# Patient Record
Sex: FEMALE | Race: WHITE | Employment: OTHER | URBAN - METROPOLITAN AREA
[De-identification: names, ages, dates, MRNs, and addresses within clinical notes are randomized per-mention and may not be internally consistent; named-entity substitution may affect disease eponyms.]

---

## 2017-01-05 ENCOUNTER — OFFICE VISIT (OUTPATIENT)
Dept: INTERNAL MEDICINE | Facility: CLINIC | Age: 82
End: 2017-01-05
Payer: COMMERCIAL

## 2017-01-05 VITALS
TEMPERATURE: 97.7 F | OXYGEN SATURATION: 97 % | BODY MASS INDEX: 17.67 KG/M2 | WEIGHT: 90 LBS | HEART RATE: 110 BPM | DIASTOLIC BLOOD PRESSURE: 70 MMHG | SYSTOLIC BLOOD PRESSURE: 140 MMHG | HEIGHT: 60 IN

## 2017-01-05 DIAGNOSIS — M25.50 MULTIPLE JOINT PAIN: Primary | ICD-10-CM

## 2017-01-05 LAB — ERYTHROCYTE [SEDIMENTATION RATE] IN BLOOD BY WESTERGREN METHOD: 60 MM/H (ref 0–30)

## 2017-01-05 PROCEDURE — 99000 SPECIMEN HANDLING OFFICE-LAB: CPT | Performed by: INTERNAL MEDICINE

## 2017-01-05 PROCEDURE — 85652 RBC SED RATE AUTOMATED: CPT | Performed by: INTERNAL MEDICINE

## 2017-01-05 PROCEDURE — 86431 RHEUMATOID FACTOR QUANT: CPT | Mod: 90 | Performed by: INTERNAL MEDICINE

## 2017-01-05 PROCEDURE — 36415 COLL VENOUS BLD VENIPUNCTURE: CPT | Performed by: INTERNAL MEDICINE

## 2017-01-05 PROCEDURE — 86038 ANTINUCLEAR ANTIBODIES: CPT | Mod: 90 | Performed by: INTERNAL MEDICINE

## 2017-01-05 PROCEDURE — 99214 OFFICE O/P EST MOD 30 MIN: CPT | Performed by: INTERNAL MEDICINE

## 2017-01-05 PROCEDURE — 86200 CCP ANTIBODY: CPT | Mod: 90 | Performed by: INTERNAL MEDICINE

## 2017-01-05 RX ORDER — PREDNISONE 1 MG/1
TABLET ORAL
Qty: 63 TABLET | Refills: 0 | Status: SHIPPED
Start: 2017-01-05 | End: 2017-02-06

## 2017-01-05 NOTE — NURSING NOTE
Chief Complaint   Patient presents with     Recheck Medication       Initial /70 mmHg  Pulse 110  Temp(Src) 97.7  F (36.5  C) (Oral)  Ht 5' (1.524 m)  Wt 90 lb (40.824 kg)  BMI 17.58 kg/m2  SpO2 97% Estimated body mass index is 17.58 kg/(m^2) as calculated from the following:    Height as of this encounter: 5' (1.524 m).    Weight as of this encounter: 90 lb (40.824 kg).  BP completed using cuff size: regular

## 2017-01-05 NOTE — MR AVS SNAPSHOT
"              After Visit Summary   2017    Ana Cristina Dominguez    MRN: [id number]           Patient Information     Date Of Birth          1931        Visit Information        Provider Department      2017 9:40 AM Alexsandra Davison MD [facility]        Today's Diagnoses     Multiple joint pain    -  1        Follow-ups after your visit        Who to contact     If you have questions or need follow up information about today's clinic visit or your schedule please contact [facility] directly at [phone].  Normal or non-critical lab and imaging results will be communicated to you by Spinomixhart, letter or phone within 4 business days after the clinic has received the results. If you do not hear from us within 7 days, please contact the clinic through Spinomixhart or phone. If you have a critical or abnormal lab result, we will notify you by phone as soon as possible.  Submit refill requests through [email or web address] or call your pharmacy and they will forward the refill request to us. Please allow 3 business days for your refill to be completed.          Additional Information About Your Visit        MyChart Information     [email or web address] lets you send messages to your doctor, view your test results, renew your prescriptions, schedule appointments and more. To sign up, go to [email or web address] . Click on \"Log in\" on the left side of the screen, which will take you to the Welcome page. Then click on \"Sign up Now\" on the right side of the page.     You will be asked to enter the access code listed below, as well as some personal information. Please follow the directions to create your username and password.     Your access code is: [email or web address]  Expires: 2017 10:04 AM     Your access code will  in 90 days. If you need help or a new code, please call your [facility] or [phone].        Care EveryWhere ID     This is your Care EveryWhere ID. This could be used by other " organizations to access your [place] medical records  [id number]        Your Vitals Were     Pulse Temperature Height BMI (Body Mass Index) Pulse Oximetry       110 97.7  F (36.5  C) (Oral) 5' (1.524 m) 17.58 kg/m2 97%        Blood Pressure from Last 3 Encounters:   01/05/17 140/70   12/06/16 156/72   11/08/16 130/50    Weight from Last 3 Encounters:   01/05/17 90 lb (40.824 kg)   12/06/16 90 lb 9.6 oz (41.096 kg)   11/08/16 89 lb 1.6 oz (40.415 kg)              We Performed the Following     SHIRLEY Screen IFA ([facility])     Cyclic Citrullinated Peptide Antibody IgG     ESR: Erythrocyte sedimentation rate     Rheumatoid factor          Today's Medication Changes          These changes are accurate as of: 1/5/17 10:04 AM.  If you have any questions, ask your nurse or doctor.               Start taking these medicines.        Dose/Directions    predniSONE 1 MG tablet   Commonly known as:  DELTASONE   Used for:  Multiple joint pain   Started by:  Alexsandra Davison MD        4 tabs po bid for 3 days, then 3 tabs po bid for 3 days, then 2 tabs po bid for 3 days then 1 tab po bid for 3 days, then 1 po daily for 3 days then stop.   Quantity:  63 tablet   Refills:  0            Where to get your medicines      These medications were sent to [facility]/pharmacy #[id number] - [place], [place] - [address], [place] [address]     Phone:  [phone]    - predniSONE 1 MG tablet             Primary Care Provider Office Phone # Fax #    Alexsandra Davison [phone]       [place] [address]  [facility] [address]        Thank you!     Thank you for choosing [facility]  for your care. Our goal is always to provide you with excellent care. Hearing back from our patients is one way we can continue to improve our services. Please take a few minutes to complete the written survey that you may receive in the mail after your visit with us. Thank you!             Your  Updated Medication List - Protect others around you: Learn how to safely use, store and throw away your medicines at www.disposemymeds.org.          This list is accurate as of: 1/5/17 10:05 AM.  Always use your most recent med list.                   Brand Name Dispense Instructions for use    ascorbic acid 500 MG tablet    VITAMIN C    100    1 TABLET DAILY       CALCIUM 600 + D 600-200 MG-UNIT Tabs      2 qd       diltiazem 120 MG Cp12 12 hr SR capsule    CARDIZEM SR    180 capsule    Take 120 mg by mouth 2 times daily       metoprolol 50 MG 24 hr tablet    TOPROL-XL    135 tablet    Take 1.5 tablets (75 mg) by mouth daily       MULTIVITAMIN TABS   OR      1 qd       olopatadine 0.1 % ophthalmic solution    PATANOL     Place 1 drop into both eyes 2 times daily as needed for allergies During Spring       predniSONE 1 MG tablet    DELTASONE    63 tablet    4 tabs po bid for 3 days, then 3 tabs po bid for 3 days, then 2 tabs po bid for 3 days then 1 tab po bid for 3 days, then 1 po daily for 3 days then stop.       rivaroxaban ANTICOAGULANT 20 MG Tabs tablet    XARELTO    90 tablet    Take 1 tablet (20 mg) by mouth daily (with dinner)       rOPINIRole 0.5 MG tablet    REQUIP    30 tablet    1/2 po qhs x 5-7 days, then 1 tablet       vitamin E 400 UNIT capsule     100    ONE CAP PO QD

## 2017-01-05 NOTE — PROGRESS NOTES
SUBJECTIVE:                                                    Ana Cristina Dominguez is a 85 year old female who presents to clinic today for the following health issues:    Joint pains: after her last visit with me with hand swelling, she saw Dr. Antonio from ortho and was put on a medrol dose pack. Within a day or so of finishing she started to have significant pain in feet, ankles, knees with swelling in the ankles. She thought it was due to the diltiazem started in October so stopped it. She then started on prednisone 5 mg bid she had from her allergist. The joint pains seemed to resolve. She ran out with last dose yesterday am and today she is starting to have significant pain in her toes again.   She is concerned this is rheumatoid arthritis after speaking with a friend who has it.   She was not having any issues other than her hands until after the medrol.   She went back on the diltiazem without worsening.     Patient Active Problem List   Diagnosis     Fracture, pelvis closed (H)     HYPERLIPIDEMIA LDL GOAL <160     Achilles bursitis or tendinitis     Senile osteoporosis     RLS (restless legs syndrome)     Abnormal blood sugar     Atrial fibrillation (H)     HTN (hypertension)     Diarrhea     Current Outpatient Prescriptions   Medication Sig Dispense Refill     diltiazem (CARDIZEM SR) 120 MG CP12 12 hr SR capsule Take 120 mg by mouth 2 times daily 180 capsule 3     rivaroxaban ANTICOAGULANT (XARELTO) 20 MG TABS tablet Take 1 tablet (20 mg) by mouth daily (with dinner) 90 tablet 1     metoprolol (TOPROL-XL) 50 MG 24 hr tablet Take 1.5 tablets (75 mg) by mouth daily 135 tablet 3     VITAMIN E 400 UNIT OR CAPS ONE CAP PO  0     VITAMIN C 500 MG OR TABS 1 TABLET DAILY 100 0     MULTIVITAMIN TABS   OR 1 qd       CALCIUM 600 + D 600-200 MG-IU OR TABS 2 qd       olopatadine (PATANOL) 0.1 % ophthalmic solution Place 1 drop into both eyes 2 times daily as needed for allergies During Spring       rOPINIRole  (REQUIP) 0.5 MG tablet 1/2 po qhs x 5-7 days, then 1 tablet 30 tablet 5      Social History   Substance Use Topics     Smoking status: Former Smoker     Quit date: 05/06/1973     Smokeless tobacco: Never Used     Alcohol Use: Yes      Comment: Socially          ROS:  No fever, chills    OBJECTIVE:                                                    /70 mmHg  Pulse 110  Temp(Src) 97.7  F (36.5  C) (Oral)  Ht 5' (1.524 m)  Wt 90 lb (40.824 kg)  BMI 17.58 kg/m2  SpO2 97%  Body mass index is 17.58 kg/(m^2).    Hand joints: no effusions or synovitis     ASSESSMENT/PLAN:                                                            1. Multiple joint pain  Advised that likely her acute joint symptoms were due to stopping the steroid and that exam is not suggestive of RA but will check labs. Will restart prednisone low dose and wean gradually to reduce joint flare.  - Rheumatoid factor  - Cyclic Citrullinated Peptide Antibody IgG  - ESR: Erythrocyte sedimentation rate  - predniSONE (DELTASONE) 1 MG tablet; 4 tabs po bid for 3 days, then 3 tabs po bid for 3 days, then 2 tabs po bid for 3 days then 1 tab po bid for 3 days, then 1 po daily for 3 days then stop.  Dispense: 63 tablet; Refill: 0  - Antinuclear antibody screen by EIA        Alexsandra Davison MD  [facility]    25 minutes spent with the patient, >50% of time spent counseling about RA versus osteoarthritis, steroid side effects.

## 2017-01-06 LAB
ANA SER QL IA: NORMAL
CCP AB SER IA-ACNC: 1 U/ML
RHEUMATOID FACT SER NEPH-ACNC: <20 IU/ML (ref 0–20)

## 2017-01-23 ENCOUNTER — TELEPHONE (OUTPATIENT)
Dept: INTERNAL MEDICINE | Facility: CLINIC | Age: 82
End: 2017-01-23

## 2017-01-23 DIAGNOSIS — M25.50 PAIN IN JOINT, MULTIPLE SITES: Primary | ICD-10-CM

## 2017-01-23 NOTE — TELEPHONE ENCOUNTER
Pt walked into clinic today wanting to know what the next step is as she is having inflammation in Rt hand and bilat ankles, feels worse after completing the medrol lilly, difficulty putting on shoes and socks, hurts to carry anything  Taking tylenol rates pain 8/10 in am once gets going 3-4 during day.   Spoke with PCP recommend appt w/ortho. For F/U  appt scheduled for [facility] hand specialist Jan 30 8:30 BV Dr. Parisi.  Pt declined appt at [facility] d/t driving over the rvr.  Alia Sandhu RN

## 2017-02-03 ENCOUNTER — OFFICE VISIT (OUTPATIENT)
Dept: INTERNAL MEDICINE | Facility: CLINIC | Age: 82
End: 2017-02-03
Payer: COMMERCIAL

## 2017-02-03 VITALS
DIASTOLIC BLOOD PRESSURE: 72 MMHG | OXYGEN SATURATION: 97 % | TEMPERATURE: 97.5 F | RESPIRATION RATE: 16 BRPM | WEIGHT: 91.5 LBS | BODY MASS INDEX: 17.96 KG/M2 | HEART RATE: 74 BPM | HEIGHT: 60 IN | SYSTOLIC BLOOD PRESSURE: 176 MMHG

## 2017-02-03 DIAGNOSIS — M19.041 PRIMARY OSTEOARTHRITIS OF BOTH HANDS: ICD-10-CM

## 2017-02-03 DIAGNOSIS — I10 ESSENTIAL HYPERTENSION: ICD-10-CM

## 2017-02-03 DIAGNOSIS — R60.9 EDEMA, UNSPECIFIED TYPE: Primary | ICD-10-CM

## 2017-02-03 DIAGNOSIS — M19.042 PRIMARY OSTEOARTHRITIS OF BOTH HANDS: ICD-10-CM

## 2017-02-03 PROCEDURE — 82040 ASSAY OF SERUM ALBUMIN: CPT | Performed by: INTERNAL MEDICINE

## 2017-02-03 PROCEDURE — 80048 BASIC METABOLIC PNL TOTAL CA: CPT | Performed by: INTERNAL MEDICINE

## 2017-02-03 PROCEDURE — 99214 OFFICE O/P EST MOD 30 MIN: CPT | Performed by: INTERNAL MEDICINE

## 2017-02-03 PROCEDURE — 36415 COLL VENOUS BLD VENIPUNCTURE: CPT | Performed by: INTERNAL MEDICINE

## 2017-02-03 NOTE — NURSING NOTE
Chief Complaint   Patient presents with     RECHECK     f/u from swelling       Initial /72 mmHg  Pulse 74  Temp(Src) 97.5  F (36.4  C) (Oral)  Resp 16  Ht 5' (1.524 m)  Wt 91 lb 8 oz (41.504 kg)  BMI 17.87 kg/m2  SpO2 97% Estimated body mass index is 17.87 kg/(m^2) as calculated from the following:    Height as of this encounter: 5' (1.524 m).    Weight as of this encounter: 91 lb 8 oz (41.504 kg).  BP completed using cuff size: giuseppe Chambers, CMA

## 2017-02-03 NOTE — PROGRESS NOTES
SUBJECTIVE:                                                    Ana Cristina Dominguez is a 85 year old female who presents to clinic today for the following health issues:      Edema: bilateral lower leg edema started about 2-3 weeks ago, some time after her last visit with me on 1/5. There is no pain but they are uncomfortable and it is difficult to wear shoes. They do not go down much with elevation. It has been gradually increasing.  She had some edema before last visit that had resolved. This is worse.   She was on prednisone for her hand joints a while. It helped a lot. She was told by Dr. Parisi she could stay on low dose. He agreed her symptoms were consistent with osteoarthritis but suggested possible rheumatology referral per her history. Her knees and ankles are improved also.         Patient Active Problem List   Diagnosis     Fracture, pelvis closed (H)     HYPERLIPIDEMIA LDL GOAL <160     Achilles bursitis or tendinitis     Senile osteoporosis     RLS (restless legs syndrome)     Abnormal blood sugar     Atrial fibrillation (H)     HTN (hypertension)     Diarrhea     Current Outpatient Prescriptions   Medication Sig Dispense Refill     diltiazem (CARDIZEM SR) 120 MG CP12 12 hr SR capsule Take 120 mg by mouth 2 times daily 180 capsule 3     rivaroxaban ANTICOAGULANT (XARELTO) 20 MG TABS tablet Take 1 tablet (20 mg) by mouth daily (with dinner) 90 tablet 1     metoprolol (TOPROL-XL) 50 MG 24 hr tablet Take 1.5 tablets (75 mg) by mouth daily 135 tablet 3     VITAMIN E 400 UNIT OR CAPS ONE CAP PO  0     VITAMIN C 500 MG OR TABS 1 TABLET DAILY 100 0     MULTIVITAMIN TABS   OR 1 qd       CALCIUM 600 + D 600-200 MG-IU OR TABS 2 qd       predniSONE (DELTASONE) 1 MG tablet 4 tabs po bid for 3 days, then 3 tabs po bid for 3 days, then 2 tabs po bid for 3 days then 1 tab po bid for 3 days, then 1 po daily for 3 days then stop. 63 tablet 0     olopatadine (PATANOL) 0.1 % ophthalmic solution Place 1 drop  into both eyes 2 times daily as needed for allergies During Spring       rOPINIRole (REQUIP) 0.5 MG tablet 1/2 po qhs x 5-7 days, then 1 tablet 30 tablet 5        ROS:  No fever, chills, leg pain, chest pain, dyspnea, palpitations or tachycardia, PND, orthopnea, abdominal bloating.     OBJECTIVE:                                                    /72 mmHg  Pulse 74  Temp(Src) 97.5  F (36.4  C) (Oral)  Resp 16  Ht 5' (1.524 m)  Wt 91 lb 8 oz (41.504 kg)  BMI 17.87 kg/m2  SpO2 97%  Body mass index is 17.87 kg/(m^2).    CV: normal S1, S2 with 3/6 ZORAIDA, no JVD  Lungs: clear  Abdomen: Bowel sounds normal, soft, nontender. No hepatosplenomegaly. No masses.   Legs: 2-3+ edema, extends almost to knees, no calf tenderness        ASSESSMENT/PLAN:                                                            1. Edema, unspecified type  Discussed at length that this may be related to fluid retention due to steroids, venous insufficiency. It could be change in renal status with elevated BP. Advised the edema is not due to her joint symptoms. Check labs, then restart lasix and consider abdominal imaging to rule out obstruction  - Basic metabolic panel  - Albumin level    2. Joint pains: improved, strongly recommend against chronic prednisone due to osteoporosis with high risk of fracture, edema issues and HTN.     3. HTN; check labs, may be stress related, will need to recheck soon.    Alexsandra Davison MD  [facility]

## 2017-02-03 NOTE — MR AVS SNAPSHOT
"              After Visit Summary   2/3/2017    Ana Cristina Dominguez    MRN: [id number]           Patient Information     Date Of Birth          1931        Visit Information        Provider Department      2/3/2017 2:20 PM Alexsandra Davison MD [facility]        Today's Diagnoses     Edema, unspecified type    -  1        Follow-ups after your visit        Who to contact     If you have questions or need follow up information about today's clinic visit or your schedule please contact [facility] directly at [phone].  Normal or non-critical lab and imaging results will be communicated to you by Biz360hart, letter or phone within 4 business days after the clinic has received the results. If you do not hear from us within 7 days, please contact the clinic through Biz360hart or phone. If you have a critical or abnormal lab result, we will notify you by phone as soon as possible.  Submit refill requests through Rip van Wafels or call your pharmacy and they will forward the refill request to us. Please allow 3 business days for your refill to be completed.          Additional Information About Your Visit        MyChart Information     Rip van Wafels lets you send messages to your doctor, view your test results, renew your prescriptions, schedule appointments and more. To sign up, go to [email or web address] . Click on \"Log in\" on the left side of the screen, which will take you to the Welcome page. Then click on \"Sign up Now\" on the right side of the page.     You will be asked to enter the access code listed below, as well as some personal information. Please follow the directions to create your username and password.     Your access code is: [email or web address]  Expires: 2017 10:04 AM     Your access code will  in 90 days. If you need help or a new code, please call your [facility] or [phone].        Care EveryWhere ID     This is your Care EveryWhere ID. This could be used by other " organizations to access your [place] medical records  [id number]        Your Vitals Were     Pulse Temperature Respirations Height BMI (Body Mass Index) Pulse Oximetry    74 97.5  F (36.4  C) (Oral) 16 5' (1.524 m) 17.87 kg/m2 97%       Blood Pressure from Last 3 Encounters:   02/03/17 176/72   01/05/17 140/70   12/06/16 156/72    Weight from Last 3 Encounters:   02/03/17 91 lb 8 oz (41.504 kg)   01/05/17 90 lb (40.824 kg)   12/06/16 90 lb 9.6 oz (41.096 kg)              We Performed the Following     Albumin level     Basic metabolic panel        Primary Care Provider Office Phone # Fax #    Alexsandra Davison [phone]       [facility] [address] [facility] [address]  [facility] [address]        Thank you!     Thank you for choosing [facility]  for your care. Our goal is always to provide you with excellent care. Hearing back from our patients is one way we can continue to improve our services. Please take a few minutes to complete the written survey that you may receive in the mail after your visit with us. Thank you!             Your Updated Medication List - Protect others around you: Learn how to safely use, store and throw away your medicines at www.disposemymeds.org.          This list is accurate as of: 2/3/17  3:06 PM.  Always use your most recent med list.                   Brand Name Dispense Instructions for use    ascorbic acid 500 MG tablet    VITAMIN C    100    1 TABLET DAILY       CALCIUM 600 + D 600-200 MG-UNIT Tabs      2 qd       diltiazem 120 MG Cp12 12 hr SR capsule    CARDIZEM SR    180 capsule    Take 120 mg by mouth 2 times daily       metoprolol 50 MG 24 hr tablet    TOPROL-XL    135 tablet    Take 1.5 tablets (75 mg) by mouth daily       MULTIVITAMIN TABS   OR      1 qd       olopatadine 0.1 % ophthalmic solution    PATANOL     Place 1 drop into both eyes 2 times daily as needed for allergies During Spring       predniSONE 1 MG tablet    DELTASONE    63  tablet    4 tabs po bid for 3 days, then 3 tabs po bid for 3 days, then 2 tabs po bid for 3 days then 1 tab po bid for 3 days, then 1 po daily for 3 days then stop.       rivaroxaban ANTICOAGULANT 20 MG Tabs tablet    XARELTO    90 tablet    Take 1 tablet (20 mg) by mouth daily (with dinner)       rOPINIRole 0.5 MG tablet    REQUIP    30 tablet    1/2 po qhs x 5-7 days, then 1 tablet       vitamin E 400 UNIT capsule     100    ONE CAP PO QD

## 2017-02-04 LAB
ALBUMIN SERPL-MCNC: 3.3 G/DL (ref 3.4–5)
ANION GAP SERPL CALCULATED.3IONS-SCNC: 7 MMOL/L (ref 3–14)
BUN SERPL-MCNC: 21 MG/DL (ref 7–30)
CALCIUM SERPL-MCNC: 9.5 MG/DL (ref 8.5–10.1)
CHLORIDE SERPL-SCNC: 96 MMOL/L (ref 94–109)
CO2 SERPL-SCNC: 31 MMOL/L (ref 20–32)
CREAT SERPL-MCNC: 0.84 MG/DL (ref 0.52–1.04)
GFR SERPL CREATININE-BSD FRML MDRD: 64 ML/MIN/1.7M2
GLUCOSE SERPL-MCNC: 93 MG/DL (ref 70–99)
POTASSIUM SERPL-SCNC: 4.1 MMOL/L (ref 3.4–5.3)
SODIUM SERPL-SCNC: 134 MMOL/L (ref 133–144)

## 2017-02-06 ENCOUNTER — TELEPHONE (OUTPATIENT)
Dept: INTERNAL MEDICINE | Facility: CLINIC | Age: 82
End: 2017-02-06

## 2017-02-06 NOTE — TELEPHONE ENCOUNTER
Please call and advise her kidney tests are normal, protein stable. Ask if any changes in legs. She can go back on the lasix 20 mg daily. I will probably order US abdomen next.

## 2017-02-06 NOTE — TELEPHONE ENCOUNTER
Spoke with pt and let her know message below.  She will call us back if no or little change in edema, she says it's about the same currently. She is going to start the Lasix.

## 2017-02-20 ENCOUNTER — OFFICE VISIT (OUTPATIENT)
Dept: INTERNAL MEDICINE | Facility: CLINIC | Age: 82
End: 2017-02-20
Payer: COMMERCIAL

## 2017-02-20 VITALS
HEIGHT: 60 IN | SYSTOLIC BLOOD PRESSURE: 150 MMHG | WEIGHT: 90.1 LBS | TEMPERATURE: 97.8 F | OXYGEN SATURATION: 97 % | RESPIRATION RATE: 16 BRPM | HEART RATE: 86 BPM | DIASTOLIC BLOOD PRESSURE: 71 MMHG | BODY MASS INDEX: 17.69 KG/M2

## 2017-02-20 DIAGNOSIS — R60.9 EDEMA, UNSPECIFIED TYPE: Primary | ICD-10-CM

## 2017-02-20 DIAGNOSIS — I48.20 CHRONIC ATRIAL FIBRILLATION (H): ICD-10-CM

## 2017-02-20 PROCEDURE — 99214 OFFICE O/P EST MOD 30 MIN: CPT | Performed by: INTERNAL MEDICINE

## 2017-02-20 RX ORDER — FUROSEMIDE 20 MG
40 TABLET ORAL DAILY
Qty: 60 TABLET | Refills: 5 | Status: SHIPPED | OUTPATIENT
Start: 2017-02-20 | End: 2017-04-07

## 2017-02-20 NOTE — MR AVS SNAPSHOT
"              After Visit Summary   2/20/2017    Ana Cristina Dominguez    MRN: [id number]           Patient Information     Date Of Birth          6/6/1931        Visit Information        Provider Department      2/20/2017 2:20 PM Alexsandra Davison MD [facility]        Today's Diagnoses     Edema, unspecified type    -  1    Chronic atrial fibrillation (H)          Care Instructions    Take 2 of the furosemide but try later in the morning.   Do not take the diltiazem for up to a week to see if the swelling improves.   Take metoprolol 2 tablets daily while off the diltiazem.                Follow-ups after your visit        Who to contact     If you have questions or need follow up information about today's clinic visit or your schedule please contact [facility] directly at [phone].  Normal or non-critical lab and imaging results will be communicated to you by MyChart, letter or phone within 4 business days after the clinic has received the results. If you do not hear from us within 7 days, please contact the clinic through MyChart or phone. If you have a critical or abnormal lab result, we will notify you by phone as soon as possible.  Submit refill requests through Funderbeam or call your pharmacy and they will forward the refill request to us. Please allow 3 business days for your refill to be completed.          Additional Information About Your Visit        MyChart Information     Funderbeam lets you send messages to your doctor, view your test results, renew your prescriptions, schedule appointments and more. To sign up, go to [email or web address] . Click on \"Log in\" on the left side of the screen, which will take you to the Welcome page. Then click on \"Sign up Now\" on the right side of the page.     You will be asked to enter the access code listed below, as well as some personal information. Please follow the directions to create your username and password.     Your access code " is: [email or web address]  Expires: 2017 10:04 AM     Your access code will  in 90 days. If you need help or a new code, please call your [facility] or [phone].        Care EveryWhere ID     This is your Care EveryWhere ID. This could be used by other organizations to access your [place] medical records  [id number]        Your Vitals Were     Pulse Temperature Respirations Height Pulse Oximetry BMI (Body Mass Index)    86 97.8  F (36.6  C) (Oral) 16 5' (1.524 m) 97% 17.6 kg/m2       Blood Pressure from Last 3 Encounters:   17 150/71   17 176/72   17 140/70    Weight from Last 3 Encounters:   17 90 lb 1.6 oz (40.9 kg)   17 91 lb 8 oz (41.5 kg)   17 90 lb (40.8 kg)              Today, you had the following     No orders found for display         Today's Medication Changes          These changes are accurate as of: 17  2:52 PM.  If you have any questions, ask your nurse or doctor.               Start taking these medicines.        Dose/Directions    furosemide 20 MG tablet   Commonly known as:  LASIX   Used for:  Edema, unspecified type   Started by:  Alexsandra Davison MD        Dose:  40 mg   Take 2 tablets (40 mg) by mouth daily   Quantity:  60 tablet   Refills:  5            Where to get your medicines      These medications were sent to [facility]/pharmacy #[id number] - [place], [place] - [address], [place] [address]     Phone:  [phone]     furosemide 20 MG tablet    rivaroxaban ANTICOAGULANT 20 MG Tabs tablet                Primary Care Provider Office Phone # Fax #    Alexsandra Davison [phone]       [facility] [address]  [facility] [address]        Thank you!     Thank you for choosing [facility]  for your care. Our goal is always to provide you with excellent care. Hearing back from our patients is one way we can continue to improve our services. Please take a few minutes to  complete the written survey that you may receive in the mail after your visit with us. Thank you!             Your Updated Medication List - Protect others around you: Learn how to safely use, store and throw away your medicines at www.disposemymeds.org.          This list is accurate as of: 2/20/17  2:52 PM.  Always use your most recent med list.                   Brand Name Dispense Instructions for use    ascorbic acid 500 MG tablet    VITAMIN C    100    1 TABLET DAILY       CALCIUM 600 + D 600-200 MG-UNIT Tabs      2 qd       diltiazem 120 MG Cp12 12 hr SR capsule    CARDIZEM SR    180 capsule    Take 120 mg by mouth 2 times daily       furosemide 20 MG tablet    LASIX    60 tablet    Take 2 tablets (40 mg) by mouth daily       metoprolol 50 MG 24 hr tablet    TOPROL-XL    135 tablet    Take 1.5 tablets (75 mg) by mouth daily       MULTIVITAMIN TABS   OR      1 qd       olopatadine 0.1 % ophthalmic solution    PATANOL     Place 1 drop into both eyes 2 times daily as needed for allergies During Spring       rivaroxaban ANTICOAGULANT 20 MG Tabs tablet    XARELTO    30 tablet    Take 1 tablet (20 mg) by mouth daily (with dinner)       rOPINIRole 0.5 MG tablet    REQUIP    30 tablet    1/2 po qhs x 5-7 days, then 1 tablet       vitamin E 400 UNIT capsule     100    ONE CAP PO QD

## 2017-02-20 NOTE — PATIENT INSTRUCTIONS
Take 2 of the furosemide but try later in the morning.   Do not take the diltiazem for up to a week to see if the swelling improves.   Take metoprolol 2 tablets daily while off the diltiazem.

## 2017-02-20 NOTE — NURSING NOTE
Chief Complaint   Patient presents with     Edema     Swelling and in pain, arthritis pain       Initial /71  Pulse 86  Temp 97.8  F (36.6  C) (Oral)  Resp 16  Ht 5' (1.524 m)  Wt 90 lb 1.6 oz (40.9 kg)  SpO2 97%  BMI 17.6 kg/m2 Estimated body mass index is 17.6 kg/(m^2) as calculated from the following:    Height as of this encounter: 5' (1.524 m).    Weight as of this encounter: 90 lb 1.6 oz (40.9 kg).  Medication Reconciliation: complete      Oriana Chambers, CMA

## 2017-02-20 NOTE — PROGRESS NOTES
SUBJECTIVE:                                                    Ana Cristina Dominguez is a 85 year old female who presents to clinic today for the following health issues:      Follow-up edema: She restarted on Lasix 2 weeks ago at one tablet in the morning for 3 days. She had minimal results with that. She increased it to 2 tablets in the morning And it did seem to help a little bit more for several days, now has been more stable. She continues to have discomfort related to the edema and having difficulty wearing shoes.  She apparently did not get called to schedule the abdominal ultrasound.   She continues to have a difficult time understanding that her joint aches/osteoarthritis are not causing her edema.  She had been started on diltiazem in October. Her edema did not start for a while after that. She tried to hold it for about 3 days without much change. There are no other new medication changes, no over-the-counter medications.      Patient Active Problem List   Diagnosis     Fracture, pelvis closed (H)     HYPERLIPIDEMIA LDL GOAL <160     Achilles bursitis or tendinitis     Senile osteoporosis     RLS (restless legs syndrome)     Abnormal blood sugar     Atrial fibrillation (H)     HTN (hypertension)     Diarrhea     Current Outpatient Prescriptions   Medication Sig Dispense Refill     furosemide (LASIX) 20 MG tablet Take 2 tablets (40 mg) by mouth daily 60 tablet 5     rivaroxaban ANTICOAGULANT (XARELTO) 20 MG TABS tablet Take 1 tablet (20 mg) by mouth daily (with dinner) 30 tablet 11     diltiazem (CARDIZEM SR) 120 MG CP12 12 hr SR capsule Take 120 mg by mouth 2 times daily 180 capsule 3     olopatadine (PATANOL) 0.1 % ophthalmic solution Place 1 drop into both eyes 2 times daily as needed for allergies During Spring       metoprolol (TOPROL-XL) 50 MG 24 hr tablet Take 1.5 tablets (75 mg) by mouth daily 135 tablet 3     rOPINIRole (REQUIP) 0.5 MG tablet 1/2 po qhs x 5-7 days, then 1 tablet 30 tablet 5      VITAMIN E 400 UNIT OR CAPS ONE CAP PO  0     VITAMIN C 500 MG OR TABS 1 TABLET DAILY 100 0     MULTIVITAMIN TABS   OR 1 qd       CALCIUM 600 + D 600-200 MG-IU OR TABS 2 qd       [DISCONTINUED] rivaroxaban ANTICOAGULANT (XARELTO) 20 MG TABS tablet Take 1 tablet (20 mg) by mouth daily (with dinner) 90 tablet 1      Social History   Substance Use Topics     Smoking status: Former Smoker     Quit date: 5/6/1973     Smokeless tobacco: Never Used     Alcohol use Yes      Comment: Socially        ROS:  No abdominal pain, cough, shortness of breath, PND, orthopnea. She has minimal ankle joint soreness. She has moderate knee joint soreness.    OBJECTIVE:                                                    /71  Pulse 86  Temp 97.8  F (36.6  C) (Oral)  Resp 16  Ht 5' (1.524 m)  Wt 90 lb 1.6 oz (40.9 kg)  SpO2 97%  BMI 17.6 kg/m2  Body mass index is 17.6 kg/(m^2).    Legs: 2+ edema without change  Ankle joints without effusions or tenderness        ASSESSMENT/PLAN:                                                            1. Edema, unspecified type  I explained to her again that her joint aches/osteoarthritis are not causing the edema. It is most likely related to vein insufficiency. It is possible the diltiazem as a factor, though I would've expected symptoms to started sooner after starting the medication. I did recommend though that she stopped taking the diltiazem for the time being, at least a week. Increase the metoprolol to 100 mg a day to help avoid any rapid A. Fib. Take her Lasix 40 mg mid to late morning. I will have the nurse call in 4 days for an update on her symptoms. Unfortunately is very difficult for her to wear support stockings and she cannot pull them up with herosteoarthritis.  - furosemide (LASIX) 20 MG tablet; Take 2 tablets (40 mg) by mouth daily  Dispense: 60 tablet; Refill: 5      Alexsandra Davison MD  [facility]    25 minutes spent with the patient, >50% of time spent  counseling About edema, venous insufficiency, osteoarthritis, the evaluation we have done so far

## 2017-02-24 ENCOUNTER — TELEPHONE (OUTPATIENT)
Dept: INTERNAL MEDICINE | Facility: CLINIC | Age: 82
End: 2017-02-24

## 2017-02-24 DIAGNOSIS — R60.9 EDEMA, UNSPECIFIED TYPE: Primary | ICD-10-CM

## 2017-02-24 DIAGNOSIS — M25.562 KNEE PAIN, BILATERAL: ICD-10-CM

## 2017-02-24 DIAGNOSIS — M25.561 KNEE PAIN, BILATERAL: ICD-10-CM

## 2017-02-24 NOTE — TELEPHONE ENCOUNTER
Call to pt. She would like to give it a few more days. It is slightly better.     Informed her that we will call her early next week.

## 2017-02-28 PROBLEM — R60.9 EDEMA, UNSPECIFIED TYPE: Status: ACTIVE | Noted: 2017-02-28

## 2017-02-28 NOTE — TELEPHONE ENCOUNTER
Does she think stopping the diltiazem has made any difference in the swelling?  I planned that she should continue the lasix 2 daily.   She should do lab to check potassium, kidneys in 1-2 weeks with this dose of lasix.   She should see ortho for the knee, Dr. Parisi is really hand so she would see someone else there, she can call or we can do through [employer].

## 2017-02-28 NOTE — TELEPHONE ENCOUNTER
Call to pt. She state there is a slight improvement.     She is really hobbling around. Her right knee is painful. Using Tylenol for the pain. She states it is swollen, but not worse than the left.     She states she doesn't need to use her walker.     She would like to use the Lasix for a few more days.     She is asking about xrays or office visit or specialist?

## 2017-03-01 ENCOUNTER — HOSPITAL ENCOUNTER (OUTPATIENT)
Dept: ULTRASOUND IMAGING | Facility: CLINIC | Age: 82
Discharge: HOME | End: 2017-03-01
Attending: INTERNAL MEDICINE | Admitting: INTERNAL MEDICINE
Payer: MEDICARE

## 2017-03-01 DIAGNOSIS — R60.9 EDEMA, UNSPECIFIED TYPE: ICD-10-CM

## 2017-03-01 PROCEDURE — 76705 ECHO EXAM OF ABDOMEN: CPT

## 2017-03-06 RX ORDER — BUMETANIDE 0.5 MG/1
0.5 TABLET ORAL
Qty: 60 TABLET | Refills: 1 | Status: SHIPPED | OUTPATIENT
Start: 2017-03-06 | End: 2017-05-13

## 2017-03-06 NOTE — TELEPHONE ENCOUNTER
Stop lasix and start bumex 1 tablet twice a day; first in am and the second around 1-2 pm. She should go back to Dr. Teixeira at [facility] for her knee.

## 2017-03-06 NOTE — TELEPHONE ENCOUNTER
Call to pt. States she does not think stopping the Diltiazen has helped at all. States her feet and knees are still quite swollen. States her legs are slightly better, but not her feet or knees. She has been taking 2 lasix daily and elevating as much as she can. When I advised pt that she needs labs done, she stated that she does not feel the lasix is doing any good so she is wondering why she is on it. She is also asking for a recommendation for who to see for her knee. Pt expressed a lot of frustration because the swelling is not getting any better, she doesn't know what to do and feels she is not getting much direction. Assured pt we would enter an order for an ortho referral for her knees and message would be sent to PCP about the swelling. Pt states she is getting depressed because she can not leave the house because her feet are so swollen that she can't get shoes on. Please advise.

## 2017-03-08 NOTE — TELEPHONE ENCOUNTER
Call to pt. Advised. She states Dr Teixeira works on hands. She wants to see Dr Aniceto Diaz.   She will  the Diuretic.     She wants us to schedule her with [facility] Dr Diaz.   Will call tomorrow.

## 2017-03-09 NOTE — TELEPHONE ENCOUNTER
Call to [facility] and scheduled OV with Dr Teixeira.   The  states Dr Teixeira mainly works on knees.   The  states they like to keep pts with the same provider.     Thursday, March 23rd. 10:30. Neli.     Call to pt and advised. She agrees.

## 2017-03-17 ENCOUNTER — TELEPHONE (OUTPATIENT)
Dept: INTERNAL MEDICINE | Facility: CLINIC | Age: 82
End: 2017-03-17

## 2017-03-17 NOTE — TELEPHONE ENCOUNTER
"Pt calling.  Has been off Diltiazem x 2 wks and taking Metoprolol 50mg 2 tabs daily.      Had to restart the Diltiazem today d/t felt her heart \"thumping and racing\" and was exhausted.    She will restart Diltiazem 120mg BID and cut her Metoprolol back to 1 1/2 tabs daily.    Anything further?    Please advise, thanks.  "

## 2017-03-20 NOTE — TELEPHONE ENCOUNTER
Attempted to contact pt to get update but no answer and voicemail not set up yet.    Will try again later.

## 2017-03-21 NOTE — TELEPHONE ENCOUNTER
Spoke to patient-    Pt has appt with Dr Antonio, Orthopedist on Thursday. Pt will wait to make appt with [facility] after she see's Dr Antonio. Pt reports that the swelling has gone down a bit more on her legs but her ankles and her feet are double the size from before. Pt continues to take her water pill and have been taking her Diltilazem with no side effects. Pt thinks she will continue to water pill for another week. Pt is using compression stockings that she bought from her local pharmacy and feels that it is not the best but insurance will not pay for nicer ones.     This CMA asked pt if there was anything wrong with her phone because we have a hard time reaching her. Pt stated that pt walks really slow due to the swelling and pain of her legs and feet, most of the time the phone will stop ringing by the time she gets to the phone. PT will try to call back but wait on hold for too long. No voicemail.

## 2017-03-27 ENCOUNTER — TELEPHONE (OUTPATIENT)
Dept: INTERNAL MEDICINE | Facility: CLINIC | Age: 82
End: 2017-03-27

## 2017-04-07 ENCOUNTER — OFFICE VISIT (OUTPATIENT)
Dept: INTERNAL MEDICINE | Facility: CLINIC | Age: 82
End: 2017-04-07
Payer: COMMERCIAL

## 2017-04-07 VITALS
HEART RATE: 92 BPM | BODY MASS INDEX: 17.85 KG/M2 | HEIGHT: 60 IN | WEIGHT: 90.9 LBS | DIASTOLIC BLOOD PRESSURE: 60 MMHG | SYSTOLIC BLOOD PRESSURE: 118 MMHG | OXYGEN SATURATION: 98 % | TEMPERATURE: 97.8 F

## 2017-04-07 DIAGNOSIS — R60.9 EDEMA, UNSPECIFIED TYPE: Primary | ICD-10-CM

## 2017-04-07 DIAGNOSIS — M25.561 PAIN IN BOTH KNEES, UNSPECIFIED CHRONICITY: ICD-10-CM

## 2017-04-07 DIAGNOSIS — M25.562 PAIN IN BOTH KNEES, UNSPECIFIED CHRONICITY: ICD-10-CM

## 2017-04-07 DIAGNOSIS — I48.20 CHRONIC ATRIAL FIBRILLATION (H): ICD-10-CM

## 2017-04-07 PROCEDURE — 99213 OFFICE O/P EST LOW 20 MIN: CPT | Performed by: INTERNAL MEDICINE

## 2017-04-07 NOTE — PROGRESS NOTES
SUBJECTIVE:                                                    Ana Cristina Dominguez is a 85 year old female who presents to clinic today for the following health issues:      1. Edema: she had improvement but decreased the diuretic back to one a day so worse again. No new symptoms.     2. Knee pain; she had injections and had decrease in swelling of her legs for about 6 days. Her knees continue to hurt.     3. Afib: started to have some tachycardia when holding diltiazem so restarted it. The edema was not changed at all when she held it.     Patient Active Problem List   Diagnosis     Fracture, pelvis closed (H)     HYPERLIPIDEMIA LDL GOAL <160     Achilles bursitis or tendinitis     Senile osteoporosis     RLS (restless legs syndrome)     Abnormal blood sugar     Atrial fibrillation (H)     HTN (hypertension)     Diarrhea     Edema, unspecified type     Current Outpatient Prescriptions   Medication Sig Dispense Refill     bumetanide (BUMEX) 0.5 MG tablet Take 1 tablet (0.5 mg) by mouth 2 times daily 60 tablet 1     rivaroxaban ANTICOAGULANT (XARELTO) 20 MG TABS tablet Take 1 tablet (20 mg) by mouth daily (with dinner) 30 tablet 11     diltiazem (CARDIZEM SR) 120 MG CP12 12 hr SR capsule Take 120 mg by mouth 2 times daily 180 capsule 3     olopatadine (PATANOL) 0.1 % ophthalmic solution Place 1 drop into both eyes 2 times daily as needed for allergies During Spring       metoprolol (TOPROL-XL) 50 MG 24 hr tablet Take 1.5 tablets (75 mg) by mouth daily 135 tablet 3     rOPINIRole (REQUIP) 0.5 MG tablet 1/2 po qhs x 5-7 days, then 1 tablet 30 tablet 5     VITAMIN E 400 UNIT OR CAPS ONE CAP PO  0     VITAMIN C 500 MG OR TABS 1 TABLET DAILY 100 0     MULTIVITAMIN TABS   OR 1 qd       CALCIUM 600 + D 600-200 MG-IU OR TABS 2 qd          Reviewed and updated as needed this visit by clinical staff  Tobacco  Allergies  Meds  Med Hx  Surg Hx  Fam Hx  Soc Hx      Reviewed and updated as needed this visit by  Provider         ROS:  No fever, chills, hand joints have not flared up, some soreness in ankles still without change. No PND, orthopnea, dyspnea on exertion, chest pains.     OBJECTIVE:                                                    /60  Pulse 92  Temp 97.8  F (36.6  C) (Oral)  Ht 5' (1.524 m)  Wt 90 lb 14.4 oz (41.2 kg)  SpO2 98%  BMI 17.75 kg/m2  Body mass index is 17.75 kg/(m^2).    2+ edema.        ASSESSMENT/PLAN:                                                            1. Edema, unspecified type  Advised that she should stay on the higher dose diuretic. She should do her lab in 2 weeks. It is not clear why it decreased with prednisone other than joints were better.     2. Chronic atrial fibrillation (H)  Stable back on med    3. Pain in both knees, unspecified chronicity  Recommend she return to ortho, Dr. Antonio, she may want to ask him if he thinks there could be any benefit from rheumatology referral but advised that the hand specialist did not think the cause was autoimmune and Dr. Antonio did not indicate that previously.           Alexsandra Davison MD  [facility]

## 2017-04-07 NOTE — MR AVS SNAPSHOT
After Visit Summary   4/7/2017    Ana Cristina Dominguez    MRN: [id number]           Patient Information     Date Of Birth          6/6/1931        Visit Information        Provider Department      4/7/2017 2:40 PM Alexsandra Davison MD [facility]        Today's Diagnoses     Edema, unspecified type    -  1    Chronic atrial fibrillation (H)        Pain in both knees, unspecified chronicity           Follow-ups after your visit        Follow-up notes from your care team     Return in about 10 days (around 4/17/2017) for Lab Work.      Your next 10 appointments already scheduled     Apr 17, 2017  9:45 AM CDT   LAB with [facility]   [facility] ([facility]    [address]  [facility] [address] [phone]           Patient must bring picture ID.  Patient should be prepared to give a urine specimen  Please do not eat 10-12 hours before your appointment if you are coming in fasting for labs on lipids, cholesterol, or glucose (sugar).  Pregnant women should follow their Care Team instructions. Water with medications is okay. Do not drink coffee or other fluids.   If you have concerns about taking  your medications, please ask at office or if scheduling via [employer], send a message by clicking on Secure Messaging, Message Your Care Team.              Who to contact     If you have questions or need follow up information about today's clinic visit or your schedule please contact [facility] directly at [phone].  Normal or non-critical lab and imaging results will be communicated to you by MyChart, letter or phone within 4 business days after the clinic has received the results. If you do not hear from us within 7 days, please contact the clinic through [employer] or phone. If you have a critical or abnormal lab result, we will notify you by phone as soon as possible.  Submit refill requests through [employer] or call your pharmacy and  "they will forward the refill request to us. Please allow 3 business days for your refill to be completed.          Additional Information About Your Visit        MyChart Information     [employer] lets you send messages to your doctor, view your test results, renew your prescriptions, schedule appointments and more. To sign up, go to [email or web address] . Click on \"Log in\" on the left side of the screen, which will take you to the Welcome page. Then click on \"Sign up Now\" on the right side of the page.     You will be asked to enter the access code listed below, as well as some personal information. Please follow the directions to create your username and password.     Your access code is: [email or web address]  Expires: 7/10/2017  3:57 PM     Your access code will  in 90 days. If you need help or a new code, please call your [facility] clinic or [phone].        Care EveryWhere ID     This is your Care EveryWhere ID. This could be used by other organizations to access your [facility] medical records  [id number]        Your Vitals Were     Pulse Temperature Height Pulse Oximetry BMI (Body Mass Index)       92 97.8  F (36.6  C) (Oral) 5' (1.524 m) 98% 17.75 kg/m2        Blood Pressure from Last 3 Encounters:   17 118/60   17 150/71   17 176/72    Weight from Last 3 Encounters:   17 90 lb 14.4 oz (41.2 kg)   17 90 lb 1.6 oz (40.9 kg)   17 91 lb 8 oz (41.5 kg)              Today, you had the following     No orders found for display         Today's Medication Changes          These changes are accurate as of: 17 11:59 PM.  If you have any questions, ask your nurse or doctor.               Stop taking these medicines if you haven't already. Please contact your care team if you have questions.     furosemide 20 MG tablet   Commonly known as:  LASIX   Stopped by:  Alexsandra Davison MD                    Primary Care Provider Office Phone # Fax #    Alexsandra Davison [phone] " [phone]       [facility] [address]  [facility] [address]        Thank you!     Thank you for choosing [facility]  for your care. Our goal is always to provide you with excellent care. Hearing back from our patients is one way we can continue to improve our services. Please take a few minutes to complete the written survey that you may receive in the mail after your visit with us. Thank you!             Your Updated Medication List - Protect others around you: Learn how to safely use, store and throw away your medicines at www.disposemymeds.org.          This list is accurate as of: 4/7/17 11:59 PM.  Always use your most recent med list.                   Brand Name Dispense Instructions for use    ascorbic acid 500 MG tablet    VITAMIN C    100    1 TABLET DAILY       bumetanide 0.5 MG tablet    BUMEX    60 tablet    Take 1 tablet (0.5 mg) by mouth 2 times daily       CALCIUM 600 + D 600-200 MG-UNIT Tabs      2 qd       diltiazem 120 MG Cp12 12 hr SR capsule    CARDIZEM SR    180 capsule    Take 120 mg by mouth 2 times daily       metoprolol 50 MG 24 hr tablet    TOPROL-XL    135 tablet    Take 1.5 tablets (75 mg) by mouth daily       MULTIVITAMIN TABS   OR      1 qd       olopatadine 0.1 % ophthalmic solution    PATANOL     Place 1 drop into both eyes 2 times daily as needed for allergies During Spring       rivaroxaban ANTICOAGULANT 20 MG Tabs tablet    XARELTO    30 tablet    Take 1 tablet (20 mg) by mouth daily (with dinner)       rOPINIRole 0.5 MG tablet    REQUIP    30 tablet    1/2 po qhs x 5-7 days, then 1 tablet       vitamin E 400 UNIT capsule     100    ONE CAP PO QD

## 2017-04-07 NOTE — NURSING NOTE
Chief Complaint   Patient presents with     RECHECK       Initial /60  Pulse 92  Temp 97.8  F (36.6  C) (Oral)  Ht 5' (1.524 m)  Wt 90 lb 14.4 oz (41.2 kg)  SpO2 98%  BMI 17.75 kg/m2 Estimated body mass index is 17.75 kg/(m^2) as calculated from the following:    Height as of this encounter: 5' (1.524 m).    Weight as of this encounter: 90 lb 14.4 oz (41.2 kg).  Medication Reconciliation: complete

## 2017-04-11 PROBLEM — M25.561 PAIN IN BOTH KNEES, UNSPECIFIED CHRONICITY: Status: ACTIVE | Noted: 2017-04-11

## 2017-04-11 PROBLEM — M25.562 PAIN IN BOTH KNEES, UNSPECIFIED CHRONICITY: Status: ACTIVE | Noted: 2017-04-11

## 2017-04-17 DIAGNOSIS — R60.9 EDEMA, UNSPECIFIED TYPE: ICD-10-CM

## 2017-04-17 PROCEDURE — 36415 COLL VENOUS BLD VENIPUNCTURE: CPT | Performed by: INTERNAL MEDICINE

## 2017-04-17 PROCEDURE — 80048 BASIC METABOLIC PNL TOTAL CA: CPT | Performed by: INTERNAL MEDICINE

## 2017-04-18 LAB
ANION GAP SERPL CALCULATED.3IONS-SCNC: 11 MMOL/L (ref 3–14)
BUN SERPL-MCNC: 21 MG/DL (ref 7–30)
CALCIUM SERPL-MCNC: 9.2 MG/DL (ref 8.5–10.1)
CHLORIDE SERPL-SCNC: 98 MMOL/L (ref 94–109)
CO2 SERPL-SCNC: 29 MMOL/L (ref 20–32)
CREAT SERPL-MCNC: 0.78 MG/DL (ref 0.52–1.04)
GFR SERPL CREATININE-BSD FRML MDRD: 71 ML/MIN/1.7M2
GLUCOSE SERPL-MCNC: 81 MG/DL (ref 70–99)
POTASSIUM SERPL-SCNC: 4.1 MMOL/L (ref 3.4–5.3)
SODIUM SERPL-SCNC: 138 MMOL/L (ref 133–144)

## 2017-05-01 ENCOUNTER — OFFICE VISIT (OUTPATIENT)
Dept: INTERNAL MEDICINE | Facility: CLINIC | Age: 82
End: 2017-05-01
Payer: COMMERCIAL

## 2017-05-01 VITALS
HEART RATE: 76 BPM | WEIGHT: 91.2 LBS | RESPIRATION RATE: 16 BRPM | BODY MASS INDEX: 17.91 KG/M2 | SYSTOLIC BLOOD PRESSURE: 178 MMHG | HEIGHT: 60 IN | TEMPERATURE: 98 F | OXYGEN SATURATION: 98 % | DIASTOLIC BLOOD PRESSURE: 82 MMHG

## 2017-05-01 DIAGNOSIS — R60.9 EDEMA, UNSPECIFIED TYPE: Primary | ICD-10-CM

## 2017-05-01 PROCEDURE — 99213 OFFICE O/P EST LOW 20 MIN: CPT | Performed by: INTERNAL MEDICINE

## 2017-05-01 NOTE — NURSING NOTE
Chief Complaint   Patient presents with     Edema     Feet and left hand edema- not getting any better. Sx for 3 months. Water pill does not work        Initial /82  Pulse 76  Temp 98  F (36.7  C) (Oral)  Resp 16  Ht 5' (1.524 m)  Wt 91 lb 3.2 oz (41.4 kg)  SpO2 98%  BMI 17.81 kg/m2 Estimated body mass index is 17.81 kg/(m^2) as calculated from the following:    Height as of this encounter: 5' (1.524 m).    Weight as of this encounter: 91 lb 3.2 oz (41.4 kg).  Medication Reconciliation: complete      Oriana Chambers CMA'

## 2017-05-01 NOTE — PROGRESS NOTES
SUBJECTIVE:                                                    Ana Cristina Dominguez is a 85 year old female who presents to clinic today for the following health issues:    Edema: bothering still. She is taking the bumex bid consistently without much improvement. She saw ortho last week and they did not think she had any other rheumatologic condition causing the edema apparently, records have not been received. No new symptoms of dyspnea, chest pain, abdominal bloating.   She is still very upset about the edema and thinks there must be another cause rather than vein changes and low albumen and wonders about seeing a cardiologist.     She was diagnosed with macular degeneration and is worried about being able to get around on her own.     Patient Active Problem List   Diagnosis     Fracture, pelvis closed (H)     HYPERLIPIDEMIA LDL GOAL <160     Achilles bursitis or tendinitis     Senile osteoporosis     RLS (restless legs syndrome)     Abnormal blood sugar     Atrial fibrillation (H)     HTN (hypertension)     Diarrhea     Edema, unspecified type     Pain in both knees, unspecified chronicity     Current Outpatient Prescriptions   Medication Sig Dispense Refill     bumetanide (BUMEX) 0.5 MG tablet Take 1 tablet (0.5 mg) by mouth 2 times daily 60 tablet 1     rivaroxaban ANTICOAGULANT (XARELTO) 20 MG TABS tablet Take 1 tablet (20 mg) by mouth daily (with dinner) 30 tablet 11     diltiazem (CARDIZEM SR) 120 MG CP12 12 hr SR capsule Take 120 mg by mouth 2 times daily 180 capsule 3     olopatadine (PATANOL) 0.1 % ophthalmic solution Place 1 drop into both eyes 2 times daily as needed for allergies During Spring       metoprolol (TOPROL-XL) 50 MG 24 hr tablet Take 1.5 tablets (75 mg) by mouth daily 135 tablet 3     rOPINIRole (REQUIP) 0.5 MG tablet 1/2 po qhs x 5-7 days, then 1 tablet 30 tablet 5     VITAMIN E 400 UNIT OR CAPS ONE CAP PO  0     VITAMIN C 500 MG OR TABS 1 TABLET DAILY 100 0     MULTIVITAMIN TABS    OR 1 qd       CALCIUM 600 + D 600-200 MG-IU OR TABS 2 qd        Social History   Substance Use Topics     Smoking status: Former Smoker     Quit date: 5/6/1973     Smokeless tobacco: Never Used     Alcohol use Yes      Comment: Socially      Reviewed and updated as needed this visit by clinical staff  Tobacco  Allergies  Meds  Med Hx  Surg Hx  Fam Hx  Soc Hx      Reviewed and updated as needed this visit by Provider         ROS:  negative    OBJECTIVE:                                                    /82  Pulse 76  Temp 98  F (36.7  C) (Oral)  Resp 16  Ht 5' (1.524 m)  Wt 91 lb 3.2 oz (41.4 kg)  SpO2 98%  BMI 17.81 kg/m2  Body mass index is 17.81 kg/(m^2).      2+ edema stable     ASSESSMENT/PLAN:                                                            1. Edema, unspecified type  She had echo with normal right ventricle function and size, normal kidney function, no proteinuria. She claims edema was better on prednisone but tests were negative for any rheumatologic condition. Will refer to cardiology per her request for another opinion, increase bumex to 2 am and 1 pm.   - CARDIOLOGY EVAL ADULT REFERRAL        Alexsandra Davison MD  [facility]

## 2017-05-01 NOTE — MR AVS SNAPSHOT
After Visit Summary   5/1/2017    Ana Cristina Dominguez    MRN: [id number]           Patient Information     Date Of Birth          6/6/1931        Visit Information        Provider Department      5/1/2017 8:20 AM Alexsandra Davison MD [facility]        Today's Diagnoses     Edema, unspecified type    -  1      Care Instructions    Increase the bumex to 2 pills in the morning and 1 pill in the afternoon.         Follow-ups after your visit        Additional Services     CARDIOLOGY EVAL ADULT REFERRAL       Your provider has referred you to:  [facility]: [facility] [phone]   [email or web address]    Please be aware that coverage of these services is subject to the terms and limitations of your health insurance plan.  Call member services at your health plan with any benefit or coverage questions.      Type of Referral:  New Cardiology Consult    Timeframe requested:  Within 2 weeks    Please bring the following to your appointment:  >>   Any x-rays, CTs or MRIs which have been performed.  Contact the facility where they were done to arrange for  prior to your scheduled appointment.    >>   List of current medications  >>   This referral request   >>   Any documents/labs given to you for this referral                  Who to contact     If you have questions or need follow up information about today's clinic visit or your schedule please contact [facility] directly at [phone].  Normal or non-critical lab and imaging results will be communicated to you by MyChart, letter or phone within 4 business days after the clinic has received the results. If you do not hear from us within 7 days, please contact the clinic through MyChart or phone. If you have a critical or abnormal lab result, we will notify you by phone as soon as possible.  Submit refill requests through [employer] or  "call your pharmacy and they will forward the refill request to us. Please allow 3 business days for your refill to be completed.          Additional Information About Your Visit        MyChart Information     [employer] lets you send messages to your doctor, view your test results, renew your prescriptions, schedule appointments and more. To sign up, go to [email or web address] . Click on \"Log in\" on the left side of the screen, which will take you to the Welcome page. Then click on \"Sign up Now\" on the right side of the page.     You will be asked to enter the access code listed below, as well as some personal information. Please follow the directions to create your username and password.     Your access code is: [email or web address]  Expires: 7/10/2017  3:57 PM     Your access code will  in 90 days. If you need help or a new code, please call your [place] clinic or [phone].        Care EveryWhere ID     This is your Care EveryWhere ID. This could be used by other organizations to access your [place] medical records  [id number]        Your Vitals Were     Pulse Temperature Respirations Height Pulse Oximetry BMI (Body Mass Index)    76 98  F (36.7  C) (Oral) 16 5' (1.524 m) 98% 17.81 kg/m2       Blood Pressure from Last 3 Encounters:   17 178/82   17 118/60   17 150/71    Weight from Last 3 Encounters:   17 91 lb 3.2 oz (41.4 kg)   17 90 lb 14.4 oz (41.2 kg)   17 90 lb 1.6 oz (40.9 kg)              We Performed the Following     CARDIOLOGY EVAL ADULT REFERRAL        Primary Care Provider Office Phone # Fax #    Alexsandra Davison [phone]       [facility] [address]   [facility] [address]        Thank you!     Thank you for choosing [facility]  for your care. Our goal is always to provide you with excellent care. Hearing back from our patients is one way we can continue to improve our services. Please take a few minutes " to complete the written survey that you may receive in the mail after your visit with us. Thank you!             Your Updated Medication List - Protect others around you: Learn how to safely use, store and throw away your medicines at www.disposemymeds.org.          This list is accurate as of: 5/1/17  9:10 AM.  Always use your most recent med list.                   Brand Name Dispense Instructions for use    ascorbic acid 500 MG tablet    VITAMIN C    100    1 TABLET DAILY       bumetanide 0.5 MG tablet    BUMEX    60 tablet    Take 1 tablet (0.5 mg) by mouth 2 times daily       CALCIUM 600 + D 600-200 MG-UNIT Tabs      2 qd       diltiazem 120 MG Cp12 12 hr SR capsule    CARDIZEM SR    180 capsule    Take 120 mg by mouth 2 times daily       metoprolol 50 MG 24 hr tablet    TOPROL-XL    135 tablet    Take 1.5 tablets (75 mg) by mouth daily       MULTIVITAMIN TABS   OR      1 qd       olopatadine 0.1 % ophthalmic solution    PATANOL     Place 1 drop into both eyes 2 times daily as needed for allergies During Spring       rivaroxaban ANTICOAGULANT 20 MG Tabs tablet    XARELTO    30 tablet    Take 1 tablet (20 mg) by mouth daily (with dinner)       rOPINIRole 0.5 MG tablet    REQUIP    30 tablet    1/2 po qhs x 5-7 days, then 1 tablet       vitamin E 400 UNIT capsule     100    ONE CAP PO QD

## 2017-05-02 ENCOUNTER — OFFICE VISIT (OUTPATIENT)
Dept: CARDIOLOGY | Facility: CLINIC | Age: 82
End: 2017-05-02
Attending: INTERNAL MEDICINE
Payer: COMMERCIAL

## 2017-05-02 VITALS
HEART RATE: 76 BPM | BODY MASS INDEX: 18.08 KG/M2 | SYSTOLIC BLOOD PRESSURE: 152 MMHG | DIASTOLIC BLOOD PRESSURE: 84 MMHG | WEIGHT: 92.1 LBS | HEIGHT: 60 IN

## 2017-05-02 DIAGNOSIS — I50.32 CHRONIC DIASTOLIC CONGESTIVE HEART FAILURE (H): Primary | ICD-10-CM

## 2017-05-02 PROCEDURE — 99204 OFFICE O/P NEW MOD 45 MIN: CPT | Performed by: INTERNAL MEDICINE

## 2017-05-02 NOTE — PROGRESS NOTES
HPI and Plan:   See dictation    Orders Placed This Encounter   Procedures     Follow-Up with Cardiologist     Echocardiogram     No orders of the defined types were placed in this encounter.    There are no discontinued medications.      Encounter Diagnosis   Name Primary?     Chronic diastolic congestive heart failure (H) Yes       CURRENT MEDICATIONS:  Current Outpatient Prescriptions   Medication Sig Dispense Refill     bumetanide (BUMEX) 0.5 MG tablet Take 1 tablet (0.5 mg) by mouth 2 times daily 60 tablet 1     rivaroxaban ANTICOAGULANT (XARELTO) 20 MG TABS tablet Take 1 tablet (20 mg) by mouth daily (with dinner) 30 tablet 11     diltiazem (CARDIZEM SR) 120 MG CP12 12 hr SR capsule Take 120 mg by mouth 2 times daily 180 capsule 3     olopatadine (PATANOL) 0.1 % ophthalmic solution Place 1 drop into both eyes 2 times daily as needed for allergies During Spring       metoprolol (TOPROL-XL) 50 MG 24 hr tablet Take 1.5 tablets (75 mg) by mouth daily 135 tablet 3     rOPINIRole (REQUIP) 0.5 MG tablet 1/2 po qhs x 5-7 days, then 1 tablet 30 tablet 5     VITAMIN E 400 UNIT OR CAPS ONE CAP PO  0     VITAMIN C 500 MG OR TABS 1 TABLET DAILY 100 0     MULTIVITAMIN TABS   OR 1 qd       CALCIUM 600 + D 600-200 MG-IU OR TABS 2 qd         ALLERGIES     Allergies   Allergen Reactions     Amoxicillin Rash     Codeine GI Disturbance     Stomach pain       PAST MEDICAL HISTORY:  Past Medical History:   Diagnosis Date     Other and unspecified hyperlipidemia      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 Father       Family History Negative Mother      Genitourinary Problems Sister      Renal failure     HEART DISEASE Sister      Rhythm issues       SOCIAL HISTORY:  Social History     Social History     Marital status:      Spouse name: N/A     Number of children: 3     Years of education: N/A     Occupational History      Retired     Social History Main Topics     Smoking status: Former Smoker     Quit date: 5/6/1973     Smokeless tobacco: Never Used     Alcohol use Yes      Comment: Socially     Drug use: No     Sexual activity: No     Other Topics Concern     Exercise Yes     Seat Belt Yes     Social History Narrative       Review of Systems:  Skin:  Negative     Eyes:  Positive for glasses  ENT:  Positive for hearing loss  Respiratory:  Positive for cough  Cardiovascular:    Positive for;edema  Gastroenterology: Negative    Genitourinary:  Positive for nocturia  Musculoskeletal:  Positive for joint pain  Neurologic:  Positive for numbness or tingling of feet  Psychiatric:  Positive for sleep disturbances  Heme/Lymph/Imm:  Positive for chills  Endocrine:  Negative      Physical Exam:  Vitals: /84 (BP Location: Right arm, Patient Position: Chair, Cuff Size: Adult Regular)  Pulse 76  Ht 1.524 m (5')  Wt 41.8 kg (92 lb 1.6 oz)  BMI 17.99 kg/m2    Constitutional:  cooperative        Skin:  warm and dry to the touch        Head:  normocephalic        Eyes:  no xanthalasma        ENT:  no pallor or cyanosis        Neck:  carotid pulses are full and equal bilaterally;no carotid bruit JVP 8-10      Chest:  normal breath sounds, clear to auscultation, normal A-P diameter, normal symmetry, normal respiratory excursion, no use of accessory muscles        Cardiac: regular rhythm;normal S1 and S2;no S3 or S4       grade 2;holosystolic murmur   diastolic murmur;grade 1      Abdomen:  abdomen soft        Vascular: pulses full and equal                                      Extremities and Back:      extending to mid  shin    Neurological:  affect appropriate, oriented to time, person and place          Recent Lab Results:  LIPID RESULTS:  Lab Results   Component Value Date    CHOL 107 10/07/2016    HDL 22 (L) 10/07/2016    LDL 65 10/07/2016    TRIG 100 10/07/2016    CHOLHDLRATIO 3.3 08/29/2013       LIVER ENZYME RESULTS:  Lab Results   Component Value Date    AST 38 10/05/2016    ALT 40 10/05/2016       CBC RESULTS:  Lab Results   Component Value Date    WBC 9.4 10/08/2016    RBC 3.28 (L) 10/08/2016    HGB 9.2 (L) 10/09/2016    HCT 29.3 (L) 10/08/2016    MCV 89 10/08/2016    MCH 30.2 10/08/2016    MCHC 33.8 10/08/2016    RDW 13.6 10/08/2016     10/08/2016       BMP RESULTS:  Lab Results   Component Value Date     04/17/2017    POTASSIUM 4.1 04/17/2017    CHLORIDE 98 04/17/2017    CO2 29 04/17/2017    ANIONGAP 11 04/17/2017    GLC 81 04/17/2017    BUN 21 04/17/2017    CR 0.78 04/17/2017    GFRESTIMATED 71 04/17/2017    GFRESTBLACK 85 04/17/2017    KARTHIK 9.2 04/17/2017        A1C RESULTS:  Lab Results   Component Value Date    A1C 5.7 12/16/2015       INR RESULTS:  Lab Results   Component Value Date    INR 1.24 (H) 10/08/2016    INR 1.35 (H) 10/05/2016           CC  Alexsandra Davison MD  [facility]  [address]  [place] [address]

## 2017-05-02 NOTE — PROGRESS NOTES
HISTORY OF PRESENT ILLNESS:  Thank you for asking me to see Ana Cristina Dominguez in Cardiology Clinic for consultation today.  Mrs. Dominguez is a delightful 85-year-old female who is here because of progressive lower extremity edema.  Mrs. Dominguez has a history of atrial fibrillation for which she is chronically anticoagulated with Xarelto.  She also has a history of hypertension and hyperlipidemia.      Mrs. Dominguez reports that she has noticed significant edema of her legs since about Jelani.  Her legs feel heavy and her shoes do not fit and she feels like her skin is stretched.  They are somewhat uncomfortable.  She does have a history of arthritis and wonders whether the injection she has gotten in her knees in the past have played any role in this.  She also has had varicose vein in the past and wonders if this might play a role in it.  She has not noticed any other cardiac symptoms such as increasing fatigue, PND/orthopnea, exertional chest, arm, neck, shoulder or jaw discomfort, claudication, palpitations or syncope/near syncope.      The patient has worn compression stockings in the past, but has not been doing this just recently.  She states that they were successful at helping to reduce her edema and she is not certain why she quit.  She was told not to wear them at night.  She is having a hard time getting them on now because of her swollen feet.  She has in the past tried to elevate her feet at least to some degree part of the day but she does not believe that ever helped much.  She also states that she tries to minimize her dietary salt, and she is aware of salt content of  multiple products and has tried to avoid processed food with high sodium content.     I reviewed the echocardiogram done last October with the patient.  It shows evidence for elevated left ventricular filling pressures as well as a significant degree of tricuspid regurgitation and some aortic insufficiency.  I explained to  her how valves work and how the elevated filling pressures and valve dysfunction may be contributing to her peripheral edema.  She asked many good questions, which I hope I have answered to her satisfaction.      ASSESSMENT AND PLAN:    1.  Peripheral edema.  This is largely secondary to the tricuspid regurgitation and the elevated left heart filling pressures as well as age.  I discussed in detail that there really are 4 approaches we can take to this.  Three of them are under her control, including the use of compression stockings, elevation of the legs, and reduced salt intake.  We discussed each of these in great detail, and she is willing to redouble her effort at each of these goals.  As for diuresis, her dose of Bumex was just increased yesterday, so we have not had a chance to see whether that is effective yet.  I will have her back in 2 or 3 weeks to review how her edema has fared with the increased diuretics.   2.  Hypertension.  Blood pressure is not under excellent control, but today's blood pressure of 152/84 is acceptable for a patient of 85 years.  I think she may benefit from use of a diuretic for blood pressure control and I would think about using spironolactone to accomplish this.  At her next visit, we will readdress this issue. She will obtain a home blood pressure monitor and record daily weights, pulse and blood pressure in a notebook and then bring those with her for her next visit.  3.  Atrial fibrillation on chronic Xarelto.  No plan to change medications.  Her rate is controlled with the use of diltiazem and metoprolol, both at appropriate doses and apparently doing a good job.     Thank you for asking me to participate in Mrs. Hampton's care.  Please do not hesitate to call if I can be of further assistance.         EZIO ROTHMAN MD, [facility]             D: 05/02/2017 12:56   T: 05/02/2017 15:19   MT: AGATHA      Name:     FELIZ HAMPTON   MRN:      [id number]        Account:       [id number]   :      1931           Service Date: 2017      Document: [id number]

## 2017-05-02 NOTE — MR AVS SNAPSHOT
After Visit Summary   5/2/2017    Ana Cristina Dominguez    MRN: [id number]           Patient Information     Date Of Birth          6/6/1931        Visit Information        Provider Department      5/2/2017 10:15 AM Fabiano Bower MD [facility] [place]        Today's Diagnoses     Chronic diastolic congestive heart failure (H)    -  1      Care Instructions    Keep salt intake under 2000 mg/day        Follow-ups after your visit        Additional Services     Follow-Up with Cardiologist                 Your next 10 appointments already scheduled     May 22, 2017  9:30 AM CDT   Ech Complete with [address] ([facility])    [address]  [facility] [id number] [phone]           1.  Please bring or wear a comfortable two-piece outfit. 2.  You may eat, drink and take your normal medicines. 3.  For any questions that cannot be answered, please contact the ordering physician ***Please check-in at the [facility] located in [address] in the [facility] building. When you are finished registering, please go to [address] and have a seat. The technician will call your name for the test.            May 30, 2017  9:15 AM CDT   Return Visit with Fabiano Bower MD   [facility] ([facility])    [address]  [facility] [id number] [phone]              Future tests that were ordered for you today     Open Future Orders        Priority Expected Expires Ordered    Follow-Up with Cardiologist Routine 5/16/2017 5/30/2017 5/2/2017    Echocardiogram Routine 5/9/2017 5/2/2018 5/2/2017            Who to contact     If you have questions or need follow up information about today's clinic visit or your schedule please contact [facility] directly at  "[phone].  Normal or non-critical lab and imaging results will be communicated to you by MyChart, letter or phone within 4 business days after the clinic has received the results. If you do not hear from us within 7 days, please contact the clinic through MyChart or phone. If you have a critical or abnormal lab result, we will notify you by phone as soon as possible.  Submit refill requests through [employer] or call your pharmacy and they will forward the refill request to us. Please allow 3 business days for your refill to be completed.          Additional Information About Your Visit        Adanhart Information     [employer] lets you send messages to your doctor, view your test results, renew your prescriptions, schedule appointments and more. To sign up, go to [email or web address] . Click on \"Log in\" on the left side of the screen, which will take you to the Welcome page. Then click on \"Sign up Now\" on the right side of the page.     You will be asked to enter the access code listed below, as well as some personal information. Please follow the directions to create your username and password.     Your access code is: [email or web address]  Expires: 7/10/2017  3:57 PM     Your access code will  in 90 days. If you need help or a new code, please call your [facility] or [phone].        Care EveryWhere ID     This is your Care EveryWhere ID. This could be used by other organizations to access your [place] medical records  [id number]        Your Vitals Were     Pulse Height BMI (Body Mass Index)             76 1.524 m (5') 17.99 kg/m2          Blood Pressure from Last 3 Encounters:   17 152/84   17 178/82   17 118/60    Weight from Last 3 Encounters:   17 41.8 kg (92 lb 1.6 oz)   17 41.4 kg (91 lb 3.2 oz)   17 41.2 kg (90 lb 14.4 oz)               Primary Care Provider Office Phone # Fax #    Alexsandra Davison [phone]       [facility] [address] [facility] [address]  [facility] [address]        Thank you!     Thank you for choosing [facility] [place]  for your care. Our goal is always to provide you with excellent care. Hearing back from our patients is one way we can continue to improve our services. Please take a few minutes to complete the written survey that you may receive in the mail after your visit with us. Thank you!             Your Updated Medication List - Protect others around you: Learn how to safely use, store and throw away your medicines at www.disposemymeds.org.          This list is accurate as of: 5/2/17 11:42 AM.  Always use your most recent med list.                   Brand Name Dispense Instructions for use    ascorbic acid 500 MG tablet    VITAMIN C    100    1 TABLET DAILY       bumetanide 0.5 MG tablet    BUMEX    60 tablet    Take 1 tablet (0.5 mg) by mouth 2 times daily       CALCIUM 600 + D 600-200 MG-UNIT Tabs      2 qd       diltiazem 120 MG Cp12 12 hr SR capsule    CARDIZEM SR    180 capsule    Take 120 mg by mouth 2 times daily       metoprolol 50 MG 24 hr tablet    TOPROL-XL    135 tablet    Take 1.5 tablets (75 mg) by mouth daily       MULTIVITAMIN TABS   OR      1 qd       olopatadine 0.1 % ophthalmic solution    PATANOL     Place 1 drop into both eyes 2 times daily as needed for allergies During Spring       rivaroxaban ANTICOAGULANT 20 MG Tabs tablet    XARELTO    30 tablet    Take 1 tablet (20 mg) by mouth daily (with dinner)       rOPINIRole 0.5 MG tablet    REQUIP    30 tablet    1/2 po qhs x 5-7 days, then 1 tablet       vitamin E 400 UNIT capsule     100    ONE CAP PO QD

## 2017-05-02 NOTE — LETTER
5/2/2017    Alexsandra Davison MD  [facility]  [address] [facility] [address]  [place] [address]    RE: Ana Cristina A Angelica       Dear Colleague,    I had the pleasure of seeing Ana Cristina Dominguez in the [facility].    Thank you for asking me to see Ana Cristina Dominguez in Cardiology Clinic for consultation today.  Mrs. Dominguez is a delightful 85-year-old female who is here because of progressive lower extremity edema.  Mrs. Dominguez has a history of atrial fibrillation for which she is chronically anticoagulated with Xarelto.  She also has a history of hypertension and hyperlipidemia.      Mrs. Dominguez reports that she has noticed significant edema of her legs since about Jelani.  Her legs feel heavy and her shoes do not fit and she feels like her skin is stretched.  They are somewhat uncomfortable.  She does have a history of arthritis and wonders whether the injection she has gotten in her knees in the past have played any role in this.  She also has had varicose vein in the past and wonders if this might play a role in it.  She has not noticed any other cardiac symptoms such as increasing fatigue, PND/orthopnea, exertional chest, arm, neck, shoulder or jaw discomfort, claudication, palpitations or syncope/near syncope.      The patient has worn compression stockings in the past, but has not been doing this just recently.  She states that they were successful at helping to reduce her edema and she is not certain why she quit.  She was told not to wear them at night.  She is having a hard time getting them on now because of her swollen feet.  She has in the past tried to elevate her feet at least to some degree part of the day but she does not believe that ever helped much.  She also states that she tries to minimize her dietary salt, and she is aware of salt content of  multiple products and has tried to avoid processed food with high sodium content.     I reviewed the  echocardiogram done last October with the patient.  It shows evidence for elevated left ventricular filling pressures as well as a significant degree of tricuspid regurgitation and some aortic insufficiency.  I explained to her how valves work and how the elevated filling pressures and valve dysfunction may be contributing to her peripheral edema.  She asked many good questions, which I hope I have answered to her satisfaction.   Outpatient Encounter Prescriptions as of 5/2/2017   Medication Sig Dispense Refill     bumetanide (BUMEX) 0.5 MG tablet Take 1 tablet (0.5 mg) by mouth 2 times daily 60 tablet 1     rivaroxaban ANTICOAGULANT (XARELTO) 20 MG TABS tablet Take 1 tablet (20 mg) by mouth daily (with dinner) 30 tablet 11     diltiazem (CARDIZEM SR) 120 MG CP12 12 hr SR capsule Take 120 mg by mouth 2 times daily 180 capsule 3     olopatadine (PATANOL) 0.1 % ophthalmic solution Place 1 drop into both eyes 2 times daily as needed for allergies During Spring       metoprolol (TOPROL-XL) 50 MG 24 hr tablet Take 1.5 tablets (75 mg) by mouth daily 135 tablet 3     rOPINIRole (REQUIP) 0.5 MG tablet 1/2 po qhs x 5-7 days, then 1 tablet 30 tablet 5     VITAMIN E 400 UNIT OR CAPS ONE CAP PO  0     VITAMIN C 500 MG OR TABS 1 TABLET DAILY 100 0     MULTIVITAMIN TABS   OR 1 qd       CALCIUM 600 + D 600-200 MG-IU OR TABS 2 qd       No facility-administered encounter medications on file as of 5/2/2017.       ASSESSMENT AND PLAN:    1.  Peripheral edema.  This is largely secondary to the tricuspid regurgitation and the elevated left heart filling pressures as well as age.  I discussed in detail that there really are 4 approaches we can take to this.  Three of them are under her control, including the use of compression stockings, elevation of the legs, and reduced salt intake.  We discussed each of these in great detail, and she is willing to redouble her effort at each of these goals.  As for diuresis, her dose of Bumex  was just increased yesterday, so we have not had a chance to see whether that is effective yet.  I will have her back in 2 or 3 weeks to review how her edema has fared with the increased diuretics.   2.  Hypertension.  Blood pressure is not under excellent control, but today's blood pressure of 152/84 is acceptable for a patient of 85 years.  I think she may benefit from use of a diuretic for blood pressure control and I would think about using spironolactone to accomplish this.  At her next visit, we will readdress this issue. She will obtain a home blood pressure monitor and record daily weights, pulse and blood pressure in a notebook and then bring those with her for her next visit.  3.  Atrial fibrillation on chronic Xarelto.  No plan to change medications.  Her rate is controlled with the use of diltiazem and metoprolol, both at appropriate doses and apparently doing a good job.     Thank you for asking me to participate in Mrs. Dominguez's care.  Please do not hesitate to call if I can be of further assistance.     Sincerely,    Fabiano Bower MD     [facility]

## 2017-05-09 DIAGNOSIS — G25.81 RLS (RESTLESS LEGS SYNDROME): ICD-10-CM

## 2017-05-09 RX ORDER — ROPINIROLE 0.5 MG/1
0.5 TABLET, FILM COATED ORAL AT BEDTIME
Qty: 30 TABLET | Refills: 5 | Status: SHIPPED | OUTPATIENT
Start: 2017-05-09 | End: 2017-11-06

## 2017-05-09 NOTE — TELEPHONE ENCOUNTER
Requip     Last Written Prescription Date: 05/20/16  Last Fill Quantity: 30, # refills: 5  Last Office Visit with [facility], [facility] or [facility] prescribing provider: 05/01/17   Next 5 appointments (look out 90 days)     May 30, 2017  9:15 AM CDT   Return Visit with Fabiano Bower MD   [facility] [place] ([facility])    [id number] [place] [address] [phone]                   BP Readings from Last 3 Encounters:   05/02/17 152/84   05/01/17 178/82   04/07/17 118/60

## 2017-05-13 DIAGNOSIS — R60.9 EDEMA, UNSPECIFIED TYPE: ICD-10-CM

## 2017-05-13 RX ORDER — BUMETANIDE 0.5 MG/1
TABLET ORAL
Qty: 90 TABLET | Refills: 1 | Status: SHIPPED | OUTPATIENT
Start: 2017-05-13 | End: 2017-07-11

## 2017-05-16 ENCOUNTER — TRANSFERRED RECORDS (OUTPATIENT)
Dept: HEALTH INFORMATION MANAGEMENT | Facility: CLINIC | Age: 82
End: 2017-05-16

## 2017-05-22 ENCOUNTER — HOSPITAL ENCOUNTER (OUTPATIENT)
Dept: CARDIOLOGY | Facility: CLINIC | Age: 82
Discharge: HOME | End: 2017-05-22
Attending: INTERNAL MEDICINE | Admitting: INTERNAL MEDICINE
Payer: MEDICARE

## 2017-05-22 DIAGNOSIS — I50.32 CHRONIC DIASTOLIC CONGESTIVE HEART FAILURE (H): ICD-10-CM

## 2017-05-22 PROCEDURE — 93306 TTE W/DOPPLER COMPLETE: CPT

## 2017-05-22 PROCEDURE — 93306 TTE W/DOPPLER COMPLETE: CPT | Mod: 26 | Performed by: INTERNAL MEDICINE

## 2017-05-30 ENCOUNTER — OFFICE VISIT (OUTPATIENT)
Dept: CARDIOLOGY | Facility: CLINIC | Age: 82
End: 2017-05-30
Attending: INTERNAL MEDICINE
Payer: COMMERCIAL

## 2017-05-30 VITALS
SYSTOLIC BLOOD PRESSURE: 140 MMHG | HEART RATE: 88 BPM | WEIGHT: 91.5 LBS | BODY MASS INDEX: 17.96 KG/M2 | DIASTOLIC BLOOD PRESSURE: 70 MMHG | OXYGEN SATURATION: 97 % | HEIGHT: 60 IN

## 2017-05-30 DIAGNOSIS — I50.32 CHRONIC DIASTOLIC CONGESTIVE HEART FAILURE (H): ICD-10-CM

## 2017-05-30 LAB
ANION GAP SERPL CALCULATED.3IONS-SCNC: 11 MMOL/L (ref 3–14)
BUN SERPL-MCNC: 18 MG/DL (ref 7–30)
CALCIUM SERPL-MCNC: 9.3 MG/DL (ref 8.5–10.1)
CHLORIDE SERPL-SCNC: 95 MMOL/L (ref 94–109)
CO2 SERPL-SCNC: 22 MMOL/L (ref 20–32)
CREAT SERPL-MCNC: 0.52 MG/DL (ref 0.52–1.04)
GFR SERPL CREATININE-BSD FRML MDRD: ABNORMAL ML/MIN/1.7M2
GLUCOSE SERPL-MCNC: 111 MG/DL (ref 70–99)
POTASSIUM SERPL-SCNC: 4.1 MMOL/L (ref 3.4–5.3)
SODIUM SERPL-SCNC: 128 MMOL/L (ref 133–144)

## 2017-05-30 PROCEDURE — 36415 COLL VENOUS BLD VENIPUNCTURE: CPT | Performed by: INTERNAL MEDICINE

## 2017-05-30 PROCEDURE — 80048 BASIC METABOLIC PNL TOTAL CA: CPT | Performed by: INTERNAL MEDICINE

## 2017-05-30 PROCEDURE — 99214 OFFICE O/P EST MOD 30 MIN: CPT | Performed by: INTERNAL MEDICINE

## 2017-05-30 RX ORDER — METOLAZONE 2.5 MG/1
2.5 TABLET ORAL
Qty: 30 TABLET | Refills: 3 | Status: SHIPPED | OUTPATIENT
Start: 2017-06-01 | End: 2017-06-29

## 2017-05-30 NOTE — PROGRESS NOTES
HISTORY OF PRESENT ILLNESS:  I am seeing Ana Cristina Dominguez in Cardiology Clinic today for a followup visit.  Ana Cristina is a delightful 85-year-old female whom I first met a month ago when she was seen for progressive lower extremity edema.  She also has a history of chronic atrial fibrillation for which she uses Xarelto.  In addition, she has a history of hypertension and hyperlipidemia.      At her last visit Mrs. Dominguez reported worsening peripheral edema, especially over the last 6 months or so.  She had just had her diuretics increased the day before, and  I felt this was appropriate.  We spoke about various mechanical mechanisms for reducing peripheral edema including use of compression stockings, elevation of legs and reduction of salt intake.  We went into great amount of detail in each of these, and she reports today that she has redoubled her efforts.  She watches salt content in foods and has kept her total salt intake less than 2 grams per day.  She is elevating her legs when she can, and she wears compression stockings throughout the day.  She continues to complain of peripheral edema that causes aching of her ankles and a heaviness of her feet.  She also periodically gets throbbing of her varicose veins in her legs which is similar to what she had with pregnancy many years ago, and this seems to correlate with degree of edema..  On these days, she stays off her legs.  Today her varicose veins are not bothering her.      I also asked the patient to check her home blood pressure, and she got a blood pressure machine and brought in those values today.  I reviewed them for her, and they look excellent with systolic values ranging between 106 to 145 and diastolic values in the 50s to 70s.  Her blood pressure is well controlled.      Since I last saw her, the bumex has been slightly increased further, and she is now taking 3 tablets of half mg per day.  She has not noticed any improvement in her edema.   "She has not had her metabolic profile checked for more than a month. She is annoyed by her thick ankles, and acknowledges that it bothers her pride.  It is hard to tell how much discomfort they really cause her, since she stated several times that \"if I have to live with them, I can.  I just want to know\".  Her main complaint, however is her severe diffuse arthritic pain, which is new for her and limits her activity considerably.     ASSESSMENT AND PLAN:   1.  Lower extremity edema.  This is multifactorial including advanced age, tricuspid regurgitation and heart failure with preserved ejection fraction.  The patient's echo was reviewed with her in detail, and it shows no new changes with a moderate degree of tricuspid regurgitation and borderline elevated right heart pressures.  We talked extensively about the discomfort she is experiencing from her edema and the possibilities for further medical treatment.  I have suggested that she add metolazone but warned her that this could be harmful to her kidneys.  We will monitor this closely, but she thinks that this would be a worthwhile endeavor.  I will start at a very low dose because of her small size and advanced age, 2.5 mg twice a week.   2.  Arthritis.  The patient is actually more bothered by arthritis than anything else, and she has difficulty walking and is in a great deal of pain throughout her body.  I fully agree that a rheumatologist consult would be in order to try to find an acceptable treatment for her.  She has tried several injections in various joints without results.   3.  Hypertension.  Blood pressure is under excellent control.  The metolazone may cause some further reduction in blood pressure, and I warned her of possible orthostatic dizziness.  We do not need to push antihypertensives further.      I will plan on having Mrs. Dominguez back in a month for a repeat visit and metabolic profile.  I am hoping that she will get some improvement in " her uncomfortable lower extremity edema with the addition of metolazone.  None of her current medications are likely to be a contributing factor.  Please do not hesitate to call if I can be of further assistance.         EZIO ROTHMAN MD, [facility]             D: 2017 10:13   T: 2017 12:48   MT: CALDERON      Name:     FELIZ HAMPTON   MRN:      [id number]        Account:      [id number]   :      1931           Service Date: 2017      Document: [id number]

## 2017-05-30 NOTE — LETTER
5/30/2017    Dez Urban MD  [address] [facility] [address]  [facility] [address]    RE: Ana Cristina Dominguez       Dear Colleague,    I had the pleasure of seeing Ana Cristina Dominguez in the [facility].    I am seeing Ana Cristina Dominguez in Cardiology Clinic today for a followup visit.  Ana Cristina is a delightful 85-year-old female whom I first met a month ago when she was seen for progressive lower extremity edema.  She also has a history of chronic atrial fibrillation for which she uses Xarelto.  In addition, she has a history of hypertension and hyperlipidemia.      At her last visit Mrs. Dominguez reported worsening peripheral edema, especially over the last 6 months or so.  She had just had her diuretics increased the day before, and  I felt this was appropriate.  We spoke about various mechanical mechanisms for reducing peripheral edema including use of compression stockings, elevation of legs and reduction of salt intake.  We went into great amount of detail in each of these, and she reports today that she has redoubled her efforts.  She watches salt content in foods and has kept her total salt intake less than 2 grams per day.  She is elevating her legs when she can, and she wears compression stockings throughout the day.  She continues to complain of peripheral edema that causes aching of her ankles and a heaviness of her feet.  She also periodically gets throbbing of her varicose veins in her legs which is similar to what she had with pregnancy many years ago, and this seems to correlate with degree of edema..  On these days, she stays off her legs.  Today her varicose veins are not bothering her.      I also asked the patient to check her home blood pressure, and she got a blood pressure machine and brought in those values today.  I reviewed them for her, and they look excellent with systolic values ranging between 106 to 145 and diastolic values in the 50s to 70s.  Her blood  "pressure is well controlled.      Since I last saw her, the bumex has been slightly increased further, and she is now taking 3 tablets of half mg per day.  She has not noticed any improvement in her edema.  She has not had her metabolic profile checked for more than a month. She is annoyed by her thick ankles, and acknowledges that it bothers her pride.  It is hard to tell how much discomfort they really cause her, since she stated several times that \"if I have to live with them, I can.  I just want to know\".  Her main complaint, however is her severe diffuse arthritic pain, which is new for her and limits her activity considerably.    Outpatient Encounter Prescriptions as of 5/30/2017   Medication Sig Dispense Refill     [DISCONTINUED] metolazone (ZAROXOLYN) 2.5 MG tablet Take 1 tablet (2.5 mg) by mouth twice a week 30 tablet 3     [DISCONTINUED] bumetanide (BUMEX) 0.5 MG tablet 2 q am, 1 q pm 90 tablet 1     rOPINIRole (REQUIP) 0.5 MG tablet Take 1 tablet (0.5 mg) by mouth At Bedtime 30 tablet 5     rivaroxaban ANTICOAGULANT (XARELTO) 20 MG TABS tablet Take 1 tablet (20 mg) by mouth daily (with dinner) 30 tablet 11     diltiazem (CARDIZEM SR) 120 MG CP12 12 hr SR capsule Take 120 mg by mouth 2 times daily 180 capsule 3     olopatadine (PATANOL) 0.1 % ophthalmic solution Place 1 drop into both eyes 2 times daily as needed for allergies During Spring       [DISCONTINUED] metoprolol (TOPROL-XL) 50 MG 24 hr tablet Take 1.5 tablets (75 mg) by mouth daily 135 tablet 3     VITAMIN E 400 UNIT OR CAPS ONE CAP PO  0     VITAMIN C 500 MG OR TABS 1 TABLET DAILY 100 0     MULTIVITAMIN TABS   OR 1 qd       CALCIUM 600 + D 600-200 MG-IU OR TABS 2 qd       No facility-administered encounter medications on file as of 5/30/2017.       ASSESSMENT AND PLAN:   1.  Lower extremity edema.  This is multifactorial including advanced age, tricuspid regurgitation and heart failure with preserved ejection fraction.  The patient's echo " was reviewed with her in detail, and it shows no new changes with a moderate degree of tricuspid regurgitation and borderline elevated right heart pressures.  We talked extensively about the discomfort she is experiencing from her edema and the possibilities for further medical treatment.  I have suggested that she add metolazone but warned her that this could be harmful to her kidneys.  We will monitor this closely, but she thinks that this would be a worthwhile endeavor.  I will start at a very low dose because of her small size and advanced age, 2.5 mg twice a week.   2.  Arthritis.  The patient is actually more bothered by arthritis than anything else, and she has difficulty walking and is in a great deal of pain throughout her body.  I fully agree that a rheumatologist consult would be in order to try to find an acceptable treatment for her.  She has tried several injections in various joints without results.   3.  Hypertension.  Blood pressure is under excellent control.  The metolazone may cause some further reduction in blood pressure, and I warned her of possible orthostatic dizziness.  We do not need to push antihypertensives further.      I will plan on having Mrs. Dominguez back in a month for a repeat visit and metabolic profile.  I am hoping that she will get some improvement in her uncomfortable lower extremity edema with the addition of metolazone.  None of her current medications are likely to be a contributing factor.  Please do not hesitate to call if I can be of further assistance.             Again, thank you for allowing me to participate in the care of your patient.      Sincerely,    Fabiano Bower MD     [facility]

## 2017-05-30 NOTE — MR AVS SNAPSHOT
After Visit Summary   5/30/2017    Ana Cristina Dominguez    MRN: [id number]           Patient Information     Date Of Birth          6/6/1931        Visit Information        Provider Department      5/30/2017 9:15 AM Fabiano Bower MD [facility]        Today's Diagnoses     Chronic diastolic congestive heart failure (H)           Follow-ups after your visit        Additional Services     Follow-Up with Cardiologist                 Your next 10 appointments already scheduled     May 30, 2017 10:30 AM CDT   LAB with RU LAB   [facility] ([facility])    [address] [facility] [address] [place] [address]   [phone]           Patient must bring picture ID.  Patient should be prepared to give a urine specimen  Please do not eat 10-12 hours before your appointment if you are coming in fasting for labs on lipids, cholesterol, or glucose (sugar).  Pregnant women should follow their Care Team instructions. Water with medications is okay. Do not drink coffee or other fluids.   If you have concerns about taking  your medications, please ask at office or if scheduling via [employer], send a message by clicking on Secure Messaging, Message Your Care Team.            Jun 29, 2017  8:45 AM CDT   LAB with RU LAB   [facility] ([facility])    [address] [facility] [address]  [facility] [address] [phone]           Patient must bring picture ID.  Patient should be prepared to give a urine specimen  Please do not eat 10-12 hours before your appointment if you are coming in fasting for labs on lipids, cholesterol, or glucose (sugar).  Pregnant women should follow their Care Team instructions. Water with medications is okay. Do not drink coffee or other fluids.   If you have concerns about taking  your medications, please ask at office or if scheduling via [employer],  "send a message by clicking on Secure Messaging, Message Your Care Team.            Jun 29, 2017  9:45 AM CDT   Return Visit with Fabiano Bower MD   [facility] AT [place] ([facility] Clin[facility])    [address] [facility] [address]  [facility] [id number] [phone]              Future tests that were ordered for you today     Open Future Orders        Priority Expected Expires Ordered    Basic metabolic panel Routine 6/29/2017 5/30/2018 5/30/2017    Follow-Up with Cardiologist Routine 6/29/2017 5/30/2018 5/30/2017    Basic metabolic panel Routine 5/30/2017 5/25/2018 5/30/2017            Who to contact     If you have questions or need follow up information about today's clinic visit or your schedule please contact [facility] [place] directly at [phone].  Normal or non-critical lab and imaging results will be communicated to you by MyChart, letter or phone within 4 business days after the clinic has received the results. If you do not hear from us within 7 days, please contact the clinic through [employer] or phone. If you have a critical or abnormal lab result, we will notify you by phone as soon as possible.  Submit refill requests through [employer] or call your pharmacy and they will forward the refill request to us. Please allow 3 business days for your refill to be completed.          Additional Information About Your Visit        [employer] Information     [employer] lets you send messages to your doctor, view your test results, renew your prescriptions, schedule appointments and more. To sign up, go to [email or web address] . Click on \"Log in\" on the left side of the screen, which will take you to the Welcome page. Then click on \"Sign up Now\" on the right side of the page.     You will be asked to enter the access code listed below, as well as some personal information. Please follow the directions to create your username and password.   "   Your access code is: [email or web address]  Expires: 7/10/2017  3:57 PM     Your access code will  in 90 days. If you need help or a new code, please call your [facility] or [phone].        Care EveryWhere ID     This is your Care EveryWhere ID. This could be used by other organizations to access your [place] medical records  [id number]        Your Vitals Were     Pulse Height Pulse Oximetry BMI (Body Mass Index)          88 1.524 m (5') 97% 17.87 kg/m2         Blood Pressure from Last 3 Encounters:   17 140/70   17 152/84   17 178/82    Weight from Last 3 Encounters:   17 41.5 kg (91 lb 8 oz)   17 41.8 kg (92 lb 1.6 oz)   17 41.4 kg (91 lb 3.2 oz)              We Performed the Following     Follow-Up with Cardiologist          Today's Medication Changes          These changes are accurate as of: 17 10:08 AM.  If you have any questions, ask your nurse or doctor.               Start taking these medicines.        Dose/Directions    metolazone 2.5 MG tablet   Commonly known as:  ZAROXOLYN   Used for:  Chronic diastolic congestive heart failure (H)   Started by:  Fabiano Bower MD        Dose:  2.5 mg   Start taking on:  2017   Take 1 tablet (2.5 mg) by mouth twice a week   Quantity:  30 tablet   Refills:  3            Where to get your medicines      These medications were sent to [facility]/pharmacy #[id number] [facility] [address], [place] [address]     Phone:  [phone]     metolazone 2.5 MG tablet                Primary Care Provider Office Phone # Fax #    Alexsandra Davison [phone]       [facility] [address] [place] [address]        Thank you!     Thank you for choosing [facility]YS[facility] HE[facility] AT [place]  for your care. Our goal is always to provide you with excellent care. Hearing back from our patients is one way we can continue to improve our  services. Please take a few minutes to complete the written survey that you may receive in the mail after your visit with us. Thank you!             Your Updated Medication List - Protect others around you: Learn how to safely use, store and throw away your medicines at www.disposemymeds.org.          This list is accurate as of: 5/30/17 10:08 AM.  Always use your most recent med list.                   Brand Name Dispense Instructions for use    ascorbic acid 500 MG tablet    VITAMIN C    100    1 TABLET DAILY       bumetanide 0.5 MG tablet    BUMEX    90 tablet    2 q am, 1 q pm       CALCIUM 600 + D 600-200 MG-UNIT Tabs      2 qd       diltiazem 120 MG Cp12 12 hr SR capsule    CARDIZEM SR    180 capsule    Take 120 mg by mouth 2 times daily       metolazone 2.5 MG tablet   Start taking on:  6/1/2017    ZAROXOLYN    30 tablet    Take 1 tablet (2.5 mg) by mouth twice a week       metoprolol 50 MG 24 hr tablet    TOPROL-XL    135 tablet    Take 1.5 tablets (75 mg) by mouth daily       MULTIVITAMIN TABS   OR      1 qd       olopatadine 0.1 % ophthalmic solution    PATANOL     Place 1 drop into both eyes 2 times daily as needed for allergies During Spring       rivaroxaban ANTICOAGULANT 20 MG Tabs tablet    XARELTO    30 tablet    Take 1 tablet (20 mg) by mouth daily (with dinner)       rOPINIRole 0.5 MG tablet    REQUIP    30 tablet    Take 1 tablet (0.5 mg) by mouth At Bedtime       vitamin E 400 UNIT capsule     100    ONE CAP PO QD

## 2017-05-30 NOTE — PROGRESS NOTES
HPI and Plan:   See dictation    Orders Placed This Encounter   Procedures     Basic metabolic panel     Basic metabolic panel     Follow-Up with Cardiologist     Orders Placed This Encounter   Medications     metolazone (ZAROXOLYN) 2.5 MG tablet     Sig: Take 1 tablet (2.5 mg) by mouth twice a week     Dispense:  30 tablet     Refill:  3     There are no discontinued medications.      Encounter Diagnosis   Name Primary?     Chronic diastolic congestive heart failure (H)        CURRENT MEDICATIONS:  Current Outpatient Prescriptions   Medication Sig Dispense Refill     [START ON 6/1/2017] metolazone (ZAROXOLYN) 2.5 MG tablet Take 1 tablet (2.5 mg) by mouth twice a week 30 tablet 3     bumetanide (BUMEX) 0.5 MG tablet 2 q am, 1 q pm 90 tablet 1     rOPINIRole (REQUIP) 0.5 MG tablet Take 1 tablet (0.5 mg) by mouth At Bedtime 30 tablet 5     rivaroxaban ANTICOAGULANT (XARELTO) 20 MG TABS tablet Take 1 tablet (20 mg) by mouth daily (with dinner) 30 tablet 11     diltiazem (CARDIZEM SR) 120 MG CP12 12 hr SR capsule Take 120 mg by mouth 2 times daily 180 capsule 3     olopatadine (PATANOL) 0.1 % ophthalmic solution Place 1 drop into both eyes 2 times daily as needed for allergies During Spring       metoprolol (TOPROL-XL) 50 MG 24 hr tablet Take 1.5 tablets (75 mg) by mouth daily 135 tablet 3     VITAMIN E 400 UNIT OR CAPS ONE CAP PO  0     VITAMIN C 500 MG OR TABS 1 TABLET DAILY 100 0     MULTIVITAMIN TABS   OR 1 qd       CALCIUM 600 + D 600-200 MG-IU OR TABS 2 qd         ALLERGIES     Allergies   Allergen Reactions     Amoxicillin Rash     Codeine GI Disturbance     Stomach pain       PAST MEDICAL HISTORY:  Past Medical History:   Diagnosis Date     Other and unspecified hyperlipidemia      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 Father      Family History Negative Mother      Genitourinary Problems Sister      Renal failure     HEART DISEASE Sister      Rhythm issues       SOCIAL HISTORY:  Social History     Social History     Marital status:      Spouse name: N/A     Number of children: 3     Years of education: N/A     Occupational History      Retired     Social History Main Topics     Smoking status: Former Smoker     Quit date: 5/6/1973     Smokeless tobacco: Never Used     Alcohol use Yes      Comment: Socially     Drug use: No     Sexual activity: No     Other Topics Concern     Exercise Yes     Seat Belt Yes     Social History Narrative       Review of Systems:  Skin:  Negative     Eyes:  Positive for glasses  ENT:  Positive for hearing loss  Respiratory:  Positive for cough  Cardiovascular:    edema;Positive for  Gastroenterology: Negative    Genitourinary:  Positive for urinary frequency  Musculoskeletal:  Positive for arthritis  Neurologic:  Negative    Psychiatric:  Negative    Heme/Lymph/Imm:  Positive for easy bruising  Endocrine:  Negative      Physical Exam:  Vitals: /70 (BP Location: Right arm, Patient Position: Chair, Cuff Size: Adult Small)  Pulse 88  Ht 1.524 m (5')  Wt 41.5 kg (91 lb 8 oz)  SpO2 97%  BMI 17.87 kg/m2    Constitutional:  cooperative        Skin:  warm and dry to the touch        Head:  normocephalic        Eyes:  no xanthalasma        ENT:  no pallor or cyanosis        Neck:  carotid pulses are full and equal bilaterally, JVP normal, no carotid bruit, no thyromegaly        Chest:  normal breath sounds, clear to auscultation, normal A-P diameter, normal symmetry, normal respiratory excursion, no use of accessory muscles        Cardiac: normal S1 and  S2;no S3 or S4 irregular rhythm     systolic murmur;grade 1;LUSB systolic murmur;apical;grade 1        Abdomen:  abdomen soft, non-tender, BS normoactive, no mass, no HSM, no bruits        Vascular: pulses full and equal                                      Extremities and Back:           Neurological:  affect appropriate, oriented to time, person and place          Recent Lab Results:  LIPID RESULTS:  Lab Results   Component Value Date    CHOL 107 10/07/2016    HDL 22 (L) 10/07/2016    LDL 65 10/07/2016    TRIG 100 10/07/2016    CHOLHDLRATIO 3.3 08/29/2013       LIVER ENZYME RESULTS:  Lab Results   Component Value Date    AST 38 10/05/2016    ALT 40 10/05/2016       CBC RESULTS:  Lab Results   Component Value Date    WBC 9.4 10/08/2016    RBC 3.28 (L) 10/08/2016    HGB 9.2 (L) 10/09/2016    HCT 29.3 (L) 10/08/2016    MCV 89 10/08/2016    MCH 30.2 10/08/2016    MCHC 33.8 10/08/2016    RDW 13.6 10/08/2016     10/08/2016       BMP RESULTS:  Lab Results   Component Value Date     (L) 05/30/2017    POTASSIUM 4.1 05/30/2017    CHLORIDE 95 05/30/2017    CO2 22 05/30/2017    ANIONGAP 11 05/30/2017     (H) 05/30/2017    BUN 18 05/30/2017    CR 0.52 05/30/2017    GFRESTIMATED >90  Non  GFR Calc   05/30/2017    GFRESTBLACK >90   GFR Calc   05/30/2017    KARTHIK 9.3 05/30/2017        A1C RESULTS:  Lab Results   Component Value Date    A1C 5.7 12/16/2015       INR RESULTS:  Lab Results   Component Value Date    INR 1.24 (H) 10/08/2016    INR 1.35 (H) 10/05/2016           CC  Fabiano Bower MD  [facility] HEART [address] [address]

## 2017-06-02 ENCOUNTER — OFFICE VISIT (OUTPATIENT)
Dept: NURSING | Facility: CLINIC | Age: 82
End: 2017-06-02
Payer: COMMERCIAL

## 2017-06-02 ENCOUNTER — TELEPHONE (OUTPATIENT)
Dept: CARDIOLOGY | Facility: CLINIC | Age: 82
End: 2017-06-02

## 2017-06-02 DIAGNOSIS — E87.6 HYPOKALEMIA: Primary | ICD-10-CM

## 2017-06-02 DIAGNOSIS — R21 RASH: Primary | ICD-10-CM

## 2017-06-02 PROCEDURE — 99207 ZZC NO CHARGE NURSE ONLY: CPT

## 2017-06-02 NOTE — TELEPHONE ENCOUNTER
Patient had a BMP on 05-30-17, Sodium was low at 128.     Notes Recorded by Fabiano Bower MD on 5/31/2017 at 9:40 PM  Na noted to be low.  She has been this low in the past, without this level of diuresis.  We'll need to keep a close eye on this.  Recheck in 2 weeks    I called patient to review her results and recommendations. Patient was not available. I was not able to leave a message.      Aldair WEBER  [facility]

## 2017-06-02 NOTE — MR AVS SNAPSHOT
After Visit Summary   6/2/2017    Ana Cristina Dominguez    MRN: [id number]           Patient Information     Date Of Birth          6/6/1931        Today's Diagnoses     Rash    -  1       Follow-ups after your visit        Your next 10 appointments already scheduled     Jun 16, 2017  8:45 AM CDT   LAB with RU LAB   [facility]    [address] [place] [address] [address]  [facility] [address]   [phone]           Patient must bring picture ID.  Patient should be prepared to give a urine specimen  Please do not eat 10-12 hours before your appointment if you are coming in fasting for labs on lipids, cholesterol, or glucose (sugar).  Pregnant women should follow their Care Team instructions. Water with medications is okay. Do not drink coffee or other fluids.   If you have concerns about taking  your medications, please ask at office or if scheduling via [employer], send a message by clicking on Secure Messaging, Message Your Care Team.            Jun 29, 2017  8:45 AM CDT   LAB with RU LAB   [facility]    [address] [place] [address] [address]  [facility] [address]   [phone]           Patient must bring picture ID.  Patient should be prepared to give a urine specimen  Please do not eat 10-12 hours before your appointment if you are coming in fasting for labs on lipids, cholesterol, or glucose (sugar).  Pregnant women should follow their Care Team instructions. Water with medications is okay. Do not drink coffee or other fluids.   If you have concerns about taking  your medications, please ask at office or if scheduling via [employer], send a message by clicking on Secure Messaging, Message Your Care Team.            Jun 29, 2017  9:45 AM CDT   Return Visit with Fabiano Bower MD   [facility]    [address] [place] [address]  [address]  [facility] [id number] [phone]              Who to contact     If you have questions or need follow up information about today's clinic visit or your schedule please contact [facility] directly at [phone].  Normal or non-critical lab and imaging results will be communicated to you by AudienceViewhart, letter or phone within 4 business days after the clinic has received the results. If you do not hear from us within 7 days, please contact the clinic through AudienceViewhart or phone. If you have a critical or abnormal lab result, we will notify you by phone as soon as possible.  Submit refill requests through [employer] or call your pharmacy and they will forward the refill request to us. Please allow 3 business days for your refill to be completed.          Additional Information About Your Visit        [employer] Information     [employer] gives you secure access to your electronic health record. If you see a primary care provider, you can also send messages to your care team and make appointments. If you have questions, please call your primary care clinic.  If you do not have a primary care provider, please call [phone] and they will assist you.        Care EveryWhere ID     This is your Care EveryWhere ID. This could be used by other organizations to access your [facility] medical records  [id number]         Blood Pressure from Last 3 Encounters:   06/06/17 124/65   05/30/17 140/70   05/02/17 152/84    Weight from Last 3 Encounters:   06/06/17 91 lb (41.3 kg)   05/30/17 91 lb 8 oz (41.5 kg)   05/02/17 92 lb 1.6 oz (41.8 kg)              Today, you had the following     No orders found for display         Today's Medication Changes          These changes are accurate as of: 6/2/17 11:59 PM.  If you have any questions, ask your nurse or doctor.               Start taking these medicines.        Dose/Directions    potassium chloride SA 20 MEQ CR tablet   Commonly known as:  K-DUR/KLOR-CON M   Used  for:  Hypokalemia   Started by:  Jacquelyn Castaneda RN        Dose:  20 mEq   Take 1 tablet (20 mEq) by mouth daily   Quantity:  30 tablet   Refills:  3            Where to get your medicines      These medications were sent to [facility]/pharmacy #[id number] - [place], [place] - [address]  [address], [place] [address]     Phone:  [phone]     potassium chloride SA 20 MEQ CR tablet                Primary Care Provider Office Phone # Fax #    Alexsandra Davison [phone]       [facility] [address]  [facility] [address]        Thank you!     Thank you for choosing [facility]  for your care. Our goal is always to provide you with excellent care. Hearing back from our patients is one way we can continue to improve our services. Please take a few minutes to complete the written survey that you may receive in the mail after your visit with us. Thank you!             Your Updated Medication List - Protect others around you: Learn how to safely use, store and throw away your medicines at www.disposemymeds.org.          This list is accurate as of: 6/2/17 11:59 PM.  Always use your most recent med list.                   Brand Name Dispense Instructions for use    ascorbic acid 500 MG tablet    VITAMIN C    100    1 TABLET DAILY       bumetanide 0.5 MG tablet    BUMEX    90 tablet    2 q am, 1 q pm       CALCIUM 600 + D 600-200 MG-UNIT Tabs      2 qd       diltiazem 120 MG Cp12 12 hr SR capsule    CARDIZEM SR    180 capsule    Take 120 mg by mouth 2 times daily       metolazone 2.5 MG tablet    ZAROXOLYN    30 tablet    Take 1 tablet (2.5 mg) by mouth twice a week       metoprolol 50 MG 24 hr tablet    TOPROL-XL    135 tablet    Take 1.5 tablets (75 mg) by mouth daily       MULTIVITAMIN TABS   OR      1 qd       olopatadine 0.1 % ophthalmic solution    PATANOL     Place 1 drop into both eyes 2 times daily as needed for allergies During Spring       potassium  chloride SA 20 MEQ CR tablet    K-DUR/KLOR-CON M    30 tablet    Take 1 tablet (20 mEq) by mouth daily       rivaroxaban ANTICOAGULANT 20 MG Tabs tablet    XARELTO    30 tablet    Take 1 tablet (20 mg) by mouth daily (with dinner)       rOPINIRole 0.5 MG tablet    REQUIP    30 tablet    Take 1 tablet (0.5 mg) by mouth At Bedtime       vitamin E 400 UNIT capsule     100    ONE CAP PO QD

## 2017-06-05 NOTE — TELEPHONE ENCOUNTER
I spoke with Luis, patient's son. uLis stated, patient mis-placed her phone. I reviewed that patient needs a repeat lab to check her low sodium level. He will contact patient and will have her call me back.    TGarbers RN  [facility]

## 2017-06-06 ENCOUNTER — HOSPITAL ENCOUNTER (OUTPATIENT)
Dept: LAB | Facility: CLINIC | Age: 82
Discharge: HOME | End: 2017-06-06
Attending: INTERNAL MEDICINE | Admitting: INTERNAL MEDICINE
Payer: MEDICARE

## 2017-06-06 ENCOUNTER — OFFICE VISIT (OUTPATIENT)
Dept: RHEUMATOLOGY | Facility: CLINIC | Age: 82
End: 2017-06-06
Payer: COMMERCIAL

## 2017-06-06 VITALS
BODY MASS INDEX: 17.77 KG/M2 | HEART RATE: 76 BPM | DIASTOLIC BLOOD PRESSURE: 65 MMHG | SYSTOLIC BLOOD PRESSURE: 124 MMHG | WEIGHT: 91 LBS

## 2017-06-06 DIAGNOSIS — M25.50 MULTIPLE JOINT PAIN: ICD-10-CM

## 2017-06-06 DIAGNOSIS — D50.0 IRON DEFICIENCY ANEMIA DUE TO CHRONIC BLOOD LOSS: Primary | ICD-10-CM

## 2017-06-06 DIAGNOSIS — E87.1 HYPONATREMIA: ICD-10-CM

## 2017-06-06 DIAGNOSIS — D50.0 IRON DEFICIENCY ANEMIA DUE TO CHRONIC BLOOD LOSS: ICD-10-CM

## 2017-06-06 LAB
ANION GAP SERPL CALCULATED.3IONS-SCNC: 7 MMOL/L (ref 3–14)
BASOPHILS # BLD AUTO: 0 10E9/L (ref 0–0.2)
BASOPHILS NFR BLD AUTO: 0.3 %
BUN SERPL-MCNC: 45 MG/DL (ref 7–30)
CALCIUM SERPL-MCNC: 9.3 MG/DL (ref 8.5–10.1)
CHLORIDE SERPL-SCNC: 89 MMOL/L (ref 94–109)
CK SERPL-CCNC: 56 U/L (ref 30–225)
CO2 SERPL-SCNC: 34 MMOL/L (ref 20–32)
CREAT SERPL-MCNC: 1.04 MG/DL (ref 0.52–1.04)
CRP SERPL-MCNC: 154 MG/L (ref 0–8)
DIFFERENTIAL METHOD BLD: ABNORMAL
EOSINOPHIL # BLD AUTO: 0 10E9/L (ref 0–0.7)
EOSINOPHIL NFR BLD AUTO: 0.4 %
ERYTHROCYTE [DISTWIDTH] IN BLOOD BY AUTOMATED COUNT: 14.5 % (ref 10–15)
ERYTHROCYTE [SEDIMENTATION RATE] IN BLOOD BY WESTERGREN METHOD: 132 MM/H (ref 0–30)
GFR SERPL CREATININE-BSD FRML MDRD: 50 ML/MIN/1.7M2
GLUCOSE SERPL-MCNC: 156 MG/DL (ref 70–99)
HCT VFR BLD AUTO: 27.3 % (ref 35–47)
HGB BLD-MCNC: 8.9 G/DL (ref 11.7–15.7)
IMM GRANULOCYTES # BLD: 0.1 10E9/L (ref 0–0.4)
IMM GRANULOCYTES NFR BLD: 0.7 %
LDH SERPL L TO P-CCNC: 184 U/L (ref 81–234)
LYMPHOCYTES # BLD AUTO: 1.4 10E9/L (ref 0.8–5.3)
LYMPHOCYTES NFR BLD AUTO: 20.5 %
MCH RBC QN AUTO: 27.5 PG (ref 26.5–33)
MCHC RBC AUTO-ENTMCNC: 32.6 G/DL (ref 31.5–36.5)
MCV RBC AUTO: 84 FL (ref 78–100)
MONOCYTES # BLD AUTO: 0.9 10E9/L (ref 0–1.3)
MONOCYTES NFR BLD AUTO: 12.5 %
NEUTROPHILS # BLD AUTO: 4.6 10E9/L (ref 1.6–8.3)
NEUTROPHILS NFR BLD AUTO: 65.6 %
NRBC # BLD AUTO: 0 10*3/UL
NRBC BLD AUTO-RTO: 0 /100
PLATELET # BLD AUTO: 412 10E9/L (ref 150–450)
POTASSIUM SERPL-SCNC: 3.3 MMOL/L (ref 3.4–5.3)
RBC # BLD AUTO: 3.24 10E12/L (ref 3.8–5.2)
SODIUM SERPL-SCNC: 130 MMOL/L (ref 133–144)
WBC # BLD AUTO: 6.9 10E9/L (ref 4–11)

## 2017-06-06 PROCEDURE — 84165 PROTEIN E-PHORESIS SERUM: CPT | Performed by: INTERNAL MEDICINE

## 2017-06-06 PROCEDURE — 82550 ASSAY OF CK (CPK): CPT | Performed by: INTERNAL MEDICINE

## 2017-06-06 PROCEDURE — 00000402 ZZHCL STATISTIC TOTAL PROTEIN: Performed by: INTERNAL MEDICINE

## 2017-06-06 PROCEDURE — 83615 LACTATE (LD) (LDH) ENZYME: CPT | Performed by: INTERNAL MEDICINE

## 2017-06-06 PROCEDURE — 86140 C-REACTIVE PROTEIN: CPT | Performed by: INTERNAL MEDICINE

## 2017-06-06 PROCEDURE — 80048 BASIC METABOLIC PNL TOTAL CA: CPT | Performed by: INTERNAL MEDICINE

## 2017-06-06 PROCEDURE — 85025 COMPLETE CBC W/AUTO DIFF WBC: CPT | Performed by: INTERNAL MEDICINE

## 2017-06-06 PROCEDURE — 99204 OFFICE O/P NEW MOD 45 MIN: CPT | Performed by: INTERNAL MEDICINE

## 2017-06-06 PROCEDURE — 83010 ASSAY OF HAPTOGLOBIN QUANT: CPT | Performed by: INTERNAL MEDICINE

## 2017-06-06 PROCEDURE — 85652 RBC SED RATE AUTOMATED: CPT | Performed by: INTERNAL MEDICINE

## 2017-06-06 PROCEDURE — 86200 CCP ANTIBODY: CPT | Performed by: INTERNAL MEDICINE

## 2017-06-06 PROCEDURE — 36415 COLL VENOUS BLD VENIPUNCTURE: CPT | Performed by: INTERNAL MEDICINE

## 2017-06-06 NOTE — MR AVS SNAPSHOT
After Visit Summary   6/6/2017    Ana Cristina Dominguez    MRN: [id number]           Patient Information     Date Of Birth          6/6/1931        Visit Information        Provider Department      6/6/2017 2:45 PM Go Hidalgo MD [facility] [place] [facility]        Today's Diagnoses     Iron deficiency anemia due to chronic blood loss    -  1    Multiple joint pain           Follow-ups after your visit        Your next 10 appointments already scheduled     Jun 06, 2017  3:45 PM CDT   LAB with RH LAB ONLY   [facility]    [address]  [facility] [address] [phone]           Patient must bring picture ID.  Patient should be prepared to give a urine specimen  Please do not eat 10-12 hours before your appointment if you are coming in fasting for labs on lipids, cholesterol, or glucose (sugar).  Pregnant women should follow their Care Team instructions. Water with medications is okay. Do not drink coffee or other fluids.   If you have concerns about taking  your medications, please ask at office or if scheduling via [employer], send a message by clicking on Secure Messaging, Message Your Care Team.            Jun 29, 2017  8:45 AM CDT   LAB with RU LAB   [facility] [place] [facility]    [address]  [facility] [address] [phone]           Patient must bring picture ID.  Patient should be prepared to give a urine specimen  Please do not eat 10-12 hours before your appointment if you are coming in fasting for labs on lipids, cholesterol, or glucose (sugar).  Pregnant women should follow their Care Team instructions. Water with medications is okay. Do not drink coffee or other fluids.   If you have concerns about taking  your medications, please ask at office or if scheduling via [employer], send a message by clicking on Secure Messaging, Message Your Care Team.            Jun 29,  2017  9:45 AM CDT   Return Visit with Fabiano Bower MD   [facility] [place] ([facility]    [address]  [facility] [address] [phone]              Future tests that were ordered for you today     Open Future Orders        Priority Expected Expires Ordered    CK total Routine  6/6/2018 6/6/2017    CBC with platelets differential Routine  6/6/2018 6/6/2017    Erythrocyte sedimentation rate auto Routine  6/6/2018 6/6/2017    CRP inflammation Routine  6/6/2018 6/6/2017    Haptoglobin Routine  6/6/2018 6/6/2017    Lactate Dehydrogenase Routine  6/6/2018 6/6/2017    Cyclic Citrullinated Peptide Antibody IgG Routine  6/6/2018 6/6/2017    Protein electrophoresis Routine  6/6/2018 6/6/2017            Who to contact     If you have questions or need follow up information about today's clinic visit or your schedule please contact [facility] directly at [phone].  Normal or non-critical lab and imaging results will be communicated to you by Functional Neuromodulationhart, letter or phone within 4 business days after the clinic has received the results. If you do not hear from us within 7 days, please contact the clinic through Plixt or phone. If you have a critical or abnormal lab result, we will notify you by phone as soon as possible.  Submit refill requests through [employer] or call your pharmacy and they will forward the refill request to us. Please allow 3 business days for your refill to be completed.          Additional Information About Your Visit        [employer]iner Information     [employer] gives you secure access to your electronic health record. If you see a primary care provider, you can also send messages to your care team and make appointments. If you have questions, please call your primary care clinic.  If you do not have a primary care provider, please call [phone] and they will assist you.        Care EveryWhere ID     This is your  Care EveryWhere ID. This could be used by other organizations to access your [facility] medical records  [id number]        Your Vitals Were     Pulse BMI (Body Mass Index)                76 17.77 kg/m2           Blood Pressure from Last 3 Encounters:   06/06/17 124/65   05/30/17 140/70   05/02/17 152/84    Weight from Last 3 Encounters:   06/06/17 41.3 kg (91 lb)   05/30/17 41.5 kg (91 lb 8 oz)   05/02/17 41.8 kg (92 lb 1.6 oz)               Primary Care Provider Office Phone # Fax #    Alexsandra Davison [phone]       [facility] [address] [facility] [address]  [facility] [address]        Thank you!     Thank you for choosing [facility]  for your care. Our goal is always to provide you with excellent care. Hearing back from our patients is one way we can continue to improve our services. Please take a few minutes to complete the written survey that you may receive in the mail after your visit with us. Thank you!             Your Updated Medication List - Protect others around you: Learn how to safely use, store and throw away your medicines at www.disposemymeds.org.          This list is accurate as of: 6/6/17  3:43 PM.  Always use your most recent med list.                   Brand Name Dispense Instructions for use    ascorbic acid 500 MG tablet    VITAMIN C    100    1 TABLET DAILY       bumetanide 0.5 MG tablet    BUMEX    90 tablet    2 q am, 1 q pm       CALCIUM 600 + D 600-200 MG-UNIT Tabs      2 qd       diltiazem 120 MG Cp12 12 hr SR capsule    CARDIZEM SR    180 capsule    Take 120 mg by mouth 2 times daily       metolazone 2.5 MG tablet    ZAROXOLYN    30 tablet    Take 1 tablet (2.5 mg) by mouth twice a week       metoprolol 50 MG 24 hr tablet    TOPROL-XL    135 tablet    Take 1.5 tablets (75 mg) by mouth daily       MULTIVITAMIN TABS   OR      1 qd       olopatadine 0.1 % ophthalmic solution    PATANOL     Place 1 drop into both eyes 2 times daily as needed for  allergies During Spring       rivaroxaban ANTICOAGULANT 20 MG Tabs tablet    XARELTO    30 tablet    Take 1 tablet (20 mg) by mouth daily (with dinner)       rOPINIRole 0.5 MG tablet    REQUIP    30 tablet    Take 1 tablet (0.5 mg) by mouth At Bedtime       vitamin E 400 UNIT capsule     100    ONE CAP PO QD

## 2017-06-06 NOTE — TELEPHONE ENCOUNTER
BMP reviewed. . K is 3.3.     Messaged Dr. Bower to review.     TGaroberto WEBER  [facility]

## 2017-06-06 NOTE — NURSING NOTE
Chief Complaint   Patient presents with     Consult     Dr. Davison       Initial /65  Pulse 76  Wt 41.3 kg (91 lb)  BMI 17.77 kg/m2 Estimated body mass index is 17.77 kg/(m^2) as calculated from the following:    Height as of 5/30/17: 1.524 m (5').    Weight as of this encounter: 41.3 kg (91 lb).      Patient prefers to be contacted via at Home.   Okay to leave detailed message: Yes  Patient uses MyChart: No    Jazmyn COATES LPN

## 2017-06-07 LAB
CCP AB SER IA-ACNC: 2 U/ML
HAPTOGLOB SERPL-MCNC: 571 MG/DL (ref 35–175)

## 2017-06-07 RX ORDER — POTASSIUM CHLORIDE 1500 MG/1
20 TABLET, EXTENDED RELEASE ORAL DAILY
Qty: 30 TABLET | Refills: 3 | Status: SHIPPED | OUTPATIENT
Start: 2017-06-07 | End: 2017-06-28

## 2017-06-07 NOTE — PROGRESS NOTES
REFERRING PHYSICIAN:  Dr. Fabiano Bower.      PRIMARY CARE PHYSICIAN:  Dr. Alexsandra Davison.      CHIEF COMPLAINT:  Arthritis.      HISTORY OF PRESENT ILLNESS:  Ana Cristina Dominguez is an 86-year-old female who presents for evaluation of multiplicity of symptoms.  She says back 5-6 months ago she started noticing migratory pain with weakness involving the legs to a lesser extent the arms.  It should be noted that prior to this back in 10/2016 she had been admitted to the hospital for possibly chronic atrial fibrillation and abdominal pain.  While workup was unremarkable it should be noted that her hemoglobin at time of discharge was 9.2.  It does not appear that this was ever followed up on.  Prior to this her hemoglobin on admission had been 11.2, normal hepatic panel, normal creatinine.  She is hence complaining quite a bit of fatigue and had an extensive workup for peripheral edema, seen by Cardiology who was found that she does in fact have chronic diastolic heart failure secondary to tricuspid regurgitation and borderline elevated right heart pressures.  It was his recommendation that given her complaints of pain she be seen by rheumatology.  Prior to this she had been seen by Orthopedics.  [place] she was given a course of prednisone that apparently helped dramatically.  More recent evaluation by [facility] at the beginning of 05/2017 did not find a cause for her symptoms.  They also did not feel she needed to see a rheumatologist and of course prednisone was ineffective.      She cannot be an NSAID due to chronic anticoagulation.        She does not have a rash or psoriasis.  She does not have chronic diarrhea or history of inflammatory bowel disease.  She is not having any fevers, chills or sweats.  There are no headaches, jaw claudication or sudden change in loss of vision.  There is no rash, photosensitive rash, stomatitis, or Raynaud's.      PAST MEDICAL HISTORY:  Hyperlipidemia,  senile osteoporosis, restless leg syndrome, chronic atrial fibrillation, tricuspid regurgitation with diastolic heart failure, peripheral edema, multifactorial in origin.      MEDICATIONS:  Zaroxolyn 2.5 mg twice a week, Bumex 0.5 mg 2 tablets in the morning and 1 in the evening, ropinorole 0.5 mg at bedtime, Xarelto 20 mg with dinner, diltiazem  mg b.i.d., Patanol eyedrops, Metoprolol XL 75 mg daily.      DRUG ALLERGIES:  Amoxicillin, codeine.      SOCIAL HISTORY:  Not a smoker, drinks occasionally.      FAMILY HISTORY:  There is nobody in her family with rheumatoid arthritis, lupus, Crohn's disease.      REVIEW OF SYSTEMS:  A 10-point review of systems is otherwise negative.  The patient had labs performed back in 01/2017.  She was found to have a sed rate of 60 with a negative rheumatoid factor and CCP antibody.      PHYSICAL EXAMINATION:   VITAL SIGNS:  Blood pressure 124/65, pulse 76.     HEENT:  She is normocephalic, atraumatic.  Sclerae are clear and moist.  Temporal arteries are nontender, normal pulsations.  Oropharynx without lesions.   NECK:  Supple, without lymphadenopathy.  There are no carotid or subclavian bruits.   LUNGS:  Clear to auscultation bilaterally.   HEART:  Regular rate and rhythm without murmur.   MUSCULOSKELETAL:  Joint exam.  She has clearcut osteoarthritis at multiple DIP joints and PIP joints with no synovitis of the wrists, MCPs, or PIPs.  There is no synovitis of the elbows, shoulders, knees, ankles.  She is very weak appearing.  She has difficulty getting out of a chair and has to push off on the chair and has to get her balance for a minute.  She is somewhat stiff appearing when she does this.  She also appears to be somewhat stiff when she is reaching for things above her head.  Strength is 4+/5 proximally in the upper extremities, 4/5 in the proximal lower extremities.  DTRs 2+ symmetrically, knees, ankles, biceps.   SKIN:  Without any lesions.      IMPRESSION:     1.   Historically chronic anemia which might contribute to an elevated sed rate of 60 that was found back in 2017.   2.  Proximal myalgias and weakness more in the lower extremities, might raise the diagnosis of polymyalgia rheumatica given her elevated sed rate.        RECOMMENDATIONS:   1.  We need to figure out whether she is still anemic and whether or not this to the cause of the elevated sed rate.  We will get her to repeat her CBC, check an LDH, haptoglobin, SPEP.  For the weakness we will check a CPK antibody.   2.  Discussed the etiology, pathogenesis, treatment options of polymyalgia rheumatica.  If she does in fact have this then dose of prednisone of 10-15 mg should lead to dramatic improvement quickly.  Of course the only accepted treatment for polymyalgia rheumatica is prednisone which does require slow tapering.   3.  Of course the other reason for the elevated sed rate would be if she has ongoing anemia, which also could then at least contribute to weakness and some of these other issues.  If she is still anemic this will need to be addressed either by Gastroenterology to make certain she is not having a low-grade GI bleed or hematology.   4.  Further treatment and followup will depend on results of labs ordered today.         CECILY AUGUSTINE MD             D: 2017 16:05   T: 2017 14:29   MT: DAVIN      Name:     FELIZ HAMPTNO   MRN:      [id number]        Account:      [id number]   :      1931           Visit Date:   2017      Document: [id number]       cc: Alexsandra Bower MD, FACC

## 2017-06-07 NOTE — TELEPHONE ENCOUNTER
Dr. Bower reviewed patient's BMP. Per Dr. Bower, add Potassium 20 Meq a day. Check BMP in 7 days.      Results and recommendations were reviewed with patient over the phone. Patient had no questions. Script for potassium was sent to patient's pharmacy. BMP is scheduled on 06-16-17.    Aldair WEBER  [facility]

## 2017-06-08 ENCOUNTER — MYC MEDICAL ADVICE (OUTPATIENT)
Dept: RHEUMATOLOGY | Facility: CLINIC | Age: 82
End: 2017-06-08

## 2017-06-08 DIAGNOSIS — M35.3 POLYMYALGIA RHEUMATICA (H): Primary | ICD-10-CM

## 2017-06-08 LAB
ALBUMIN SERPL ELPH-MCNC: 3.2 G/DL (ref 3.7–5.1)
ALPHA1 GLOB SERPL ELPH-MCNC: 0.8 G/DL (ref 0.2–0.4)
ALPHA2 GLOB SERPL ELPH-MCNC: 1.6 G/DL (ref 0.5–0.9)
B-GLOBULIN SERPL ELPH-MCNC: 1.1 G/DL (ref 0.6–1)
ELP INTERPRETATION: ABNORMAL
GAMMA GLOB SERPL ELPH-MCNC: 1 G/DL (ref 0.7–1.6)
M PROTEIN SERPL ELPH-MCNC: 0.2 G/DL

## 2017-06-09 RX ORDER — PREDNISONE 5 MG/1
TABLET ORAL
Qty: 90 TABLET | Refills: 3 | Status: SHIPPED | OUTPATIENT
Start: 2017-06-09 | End: 2017-07-18

## 2017-06-09 NOTE — TELEPHONE ENCOUNTER
Script was sent to [facility] for Prednisone       Jazmyn COATES LPN  [facility]  Rheumatology-Dr. Hidalgo  [address]   [address] [place] [address]

## 2017-06-15 ENCOUNTER — MYC MEDICAL ADVICE (OUTPATIENT)
Dept: RHEUMATOLOGY | Facility: CLINIC | Age: 82
End: 2017-06-15

## 2017-06-16 DIAGNOSIS — I50.32 CHRONIC DIASTOLIC CONGESTIVE HEART FAILURE (H): ICD-10-CM

## 2017-06-16 LAB
ANION GAP SERPL CALCULATED.3IONS-SCNC: 3 MMOL/L (ref 3–14)
BUN SERPL-MCNC: 47 MG/DL (ref 7–30)
CALCIUM SERPL-MCNC: 9.2 MG/DL (ref 8.5–10.1)
CHLORIDE SERPL-SCNC: 95 MMOL/L (ref 94–109)
CO2 SERPL-SCNC: 38 MMOL/L (ref 20–32)
CREAT SERPL-MCNC: 0.9 MG/DL (ref 0.52–1.04)
GFR SERPL CREATININE-BSD FRML MDRD: 59 ML/MIN/1.7M2
GLUCOSE SERPL-MCNC: 156 MG/DL (ref 70–99)
POTASSIUM SERPL-SCNC: 3.3 MMOL/L (ref 3.4–5.3)
SODIUM SERPL-SCNC: 136 MMOL/L (ref 133–144)

## 2017-06-16 PROCEDURE — 80048 BASIC METABOLIC PNL TOTAL CA: CPT | Performed by: INTERNAL MEDICINE

## 2017-06-16 PROCEDURE — 36415 COLL VENOUS BLD VENIPUNCTURE: CPT | Performed by: INTERNAL MEDICINE

## 2017-06-16 NOTE — TELEPHONE ENCOUNTER
Reviewed BMP with Dr. Bower. K is still at 3.3. Patient to double her Potassium. Results and recommendations were reviewed with patient over the phone. Patient to increase her Potassium from 20 Meq a day to 40 meq a day. Patient had no questions. Patient is seeing Dr. Bower with a BMP on 06-29-17.     TGarbers RN  [facility]

## 2017-06-20 ENCOUNTER — OFFICE VISIT (OUTPATIENT)
Dept: RHEUMATOLOGY | Facility: CLINIC | Age: 82
End: 2017-06-20
Payer: COMMERCIAL

## 2017-06-20 ENCOUNTER — TELEPHONE (OUTPATIENT)
Dept: INTERNAL MEDICINE | Facility: CLINIC | Age: 82
End: 2017-06-20

## 2017-06-20 VITALS
BODY MASS INDEX: 15.82 KG/M2 | HEART RATE: 67 BPM | DIASTOLIC BLOOD PRESSURE: 74 MMHG | SYSTOLIC BLOOD PRESSURE: 166 MMHG | WEIGHT: 81 LBS

## 2017-06-20 DIAGNOSIS — D61.82 MYELOPHTHISIC ANEMIA (H): Primary | ICD-10-CM

## 2017-06-20 DIAGNOSIS — M35.3 PMR (POLYMYALGIA RHEUMATICA) (H): ICD-10-CM

## 2017-06-20 PROCEDURE — 99213 OFFICE O/P EST LOW 20 MIN: CPT | Performed by: INTERNAL MEDICINE

## 2017-06-20 NOTE — TELEPHONE ENCOUNTER
Patient would like to switch her PCP to Dr. Urban because her son, daughter-in-law and grand daughter see him. She also knows him personally through her children. Please let her know if this is ok with Dr. Urban; she currently sees Dr. Davison. Please call her back to advise at [phone] (no answering machine).   Thank you.    Marce Rogers

## 2017-06-20 NOTE — PROGRESS NOTES
HISTORY:  Ana Cristina Hampton is seen back in followup for her polymyalgia rheumatica.  She has dramatically improved on the prednisone.  Our workup though did find that she has small monoclonal protein and she has still ongoing anemia which had not been followed up on or handled since her hospital admission back in November.  Her weight is also done and to 81 pounds, which is of undetermined etiology.  She felt better from the prednisone almost immediately and does feel like her stiffness has resolved as well as the proximal pain.  No headaches or jaw claudication, wondering where to proceed from here.      PHYSICAL EXAMINATION:  Blood pressure 166/74, pulse 67, easily gets in and out of a chair, raises her arms above her head.  There is no synovitis of the wrists, MCPs, or PIPs.      IMPRESSION:    1.  Polymyalgia rheumatica.   2.  Anemia of undetermined etiology.      RECOMMENDATIONS:    1.  Follow protocol.  I have previously discussed for tapering her prednisone; after 2 weeks of 15 mg go to 12.5 mg for 2 weeks and then 5 mg b.i.d. for a month and then taper by 1 mg a month.  We will refer her to Hematology for help with the workup in the etiology of her ongoing chronic anemia, which does not appear to be iron deficiency as it preceded her signs and symptoms of polymyalgia rheumatica, so is not clearly autoimmune in nature.     2.  Follow up with me in 4-6 weeks.         CECILY AUGUSTINE MD             D: 2017 12:37   T: 2017 12:46   MT: KARIME#[id number]      Name:     ANA CRISTINA HAMPTON   MRN:      [id number]        Account:      [id number]   :      1931           Visit Date:   2017      Document: [id number]

## 2017-06-20 NOTE — MR AVS SNAPSHOT
After Visit Summary   6/20/2017    Ana Cristina Dominguez    MRN: [id number]           Patient Information     Date Of Birth          6/6/1931        Visit Information        Provider Department      6/20/2017 8:30 AM Go Hidalgo MD [facility]        Today's Diagnoses     Myelophthisic anemia (H)    -  1    PMR (polymyalgia rheumatica) (H)           Follow-ups after your visit        Additional Services     ONC/HEME ADULT REFERRAL       Your provider has referred you to: [facility]: [facility] [phone]   [email or web address]    Please be aware that coverage of these services is subject to the terms and limitations of your health insurance plan.  Call member services at your health plan with any benefit or coverage questions.      Please bring the following with you to your appointment:    (1) Any X-Rays, CTs or MRIs which have been performed.  Contact the facility where they were done to arrange for  prior to your scheduled appointment.   (2) List of current medications  (3) This referral request   (4) Any documents/labs given to you for this referral                  Your next 10 appointments already scheduled     Jun 22, 2017  1:45 PM CDT   New Visit with Carly Mishra MD   [facility] ([facility])    [facility]  [address] [place] [address] [address]  [facility] [address]   [phone]            Jun 29, 2017  8:45 AM CDT   LAB with RU LAB   [facility] [place] ([facility])    [address]  [facility] [address]   [phone]           Patient must bring picture ID.  Patient should be prepared to give a urine specimen  Please do not eat 10-12 hours before your appointment if you are coming in fasting for labs on lipids, cholesterol, or  glucose (sugar).  Pregnant women should follow their Care Team instructions. Water with medications is okay. Do not drink coffee or other fluids.   If you have concerns about taking  your medications, please ask at office or if scheduling via [email or web address], send a message by clicking on Secure Messaging, Message Your Care Team.            Jun 29, 2017  9:45 AM CDT   Return Visit with Fabiano Bower MD   [facility] ([facility])    [address]  [facility] [id number] [phone]            Jul 18, 2017  9:00 AM CDT   Return Visit with Go Hidalgo MD   [facility] ([facility])    [address], [address]  [facility] [address] [phone]           Please inform patient of the clinic address: [facility] [address] , [address] [facility] [address]              Who to contact     If you have questions or need follow up information about today's clinic visit or your schedule please contact [facility] directly at [phone].  Normal or non-critical lab and imaging results will be communicated to you by [email or web address], letter or phone within 4 business days after the clinic has received the results. If you do not hear from us within 7 days, please contact the clinic through [email or web address] or phone. If you have a critical or abnormal lab result, we will notify you by phone as soon as possible.  Submit refill requests through [email or web address] or call your pharmacy and they will forward the refill request to us. Please allow 3 business days for your refill to be completed.          Additional Information About Your Visit        [email or web address] Information     [email or web address] gives you secure access to your electronic health record. If you see a primary care provider, you can also send messages to your care team and make appointments. If you have questions,  please call your primary care clinic.  If you do not have a primary care provider, please call [phone] and they will assist you.        Care EveryWhere ID     This is your Care EveryWhere ID. This could be used by other organizations to access your [place] medical records  [id number]        Your Vitals Were     Pulse BMI (Body Mass Index)                67 15.82 kg/m2           Blood Pressure from Last 3 Encounters:   06/20/17 166/74   06/06/17 124/65   05/30/17 140/70    Weight from Last 3 Encounters:   06/20/17 36.7 kg (81 lb)   06/06/17 41.3 kg (91 lb)   05/30/17 41.5 kg (91 lb 8 oz)              We Performed the Following     ONC/HEME ADULT REFERRAL        Primary Care Provider Office Phone # Fax #    Alexsandra Davison [phone]       [facility] [address]  [facility] [address]        Thank you!     Thank you for choosing [facility]  for your care. Our goal is always to provide you with excellent care. Hearing back from our patients is one way we can continue to improve our services. Please take a few minutes to complete the written survey that you may receive in the mail after your visit with us. Thank you!             Your Updated Medication List - Protect others around you: Learn how to safely use, store and throw away your medicines at www.disposemymeds.org.          This list is accurate as of: 6/20/17  9:14 AM.  Always use your most recent med list.                   Brand Name Dispense Instructions for use    ascorbic acid 500 MG tablet    VITAMIN C    100    1 TABLET DAILY       bumetanide 0.5 MG tablet    BUMEX    90 tablet    2 q am, 1 q pm       CALCIUM 600 + D 600-200 MG-UNIT Tabs      2 qd       diltiazem 120 MG Cp12 12 hr SR capsule    CARDIZEM SR    180 capsule    Take 120 mg by mouth 2 times daily       metolazone 2.5 MG tablet    ZAROXOLYN    30 tablet    Take 1 tablet (2.5 mg) by mouth twice a week       metoprolol 50 MG 24 hr  tablet    TOPROL-XL    135 tablet    Take 1.5 tablets (75 mg) by mouth daily       MULTIVITAMIN TABS   OR      1 qd       olopatadine 0.1 % ophthalmic solution    PATANOL     Place 1 drop into both eyes 2 times daily as needed for allergies During Spring       potassium chloride SA 20 MEQ CR tablet    K-DUR/KLOR-CON M    30 tablet    Take 1 tablet (20 mEq) by mouth daily       predniSONE 5 MG tablet    DELTASONE    90 tablet    10 mg in the morning, 5 mg evening--call if not better in 1 week; otherwise, take for 2 weeks; then 7.5 and 5 mg x2wks; 5 mg twice daily for 1 month       rivaroxaban ANTICOAGULANT 20 MG Tabs tablet    XARELTO    30 tablet    Take 1 tablet (20 mg) by mouth daily (with dinner)       rOPINIRole 0.5 MG tablet    REQUIP    30 tablet    Take 1 tablet (0.5 mg) by mouth At Bedtime       vitamin E 400 UNIT capsule     100    ONE CAP PO QD

## 2017-06-20 NOTE — NURSING NOTE
Chief Complaint   Patient presents with     RECHECK     Weight loss    Initial /74  Pulse 67  Wt 36.7 kg (81 lb)  BMI 15.82 kg/m2 Estimated body mass index is 15.82 kg/(m^2) as calculated from the following:    Height as of 5/30/17: 1.524 m (5').    Weight as of this encounter: 36.7 kg (81 lb).      Patient prefers to be contacted via at Home.   Okay to leave detailed message: Yes  Patient uses MyChart: Yes    Jazmyn COATES LPN

## 2017-06-22 ENCOUNTER — HOSPITAL ENCOUNTER (OUTPATIENT)
Facility: CLINIC | Age: 82
Setting detail: SPECIMEN
Discharge: HOME | End: 2017-06-22
Attending: INTERNAL MEDICINE | Admitting: INTERNAL MEDICINE
Payer: MEDICARE

## 2017-06-22 ENCOUNTER — ONCOLOGY VISIT (OUTPATIENT)
Dept: ONCOLOGY | Facility: CLINIC | Age: 82
End: 2017-06-22
Attending: INTERNAL MEDICINE
Payer: COMMERCIAL

## 2017-06-22 VITALS
TEMPERATURE: 97.9 F | HEIGHT: 60 IN | HEART RATE: 62 BPM | SYSTOLIC BLOOD PRESSURE: 137 MMHG | RESPIRATION RATE: 16 BRPM | BODY MASS INDEX: 16.02 KG/M2 | WEIGHT: 81.6 LBS | DIASTOLIC BLOOD PRESSURE: 64 MMHG | OXYGEN SATURATION: 98 %

## 2017-06-22 DIAGNOSIS — D64.9 ANEMIA, UNSPECIFIED TYPE: Primary | ICD-10-CM

## 2017-06-22 LAB
ALBUMIN SERPL-MCNC: 3.3 G/DL (ref 3.4–5)
ALP SERPL-CCNC: 83 U/L (ref 40–150)
ALT SERPL W P-5'-P-CCNC: 24 U/L (ref 0–50)
ANION GAP SERPL CALCULATED.3IONS-SCNC: 5 MMOL/L (ref 3–14)
AST SERPL W P-5'-P-CCNC: 14 U/L (ref 0–45)
BASOPHILS # BLD AUTO: 0 10E9/L (ref 0–0.2)
BASOPHILS NFR BLD AUTO: 0 %
BILIRUB SERPL-MCNC: 0.3 MG/DL (ref 0.2–1.3)
BLOOD BANK CMNT PATIENT-IMP: NORMAL
BUN SERPL-MCNC: 42 MG/DL (ref 7–30)
CALCIUM SERPL-MCNC: 9.5 MG/DL (ref 8.5–10.1)
CHLORIDE SERPL-SCNC: 93 MMOL/L (ref 94–109)
CO2 SERPL-SCNC: 35 MMOL/L (ref 20–32)
CREAT SERPL-MCNC: 0.86 MG/DL (ref 0.52–1.04)
DAT IGG-SP REAG RBC-IMP: NORMAL
DAT POLY-SP REAG RBC QL: NORMAL
DIFFERENTIAL METHOD BLD: ABNORMAL
EOSINOPHIL # BLD AUTO: 0 10E9/L (ref 0–0.7)
EOSINOPHIL NFR BLD AUTO: 0 %
ERYTHROCYTE [DISTWIDTH] IN BLOOD BY AUTOMATED COUNT: 16.5 % (ref 10–15)
FERRITIN SERPL-MCNC: 244 NG/ML (ref 8–252)
FOLATE SERPL-MCNC: 14.8 NG/ML
GFR SERPL CREATININE-BSD FRML MDRD: 63 ML/MIN/1.7M2
GLUCOSE SERPL-MCNC: 104 MG/DL (ref 70–99)
HCT VFR BLD AUTO: 30.3 % (ref 35–47)
HGB BLD-MCNC: 9.8 G/DL (ref 11.7–15.7)
IMM GRANULOCYTES # BLD: 0.2 10E9/L (ref 0–0.4)
IMM GRANULOCYTES NFR BLD: 1.7 %
IRON SATN MFR SERPL: 23 % (ref 15–46)
IRON SERPL-MCNC: 68 UG/DL (ref 35–180)
LYMPHOCYTES # BLD AUTO: 1.5 10E9/L (ref 0.8–5.3)
LYMPHOCYTES NFR BLD AUTO: 14.6 %
MCH RBC QN AUTO: 27.9 PG (ref 26.5–33)
MCHC RBC AUTO-ENTMCNC: 32.3 G/DL (ref 31.5–36.5)
MCV RBC AUTO: 86 FL (ref 78–100)
MONOCYTES # BLD AUTO: 0.8 10E9/L (ref 0–1.3)
MONOCYTES NFR BLD AUTO: 8.1 %
NEUTROPHILS # BLD AUTO: 7.6 10E9/L (ref 1.6–8.3)
NEUTROPHILS NFR BLD AUTO: 75.6 %
NRBC # BLD AUTO: 0 10*3/UL
NRBC BLD AUTO-RTO: 0 /100
PLATELET # BLD AUTO: 383 10E9/L (ref 150–450)
POTASSIUM SERPL-SCNC: 4.2 MMOL/L (ref 3.4–5.3)
PROT SERPL-MCNC: 8.2 G/DL (ref 6.8–8.8)
RBC # BLD AUTO: 3.51 10E12/L (ref 3.8–5.2)
RETICS # AUTO: 63.9 10E9/L (ref 25–95)
RETICS/RBC NFR AUTO: 1.8 % (ref 0.5–2)
SODIUM SERPL-SCNC: 133 MMOL/L (ref 133–144)
TIBC SERPL-MCNC: 292 UG/DL (ref 240–430)
VIT B12 SERPL-MCNC: 1403 PG/ML (ref 193–986)
WBC # BLD AUTO: 10 10E9/L (ref 4–11)

## 2017-06-22 PROCEDURE — 00000402 ZZHCL STATISTIC TOTAL PROTEIN: Performed by: INTERNAL MEDICINE

## 2017-06-22 PROCEDURE — 83540 ASSAY OF IRON: CPT | Performed by: INTERNAL MEDICINE

## 2017-06-22 PROCEDURE — 86880 COOMBS TEST DIRECT: CPT | Performed by: INTERNAL MEDICINE

## 2017-06-22 PROCEDURE — 85025 COMPLETE CBC W/AUTO DIFF WBC: CPT | Performed by: INTERNAL MEDICINE

## 2017-06-22 PROCEDURE — 84165 PROTEIN E-PHORESIS SERUM: CPT | Performed by: INTERNAL MEDICINE

## 2017-06-22 PROCEDURE — 99203 OFFICE O/P NEW LOW 30 MIN: CPT | Performed by: INTERNAL MEDICINE

## 2017-06-22 PROCEDURE — 80053 COMPREHEN METABOLIC PANEL: CPT | Performed by: INTERNAL MEDICINE

## 2017-06-22 PROCEDURE — 83550 IRON BINDING TEST: CPT | Performed by: INTERNAL MEDICINE

## 2017-06-22 PROCEDURE — 85045 AUTOMATED RETICULOCYTE COUNT: CPT | Performed by: INTERNAL MEDICINE

## 2017-06-22 PROCEDURE — 82607 VITAMIN B-12: CPT | Performed by: INTERNAL MEDICINE

## 2017-06-22 PROCEDURE — 82728 ASSAY OF FERRITIN: CPT | Performed by: INTERNAL MEDICINE

## 2017-06-22 PROCEDURE — 40000847 ZZHCL STATISTIC MORPHOLOGY W/INTERP HISTOLOGY TC 85060: Performed by: INTERNAL MEDICINE

## 2017-06-22 PROCEDURE — 82746 ASSAY OF FOLIC ACID SERUM: CPT | Performed by: INTERNAL MEDICINE

## 2017-06-22 PROCEDURE — 85060 BLOOD SMEAR INTERPRETATION: CPT | Performed by: INTERNAL MEDICINE

## 2017-06-22 PROCEDURE — 36415 COLL VENOUS BLD VENIPUNCTURE: CPT

## 2017-06-22 PROCEDURE — 99211 OFF/OP EST MAY X REQ PHY/QHP: CPT

## 2017-06-22 ASSESSMENT — PAIN SCALES - GENERAL: PAINLEVEL: NO PAIN (0)

## 2017-06-22 NOTE — PATIENT INSTRUCTIONS
-labs today-Vilma  -return to clinic in 2 weeks- scheduled for July 6th @ 2:45/Vannesa    AVS printed and given.  Vannesa

## 2017-06-22 NOTE — NURSING NOTE
Oncology Rooming Note    June 22, 2017 1:50 PM   Ana Cristina Dominguez is a 86 year old female who presents for:    Chief Complaint   Patient presents with     Oncology Clinic Visit     New Consult     Initial Vitals: /64 (BP Location: Right arm, Cuff Size: Adult Small)  Pulse 62  Temp 97.9  F (36.6  C) (Tympanic)  Resp 16  Ht 1.524 m (5')  Wt 37 kg (81 lb 9.6 oz)  SpO2 98%  BMI 15.94 kg/m2 Estimated body mass index is 15.94 kg/(m^2) as calculated from the following:    Height as of this encounter: 1.524 m (5').    Weight as of this encounter: 37 kg (81 lb 9.6 oz). Body surface area is 1.25 meters squared.  No Pain (0) Comment: Data Unavailable   No LMP recorded. Patient is postmenopausal.  Allergies reviewed: Yes  Medications reviewed: Yes    Medications: Medication refills not needed today.  Pharmacy name entered into Secret[employer]: [id number] [place], [place] - [address]    Clinical concerns: New Consult-Anemia, Referred by Dr. Hidalgo  8 minutes for nursing intake (face to face time)     Vilma Parnell  DISCHARGE PLAN:  Next appointments: See patient instruction section  Departure Mode: Ambulatory  Accompanied by: self  0 minutes for nursing discharge (face to face time)   Vilma Parnell

## 2017-06-22 NOTE — LETTER
June 22, 2017      RE: Ana Cristina Dominguez  [address]  [place] [address]        Dear Colleague,    Thank you for the opportunity to participate in the care of your patient, Ana Cristina Dominguez, at [facility]. Please see a copy of my visit note below.    [facility]    Hematology/Oncology New Patient Note      Today's Date: 06/22/17    Reason for Consult: anemia      HISTORY OF PRESENT ILLNESS: Ana Cristina Dominguez is a 86 year old female with PMHx of polymyalgia rheumatica, osteoporosis, HLD, who presents with anemia.  Per chart review, her hemoglobin was 11.2 in October 2016, and has decreased gradually down to 8.9 in 6/6/17.      She says that she has lost quite a bit of weight despite a good appetite.  She denies any bleeding symptoms.  She says that she had a colonoscopy 15 years ago, and she does not want another one.          REVIEW OF SYSTEMS:   14 point ROS was reviewed and is negative other than as noted above in HPI.       HOME MEDICATIONS:  Current Outpatient Prescriptions   Medication Sig Dispense Refill     predniSONE (DELTASONE) 5 MG tablet 10 mg in the morning, 5 mg evening--call if not better in 1 week; otherwise, take for 2 weeks; then 7.5 and 5 mg x2wks; 5 mg twice daily for 1 month 90 tablet 3     potassium chloride SA (K-DUR/KLOR-CON M) 20 MEQ CR tablet Take 1 tablet (20 mEq) by mouth daily 30 tablet 3     metolazone (ZAROXOLYN) 2.5 MG tablet Take 1 tablet (2.5 mg) by mouth twice a week 30 tablet 3     bumetanide (BUMEX) 0.5 MG tablet 2 q am, 1 q pm 90 tablet 1     rOPINIRole (REQUIP) 0.5 MG tablet Take 1 tablet (0.5 mg) by mouth At Bedtime 30 tablet 5     rivaroxaban ANTICOAGULANT (XARELTO) 20 MG TABS tablet Take 1 tablet (20 mg) by mouth daily (with dinner) 30 tablet 11     diltiazem (CARDIZEM SR) 120 MG CP12 12 hr SR capsule Take 120 mg by mouth 2 times daily 180 capsule 3     olopatadine (PATANOL) 0.1 %  ophthalmic solution Place 1 drop into both eyes 2 times daily as needed for allergies During Spring       metoprolol (TOPROL-XL) 50 MG 24 hr tablet Take 1.5 tablets (75 mg) by mouth daily 135 tablet 3     VITAMIN E 400 UNIT OR CAPS ONE CAP PO  0     VITAMIN C 500 MG OR TABS 1 TABLET DAILY 100 0     MULTIVITAMIN TABS   OR 1 qd       CALCIUM 600 + D 600-200 MG-IU OR TABS 2 qd           ALLERGIES:  Allergies   Allergen Reactions     Amoxicillin Rash     Codeine GI Disturbance     Stomach pain         PAST MEDICAL HISTORY:  Past Medical History:   Diagnosis Date     Other and unspecified hyperlipidemia      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     RLS (restless legs syndrome)      Senile osteoporosis     Reclast 11/16/09, 11/10, 11/11         PAST SURGICAL HISTORY:  Past Surgical History:   Procedure Laterality Date     APPENDECTOMY       CATARACT IOL, RT/LT  1/19/09    right     HC EXCIS PRIMARY GANGLION WRIST           SOCIAL HISTORY:  Social History     Social History     Marital status:      Spouse name: N/A     Number of children: 3     Years of education: N/A     Occupational History      Retired     Social History Main Topics     Smoking status: Former Smoker     Quit date: 5/6/1973     Smokeless tobacco: Never Used     Alcohol use Yes      Comment: Socially     Drug use: No     Sexual activity: No     Other Topics Concern     Exercise Yes     Seat Belt Yes     Social History Narrative         FAMILY HISTORY:  Family History   Problem Relation Age of Onset     C.A.D. Father      Family History Negative Mother      Genitourinary Problems Sister      Renal failure     HEART DISEASE Sister      Rhythm issues         PHYSICAL EXAM:  Vital signs:  /64 (BP Location: Right arm, Cuff Size: Adult Small)  Pulse 62  Temp 97.9  F (36.6  C) (Tympanic)  Resp 16  Ht 1.524 m (5')  Wt 37 kg (81 lb 9.6 oz)  SpO2 98%  BMI 15.94 kg/m2    GENERAL/CONSTITUTIONAL: No acute distress.  EYES: No scleral icterus.  RESPIRATORY: Clear to auscultation bilaterally. No crackles or wheezing.   CARDIOVASCULAR: Regular rate and rhythm without murmurs, gallops, or rubs.  GASTROINTESTINAL: No tenderness. The patient has normal bowel sounds. No guarding.  No distention.  MUSCULOSKELETAL: Warm and well-perfused.  NEUROLOGIC: Alert, oriented, answers questions appropriately.  INTEGUMENTARY: No jaundice.      LABS:  Pending.      ASSESSMENT/PLAN:  Ana Cristina Dominguez is a 86 year old female with:    1) Anemia, normocytoc: Possible causes may include iron deficiency, anemia of chronic disease, medications, malignancy.  We will obtain peripheral labs to start evaluation.  I also discussed possibility of bone marrow biopsy, but patient is weary of this, says that she is 86 years old and wants to avoid invasive procedures, which is reasonable.  With her recent weight loss, I also discussed doing CT scan to evaluate for malignancy.  Patient prefers to start with labs today, and then go from there.  She is willing to do CT scan if the peripheral labs are unrevealing.  -labs today: CBC, CMP, MARLON, SPEP, BASILIO, ferritin, iron panel, vitamin B12, folate, peripheral smear  -RTC in 2 weeks      I spent a total of 30 minutes with the patient, with over >50% of the time in counseling and/or coordination of care.       Carly Mishra MD  Hematology/Oncology  [facility]      Please do not hesitate to contact me if you have any questions/concerns.     Sincerely,       Carly Mishra MD

## 2017-06-22 NOTE — MR AVS SNAPSHOT
After Visit Summary   6/22/2017    Ana Cristina Dominguez    MRN: [id number]           Patient Information     Date Of Birth          6/6/1931        Visit Information        Provider Department      6/22/2017 1:45 PM Carly Mishra MD [facility]      Today's Diagnoses     Anemia, unspecified type    -  1      Care Instructions      -labs today-Vilma  -return to clinic in 2 weeks          Follow-ups after your visit        Your next 10 appointments already scheduled     Jun 29, 2017  8:45 AM CDT   LAB with RU LAB   [facility] ([facility])    [address]  [facility] [address] [phone]           Patient must bring picture ID.  Patient should be prepared to give a urine specimen  Please do not eat 10-12 hours before your appointment if you are coming in fasting for labs on lipids, cholesterol, or glucose (sugar).  Pregnant women should follow their Care Team instructions. Water with medications is okay. Do not drink coffee or other fluids.   If you have concerns about taking  your medications, please ask at office or if scheduling via [employer], send a message by clicking on Secure Messaging, Message Your Care Team.            Jun 29, 2017  9:45 AM CDT   Return Visit with Fabiano Bower MD   [facility] ([facility])    [address]  [facility] [address] [phone]            Jul 06, 2017  2:45 PM CDT   Return Visit with Carly Mishra MD   [facility] ([facility])    [facility]  [id number] [place] [address] [address]  [facility] [address]   [phone]            Jul 18, 2017  9:00 AM CDT   Return Visit with Go Hidalgo MD   [facility] ([facility])    [address]  [facility] [address] [phone]           Please inform patient of the clinic address: [facility] [place] [address], [address] [facility] [address]              Who to contact     If you have questions or need follow up information about today's clinic visit or your schedule please contact [facility] directly at [phone].  Normal or non-critical lab and imaging results will be communicated to you by [employer], letter or phone within 4 business days after the clinic has received the results. If you do not hear from us within 7 days, please contact the clinic through [email or web address] or phone. If you have a critical or abnormal lab result, we will notify you by phone as soon as possible.  Submit refill requests through [employer] or call your pharmacy and they will forward the refill request to us. Please allow 3 business days for your refill to be completed.          Additional Information About Your Visit        [employer] Information     [employer] gives you secure access to your electronic health record. If you see a primary care provider, you can also send messages to your care team and make appointments. If you have questions, please call your primary care clinic.  If you do not have a primary care provider, please call [phone] and they will assist you.        Care EveryWhere ID     This is your Care EveryWhere ID. This could be used by other organizations to access your [place] medical records  [id number]        Your Vitals Were     Pulse Temperature Respirations Height Pulse Oximetry BMI (Body Mass Index)    62 97.9  F (36.6  C) (Tympanic) 16 1.524 m (5') 98% 15.94 kg/m2       Blood Pressure from Last 3 Encounters:   06/22/17 137/64   06/20/17 166/74   06/06/17 124/65    Weight from Last 3 Encounters:   06/22/17 37 kg (81 lb 9.6 oz)   06/20/17 36.7 kg (81 lb)   06/06/17 41.3 kg (91 lb)              We Performed the Following     Bld morphology  pathology review     CBC with platelets differential     Comprehensive metabolic panel     Direct antiglobulin test     Ferritin     Folate     Iron and iron binding capacity     Protein electrophoresis     Reticulocyte count     Vitamin B12        Primary Care Provider Office Phone # Fax #    Alexsandra Davison [phone]       [facility] [address] [facility] [address]  [facility] [address]        Equal Access to Services     ABBE ESPINOSA : Hadii aad ku hadasho Soomaali, waaxda luqadaha, qaybta kaalmada adeegyada, waxay idiin hayaan adeeg khirmash lafelipen . So [facility] [phone].    ATENCIÓN: Si habla español, tiene a dominguez disposición servicios gratuitos de asistencia lingüística. Fabian al [phone].    We comply with applicable federal civil rights laws and [place] laws. We do not discriminate on the basis of race, color, national origin, age, disability sex, sexual orientation or gender identity.            Thank you!     Thank you for choosing [facility]  for your care. Our goal is always to provide you with excellent care. Hearing back from our patients is one way we can continue to improve our services. Please take a few minutes to complete the written survey that you may receive in the mail after your visit with us. Thank you!             Your Updated Medication List - Protect others around you: Learn how to safely use, store and throw away your medicines at www.disposemymeds.org.          This list is accurate as of: 6/22/17  2:39 PM.  Always use your most recent med list.                   Brand Name Dispense Instructions for use Diagnosis    ascorbic acid 500 MG tablet    VITAMIN C    100    1 TABLET DAILY        bumetanide 0.5 MG tablet    BUMEX    90 tablet    2 q am, 1 q pm    Edema, unspecified type       CALCIUM 600 + D 600-200 MG-UNIT Tabs      2 qd        diltiazem 120 MG Cp12 12 hr SR capsule    CARDIZEM SR    180 capsule    Take 120 mg by mouth 2 times  daily    Chronic atrial fibrillation (H)       metolazone 2.5 MG tablet    ZAROXOLYN    30 tablet    Take 1 tablet (2.5 mg) by mouth twice a week    Chronic diastolic congestive heart failure (H)       metoprolol 50 MG 24 hr tablet    TOPROL-XL    135 tablet    Take 1.5 tablets (75 mg) by mouth daily    HTN (hypertension)       MULTIVITAMIN TABS   OR      1 qd        olopatadine 0.1 % ophthalmic solution    PATANOL     Place 1 drop into both eyes 2 times daily as needed for allergies During Spring        potassium chloride SA 20 MEQ CR tablet    K-DUR/KLOR-CON M    30 tablet    Take 1 tablet (20 mEq) by mouth daily    Hypokalemia       predniSONE 5 MG tablet    DELTASONE    90 tablet    10 mg in the morning, 5 mg evening--call if not better in 1 week; otherwise, take for 2 weeks; then 7.5 and 5 mg x2wks; 5 mg twice daily for 1 month    Polymyalgia rheumatica (H)       rivaroxaban ANTICOAGULANT 20 MG Tabs tablet    XARELTO    30 tablet    Take 1 tablet (20 mg) by mouth daily (with dinner)    Chronic atrial fibrillation (H)       rOPINIRole 0.5 MG tablet    REQUIP    30 tablet    Take 1 tablet (0.5 mg) by mouth At Bedtime    RLS (restless legs syndrome)       vitamin E 400 UNIT capsule     100    ONE CAP PO QD

## 2017-06-22 NOTE — PROGRESS NOTES
[facility]    Hematology/Oncology New Patient Note      Today's Date: 06/22/17    Reason for Consult: anemia      HISTORY OF PRESENT ILLNESS: Ana Cristina Dominguez is a 86 year old female with PMHx of polymyalgia rheumatica, osteoporosis, HLD, who presents with anemia.  Per chart review, her hemoglobin was 11.2 in October 2016, and has decreased gradually down to 8.9 in 6/6/17.      She says that she has lost quite a bit of weight despite a good appetite.  She denies any bleeding symptoms.  She says that she had a colonoscopy 15 years ago, and she does not want another one.          REVIEW OF SYSTEMS:   14 point ROS was reviewed and is negative other than as noted above in HPI.       HOME MEDICATIONS:  Current Outpatient Prescriptions   Medication Sig Dispense Refill     predniSONE (DELTASONE) 5 MG tablet 10 mg in the morning, 5 mg evening--call if not better in 1 week; otherwise, take for 2 weeks; then 7.5 and 5 mg x2wks; 5 mg twice daily for 1 month 90 tablet 3     potassium chloride SA (K-DUR/KLOR-CON M) 20 MEQ CR tablet Take 1 tablet (20 mEq) by mouth daily 30 tablet 3     metolazone (ZAROXOLYN) 2.5 MG tablet Take 1 tablet (2.5 mg) by mouth twice a week 30 tablet 3     bumetanide (BUMEX) 0.5 MG tablet 2 q am, 1 q pm 90 tablet 1     rOPINIRole (REQUIP) 0.5 MG tablet Take 1 tablet (0.5 mg) by mouth At Bedtime 30 tablet 5     rivaroxaban ANTICOAGULANT (XARELTO) 20 MG TABS tablet Take 1 tablet (20 mg) by mouth daily (with dinner) 30 tablet 11     diltiazem (CARDIZEM SR) 120 MG CP12 12 hr SR capsule Take 120 mg by mouth 2 times daily 180 capsule 3     olopatadine (PATANOL) 0.1 % ophthalmic solution Place 1 drop into both eyes 2 times daily as needed for allergies During Spring       metoprolol (TOPROL-XL) 50 MG 24 hr tablet Take 1.5 tablets (75 mg) by mouth daily 135 tablet 3     VITAMIN E 400 UNIT OR CAPS ONE CAP PO  0     VITAMIN C 500 MG OR TABS 1 TABLET DAILY 100 0     MULTIVITAMIN  TABS   OR 1 qd       CALCIUM 600 + D 600-200 MG-IU OR TABS 2 qd           ALLERGIES:  Allergies   Allergen Reactions     Amoxicillin Rash     Codeine GI Disturbance     Stomach pain         PAST MEDICAL HISTORY:  Past Medical History:   Diagnosis Date     Other and unspecified hyperlipidemia      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     RLS (restless legs syndrome)      Senile osteoporosis     Reclast 11/16/09, 11/10, 11/11         PAST SURGICAL HISTORY:  Past Surgical History:   Procedure Laterality Date     APPENDECTOMY       CATARACT IOL, RT/LT  1/19/09    right     HC EXCIS PRIMARY GANGLION WRIST           SOCIAL HISTORY:  Social History     Social History     Marital status:      Spouse name: N/A     Number of children: 3     Years of education: N/A     Occupational History      Retired     Social History Main Topics     Smoking status: Former Smoker     Quit date: 5/6/1973     Smokeless tobacco: Never Used     Alcohol use Yes      Comment: Socially     Drug use: No     Sexual activity: No     Other Topics Concern     Exercise Yes     Seat Belt Yes     Social History Narrative         FAMILY HISTORY:  Family History   Problem Relation Age of Onset     C.A.D. Father      Family History Negative Mother      Genitourinary Problems Sister      Renal failure     HEART DISEASE Sister      Rhythm issues         PHYSICAL EXAM:  Vital signs:  /64 (BP Location: Right arm, Cuff Size: Adult Small)  Pulse 62  Temp 97.9  F (36.6  C) (Tympanic)  Resp 16  Ht 1.524 m (5')  Wt 37 kg (81 lb 9.6 oz)  SpO2 98%  BMI 15.94 kg/m2   GENERAL/CONSTITUTIONAL: No acute distress.  EYES: No scleral icterus.  RESPIRATORY: Clear to auscultation bilaterally. No crackles or wheezing.   CARDIOVASCULAR: Regular rate and rhythm without murmurs, gallops, or rubs.  GASTROINTESTINAL: No tenderness. The patient has normal bowel sounds. No guarding.  No  distention.  MUSCULOSKELETAL: Warm and well-perfused.  NEUROLOGIC: Alert, oriented, answers questions appropriately.  INTEGUMENTARY: No jaundice.      LABS:  Pending.      ASSESSMENT/PLAN:  Ana Cristina Dominguez is a 86 year old female with:    1) Anemia, normocytoc: Possible causes may include iron deficiency, anemia of chronic disease, medications, malignancy.  We will obtain peripheral labs to start evaluation.  I also discussed possibility of bone marrow biopsy, but patient is weary of this, says that she is 86 years old and wants to avoid invasive procedures, which is reasonable.  With her recent weight loss, I also discussed doing CT scan to evaluate for malignancy.  Patient prefers to start with labs today, and then go from there.  She is willing to do CT scan if the peripheral labs are unrevealing.  -labs today: CBC, CMP, MARLON, SPEP, BASILIO, ferritin, iron panel, vitamin B12, folate, peripheral smear  -RTC in 2 weeks      I spent a total of 30 minutes with the patient, with over >50% of the time in counseling and/or coordination of care.       Carly Mishra MD  Hematology/Oncology  [facility]

## 2017-06-23 LAB
ALBUMIN SERPL ELPH-MCNC: 3.6 G/DL (ref 3.7–5.1)
ALPHA1 GLOB SERPL ELPH-MCNC: 0.6 G/DL (ref 0.2–0.4)
ALPHA2 GLOB SERPL ELPH-MCNC: 1.4 G/DL (ref 0.5–0.9)
B-GLOBULIN SERPL ELPH-MCNC: 1.1 G/DL (ref 0.6–1)
COPATH REPORT: NORMAL
ELP INTERPRETATION: ABNORMAL
GAMMA GLOB SERPL ELPH-MCNC: 1 G/DL (ref 0.7–1.6)
M PROTEIN SERPL ELPH-MCNC: 0.2 G/DL

## 2017-06-28 DIAGNOSIS — E87.6 HYPOKALEMIA: ICD-10-CM

## 2017-06-28 RX ORDER — POTASSIUM CHLORIDE 1500 MG/1
40 TABLET, EXTENDED RELEASE ORAL DAILY
Start: 2017-06-28 | End: 2017-06-29

## 2017-06-29 ENCOUNTER — OFFICE VISIT (OUTPATIENT)
Dept: CARDIOLOGY | Facility: CLINIC | Age: 82
End: 2017-06-29
Attending: INTERNAL MEDICINE
Payer: COMMERCIAL

## 2017-06-29 VITALS
BODY MASS INDEX: 16.41 KG/M2 | DIASTOLIC BLOOD PRESSURE: 64 MMHG | WEIGHT: 83.6 LBS | HEIGHT: 60 IN | HEART RATE: 68 BPM | SYSTOLIC BLOOD PRESSURE: 156 MMHG

## 2017-06-29 DIAGNOSIS — I50.32 CHRONIC DIASTOLIC CONGESTIVE HEART FAILURE (H): ICD-10-CM

## 2017-06-29 DIAGNOSIS — E87.1 HYPONATREMIA: Primary | ICD-10-CM

## 2017-06-29 DIAGNOSIS — E87.6 HYPOKALEMIA: ICD-10-CM

## 2017-06-29 DIAGNOSIS — E87.1 HYPONATREMIA: ICD-10-CM

## 2017-06-29 LAB
ANION GAP SERPL CALCULATED.3IONS-SCNC: 4 MMOL/L (ref 3–14)
BUN SERPL-MCNC: 35 MG/DL (ref 7–30)
CALCIUM SERPL-MCNC: 8.8 MG/DL (ref 8.5–10.1)
CHLORIDE SERPL-SCNC: 97 MMOL/L (ref 94–109)
CO2 SERPL-SCNC: 33 MMOL/L (ref 20–32)
CREAT SERPL-MCNC: 0.76 MG/DL (ref 0.52–1.04)
GFR SERPL CREATININE-BSD FRML MDRD: 73 ML/MIN/1.7M2
GLUCOSE SERPL-MCNC: 109 MG/DL (ref 70–99)
POTASSIUM SERPL-SCNC: 4 MMOL/L (ref 3.4–5.3)
SODIUM SERPL-SCNC: 134 MMOL/L (ref 133–144)

## 2017-06-29 PROCEDURE — 36415 COLL VENOUS BLD VENIPUNCTURE: CPT | Performed by: INTERNAL MEDICINE

## 2017-06-29 PROCEDURE — 99214 OFFICE O/P EST MOD 30 MIN: CPT | Performed by: INTERNAL MEDICINE

## 2017-06-29 PROCEDURE — 80048 BASIC METABOLIC PNL TOTAL CA: CPT | Performed by: INTERNAL MEDICINE

## 2017-06-29 NOTE — MR AVS SNAPSHOT
After Visit Summary   6/29/2017    Ana Cristina Dominguez    MRN: [id number]           Patient Information     Date Of Birth          6/6/1931        Visit Information        Provider Department      6/29/2017 9:45 AM Fabiano Bower MD [facility] [place]        Today's Diagnoses     Chronic diastolic congestive heart failure (H)           Follow-ups after your visit        Additional Services     Follow-Up with Cardiologist                 Your next 10 appointments already scheduled     Jul 06, 2017  2:45 PM CDT   Return Visit with Carly Mishra MD   [facility] ([facility])    [facility]  [address] [place] [address]  [address]  [facility] [address]   [phone]            Jul 18, 2017  9:00 AM CDT   Return Visit with Go Hidalgo MD   [facility] ([facility] [place])    [address], [address]  [facility] [address] [phone]           Please inform patient of the clinic address: [facility] [address] [facility] [address]            Aug 30, 2017  9:00 AM CDT   Office Visit with Dez Urban MD   [facility]    [address] [facility] [id number] [phone]           Bring a current list of meds and any records pertaining to this visit.  For Physicals, please bring immunization records and any forms needing to be filled out.  Please arrive 10 minutes early to complete paperwork.              Future tests that were ordered for you today     Open Future Orders        Priority Expected Expires Ordered    Follow-Up with Cardiologist Routine 6/29/2018 11/11/2018 6/29/2017            Who to contact     If you have questions or need follow up information about today's clinic visit or your schedule please contact [facility] [place] directly at [phone].  Normal or non-critical lab and imaging results will be communicated to you by MyChart, letter or phone within 4 business days after the clinic has received the results. If you do not hear from us within 7 days, please contact the clinic through MyChart or phone. If you have a critical or abnormal lab result, we will notify you by phone as soon as possible.  Submit refill requests through [email or web address] or call your pharmacy and they will forward the refill request to us. Please allow 3 business days for your refill to be completed.          Additional Information About Your Visit        [employer] Information     [email or web address] gives you secure access to your electronic health record. If you see a primary care provider, you can also send messages to your care team and make appointments. If you have questions, please call your primary care clinic.  If you do not have a primary care provider, please call [phone] and they will assist you.        Care EveryWhere ID     This is your Care EveryWhere ID. This could be used by other organizations to access your [place] medical records  [id number]        Your Vitals Were     Pulse Height Breastfeeding? BMI (Body Mass Index)          68 1.524 m (5') No 16.33 kg/m2         Blood Pressure from Last 3 Encounters:   06/29/17 156/64   06/22/17 137/64   06/20/17 166/74    Weight from Last 3 Encounters:   06/29/17 37.9 kg (83 lb 9.6 oz)   06/22/17 37 kg (81 lb 9.6 oz)   06/20/17 36.7 kg (81 lb)              We Performed the Following     Follow-Up with Cardiologist        Primary Care Provider Office Phone # Fax #    Dez Urban [phone]       [facility] [address]  [facility] [address]        Equal Access to Services     ABBE ESPINOSA : Hadii shanelle booo Soshawn, waaxda luqadaha, qaybta kaalmada adeegyada, rinku obrien. So [facility]  [phone].    ATENCIÓN: Si [facility], tiene a dominguez disposición servicios gratuitos de asistencia lingüística. Fabian caldwell [phone].    We comply with applicable federal civil rights laws and [place] laws. We do not discriminate on the basis of race, color, national origin, age, disability sex, sexual orientation or gender identity.            Thank you!     Thank you for choosing [facility] AT [place]  for your care. Our goal is always to provide you with excellent care. Hearing back from our patients is one way we can continue to improve our services. Please take a few minutes to complete the written survey that you may receive in the mail after your visit with us. Thank you!             Your Updated Medication List - Protect others around you: Learn how to safely use, store and throw away your medicines at www.disposemymeds.org.          This list is accurate as of: 6/29/17 10:29 AM.  Always use your most recent med list.                   Brand Name Dispense Instructions for use Diagnosis    ascorbic acid 500 MG tablet    VITAMIN C    100    1 TABLET DAILY        bumetanide 0.5 MG tablet    BUMEX    90 tablet    2 q am, 1 q pm    Edema, unspecified type       CALCIUM 600 + D 600-200 MG-UNIT Tabs      2 qd        diltiazem 120 MG Cp12 12 hr SR capsule    CARDIZEM SR    180 capsule    Take 120 mg by mouth 2 times daily    Chronic atrial fibrillation (H)       metoprolol 50 MG 24 hr tablet    TOPROL-XL    135 tablet    Take 1.5 tablets (75 mg) by mouth daily    HTN (hypertension)       MULTIVITAMIN TABS   OR      1 qd        olopatadine 0.1 % ophthalmic solution    PATANOL     Place 1 drop into both eyes 2 times daily as needed for allergies During Spring        predniSONE 5 MG tablet    DELTASONE    90 tablet    10 mg in the morning, 5 mg evening--call if not better in 1 week; otherwise, take for 2 weeks; then 7.5 and 5 mg x2wks; 5 mg twice daily for 1 month    Polymyalgia  rheumatica (H)       rivaroxaban ANTICOAGULANT 20 MG Tabs tablet    XARELTO    30 tablet    Take 1 tablet (20 mg) by mouth daily (with dinner)    Chronic atrial fibrillation (H)       rOPINIRole 0.5 MG tablet    REQUIP    30 tablet    Take 1 tablet (0.5 mg) by mouth At Bedtime    RLS (restless legs syndrome)       TYLENOL PO      Take by mouth every 4 hours as needed for mild pain or fever        vitamin E 400 UNIT capsule     100    ONE CAP PO QD

## 2017-06-29 NOTE — LETTER
2017    Dez Urban MD  [facility]   [address]  [facility] [address]    RE: Ana Cristina Renstephanie       Dear Colleague,    I had the pleasure of seeing Ana Cristina Dominguez in the [facility].    HPI and Plan:     : 31 and service is     I am seeing Ana Cristina Dominguez in cardiology clinic today for a follow-up visit. Mrs. Dominguez is a delightful 85 year old female who presented to cardiology clinic in early May with complaints of severe, rapid onset peripheral edema. Her echocardiogram showed significant tricuspid regurgitation and elevated left heart filling pressures and it was felt that she had diastolic heart failure. She was seen once in follow-up and her diuretics were increased with the addition of metolazone twice a day. She also required the addition of potassium when the metolazone was started. Interestingly, she also complained of new, severe arthralgias and a visit with rheumatology was suggested.    Since our last visit, Mrs. Dominguez saw a rheumatologist and was diagnosed with polymyalgia. She was started on a tapered steroid dose and both her lower extremity edema and joint pain have completely resolved . Today, she has not significant peripheral edema on exam and has stopped using her compression stockings, although she continues to follow a low sodium diet and is continuing to take her diuretics. She is very happy with her outcome, having feeling completely normal now. She denies any other cardiac systomps what so ever and feels that her strength is normal. She is not having PND or orthopnea,  chest pain,claudication, or syncope/near syncope.    Assessment Plan:    1. This patient had rapid onset of peripheral edema with evidence by echocardiography of diastolic dysfunction, but her edema and joint pain have completely resolved after being started on high does steroids for her newly diagnosed polymyalgia rheumatica. In  hind sight it is likely that she had some degree of heart failure since jugular venous pressures were modestly elevated on initial presentation, but her polymyalgia was likely the major force leading to her peripheral edema. Today, I asked her to stop her metolazone and her potassium supplementation. She will at this point continue her Bumex, but if she does not re-develop edema upon discontinuation of metolazone, I would plan on gradually stopping all of her diuretics as tolerated. She will call us in two to four weeks and let us know if she has redeveloped edema, and if not, we will cut her Bumex from .5 mgs twice a day to once a day and then eventually stop it altogether.    2. Hypertension. It is noted that today s blood pressure is a bit high at 156/64, although considering her advanced age I am not concerned about those numbers. I suspect that her high dose of steroids continue to play a role. She may need further antihypertensive therapy if her blood pressure remains elevated after her steroid taper or if she needs to go on long term high dose steroids. Of note, previous blood pressures have been within acceptable range, so I am not anxious to adjust antihypertensive at this point in her care. She is currently using Toprol and diltiazem for hypertension, well as her diuretics.    Thank you for asking me to participate in your patient s care. We will adjust her diuretics by phone as described above. I will plan on seeing her back in one year on a routine basis, but I would be more than happy to see her sooner should she have other cardiology issues. Please do not hesitate to call if I can be of further assistance.    Fabiano Bower MD        Orders Placed This Encounter   Procedures     Follow-Up with Cardiologist     Orders Placed This Encounter   Medications     Acetaminophen (TYLENOL PO)     Sig: Take by mouth every 4 hours as needed for mild pain or fever     Medications Discontinued During This  Encounter   Medication Reason     metolazone (ZAROXOLYN) 2.5 MG tablet Therapy completed     potassium chloride SA (K-DUR/KLOR-CON M) 20 MEQ CR tablet Therapy completed         Encounter Diagnosis   Name Primary?     Chronic diastolic congestive heart failure (H)        CURRENT MEDICATIONS:  Current Outpatient Prescriptions   Medication Sig Dispense Refill     Acetaminophen (TYLENOL PO) Take by mouth every 4 hours as needed for mild pain or fever       predniSONE (DELTASONE) 5 MG tablet 10 mg in the morning, 5 mg evening--call if not better in 1 week; otherwise, take for 2 weeks; then 7.5 and 5 mg x2wks; 5 mg twice daily for 1 month 90 tablet 3     bumetanide (BUMEX) 0.5 MG tablet 2 q am, 1 q pm 90 tablet 1     rOPINIRole (REQUIP) 0.5 MG tablet Take 1 tablet (0.5 mg) by mouth At Bedtime 30 tablet 5     rivaroxaban ANTICOAGULANT (XARELTO) 20 MG TABS tablet Take 1 tablet (20 mg) by mouth daily (with dinner) 30 tablet 11     diltiazem (CARDIZEM SR) 120 MG CP12 12 hr SR capsule Take 120 mg by mouth 2 times daily 180 capsule 3     olopatadine (PATANOL) 0.1 % ophthalmic solution Place 1 drop into both eyes 2 times daily as needed for allergies During Spring       metoprolol (TOPROL-XL) 50 MG 24 hr tablet Take 1.5 tablets (75 mg) by mouth daily 135 tablet 3     VITAMIN E 400 UNIT OR CAPS ONE CAP PO  0     VITAMIN C 500 MG OR TABS 1 TABLET DAILY 100 0     MULTIVITAMIN TABS   OR 1 qd       CALCIUM 600 + D 600-200 MG-IU OR TABS 2 qd         ALLERGIES     Allergies   Allergen Reactions     Amoxicillin Rash     Codeine GI Disturbance     Stomach pain       PAST MEDICAL HISTORY:  Past Medical History:   Diagnosis Date     Other and unspecified hyperlipidemia      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 Father      Family History Negative Mother      Genitourinary Problems Sister      Renal failure     HEART DISEASE Sister      Rhythm issues       SOCIAL HISTORY:  Social History     Social History     Marital status:      Spouse name: N/A     Number of children: 3     Years of education: N/A     Occupational History      Retired     Social History Main Topics     Smoking status: Former Smoker     Quit date: 5/6/1973     Smokeless tobacco: Never Used     Alcohol use Yes      Comment: Socially     Drug use: No     Sexual activity: No     Other Topics Concern     Exercise Yes     Seat Belt Yes     Social History Narrative       Review of Systems:  Skin:  Negative     Eyes:  Negative glasses  ENT:  Positive for hearing loss  Respiratory:  Negative    Cardiovascular:  Negative    Gastroenterology: Negative    Genitourinary:  Positive for urinary frequency  Musculoskeletal:  Positive for    Neurologic:  Positive for numbness or tingling of feet  Psychiatric:  Positive for sleep disturbances  Heme/Lymph/Imm:  Positive for easy bruising;hay fever  Endocrine:  Negative      Physical Exam:  Vitals: /64 (BP Location: Right arm, Patient Position: Sitting, Cuff Size: Adult Small)  Pulse 68  Ht 1.524 m (5')  Wt 37.9 kg (83 lb 9.6 oz)  Breastfeeding? No  BMI 16.33 kg/m2    Constitutional:  cooperative, alert and oriented, well developed, well nourished, in no acute distress        Skin:  warm and dry to the touch        Head:  normocephalic        Eyes:  no xanthalasma        ENT:  no pallor or cyanosis        Neck:  carotid pulses are full and equal bilaterally        Chest:  normal breath sounds, clear to auscultation, normal A-P diameter, normal symmetry, normal respiratory excursion, no use of accessory muscles         Cardiac: regular rhythm;normal S1 and S2;no S3 or S4       systolic murmur;holosystolic murmur;grade 1;throughout precordium   holodiastolic murmur;decrescendo;throughout precordium      Abdomen:  abdomen soft, non-tender, BS normoactive, no mass, no HSM, no bruits        Vascular: pulses full and equal                                      Extremities and Back:  no edema        Neurological:  affect appropriate, oriented to time, person and place;no gross motor deficits          Recent Lab Results:  LIPID RESULTS:  Lab Results   Component Value Date    CHOL 107 10/07/2016    HDL 22 (L) 10/07/2016    LDL 65 10/07/2016    TRIG 100 10/07/2016    CHOLHDLRATIO 3.3 08/29/2013       LIVER ENZYME RESULTS:  Lab Results   Component Value Date    AST 14 06/22/2017    ALT 24 06/22/2017       CBC RESULTS:  Lab Results   Component Value Date    WBC 10.0 06/22/2017    RBC 3.51 (L) 06/22/2017    HGB 9.8 (L) 06/22/2017    HCT 30.3 (L) 06/22/2017    MCV 86 06/22/2017    MCH 27.9 06/22/2017    MCHC 32.3 06/22/2017    RDW 16.5 (H) 06/22/2017     06/22/2017       BMP RESULTS:  Lab Results   Component Value Date     06/29/2017    POTASSIUM 4.0 06/29/2017    CHLORIDE 97 06/29/2017    CO2 33 (H) 06/29/2017    ANIONGAP 4 06/29/2017     (H) 06/29/2017    BUN 35 (H) 06/29/2017    CR 0.76 06/29/2017    GFRESTIMATED 73 06/29/2017    GFRESTBLACK 88 06/29/2017    KARTHIK 8.8 06/29/2017        A1C RESULTS:  Lab Results   Component Value Date    A1C 5.7 12/16/2015       INR RESULTS:  Lab Results   Component Value Date    INR 1.24 (H) 10/08/2016    INR 1.35 (H) 10/05/2016     Thank you for allowing me to participate in the care of your patient.    Sincerely,     Fabiano Bower MD     [facility]

## 2017-06-29 NOTE — PROGRESS NOTES
HPI and Plan:     : 31 and service is     I am seeing Ana Cristina Dominguez in cardiology clinic today for a follow-up visit. Mrs. Dominguez is a delightful 85 year old female who presented to cardiology clinic in early May with complaints of severe, rapid onset peripheral edema. Her echocardiogram showed significant tricuspid regurgitation and elevated left heart filling pressures and it was felt that she had diastolic heart failure. She was seen once in follow-up and her diuretics were increased with the addition of metolazone twice a day. She also required the addition of potassium when the metolazone was started. Interestingly, she also complained of new, severe arthralgias and a visit with rheumatology was suggested.    Since our last visit, Mrs. Dominguez saw a rheumatologist and was diagnosed with polymyalgia. She was started on a tapered steroid dose and both her lower extremity edema and joint pain have completely resolved . Today, she has not significant peripheral edema on exam and has stopped using her compression stockings, although she continues to follow a low sodium diet and is continuing to take her diuretics. She is very happy with her outcome, having feeling completely normal now. She denies any other cardiac systomps what so ever and feels that her strength is normal. She is not having PND or orthopnea,  chest pain,claudication, or syncope/near syncope.    Assessment Plan:    1. This patient had rapid onset of peripheral edema with evidence by echocardiography of diastolic dysfunction, but her edema and joint pain have completely resolved after being started on high does steroids for her newly diagnosed polymyalgia rheumatica. In hind sight it is likely that she had some degree of heart failure since jugular venous pressures were modestly elevated on initial presentation, but her polymyalgia was likely the major force leading to her peripheral edema. Today, I asked her to stop her  metolazone and her potassium supplementation. She will at this point continue her Bumex, but if she does not re-develop edema upon discontinuation of metolazone, I would plan on gradually stopping all of her diuretics as tolerated. She will call us in two to four weeks and let us know if she has redeveloped edema, and if not, we will cut her Bumex from .5 mgs twice a day to once a day and then eventually stop it altogether.    2. Hypertension. It is noted that today s blood pressure is a bit high at 156/64, although considering her advanced age I am not concerned about those numbers. I suspect that her high dose of steroids continue to play a role. She may need further antihypertensive therapy if her blood pressure remains elevated after her steroid taper or if she needs to go on long term high dose steroids. Of note, previous blood pressures have been within acceptable range, so I am not anxious to adjust antihypertensive at this point in her care. She is currently using Toprol and diltiazem for hypertension, well as her diuretics.    Thank you for asking me to participate in your patient s care. We will adjust her diuretics by phone as described above. I will plan on seeing her back in one year on a routine basis, but I would be more than happy to see her sooner should she have other cardiology issues. Please do not hesitate to call if I can be of further assistance.    Fabiano Bower MD        Orders Placed This Encounter   Procedures     Follow-Up with Cardiologist     Orders Placed This Encounter   Medications     Acetaminophen (TYLENOL PO)     Sig: Take by mouth every 4 hours as needed for mild pain or fever     Medications Discontinued During This Encounter   Medication Reason     metolazone (ZAROXOLYN) 2.5 MG tablet Therapy completed     potassium chloride SA (K-DUR/KLOR-CON M) 20 MEQ CR tablet Therapy completed         Encounter Diagnosis   Name Primary?     Chronic diastolic congestive heart  failure (H)        CURRENT MEDICATIONS:  Current Outpatient Prescriptions   Medication Sig Dispense Refill     Acetaminophen (TYLENOL PO) Take by mouth every 4 hours as needed for mild pain or fever       predniSONE (DELTASONE) 5 MG tablet 10 mg in the morning, 5 mg evening--call if not better in 1 week; otherwise, take for 2 weeks; then 7.5 and 5 mg x2wks; 5 mg twice daily for 1 month 90 tablet 3     bumetanide (BUMEX) 0.5 MG tablet 2 q am, 1 q pm 90 tablet 1     rOPINIRole (REQUIP) 0.5 MG tablet Take 1 tablet (0.5 mg) by mouth At Bedtime 30 tablet 5     rivaroxaban ANTICOAGULANT (XARELTO) 20 MG TABS tablet Take 1 tablet (20 mg) by mouth daily (with dinner) 30 tablet 11     diltiazem (CARDIZEM SR) 120 MG CP12 12 hr SR capsule Take 120 mg by mouth 2 times daily 180 capsule 3     olopatadine (PATANOL) 0.1 % ophthalmic solution Place 1 drop into both eyes 2 times daily as needed for allergies During Spring       metoprolol (TOPROL-XL) 50 MG 24 hr tablet Take 1.5 tablets (75 mg) by mouth daily 135 tablet 3     VITAMIN E 400 UNIT OR CAPS ONE CAP PO  0     VITAMIN C 500 MG OR TABS 1 TABLET DAILY 100 0     MULTIVITAMIN TABS   OR 1 qd       CALCIUM 600 + D 600-200 MG-IU OR TABS 2 qd         ALLERGIES     Allergies   Allergen Reactions     Amoxicillin Rash     Codeine GI Disturbance     Stomach pain       PAST MEDICAL HISTORY:  Past Medical History:   Diagnosis Date     Other and unspecified hyperlipidemia      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 Father      Family History Negative Mother       Genitourinary Problems Sister      Renal failure     HEART DISEASE Sister      Rhythm issues       SOCIAL HISTORY:  Social History     Social History     Marital status:      Spouse name: N/A     Number of children: 3     Years of education: N/A     Occupational History      Retired     Social History Main Topics     Smoking status: Former Smoker     Quit date: 5/6/1973     Smokeless tobacco: Never Used     Alcohol use Yes      Comment: Socially     Drug use: No     Sexual activity: No     Other Topics Concern     Exercise Yes     Seat Belt Yes     Social History Narrative       Review of Systems:  Skin:  Negative     Eyes:  Negative glasses  ENT:  Positive for hearing loss  Respiratory:  Negative    Cardiovascular:  Negative    Gastroenterology: Negative    Genitourinary:  Positive for urinary frequency  Musculoskeletal:  Positive for    Neurologic:  Positive for numbness or tingling of feet  Psychiatric:  Positive for sleep disturbances  Heme/Lymph/Imm:  Positive for easy bruising;hay fever  Endocrine:  Negative      Physical Exam:  Vitals: /64 (BP Location: Right arm, Patient Position: Sitting, Cuff Size: Adult Small)  Pulse 68  Ht 1.524 m (5')  Wt 37.9 kg (83 lb 9.6 oz)  Breastfeeding? No  BMI 16.33 kg/m2    Constitutional:  cooperative, alert and oriented, well developed, well nourished, in no acute distress        Skin:  warm and dry to the touch        Head:  normocephalic        Eyes:  no xanthalasma        ENT:  no pallor or cyanosis        Neck:  carotid pulses are full and equal bilaterally        Chest:  normal breath sounds, clear to auscultation, normal A-P diameter, normal symmetry, normal respiratory excursion, no use of accessory muscles        Cardiac: regular rhythm;normal S1 and S2;no S3 or S4       systolic murmur;holosystolic murmur;grade 1;throughout precordium   holodiastolic murmur;decrescendo;throughout precordium      Abdomen:  abdomen soft, non-tender, BS normoactive, no  mass, no HSM, no bruits        Vascular: pulses full and equal                                      Extremities and Back:  no edema        Neurological:  affect appropriate, oriented to time, person and place;no gross motor deficits          Recent Lab Results:  LIPID RESULTS:  Lab Results   Component Value Date    CHOL 107 10/07/2016    HDL 22 (L) 10/07/2016    LDL 65 10/07/2016    TRIG 100 10/07/2016    CHOLHDLRATIO 3.3 08/29/2013       LIVER ENZYME RESULTS:  Lab Results   Component Value Date    AST 14 06/22/2017    ALT 24 06/22/2017       CBC RESULTS:  Lab Results   Component Value Date    WBC 10.0 06/22/2017    RBC 3.51 (L) 06/22/2017    HGB 9.8 (L) 06/22/2017    HCT 30.3 (L) 06/22/2017    MCV 86 06/22/2017    MCH 27.9 06/22/2017    MCHC 32.3 06/22/2017    RDW 16.5 (H) 06/22/2017     06/22/2017       BMP RESULTS:  Lab Results   Component Value Date     06/29/2017    POTASSIUM 4.0 06/29/2017    CHLORIDE 97 06/29/2017    CO2 33 (H) 06/29/2017    ANIONGAP 4 06/29/2017     (H) 06/29/2017    BUN 35 (H) 06/29/2017    CR 0.76 06/29/2017    GFRESTIMATED 73 06/29/2017    GFRESTBLACK 88 06/29/2017    KARTHIK 8.8 06/29/2017        A1C RESULTS:  Lab Results   Component Value Date    A1C 5.7 12/16/2015       INR RESULTS:  Lab Results   Component Value Date    INR 1.24 (H) 10/08/2016    INR 1.35 (H) 10/05/2016           CC  Fabiano Bower MD  [facility]  [address] [place] [id number]

## 2017-07-06 ENCOUNTER — ONCOLOGY VISIT (OUTPATIENT)
Dept: ONCOLOGY | Facility: CLINIC | Age: 82
End: 2017-07-06
Attending: INTERNAL MEDICINE
Payer: COMMERCIAL

## 2017-07-06 VITALS
RESPIRATION RATE: 18 BRPM | HEART RATE: 61 BPM | SYSTOLIC BLOOD PRESSURE: 124 MMHG | OXYGEN SATURATION: 97 % | BODY MASS INDEX: 16.44 KG/M2 | TEMPERATURE: 97.2 F | DIASTOLIC BLOOD PRESSURE: 59 MMHG | WEIGHT: 84.2 LBS

## 2017-07-06 DIAGNOSIS — D64.9 ANEMIA, UNSPECIFIED TYPE: Primary | ICD-10-CM

## 2017-07-06 PROCEDURE — 99211 OFF/OP EST MAY X REQ PHY/QHP: CPT

## 2017-07-06 PROCEDURE — 99213 OFFICE O/P EST LOW 20 MIN: CPT | Performed by: INTERNAL MEDICINE

## 2017-07-06 NOTE — PROGRESS NOTES
[facility]    Hematology/Oncology Established Patient Note      Today's Date: 07/06/17    Reason for Follow-up: anemia      HISTORY OF PRESENT ILLNESS: Ana Cristina Dominguez is a 86 year old female with PMHx of polymyalgia rheumatica, osteoporosis, HLD, who presents with anemia.  Per chart review, her hemoglobin was 11.2 in October 2016, and has decreased gradually down to 8.9 in 6/6/17.      She says that she has lost quite a bit of weight despite a good appetite.  She denies any bleeding symptoms.  She says that she had a colonoscopy 15 years ago, and she does not want another one.        INTERIM HISTORY: Ana Cristina comes in for follow-up today.  She says that she feels better.  She is gaining weight again and feels good.  Appetite is good.        REVIEW OF SYSTEMS:   14 point ROS was reviewed and is negative other than as noted above in HPI.       HOME MEDICATIONS:  Current Outpatient Prescriptions   Medication Sig Dispense Refill     Acetaminophen (TYLENOL PO) Take by mouth every 4 hours as needed for mild pain or fever       predniSONE (DELTASONE) 5 MG tablet 10 mg in the morning, 5 mg evening--call if not better in 1 week; otherwise, take for 2 weeks; then 7.5 and 5 mg x2wks; 5 mg twice daily for 1 month 90 tablet 3     bumetanide (BUMEX) 0.5 MG tablet 2 q am, 1 q pm 90 tablet 1     rOPINIRole (REQUIP) 0.5 MG tablet Take 1 tablet (0.5 mg) by mouth At Bedtime 30 tablet 5     rivaroxaban ANTICOAGULANT (XARELTO) 20 MG TABS tablet Take 1 tablet (20 mg) by mouth daily (with dinner) 30 tablet 11     diltiazem (CARDIZEM SR) 120 MG CP12 12 hr SR capsule Take 120 mg by mouth 2 times daily 180 capsule 3     olopatadine (PATANOL) 0.1 % ophthalmic solution Place 1 drop into both eyes 2 times daily as needed for allergies During Spring       metoprolol (TOPROL-XL) 50 MG 24 hr tablet Take 1.5 tablets (75 mg) by mouth daily 135 tablet 3     VITAMIN E 400 UNIT OR CAPS ONE CAP PO  0     VITAMIN C 500  MG OR TABS 1 TABLET DAILY 100 0     MULTIVITAMIN TABS   OR 1 qd       CALCIUM 600 + D 600-200 MG-IU OR TABS 2 qd           ALLERGIES:  Allergies   Allergen Reactions     Amoxicillin Rash     Codeine GI Disturbance     Stomach pain         PAST MEDICAL HISTORY:  Past Medical History:   Diagnosis Date     Other and unspecified hyperlipidemia      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     RLS (restless legs syndrome)      Senile osteoporosis     Reclast 11/16/09, 11/10, 11/11         PAST SURGICAL HISTORY:  Past Surgical History:   Procedure Laterality Date     APPENDECTOMY       CATARACT IOL, RT/LT  1/19/09    right     HC EXCIS PRIMARY GANGLION WRIST           SOCIAL HISTORY:  Social History     Social History     Marital status:      Spouse name: N/A     Number of children: 3     Years of education: N/A     Occupational History      Retired     Social History Main Topics     Smoking status: Former Smoker     Quit date: 5/6/1973     Smokeless tobacco: Never Used     Alcohol use Yes      Comment: Socially     Drug use: No     Sexual activity: No     Other Topics Concern     Exercise Yes     Seat Belt Yes     Social History Narrative         FAMILY HISTORY:  Family History   Problem Relation Age of Onset     C.A.D. Father      Family History Negative Mother      Genitourinary Problems Sister      Renal failure     HEART DISEASE Sister      Rhythm issues         PHYSICAL EXAM:  Vital signs:  /59  Pulse 61  Temp 97.2  F (36.2  C) (Oral)  Resp 18  Wt 38.2 kg (84 lb 3.2 oz)  SpO2 97%  BMI 16.44 kg/m2   GENERAL/CONSTITUTIONAL: No acute distress.  ENT: Hard of hearing.  EYES: No scleral icterus.  NEUROLOGIC: Alert, oriented, answers questions appropriately.  INTEGUMENTARY: No jaundice.      LABS:  CBC RESULTS:   Recent Labs   Lab Test  06/22/17   1434   WBC  10.0   RBC  3.51*   HGB  9.8*   HCT  30.3*   MCV  86   MCH  27.9   MCHC  32.3    RDW  16.5*   PLT  383     Recent Labs   Lab Test  06/29/17   0846  06/22/17   1434   NA  134  133   POTASSIUM  4.0  4.2   CHLORIDE  97  93*   CO2  33*  35*   ANIONGAP  4  5   GLC  109*  104*   BUN  35*  42*   CR  0.76  0.86   KARTHIK  8.8  9.5     Lab Results   Component Value Date    AST 14 06/22/2017     Lab Results   Component Value Date    ALT 24 06/22/2017     Lab Results   Component Value Date    BILICONJ 0.0 11/11/2013      Lab Results   Component Value Date    BILITOTAL 0.3 06/22/2017     Lab Results   Component Value Date    ALBUMIN 3.3 06/22/2017     Lab Results   Component Value Date    PROTTOTAL 8.2 06/22/2017      Lab Results   Component Value Date    ALKPHOS 83 06/22/2017     Component      Latest Ref Rng & Units 6/22/2017   Iron      35 - 180 ug/dL 68   Iron Binding Cap      240 - 430 ug/dL 292   Iron Saturation Index      15 - 46 % 23   % Retic      0.5 - 2.0 % 1.8   Absolute Retic      25 - 95 10e9/L 63.9   Ferritin      8 - 252 ng/mL 244   Vitamin B12      193 - 986 pg/mL 1403 (H)     Folate 14.8    SPEP  6/22/17: M-spike of 0.2 g/dL      MARLON negative    Peripheral smear 6/22/17:  FINAL DIAGNOSIS:   Peripheral blood.   - Normochromic normocytic anemia.  Morphologically nonspecific.  See   microscopic description and comment.       ASSESSMENT/PLAN:  Ana Cristina Dominguez is a 86 year old female with:    1) Anemia, normocytoc: Possible causes may include anemia of chronic disease/inflammation, medications, malignancy.  She does not appear iron deficient on labs.  Labs appear unrevealing.  She has a very small M-spike, but doubt that is causing her anemia.  I discussed further evaluation, such as bone marrow biopsy or CT scan, but she declines.  She says that she is 86 years old, and she has gone as far with this evaluation as she wants, since she is feeling good now.    -patient declines further evaluation, and prefers to follow-up with her rheumatolgist and PCP    2) MGUS: Mild M-spike of 0.2 g/dL.   Could obtain further evaluation with ANTONIO, skeletal survey, other labs, but patient declines.      I spent a total of 15 minutes with the patient, with over >50% of the time in counseling and/or coordination of care.       Carly Mishra MD  Hematology/Oncology  [facility]

## 2017-07-06 NOTE — MR AVS SNAPSHOT
After Visit Summary   7/6/2017    Ana Cristina Dominguez    MRN: [id number]           Patient Information     Date Of Birth          6/6/1931        Visit Information        Provider Department      7/6/2017 2:45 PM Carly Mishra MD [facility] Oncology [facility]      Today's Diagnoses     Anemia, unspecified type    -  1       Follow-ups after your visit        Your next 10 appointments already scheduled     Jul 18, 2017  9:00 AM CDT   Return Visit with Go Hidalgo MD   [facility] ([facility] [place])    [address], [address]  [facility] [address] [phone]           Please inform patient of the clinic address: [facility] [address] , [address] [facility] [address]            Aug 30, 2017  9:00 AM CDT   Office Visit with Dez Urban MD   [facility]    [address] [place] [address] [phone]           Bring a current list of meds and any records pertaining to this visit.  For Physicals, please bring immunization records and any forms needing to be filled out.  Please arrive 10 minutes early to complete paperwork.              Who to contact     If you have questions or need follow up information about today's clinic visit or your schedule please contact [facility] directly at [phone].  Normal or non-critical lab and imaging results will be communicated to you by MyChart, letter or phone within 4 business days after the clinic has received the results. If you do not hear from us within 7 days, please contact the clinic through MyChart or phone. If you have a critical or abnormal lab result, we will notify you by phone as soon as possible.  Submit refill requests through OLX or call your pharmacy and they will forward the refill request to us. Please allow 3  business days for your refill to be completed.          Additional Information About Your Visit        MyChart Information     [employer] gives you secure access to your electronic health record. If you see a primary care provider, you can also send messages to your care team and make appointments. If you have questions, please call your primary care clinic.  If you do not have a primary care provider, please call [phone] and they will assist you.        Care EveryWhere ID     This is your Care EveryWhere ID. This could be used by other organizations to access your [place] medical records  [id number]        Your Vitals Were     Pulse Temperature Respirations Pulse Oximetry BMI (Body Mass Index)       61 97.2  F (36.2  C) (Oral) 18 97% 16.44 kg/m2        Blood Pressure from Last 3 Encounters:   07/06/17 124/59   06/29/17 156/64   06/22/17 137/64    Weight from Last 3 Encounters:   07/06/17 38.2 kg (84 lb 3.2 oz)   06/29/17 37.9 kg (83 lb 9.6 oz)   06/22/17 37 kg (81 lb 9.6 oz)              Today, you had the following     No orders found for display       Primary Care Provider Office Phone # Fax #    Dez Urban [phone]       [facility] [address]  [facility] [address]        Equal Access to Services     ABBE ESPINOSA : Hadii aad ku hadasho Soomaali, waaxda luqadaha, qaybta kaalmada adeegyada, rinku obrien. So [facility] [phone].    ATENCIÓN: Si habla español, tiene a dominguez disposición servicios gratuitos de asistencia lingüística. Llame al [phone].    We comply with applicable federal civil rights laws and [place] laws. We do not discriminate on the basis of race, color, national origin, age, disability sex, sexual orientation or gender identity.            Thank you!     Thank you for choosing [facility]  for your care. Our goal is always to provide you with excellent care. Hearing back from our patients is  one way we can continue to improve our services. Please take a few minutes to complete the written survey that you may receive in the mail after your visit with us. Thank you!             Your Updated Medication List - Protect others around you: Learn how to safely use, store and throw away your medicines at www.disposemymeds.org.          This list is accurate as of: 7/6/17  3:44 PM.  Always use your most recent med list.                   Brand Name Dispense Instructions for use Diagnosis    ascorbic acid 500 MG tablet    VITAMIN C    100    1 TABLET DAILY        bumetanide 0.5 MG tablet    BUMEX    90 tablet    2 q am, 1 q pm    Edema, unspecified type       CALCIUM 600 + D 600-200 MG-UNIT Tabs      2 qd        diltiazem 120 MG Cp12 12 hr SR capsule    CARDIZEM SR    180 capsule    Take 120 mg by mouth 2 times daily    Chronic atrial fibrillation (H)       metoprolol 50 MG 24 hr tablet    TOPROL-XL    135 tablet    Take 1.5 tablets (75 mg) by mouth daily    HTN (hypertension)       MULTIVITAMIN TABS   OR      1 qd        olopatadine 0.1 % ophthalmic solution    PATANOL     Place 1 drop into both eyes 2 times daily as needed for allergies During Spring        predniSONE 5 MG tablet    DELTASONE    90 tablet    10 mg in the morning, 5 mg evening--call if not better in 1 week; otherwise, take for 2 weeks; then 7.5 and 5 mg x2wks; 5 mg twice daily for 1 month    Polymyalgia rheumatica (H)       rivaroxaban ANTICOAGULANT 20 MG Tabs tablet    XARELTO    30 tablet    Take 1 tablet (20 mg) by mouth daily (with dinner)    Chronic atrial fibrillation (H)       rOPINIRole 0.5 MG tablet    REQUIP    30 tablet    Take 1 tablet (0.5 mg) by mouth At Bedtime    RLS (restless legs syndrome)       TYLENOL PO      Take by mouth every 4 hours as needed for mild pain or fever        vitamin E 400 UNIT capsule     100    ONE CAP PO QD

## 2017-07-06 NOTE — NURSING NOTE
Oncology Rooming Note    July 6, 2017 3:20 PM   Ana Cristina Dominguez is a 86 year old female who presents for:    Chief Complaint   Patient presents with     Oncology Clinic Visit     blood work done and needs results     Initial Vitals: /59  Pulse 61  Temp 97.2  F (36.2  C) (Oral)  Resp 18  Wt 38.2 kg (84 lb 3.2 oz)  SpO2 97%  BMI 16.44 kg/m2 Estimated body mass index is 16.44 kg/(m^2) as calculated from the following:    Height as of 6/29/17: 1.524 m (5').    Weight as of this encounter: 38.2 kg (84 lb 3.2 oz). Body surface area is 1.27 meters squared.  Data Unavailable Comment: Data Unavailable   No LMP recorded. Patient is postmenopausal.  Allergies reviewed: Yes  Medications reviewed: Yes    Medications: Medication refills not needed today.  Pharmacy name entered into [facility]: [id number] [place], [place] - [address]    Clinical concerns: none     10 minutes for nursing intake (face to face time)     Kathie Harris LPN    DISCHARGE PLAN:  Next appointments: See patient instruction section  Departure Mode: Ambulatory  Accompanied by: self  5 minutes for nursing discharge (face to face time)   Kathie Harris LPN

## 2017-07-06 NOTE — LETTER
July 6, 2017      RE: Ana Cristina Dominguez  [address]  [place] [address]      [facility]    Hematology/Oncology Established Patient Note      Today's Date: 07/06/17    Reason for Follow-up: anemia      HISTORY OF PRESENT ILLNESS: Ana Cristina Dominguez is a 86 year old female with PMHx of polymyalgia rheumatica, osteoporosis, HLD, who presents with anemia.  Per chart review, her hemoglobin was 11.2 in October 2016, and has decreased gradually down to 8.9 in 6/6/17.      She says that she has lost quite a bit of weight despite a good appetite.  She denies any bleeding symptoms.  She says that she had a colonoscopy 15 years ago, and she does not want another one.        INTERIM HISTORY: Ana Cristina comes in for follow-up today.  She says that she feels better.  She is gaining weight again and feels good.  Appetite is good.        REVIEW OF SYSTEMS:   14 point ROS was reviewed and is negative other than as noted above in HPI.       HOME MEDICATIONS:  Current Outpatient Prescriptions   Medication Sig Dispense Refill     Acetaminophen (TYLENOL PO) Take by mouth every 4 hours as needed for mild pain or fever       predniSONE (DELTASONE) 5 MG tablet 10 mg in the morning, 5 mg evening--call if not better in 1 week; otherwise, take for 2 weeks; then 7.5 and 5 mg x2wks; 5 mg twice daily for 1 month 90 tablet 3     bumetanide (BUMEX) 0.5 MG tablet 2 q am, 1 q pm 90 tablet 1     rOPINIRole (REQUIP) 0.5 MG tablet Take 1 tablet (0.5 mg) by mouth At Bedtime 30 tablet 5     rivaroxaban ANTICOAGULANT (XARELTO) 20 MG TABS tablet Take 1 tablet (20 mg) by mouth daily (with dinner) 30 tablet 11     diltiazem (CARDIZEM SR) 120 MG CP12 12 hr SR capsule Take 120 mg by mouth 2 times daily 180 capsule 3     olopatadine (PATANOL) 0.1 % ophthalmic solution Place 1 drop into both eyes 2 times daily as needed for allergies During Spring       metoprolol (TOPROL-XL) 50 MG 24 hr tablet Take 1.5 tablets  (75 mg) by mouth daily 135 tablet 3     VITAMIN E 400 UNIT OR CAPS ONE CAP PO  0     VITAMIN C 500 MG OR TABS 1 TABLET DAILY 100 0     MULTIVITAMIN TABS   OR 1 qd       CALCIUM 600 + D 600-200 MG-IU OR TABS 2 qd           ALLERGIES:  Allergies   Allergen Reactions     Amoxicillin Rash     Codeine GI Disturbance     Stomach pain         PAST MEDICAL HISTORY:  Past Medical History:   Diagnosis Date     Other and unspecified hyperlipidemia      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     RLS (restless legs syndrome)      Senile osteoporosis     Reclast 11/16/09, 11/10, 11/11         PAST SURGICAL HISTORY:  Past Surgical History:   Procedure Laterality Date     APPENDECTOMY       CATARACT IOL, RT/LT  1/19/09    right     HC EXCIS PRIMARY GANGLION WRIST           SOCIAL HISTORY:  Social History     Social History     Marital status:      Spouse name: N/A     Number of children: 3     Years of education: N/A     Occupational History      Retired     Social History Main Topics     Smoking status: Former Smoker     Quit date: 5/6/1973     Smokeless tobacco: Never Used     Alcohol use Yes      Comment: Socially     Drug use: No     Sexual activity: No     Other Topics Concern     Exercise Yes     Seat Belt Yes     Social History Narrative         FAMILY HISTORY:  Family History   Problem Relation Age of Onset     C.A.D. Father      Family History Negative Mother      Genitourinary Problems Sister      Renal failure     HEART DISEASE Sister      Rhythm issues         PHYSICAL EXAM:  Vital signs:  /59  Pulse 61  Temp 97.2  F (36.2  C) (Oral)  Resp 18  Wt 38.2 kg (84 lb 3.2 oz)  SpO2 97%  BMI 16.44 kg/m2   GENERAL/CONSTITUTIONAL: No acute distress.  ENT: Hard of hearing.  EYES: No scleral icterus.  NEUROLOGIC: Alert, oriented, answers questions appropriately.  INTEGUMENTARY: No jaundice.      LABS:  CBC RESULTS:   Recent Labs   Lab Test   06/22/17   1434   WBC  10.0   RBC  3.51*   HGB  9.8*   HCT  30.3*   MCV  86   MCH  27.9   MCHC  32.3   RDW  16.5*   PLT  383     Recent Labs   Lab Test  06/29/17   0846  06/22/17   1434   NA  134  133   POTASSIUM  4.0  4.2   CHLORIDE  97  93*   CO2  33*  35*   ANIONGAP  4  5   GLC  109*  104*   BUN  35*  42*   CR  0.76  0.86   KARTHIK  8.8  9.5     Lab Results   Component Value Date    AST 14 06/22/2017     Lab Results   Component Value Date    ALT 24 06/22/2017     Lab Results   Component Value Date    BILICONJ 0.0 11/11/2013      Lab Results   Component Value Date    BILITOTAL 0.3 06/22/2017     Lab Results   Component Value Date    ALBUMIN 3.3 06/22/2017     Lab Results   Component Value Date    PROTTOTAL 8.2 06/22/2017      Lab Results   Component Value Date    ALKPHOS 83 06/22/2017     Component      Latest Ref Rng & Units 6/22/2017   Iron      35 - 180 ug/dL 68   Iron Binding Cap      240 - 430 ug/dL 292   Iron Saturation Index      15 - 46 % 23   % Retic      0.5 - 2.0 % 1.8   Absolute Retic      25 - 95 10e9/L 63.9   Ferritin      8 - 252 ng/mL 244   Vitamin B12      193 - 986 pg/mL 1403 (H)     Folate 14.8    SPEP  6/22/17: M-spike of 0.2 g/dL      MARLON negative    Peripheral smear 6/22/17:  FINAL DIAGNOSIS:   Peripheral blood.   - Normochromic normocytic anemia.  Morphologically nonspecific.  See   microscopic description and comment.       ASSESSMENT/PLAN:  Ana Cristina Dominguez is a 86 year old female with:    1) Anemia, normocytoc: Possible causes may include anemia of chronic disease/inflammation, medications, malignancy.  She does not appear iron deficient on labs.  Labs appear unrevealing.  She has a very small M-spike, but doubt that is causing her anemia.  I discussed further evaluation, such as bone marrow biopsy or CT scan, but she declines.  She says that she is 86 years old, and she has gone as far with this evaluation as she wants, since she is feeling good now.    -patient declines further  evaluation, and prefers to follow-up with her rheumatolgist and PCP    2) MGUS: Mild M-spike of 0.2 g/dL.  Could obtain further evaluation with ANTONIO, skeletal survey, other labs, but patient declines.      I spent a total of 15 minutes with the patient, with over >50% of the time in counseling and/or coordination of care.       Carly Mishra MD  Hematology/Oncology  [facility]      Carly Mishra MD

## 2017-07-11 DIAGNOSIS — R60.9 EDEMA, UNSPECIFIED TYPE: ICD-10-CM

## 2017-07-11 RX ORDER — BUMETANIDE 0.5 MG/1
TABLET ORAL
Qty: 90 TABLET | Refills: 1 | Status: SHIPPED | OUTPATIENT
Start: 2017-07-11 | End: 2018-01-01

## 2017-07-11 NOTE — TELEPHONE ENCOUNTER
BUMEX      Last Written Prescription Date: 05/13/17  Last Fill Quantity: 90, # refills: 1  Last Office Visit with [facility], [facility] or [facility] prescribing provider: 05/01/17  Next 5 appointments (look out 90 days)     Jul 18, 2017  9:00 AM CDT   Return Visit with Go Hidalgo MD   [facility]ICINE ([facility] [place])    [address]  [facility] [address]   [phone]            Aug 30, 2017  9:00 AM CDT   [facility]    [address]  [facility] [id number] [phone]                   Potassium   Date Value Ref Range Status   06/29/2017 4.0 3.4 - 5.3 mmol/L Final     Creatinine   Date Value Ref Range Status   06/29/2017 0.76 0.52 - 1.04 mg/dL Final     BP Readings from Last 3 Encounters:   07/06/17 124/59   06/29/17 156/64   06/22/17 137/64     BUMEX

## 2017-07-13 ENCOUNTER — CARE COORDINATION (OUTPATIENT)
Dept: CARDIOLOGY | Facility: CLINIC | Age: 82
End: 2017-07-13

## 2017-07-13 NOTE — PROGRESS NOTES
Let's cut back to 0.5 mg Bumex per day for two week, then have her call us and if there is no recurrence of her edema, stop it entirely.

## 2017-07-13 NOTE — PROGRESS NOTES
Dr. Bower saw patient in clinic on 06-29-17. Edema improved.  Metolazone was stopped, as well as her potassium.     I received a call from the patient. Patient stated she is doing well. No increase in edema. She denies being shortness of breath and her weight is stable. Patient is on Bumex. Tablet dose: 0.5 mg. Patient was taking 1 mg in the am and 0.5 mg in the pm.    Patient reduced her Bumex dose. She is now taking 1 mg daily in the am. She stopped taking her pm dose.        Dr. Bower mentioned that his plan is to gradually stop patient's diuretics as tolerated. Dr. Bower stated, if patient does not redevelop edema, he would reduced her Bumex from (0.5 mg) twice a day to once a day and then eventually stop it altogether.    I messaged Dr. Bower to review if he would like to reduce patient's Bumex dose to 0.5 mg a day in the am, or if patient should continue 1 mg a day in the am. Patient is planning on calling me back in 2 weeks to let us know how she is doing.     TGaroberto WEBER  [facility]

## 2017-07-14 NOTE — PROGRESS NOTES
I called patient to review Dr. Bower's recommendations, patient was not available.   Patient did not have a voicemail. I will try again.     Aldair WEBER  [facility]

## 2017-07-17 NOTE — PROGRESS NOTES
Reviewed Dr. Bower's recommendations with patient over the phone. I instructed patient to reduce her Bumex to 1 tab (0.5 ) mg daily in the am. She will check in with me in 2 weeks. If she has no edema, Bumex will be stopped. Patient had no questions.     Aldair WEBER  [facility]

## 2017-07-18 ENCOUNTER — OFFICE VISIT (OUTPATIENT)
Dept: RHEUMATOLOGY | Facility: CLINIC | Age: 82
End: 2017-07-18
Payer: COMMERCIAL

## 2017-07-18 VITALS
DIASTOLIC BLOOD PRESSURE: 57 MMHG | WEIGHT: 83.6 LBS | HEART RATE: 88 BPM | BODY MASS INDEX: 16.33 KG/M2 | SYSTOLIC BLOOD PRESSURE: 154 MMHG

## 2017-07-18 DIAGNOSIS — M35.3 PMR (POLYMYALGIA RHEUMATICA) (H): Primary | ICD-10-CM

## 2017-07-18 DIAGNOSIS — M35.3 POLYMYALGIA RHEUMATICA (H): ICD-10-CM

## 2017-07-18 PROCEDURE — 99214 OFFICE O/P EST MOD 30 MIN: CPT | Performed by: INTERNAL MEDICINE

## 2017-07-18 RX ORDER — PREDNISONE 1 MG/1
TABLET ORAL
Qty: 120 TABLET | Refills: 3 | Status: SHIPPED | OUTPATIENT
Start: 2017-07-18 | End: 2018-01-01

## 2017-07-18 RX ORDER — PREDNISONE 5 MG/1
TABLET ORAL
Qty: 90 TABLET | Refills: 3 | Status: SHIPPED | OUTPATIENT
Start: 2017-07-18 | End: 2018-01-01

## 2017-07-18 NOTE — NURSING NOTE
Chief Complaint   Patient presents with     RECHECK       Initial /57  Pulse 88  Wt 37.9 kg (83 lb 9.6 oz)  BMI 16.33 kg/m2 Estimated body mass index is 16.33 kg/(m^2) as calculated from the following:    Height as of 6/29/17: 1.524 m (5').    Weight as of this encounter: 37.9 kg (83 lb 9.6 oz).      Patient prefers to be contacted via at Home.   Okay to leave detailed message: Yes  Patient uses MyChart: Yes    Jazmyn COATES LPN

## 2017-07-18 NOTE — PATIENT INSTRUCTIONS
Take 5 mg twice daily til the end of July July 1 ---5 mg in the morning and 4 mg (take 4 1 mg tablets) in the evening  Aug 1--5 mg in am and 3 mg in pm  Sept 1-- 5 mg in am and 2 mg in pm  Oct 1---5 mg in am and 1 mg in pm  Nov 1---5 mg in am only  Dec 1-- 4 mg in am   Jan 1 3mg in am  Feb 1---2 mg daily  MAr 1--1 mg daily  Apr 1---stop

## 2017-07-18 NOTE — TELEPHONE ENCOUNTER
Faxed Prednisone to [facility] Neli COATES LPN  [facility]  Rheumatology-Dr. Hidalgo  [address]   [address] [place] [address]

## 2017-07-18 NOTE — MR AVS SNAPSHOT
After Visit Summary   7/18/2017    Ana Cristina Dominguez    MRN: [id number]           Patient Information     Date Of Birth          6/6/1931        Visit Information        Provider Department      7/18/2017 9:00 AM Go Hidalgo MD [facility]        Today's Diagnoses     PMR (polymyalgia rheumatica) (H)    -  1      Care Instructions    Take 5 mg twice daily til the end of July July 1 ---5 mg in the morning and 4 mg (take 4 1 mg tablets) in the evening  Aug 1--5 mg in am and 3 mg in pm  Sept 1-- 5 mg in am and 2 mg in pm  Oct 1---5 mg in am and 1 mg in pm  Nov 1---5 mg in am only  Dec 1-- 4 mg in am   Jan 1 3mg in am  Feb 1---2 mg daily  MAr 1--1 mg daily  Apr 1---stop          Follow-ups after your visit        Follow-up notes from your care team     Return in about 3 months (around 10/18/2017).      Your next 10 appointments already scheduled     Aug 30, 2017  9:00 AM CDT   Office Visit with Dez Urban MD   [facility]    [address]  [facility] [address] [phone]           Bring a current list of meds and any records pertaining to this visit.  For Physicals, please bring immunization records and any forms needing to be filled out.  Please arrive 10 minutes early to complete paperwork.              Who to contact     If you have questions or need follow up information about today's clinic visit or your schedule please contact [facility] directly at [phone].  Normal or non-critical lab and imaging results will be communicated to you by MyChart, letter or phone within 4 business days after the clinic has received the results. If you do not hear from us within 7 days, please contact the clinic through MyChart or phone. If you have a critical or abnormal lab result, we will notify you by phone as soon as possible.  Submit refill requests through [employer] or call your pharmacy  and they will forward the refill request to us. Please allow 3 business days for your refill to be completed.          Additional Information About Your Visit        [employer] Information     [employer] gives you secure access to your electronic health record. If you see a primary care provider, you can also send messages to your care team and make appointments. If you have questions, please call your primary care clinic.  If you do not have a primary care provider, please call [phone] and they will assist you.        Care EveryWhere ID     This is your Care EveryWhere ID. This could be used by other organizations to access your [place] medical records  [id number]        Your Vitals Were     Pulse BMI (Body Mass Index)                88 16.33 kg/m2           Blood Pressure from Last 3 Encounters:   07/18/17 154/57   07/06/17 124/59   06/29/17 156/64    Weight from Last 3 Encounters:   07/18/17 37.9 kg (83 lb 9.6 oz)   07/06/17 38.2 kg (84 lb 3.2 oz)   06/29/17 37.9 kg (83 lb 9.6 oz)              Today, you had the following     No orders found for display         Today's Medication Changes          These changes are accurate as of: 7/18/17  9:08 AM.  If you have any questions, ask your nurse or doctor.               These medicines have changed or have updated prescriptions.        Dose/Directions    * predniSONE 5 MG tablet   Commonly known as:  DELTASONE   This may have changed:  Another medication with the same name was added. Make sure you understand how and when to take each.   Used for:  Polymyalgia rheumatica (H)        10 mg in the morning, 5 mg evening--call if not better in 1 week; otherwise, take for 2 weeks; then 7.5 and 5 mg x2wks; 5 mg twice daily for 1 month   Quantity:  90 tablet   Refills:  3       * predniSONE 1 MG tablet   Commonly known as:  DELTASONE   This may have changed:  You were already taking a medication with the same name, and this prescription was added. Make sure you understand how  and when to take each.   Used for:  PMR (polymyalgia rheumatica) (H)        From 5 mg twice daily, taper by 1 mg monthly   Quantity:  120 tablet   Refills:  3       * Notice:  This list has 2 medication(s) that are the same as other medications prescribed for you. Read the directions carefully, and ask your doctor or other care provider to review them with you.         Where to get your medicines      These medications were sent to [facility]/pharmacy #[id number] - [place], [place] - [address]  [address], [place] [address]     Phone:  [phone]     predniSONE 1 MG tablet                Primary Care Provider Office Phone # Fax #    Dez Urban [phone]       [facility] [address]  [facility] [address]        Equal Access to Services     ABBE ESPINOSA : Hadii shanelle booo Soshawn, waaxda luqadaha, qaybta kaalmada adeegyada, rinku burciaga . So [facility] [phone].    ATENCIÓN: Si habla español, tiene a dominguez disposición servicios gratuitos de asistencia lingüística. Fabian al [phone].    We comply with applicable federal civil rights laws and [place] laws. We do not discriminate on the basis of race, color, national origin, age, disability sex, sexual orientation or gender identity.            Thank you!     Thank you for choosing [facility]  for your care. Our goal is always to provide you with excellent care. Hearing back from our patients is one way we can continue to improve our services. Please take a few minutes to complete the written survey that you may receive in the mail after your visit with us. Thank you!             Your Updated Medication List - Protect others around you: Learn how to safely use, store and throw away your medicines at www.disposemymeds.org.          This list is accurate as of: 7/18/17  9:08 AM.  Always use your most recent med list.                   Brand Name Dispense  Instructions for use Diagnosis    ascorbic acid 500 MG tablet    VITAMIN C    100    1 TABLET DAILY        bumetanide 0.5 MG tablet    BUMEX    90 tablet    TAKE 2 TABLETS BY MOUTH EVERY MORNING AND 1 TABLET EVERY EVENING    Edema, unspecified type       CALCIUM 600 + D 600-200 MG-UNIT Tabs      2 qd        diltiazem 120 MG Cp12 12 hr SR capsule    CARDIZEM SR    180 capsule    Take 120 mg by mouth 2 times daily    Chronic atrial fibrillation (H)       metoprolol 50 MG 24 hr tablet    TOPROL-XL    135 tablet    Take 1.5 tablets (75 mg) by mouth daily    HTN (hypertension)       MULTIVITAMIN TABS   OR      1 qd        olopatadine 0.1 % ophthalmic solution    PATANOL     Place 1 drop into both eyes 2 times daily as needed for allergies During Spring        * predniSONE 5 MG tablet    DELTASONE    90 tablet    10 mg in the morning, 5 mg evening--call if not better in 1 week; otherwise, take for 2 weeks; then 7.5 and 5 mg x2wks; 5 mg twice daily for 1 month    Polymyalgia rheumatica (H)       * predniSONE 1 MG tablet    DELTASONE    120 tablet    From 5 mg twice daily, taper by 1 mg monthly    PMR (polymyalgia rheumatica) (H)       rivaroxaban ANTICOAGULANT 20 MG Tabs tablet    XARELTO    30 tablet    Take 1 tablet (20 mg) by mouth daily (with dinner)    Chronic atrial fibrillation (H)       rOPINIRole 0.5 MG tablet    REQUIP    30 tablet    Take 1 tablet (0.5 mg) by mouth At Bedtime    RLS (restless legs syndrome)       TYLENOL PO      Take by mouth every 4 hours as needed for mild pain or fever        vitamin E 400 UNIT capsule     100    ONE CAP PO QD        * Notice:  This list has 2 medication(s) that are the same as other medications prescribed for you. Read the directions carefully, and ask your doctor or other care provider to review them with you.

## 2017-07-21 NOTE — PROGRESS NOTES
SUBJECTIVE:  The patient is seen back in follow up for polymyalgia rheumatica.  She has been seen by Cardiology and by Hematology.  They were not sure of the cause of her anemia and recommended bone marrow biopsy, which she did not want to pursue at this point.  They did feel that she has ongoing chronic congestive diastolic dysfunction, and they are adjusting the medical regimen accordingly.    At this point though, she still feels well on the prednisone.  She is on 5 milligrams twice daily.  She is not having any headaches, claudication, sudden change in loss of vision.  She has noticed gaining weight of approximately 2 pounds since last seen, is not having any of the proximal myalgias or weakness that she had when she first saw me.    PHYSICAL EXAMINATION:  VITAL SIGNS:  Blood pressure 154/57, pulse 88, weight 83.      HEENT:  The temporal arteries are nontender with normal pulsation.  The visual fields are grossly intact.      NECK:  No carotid or subclavian bruits.    JOINT EXAM:  Shows osteoarthritis, strength appears to be appropriate for her size and age.    IMPRESSION:    1.  Polymyalgia rheumatica.    2.  Chronic anemia of undetermined etiology.    RECOMMENDATIONS:    1.  Remain on 5 milligrams twice daily of prednisone until the end of the month and then start tapering by 1 milligram monthly.  2.  We will refill her prescriptions.  3.  Follow up with me in 3-to-4 months.    4.  Obviously, call me if there are any signs or symptoms of polymyalgia rheumatica.  Return at any point, although I would not generally expect this to recur until a lower dose of prednisone is achieved.        Go Hidalgo MD    D:  07/18/2017 11:16 T:  07/21/2017 10:10  Document:  [id number] \RB

## 2017-07-28 NOTE — PROGRESS NOTES
I received a call from the patient. Patient is doing well. She denies swelling or SOB. As recommended by Dr. Bower, I instructed the patient to stop her Bumex. I recommended that she call us if her  swelling returns or if she has any concerning symptoms or questions.     Aldair WEBER  [facility]

## 2017-08-17 DIAGNOSIS — I10 ESSENTIAL HYPERTENSION: ICD-10-CM

## 2017-08-17 RX ORDER — METOPROLOL SUCCINATE 50 MG/1
TABLET, EXTENDED RELEASE ORAL
Qty: 135 TABLET | Refills: 0 | Status: SHIPPED | OUTPATIENT
Start: 2017-08-17 | End: 2017-10-21

## 2017-08-17 NOTE — TELEPHONE ENCOUNTER
Metoprolol      Last Written Prescription Date: 07/29/16  Last Fill Quantity: 135, # refills: 3    Last Office Visit with [facility], [facility] or  Health prescribing provider:  05/01/17 Saman   Future Office Visit:    Next 5 appointments (look out 90 days)     Aug 30, 2017  9:00 AM CDT   Office Visit with Dez Urban MD   [facility] ([facility])    [address]  [facility] [id number] [phone]            Nov 14, 2017  9:00 AM CST   Return Visit with Go Hidalgo MD   [facility] ([facility])    [address] [address] [address] [phone]                    BP Readings from Last 3 Encounters:   07/18/17 154/57   07/06/17 124/59   06/29/17 156/64

## 2017-08-30 ENCOUNTER — OFFICE VISIT (OUTPATIENT)
Dept: INTERNAL MEDICINE | Facility: CLINIC | Age: 82
End: 2017-08-30
Payer: COMMERCIAL

## 2017-08-30 VITALS
WEIGHT: 85.3 LBS | HEART RATE: 86 BPM | HEIGHT: 60 IN | DIASTOLIC BLOOD PRESSURE: 58 MMHG | SYSTOLIC BLOOD PRESSURE: 146 MMHG | BODY MASS INDEX: 16.75 KG/M2 | OXYGEN SATURATION: 98 % | TEMPERATURE: 98 F

## 2017-08-30 DIAGNOSIS — I50.32 CHRONIC DIASTOLIC CONGESTIVE HEART FAILURE (H): ICD-10-CM

## 2017-08-30 DIAGNOSIS — H35.30 MACULAR DEGENERATION: ICD-10-CM

## 2017-08-30 DIAGNOSIS — M35.3 PMR (POLYMYALGIA RHEUMATICA) (H): ICD-10-CM

## 2017-08-30 DIAGNOSIS — G25.81 RESTLESS LEG SYNDROME: ICD-10-CM

## 2017-08-30 DIAGNOSIS — D47.2 MGUS (MONOCLONAL GAMMOPATHY OF UNKNOWN SIGNIFICANCE): ICD-10-CM

## 2017-08-30 DIAGNOSIS — I10 ESSENTIAL HYPERTENSION: ICD-10-CM

## 2017-08-30 DIAGNOSIS — D64.9 NORMOCHROMIC NORMOCYTIC ANEMIA: Primary | ICD-10-CM

## 2017-08-30 DIAGNOSIS — I48.20 CHRONIC ATRIAL FIBRILLATION (H): ICD-10-CM

## 2017-08-30 PROCEDURE — 99214 OFFICE O/P EST MOD 30 MIN: CPT | Performed by: INTERNAL MEDICINE

## 2017-08-30 NOTE — MR AVS SNAPSHOT
"              After Visit Summary   8/30/2017    Ana Cristina Dominguez    MRN: [id number]           Patient Information     Date Of Birth          6/6/1931        Visit Information        Provider Department      8/30/2017 9:00 AM Dez Urban MD [facility]        Today's Diagnoses     Normochromic normocytic anemia    -  1    MGUS (monoclonal gammopathy of unknown significance)        PMR (polymyalgia rheumatica) (H)        Chronic diastolic congestive heart failure (H)        Chronic atrial fibrillation (H)        Essential hypertension        Macular degeneration          Care Instructions    You do have a slightly low Hemoglobin, or \"anemia\". Hemoglobin refers to red blood cells, which carry oxygen to your body. A low hemoglobin can cause fatigue.   The Hematologist did not seem to feel very strongly about pushing for a bone marrow biopsy.     One type of protein in your blood was slightly high. This is called an \"MGUS\" or \"monclonal gammopathy of uncertain significance. The hematologist was not worried about this.     Stay on your current prednisone dose recommended by your rheumatologist,  And your metoprolol, diltiazem and Xarelto for atrial fibrillation.     Stay on Requip for restless legs.     Have your pharmacy call our office when refills are needed.   See me about once a year, or sooner if things come up.           Follow-ups after your visit        Your next 10 appointments already scheduled     Nov 14, 2017  9:00 AM CST   Return Visit with Go Hidalgo MD   [facility] ([facility])    [address] [place] [address] [phone]           Please inform patient of the clinic address: [facility] [place] [address] [address], [address] [address]              Who to contact     If you have questions or need follow up information about today's clinic visit or your " schedule please contact [facility] directly at [phone].  Normal or non-critical lab and imaging results will be communicated to you by MyChart, letter or phone within 4 business days after the clinic has received the results. If you do not hear from us within 7 days, please contact the clinic through MyChart or phone. If you have a critical or abnormal lab result, we will notify you by phone as soon as possible.  Submit refill requests through [employer] or call your pharmacy and they will forward the refill request to us. Please allow 3 business days for your refill to be completed.          Additional Information About Your Visit        [employer] Information     [employer] gives you secure access to your electronic health record. If you see a primary care provider, you can also send messages to your care team and make appointments. If you have questions, please call your primary care clinic.  If you do not have a primary care provider, please call [phone] and they will assist you.        Care EveryWhere ID     This is your Care EveryWhere ID. This could be used by other organizations to access your [place] medical records  [id number]        Your Vitals Were     Pulse Temperature Height Pulse Oximetry Breastfeeding? BMI (Body Mass Index)    86 98  F (36.7  C) (Oral) 5' (1.524 m) 98% No 16.66 kg/m2       Blood Pressure from Last 3 Encounters:   08/30/17 146/58   07/18/17 154/57   07/06/17 124/59    Weight from Last 3 Encounters:   08/30/17 85 lb 4.8 oz (38.7 kg)   07/18/17 83 lb 9.6 oz (37.9 kg)   07/06/17 84 lb 3.2 oz (38.2 kg)              Today, you had the following     No orders found for display       Primary Care Provider Office Phone # Fax #    Dez Urban [phone]       [address]  [facility] [address]        Equal Access to Services     ABBE ESPINOSA AH: Carol loyd Soshawn, waaxda luqadaha, qaybta karinku canas  mandairmabrannon perezInduaamalinda ah. So [facility] [phone].    ATENCIÓN: Si [facility], tiene a dominguez disposición servicios gratuitos de asistencia lingüística. Fabian caldwell [phone].    We comply with applicable federal civil rights laws and [place] laws. We do not discriminate on the basis of race, color, national origin, age, disability sex, sexual orientation or gender identity.            Thank you!     Thank you for choosing [facility]  for your care. Our goal is always to provide you with excellent care. Hearing back from our patients is one way we can continue to improve our services. Please take a few minutes to complete the written survey that you may receive in the mail after your visit with us. Thank you!             Your Updated Medication List - Protect others around you: Learn how to safely use, store and throw away your medicines at www.disposemymeds.org.          This list is accurate as of: 8/30/17  9:17 AM.  Always use your most recent med list.                   Brand Name Dispense Instructions for use Diagnosis    ascorbic acid 500 MG tablet    VITAMIN C    100    1 TABLET DAILY        bumetanide 0.5 MG tablet    BUMEX    90 tablet    TAKE 2 TABLETS BY MOUTH EVERY MORNING AND 1 TABLET EVERY EVENING    Edema, unspecified type       CALCIUM 600 + D 600-200 MG-UNIT Tabs      2 qd        diltiazem 120 MG Cp12 12 hr SR capsule    CARDIZEM SR    180 capsule    Take 120 mg by mouth 2 times daily    Chronic atrial fibrillation (H)       metoprolol 50 MG 24 hr tablet    TOPROL-XL    135 tablet    TAKE 1.5 TABLETS (75 MG) BY MOUTH DAILY    Essential hypertension       MULTIVITAMIN TABS   OR      1 qd        olopatadine 0.1 % ophthalmic solution    PATANOL     Place 1 drop into both eyes 2 times daily as needed for allergies During Spring        * predniSONE 1 MG tablet    DELTASONE    120 tablet    From 5 mg twice daily, taper by 1 mg monthly    PMR (polymyalgia rheumatica) (H)       * predniSONE 5 MG  tablet    DELTASONE    90 tablet    10 mg in the morning, 5 mg evening--call if not better in 1 week; otherwise, take for 2 weeks; then 7.5 and 5 mg x2wks; 5 mg twice daily for 1 month    Polymyalgia rheumatica (H)       rivaroxaban ANTICOAGULANT 20 MG Tabs tablet    XARELTO    30 tablet    Take 1 tablet (20 mg) by mouth daily (with dinner)    Chronic atrial fibrillation (H)       rOPINIRole 0.5 MG tablet    REQUIP    30 tablet    Take 1 tablet (0.5 mg) by mouth At Bedtime    RLS (restless legs syndrome)       TYLENOL PO      Take by mouth every 4 hours as needed for mild pain or fever        vitamin E 400 UNIT capsule     100    ONE CAP PO QD        * Notice:  This list has 2 medication(s) that are the same as other medications prescribed for you. Read the directions carefully, and ask your doctor or other care provider to review them with you.

## 2017-08-30 NOTE — PROGRESS NOTES
SUBJECTIVE:   Ana Cristina Dominguez is a 86 year old female who presents to clinic today for numerous chronic health conditions.     New Patient/Transfer of Care    MGUS, anemia  Reviewed past and recent labs per Hematology.   Discussed in lay terms diagnoses of MGUS and anemia. Results show that hemoglobin has slowly drifted down over the past several years. Iron and vitamin B12 levels are within normal limits. She was offered a bone marrow biopsy be Hematology but declined this. Patient states that she feels well and the anemia does not affect her. Patient denies fatigue.     Chronic diastolic congestive heart failure, atrial fibrillation  Patient had lower extremity edema for quite some time in the past. This improved with use of a diuretic. She has seen cardiology in recent months and reports being told that she now only has to follow with Cardiology if needed. She tolerates current medications without reporting adverse effects.     Polymyalgia rheumatica:  She has been following with Dr Hidalgo of rheumatology who is trying to very slowly taper her prednisone. No recent problems with muscle pains per patient. She has chronically worsening vision, related to macular degeneration.     Macular degeneration  Patient states that she has macular degeneration and that it is advancing. She had cataract surgery in the past.     Restless legs syndrome  Patient states that symptoms are controlled well with Requip.    Problem list and histories reviewed & adjusted, as indicated.  Additional history: as documented    Reviewed and updated as needed this visit by clinical staff  Tobacco  Allergies  Meds  Med Hx  Surg Hx  Fam Hx  Soc Hx      Reviewed and updated as needed this visit by Provider       ROS:  REVIEW OF SYSTEMS: The following systems have been completely reviewed and are negative except as noted in the HPI:   Constitutional, HEENT, respiratory, cardiovascular, gastrointestinal, genitourinary,  musculoskeletal, dermatologic, hematologic, endocrine, psychiatric, and neurologic systems.    This document serves as a record of the services and decisions personally performed and made by Dez Urban MD. It was created on his behalf by Tricia Pineda, a trained medical scribe. The creation of this document is based on the provider's statements to the medical scribe.  Tricia Pineda August 30, 2017 8:54 AM     OBJECTIVE:     /58 (BP Location: Left arm, Patient Position: Sitting, Cuff Size: Adult Regular)  Pulse 86  Temp 98  F (36.7  C) (Oral)  Ht 1.524 m (5')  Wt 38.7 kg (85 lb 4.8 oz)  SpO2 98%  Breastfeeding? No  BMI 16.66 kg/m2  Body mass index is 16.66 kg/(m^2).    GENERAL: healthy, alert and no distress  RESP: lungs clear to auscultation - no rales, rhonchi or wheezes  CV: regular rate and rhythm, normal S1 S2, no S3 or S4, no murmur, click or rub, no peripheral edema and peripheral pulses strong  MS: no gross musculoskeletal defects noted, no edema  SKIN: no suspicious lesions or rashes  NEURO: Normal strength and tone, mentation intact and speech normal  PSYCH: mentation appears normal, affect normal/bright    ASSESSMENT/PLAN:   (D64.9) Normochromic normocytic anemia  (primary encounter diagnosis)  Comment: Stable. Continue current measures and continue following with specialists as needed. She is not interested in recommended.    (D47.2) MGUS (monoclonal gammopathy of unknown significance)  Comment: Stable. No follow up needed.     (M35.3) PMR (polymyalgia rheumatica) (H)  Comment: Stable. Continue current measures and continue following with Rheumatology as they recommend.     (I50.32) Chronic diastolic congestive heart failure (H)  Comment: Stable. Continue current measures and continue following with specialists as recommended.     (I48.2) Chronic atrial fibrillation (H)  Comment: Stable. Rate controlled. Continue current measures and following with specialists as recommended.     (I10)  "Essential hypertension  Comment: Patient's blood pressure was slightly above normal limits today. Continue current meds.     (G25.81) Restless leg syndrome  Continue Requip.     (H35.30) Macular degeneration  Comment: Patient states that the macular degeneration is advancing. Continue following with specialists as recommended.     FUTURE APPOINTMENTS:       - Follow-up visit in 1 year     Patient Instructions   You do have a slightly low Hemoglobin, or \"anemia\". Hemoglobin refers to red blood cells, which carry oxygen to your body. A low hemoglobin can cause fatigue.   The Hematologist did not seem to feel very strongly about pushing for a bone marrow biopsy.     One type of protein in your blood was slightly high. This is called an \"MGUS\" or \"monclonal gammopathy of uncertain significance. The hematologist was not worried about this.     Stay on your current prednisone dose recommended by your rheumatologist,  And your metoprolol, diltiazem and Xarelto for atrial fibrillation.     Stay on Requip for restless legs.     Have your pharmacy call our office when refills are needed.   See me about once a year, or sooner if things come up.     The information in this document, created by the medical scribe for me, accurately reflects the services I personally performed and the decisions made by me. I have reviewed and approved this document for accuracy prior to leaving the patient care area.  August 30, 2017 8:54 AM    Dez Urban MD  [facility]    "

## 2017-08-30 NOTE — NURSING NOTE
Chief Complaint   Patient presents with     Establish Care       Initial /58 (BP Location: Left arm, Patient Position: Sitting, Cuff Size: Adult Regular)  Pulse 86  Temp 98  F (36.7  C) (Oral)  Ht 5' (1.524 m)  Wt 85 lb 4.8 oz (38.7 kg)  SpO2 98%  Breastfeeding? No  BMI 16.66 kg/m2 Estimated body mass index is 16.66 kg/(m^2) as calculated from the following:    Height as of this encounter: 5' (1.524 m).    Weight as of this encounter: 85 lb 4.8 oz (38.7 kg).  Medication Reconciliation: complete   Karel MOORE

## 2017-08-30 NOTE — PATIENT INSTRUCTIONS
"You do have a slightly low Hemoglobin, or \"anemia\". Hemoglobin refers to red blood cells, which carry oxygen to your body. A low hemoglobin can cause fatigue.   The Hematologist did not seem to feel very strongly about pushing for a bone marrow biopsy.     One type of protein in your blood was slightly high. This is called an \"MGUS\" or \"monclonal gammopathy of uncertain significance. The hematologist was not worried about this.     Stay on your current prednisone dose recommended by your rheumatologist,  And your metoprolol, diltiazem and Xarelto for atrial fibrillation.     Stay on Requip for restless legs.     Have your pharmacy call our office when refills are needed.   See me about once a year, or sooner if things come up.   "

## 2017-10-09 DIAGNOSIS — G25.81 RLS (RESTLESS LEGS SYNDROME): ICD-10-CM

## 2017-10-09 RX ORDER — ROPINIROLE 0.5 MG/1
TABLET, FILM COATED ORAL
Qty: 30 TABLET | Refills: 5 | OUTPATIENT
Start: 2017-10-09

## 2017-10-09 NOTE — TELEPHONE ENCOUNTER
REQUIP     Last Written Prescription Date: 05/09/17  Last Fill Quantity: 30, # refills: 5  Last Office Visit with [facility], [facility] or [facility] prescribing provider: 08/30/17   Next 5 appointments (look out 90 days)     Nov 14, 2017  9:00 AM CST   Return Visit with Go Hidalgo MD   [facility] ([facility])    [address] [place] [address] [phone]                   BP Readings from Last 3 Encounters:   08/30/17 146/58   07/18/17 154/57   07/06/17 124/59

## 2017-10-21 DIAGNOSIS — I10 ESSENTIAL HYPERTENSION: ICD-10-CM

## 2017-10-24 RX ORDER — METOPROLOL SUCCINATE 50 MG/1
TABLET, EXTENDED RELEASE ORAL
Qty: 135 TABLET | Refills: 1 | Status: SHIPPED | OUTPATIENT
Start: 2017-10-24 | End: 2018-01-01

## 2017-11-06 DIAGNOSIS — G25.81 RLS (RESTLESS LEGS SYNDROME): ICD-10-CM

## 2017-11-07 RX ORDER — ROPINIROLE 0.5 MG/1
TABLET, FILM COATED ORAL
Qty: 30 TABLET | Refills: 5 | Status: SHIPPED | OUTPATIENT
Start: 2017-11-07 | End: 2018-01-01

## 2017-11-14 ENCOUNTER — OFFICE VISIT (OUTPATIENT)
Dept: RHEUMATOLOGY | Facility: CLINIC | Age: 82
End: 2017-11-14
Payer: COMMERCIAL

## 2017-11-14 VITALS
SYSTOLIC BLOOD PRESSURE: 134 MMHG | WEIGHT: 91.6 LBS | BODY MASS INDEX: 17.89 KG/M2 | OXYGEN SATURATION: 95 % | HEART RATE: 102 BPM | DIASTOLIC BLOOD PRESSURE: 80 MMHG

## 2017-11-14 DIAGNOSIS — M35.3 PMR (POLYMYALGIA RHEUMATICA) (H): Primary | ICD-10-CM

## 2017-11-14 PROCEDURE — 99214 OFFICE O/P EST MOD 30 MIN: CPT | Performed by: INTERNAL MEDICINE

## 2017-11-14 NOTE — MR AVS SNAPSHOT
After Visit Summary   11/14/2017    Ana Cristina Dominguez    MRN: [id number]           Patient Information     Date Of Birth          6/6/1931        Visit Information        Provider Department      11/14/2017 9:00 AM Go Hidalgo MD [facility]        Today's Diagnoses     PMR (polymyalgia rheumatica) (H)    -  1       Follow-ups after your visit        Your next 10 appointments already scheduled     Dec 22, 2017  4:20 PM CST   SHORT with Dez Urban MD   [facility]    [address]  [facility] [id number] [phone]              Future tests that were ordered for you today     Open Future Orders        Priority Expected Expires Ordered    CRP inflammation Routine  11/14/2018 11/14/2017    Erythrocyte sedimentation rate auto Routine  11/14/2018 11/14/2017    CBC with platelets differential Routine  11/14/2018 11/14/2017            Who to contact     If you have questions or need follow up information about today's clinic visit or your schedule please contact [facility] directly at [phone].  Normal or non-critical lab and imaging results will be communicated to you by Mobile Mult[employer], letter or phone within 4 business days after the clinic has received the results. If you do not hear from us within 7 days, please contact the clinic through [employer] or phone. If you have a critical or abnormal lab result, we will notify you by phone as soon as possible.  Submit refill requests through [employer] or call your pharmacy and they will forward the refill request to us. Please allow 3 business days for your refill to be completed.          Additional Information About Your Visit        Mobile Multimedia[employer] Information     [employer] gives you secure access to your electronic health record. If you see a primary care provider, you can also send messages to your care team and make appointments. If you have questions,  please call your primary care clinic.  If you do not have a primary care provider, please call [phone] and they will assist you.        Care EveryWhere ID     This is your Care EveryWhere ID. This could be used by other organizations to access your [place] medical records  [id number]        Your Vitals Were     Pulse Pulse Oximetry BMI (Body Mass Index)             102 95% 17.89 kg/m2          Blood Pressure from Last 3 Encounters:   11/14/17 134/80   08/30/17 146/58   07/18/17 154/57    Weight from Last 3 Encounters:   11/14/17 41.5 kg (91 lb 9.6 oz)   08/30/17 38.7 kg (85 lb 4.8 oz)   07/18/17 37.9 kg (83 lb 9.6 oz)               Primary Care Provider Office Phone # Fax #    Dez Urban [phone]       [address]  [facility] [address]        Equal Access to Services     [facility]: Hadii aad ku hadasho Soomaali, waaxda luqadaha, qaybta kaalmada adeegyada, waxay idiin hayaan ramón burciaga . So [facility] [phone].    ATENCIÓN: Si habla español, tiene a dominguez disposición servicios gratuitos de asistencia lingüística. Llame al [phone].    We comply with applicable federal civil rights laws and [place] laws. We do not discriminate on the basis of race, color, national origin, age, disability, sex, sexual orientation, or gender identity.            Thank you!     Thank you for choosing [facility]  for your care. Our goal is always to provide you with excellent care. Hearing back from our patients is one way we can continue to improve our services. Please take a few minutes to complete the written survey that you may receive in the mail after your visit with us. Thank you!             Your Updated Medication List - Protect others around you: Learn how to safely use, store and throw away your medicines at www.disposemymeds.org.          This list is accurate as of: 11/14/17  9:34 AM.  Always use your most recent med list.                   Brand  Name Dispense Instructions for use Diagnosis    ascorbic acid 500 MG tablet    VITAMIN C    100    1 TABLET DAILY        bumetanide 0.5 MG tablet    BUMEX    90 tablet    TAKE 2 TABLETS BY MOUTH EVERY MORNING AND 1 TABLET EVERY EVENING    Edema, unspecified type       CALCIUM 600 + D 600-200 MG-UNIT Tabs      2 qd        diltiazem 120 MG Cp12 12 hr SR capsule    CARDIZEM SR    180 capsule    Take 120 mg by mouth 2 times daily    Chronic atrial fibrillation (H)       metoprolol 50 MG 24 hr tablet    TOPROL-XL    135 tablet    TAKE 1.5 TABLETS (75 MG) BY MOUTH DAILY    Essential hypertension       MULTIVITAMIN TABS   OR      1 qd        olopatadine 0.1 % ophthalmic solution    PATANOL     Place 1 drop into both eyes 2 times daily as needed for allergies During Spring        * predniSONE 1 MG tablet    DELTASONE    120 tablet    From 5 mg twice daily, taper by 1 mg monthly    PMR (polymyalgia rheumatica) (H)       * predniSONE 5 MG tablet    DELTASONE    90 tablet    10 mg in the morning, 5 mg evening--call if not better in 1 week; otherwise, take for 2 weeks; then 7.5 and 5 mg x2wks; 5 mg twice daily for 1 month    Polymyalgia rheumatica (H)       rivaroxaban ANTICOAGULANT 20 MG Tabs tablet    XARELTO    30 tablet    Take 1 tablet (20 mg) by mouth daily (with dinner)    Chronic atrial fibrillation (H)       rOPINIRole 0.5 MG tablet    REQUIP    30 tablet    TAKE 1 TABLET BY MOUTH AT BEDTIME    RLS (restless legs syndrome)       TYLENOL PO      Take by mouth every 4 hours as needed for mild pain or fever        vitamin E 400 UNIT capsule     100    ONE CAP PO QD        * Notice:  This list has 2 medication(s) that are the same as other medications prescribed for you. Read the directions carefully, and ask your doctor or other care provider to review them with you.

## 2017-11-14 NOTE — PROGRESS NOTES
HISTORY:  Feliz Hampton is seen back in followup for polymyalgia rheumatica.  She is quite upset to hear that I am leaving; discussed that Dr. Avelar would be a good alternative or Dr. Phillips who is at [facility].  She is down to 5 mg a day of prednisone and has had no return of her signs or symptoms of polymyalgia rheumatica.  She is not having any proximal myalgias, headaches, jaw claudication, and overall continues to feel well.  She again expresses her gratitude at the diagnosis finally being made and appropriate treatment being instituted. which has led to significant improvement in her life.  Her biggest ongoing issue is the stress related to her macular degeneration.      PHYSICAL EXAMINATION:   VITAL SIGNS:  Blood pressure 134/80, pulse 102, weight 91 pounds.   STRENGTH:  5/5 proximally and distally in the upper and lower extremities.     Temporal arteries are normal to pulsations, no carotid bruits.      IMPRESSIONS:  Polymyalgia rheumatica.      RECOMMENDATIONS:   1.  Continue to taper the prednisone by 1 mg per month as we previously discussed.  Did caution her of the signs and symptoms of polymyalgia rheumatica could return at any time; and if that does occur she will need to let me know.   2.  Recommend rechecking her sed rate and CRP to make certain they have normalized.   3.  She will arrange to follow up in the next 3-4 months with either Dr. Avelar or Dr. Phillips.         CECILY AUGUSTINE MD             D: 2017 12:37   T: 2017 13:03   MT: [id number]      Name:     FELIZ HAMPTON   MRN:      [id number]        Account:      [id number]   :      1931           Visit Date:   2017      Document: [id number]

## 2017-11-14 NOTE — NURSING NOTE
Chief Complaint   Patient presents with     RECHECK       Initial /80  Pulse 102  Wt 41.5 kg (91 lb 9.6 oz)  SpO2 95%  BMI 17.89 kg/m2 Estimated body mass index is 17.89 kg/(m^2) as calculated from the following:    Height as of 8/30/17: 1.524 m (5').    Weight as of this encounter: 41.5 kg (91 lb 9.6 oz).      Patient prefers to be contacted via at Home.   Okay to leave detailed message: Yes  Patient uses MyChart: Yes    Jazmyn LEWIS LPN

## 2017-11-21 DIAGNOSIS — I48.20 CHRONIC ATRIAL FIBRILLATION (H): ICD-10-CM

## 2017-11-24 RX ORDER — DILTIAZEM HYDROCHLORIDE 120 MG/1
CAPSULE, EXTENDED RELEASE ORAL
Qty: 180 CAPSULE | Refills: 2 | Status: SHIPPED | OUTPATIENT
Start: 2017-11-24 | End: 2018-01-01

## 2017-11-24 NOTE — TELEPHONE ENCOUNTER
Last OV: 08/30/17.    Prescription approved per [facility] Refill Protocol.

## 2018-01-01 ENCOUNTER — CARE COORDINATION (OUTPATIENT)
Dept: CARDIOLOGY | Facility: CLINIC | Age: 83
End: 2018-01-01

## 2018-01-01 ENCOUNTER — DOCUMENTATION ONLY (OUTPATIENT)
Dept: CARE COORDINATION | Facility: CLINIC | Age: 83
End: 2018-01-01

## 2018-01-01 ENCOUNTER — APPOINTMENT (OUTPATIENT)
Dept: GENERAL RADIOLOGY | Facility: CLINIC | Age: 83
DRG: 871 | End: 2018-01-01
Attending: INTERNAL MEDICINE
Payer: MEDICARE

## 2018-01-01 ENCOUNTER — OFFICE VISIT (OUTPATIENT)
Dept: RHEUMATOLOGY | Facility: CLINIC | Age: 83
End: 2018-01-01
Payer: COMMERCIAL

## 2018-01-01 ENCOUNTER — OFFICE VISIT (OUTPATIENT)
Dept: CARDIOLOGY | Facility: CLINIC | Age: 83
End: 2018-01-01
Payer: COMMERCIAL

## 2018-01-01 ENCOUNTER — MYC MEDICAL ADVICE (OUTPATIENT)
Dept: INTERNAL MEDICINE | Facility: CLINIC | Age: 83
End: 2018-01-01

## 2018-01-01 ENCOUNTER — DOCUMENTATION ONLY (OUTPATIENT)
Dept: CARDIOLOGY | Facility: CLINIC | Age: 83
End: 2018-01-01

## 2018-01-01 ENCOUNTER — TELEPHONE (OUTPATIENT)
Dept: CARDIOLOGY | Facility: CLINIC | Age: 83
End: 2018-01-01

## 2018-01-01 ENCOUNTER — OFFICE VISIT (OUTPATIENT)
Dept: INTERNAL MEDICINE | Facility: CLINIC | Age: 83
End: 2018-01-01
Payer: COMMERCIAL

## 2018-01-01 ENCOUNTER — APPOINTMENT (OUTPATIENT)
Dept: GENERAL RADIOLOGY | Facility: CLINIC | Age: 83
DRG: 871 | End: 2018-01-01
Attending: EMERGENCY MEDICINE
Payer: MEDICARE

## 2018-01-01 ENCOUNTER — PATIENT OUTREACH (OUTPATIENT)
Dept: CARE COORDINATION | Facility: CLINIC | Age: 83
End: 2018-01-01

## 2018-01-01 ENCOUNTER — TELEPHONE (OUTPATIENT)
Dept: INTERNAL MEDICINE | Facility: CLINIC | Age: 83
End: 2018-01-01

## 2018-01-01 ENCOUNTER — HOSPITAL ENCOUNTER (OUTPATIENT)
Dept: GENERAL RADIOLOGY | Facility: CLINIC | Age: 83
Discharge: HOME | End: 2018-11-13
Attending: INTERNAL MEDICINE | Admitting: INTERNAL MEDICINE
Payer: MEDICARE

## 2018-01-01 ENCOUNTER — APPOINTMENT (OUTPATIENT)
Dept: GENERAL RADIOLOGY | Facility: CLINIC | Age: 83
DRG: 308 | End: 2018-01-01
Attending: EMERGENCY MEDICINE
Payer: MEDICARE

## 2018-01-01 ENCOUNTER — HOSPITAL ENCOUNTER (OUTPATIENT)
Dept: NUCLEAR MEDICINE | Facility: CLINIC | Age: 83
Setting detail: NUCLEAR MEDICINE
Discharge: HOME | End: 2018-05-14
Attending: INTERNAL MEDICINE | Admitting: INTERNAL MEDICINE
Payer: MEDICARE

## 2018-01-01 ENCOUNTER — HOSPITAL ENCOUNTER (OUTPATIENT)
Dept: CARDIOLOGY | Facility: CLINIC | Age: 83
Discharge: HOME | End: 2018-03-21
Attending: INTERNAL MEDICINE | Admitting: INTERNAL MEDICINE
Payer: MEDICARE

## 2018-01-01 ENCOUNTER — APPOINTMENT (OUTPATIENT)
Dept: CARDIOLOGY | Facility: CLINIC | Age: 83
End: 2018-01-01
Attending: INTERNAL MEDICINE
Payer: MEDICARE

## 2018-01-01 ENCOUNTER — OFFICE VISIT (OUTPATIENT)
Dept: CARDIOLOGY | Facility: CLINIC | Age: 83
End: 2018-01-01
Attending: INTERNAL MEDICINE
Payer: COMMERCIAL

## 2018-01-01 ENCOUNTER — HOSPITAL ENCOUNTER (OUTPATIENT)
Dept: CT IMAGING | Facility: CLINIC | Age: 83
Discharge: HOME | End: 2018-03-14
Attending: INTERNAL MEDICINE | Admitting: INTERNAL MEDICINE
Payer: MEDICARE

## 2018-01-01 ENCOUNTER — APPOINTMENT (OUTPATIENT)
Dept: CT IMAGING | Facility: CLINIC | Age: 83
DRG: 871 | End: 2018-01-01
Attending: EMERGENCY MEDICINE
Payer: MEDICARE

## 2018-01-01 ENCOUNTER — HOSPITAL ENCOUNTER (INPATIENT)
Facility: CLINIC | Age: 83
LOS: 1 days | Discharge: HOME | DRG: 308 | End: 2018-06-05
Attending: EMERGENCY MEDICINE | Admitting: INTERNAL MEDICINE
Payer: MEDICARE

## 2018-01-01 ENCOUNTER — APPOINTMENT (OUTPATIENT)
Dept: GENERAL RADIOLOGY | Facility: CLINIC | Age: 83
End: 2018-01-01
Attending: EMERGENCY MEDICINE
Payer: MEDICARE

## 2018-01-01 ENCOUNTER — HOSPITAL ENCOUNTER (OUTPATIENT)
Facility: CLINIC | Age: 83
Discharge: HOME | End: 2018-07-17
Attending: INTERNAL MEDICINE | Admitting: INTERNAL MEDICINE
Payer: MEDICARE

## 2018-01-01 ENCOUNTER — APPOINTMENT (OUTPATIENT)
Dept: CT IMAGING | Facility: CLINIC | Age: 83
DRG: 308 | End: 2018-01-01
Attending: EMERGENCY MEDICINE
Payer: MEDICARE

## 2018-01-01 ENCOUNTER — HOSPITAL ENCOUNTER (EMERGENCY)
Facility: CLINIC | Age: 83
Discharge: HOME | End: 2018-11-04
Attending: EMERGENCY MEDICINE | Admitting: EMERGENCY MEDICINE
Payer: MEDICARE

## 2018-01-01 ENCOUNTER — APPOINTMENT (OUTPATIENT)
Dept: CT IMAGING | Facility: CLINIC | Age: 83
DRG: 871 | End: 2018-01-01
Attending: PODIATRIST
Payer: MEDICARE

## 2018-01-01 ENCOUNTER — HOSPITAL ENCOUNTER (OUTPATIENT)
Dept: CARDIOLOGY | Facility: CLINIC | Age: 83
Discharge: HOME | End: 2018-05-14
Attending: INTERNAL MEDICINE | Admitting: INTERNAL MEDICINE
Payer: MEDICARE

## 2018-01-01 ENCOUNTER — MEDICAL CORRESPONDENCE (OUTPATIENT)
Dept: HEALTH INFORMATION MANAGEMENT | Facility: CLINIC | Age: 83
End: 2018-01-01

## 2018-01-01 ENCOUNTER — ALLIED HEALTH/NURSE VISIT (OUTPATIENT)
Dept: CARDIOLOGY | Facility: CLINIC | Age: 83
End: 2018-01-01
Payer: COMMERCIAL

## 2018-01-01 ENCOUNTER — APPOINTMENT (OUTPATIENT)
Dept: ULTRASOUND IMAGING | Facility: CLINIC | Age: 83
DRG: 871 | End: 2018-01-01
Attending: PODIATRIST
Payer: MEDICARE

## 2018-01-01 ENCOUNTER — OFFICE VISIT (OUTPATIENT)
Dept: FAMILY MEDICINE | Facility: CLINIC | Age: 83
End: 2018-01-01
Payer: COMMERCIAL

## 2018-01-01 ENCOUNTER — HOSPITAL ENCOUNTER (OUTPATIENT)
Dept: CARDIOLOGY | Facility: CLINIC | Age: 83
Discharge: HOME | End: 2018-11-02
Attending: PHYSICIAN ASSISTANT | Admitting: PHYSICIAN ASSISTANT
Payer: MEDICARE

## 2018-01-01 ENCOUNTER — APPOINTMENT (OUTPATIENT)
Dept: GENERAL RADIOLOGY | Facility: CLINIC | Age: 83
End: 2018-01-01
Attending: INTERNAL MEDICINE
Payer: MEDICARE

## 2018-01-01 ENCOUNTER — APPOINTMENT (OUTPATIENT)
Dept: GENERAL RADIOLOGY | Facility: CLINIC | Age: 83
DRG: 308 | End: 2018-01-01
Attending: INTERNAL MEDICINE
Payer: MEDICARE

## 2018-01-01 ENCOUNTER — DOCUMENTATION ONLY (OUTPATIENT)
Dept: OTHER | Facility: CLINIC | Age: 83
End: 2018-01-01

## 2018-01-01 ENCOUNTER — HOSPITAL ENCOUNTER (OUTPATIENT)
Dept: ULTRASOUND IMAGING | Facility: CLINIC | Age: 83
End: 2018-05-14
Attending: INTERNAL MEDICINE
Payer: MEDICARE

## 2018-01-01 ENCOUNTER — HOSPITAL ENCOUNTER (INPATIENT)
Facility: CLINIC | Age: 83
LOS: 5 days | Discharge: HOME HEALTH | DRG: 308 | End: 2018-06-26
Attending: EMERGENCY MEDICINE | Admitting: INTERNAL MEDICINE
Payer: MEDICARE

## 2018-01-01 ENCOUNTER — HOSPITAL ENCOUNTER (EMERGENCY)
Facility: CLINIC | Age: 83
Discharge: HOME | End: 2018-05-18
Attending: EMERGENCY MEDICINE | Admitting: EMERGENCY MEDICINE
Payer: MEDICARE

## 2018-01-01 ENCOUNTER — TRANSFERRED RECORDS (OUTPATIENT)
Dept: HEALTH INFORMATION MANAGEMENT | Facility: CLINIC | Age: 83
End: 2018-01-01

## 2018-01-01 ENCOUNTER — OFFICE VISIT (OUTPATIENT)
Dept: CARDIOLOGY | Facility: CLINIC | Age: 83
End: 2018-01-01
Attending: NURSE PRACTITIONER
Payer: COMMERCIAL

## 2018-01-01 ENCOUNTER — RADIANT APPOINTMENT (OUTPATIENT)
Dept: GENERAL RADIOLOGY | Facility: CLINIC | Age: 83
End: 2018-01-01
Attending: INTERNAL MEDICINE
Payer: COMMERCIAL

## 2018-01-01 ENCOUNTER — HOSPITAL ENCOUNTER (EMERGENCY)
Facility: CLINIC | Age: 83
Discharge: HOME | End: 2018-09-01
Attending: EMERGENCY MEDICINE | Admitting: EMERGENCY MEDICINE
Payer: MEDICARE

## 2018-01-01 ENCOUNTER — TELEPHONE (OUTPATIENT)
Dept: PEDIATRICS | Facility: CLINIC | Age: 83
End: 2018-01-01

## 2018-01-01 ENCOUNTER — HOSPITAL ENCOUNTER (INPATIENT)
Facility: CLINIC | Age: 83
LOS: 7 days | Discharge: HOSPICE | DRG: 871 | End: 2018-11-28
Attending: EMERGENCY MEDICINE | Admitting: INTERNAL MEDICINE
Payer: MEDICARE

## 2018-01-01 VITALS
HEIGHT: 60 IN | HEART RATE: 62 BPM | SYSTOLIC BLOOD PRESSURE: 108 MMHG | BODY MASS INDEX: 15.55 KG/M2 | WEIGHT: 79.2 LBS | DIASTOLIC BLOOD PRESSURE: 52 MMHG | OXYGEN SATURATION: 96 %

## 2018-01-01 VITALS
WEIGHT: 80.3 LBS | SYSTOLIC BLOOD PRESSURE: 132 MMHG | RESPIRATION RATE: 18 BRPM | BODY MASS INDEX: 15.76 KG/M2 | DIASTOLIC BLOOD PRESSURE: 59 MMHG | HEIGHT: 60 IN | HEART RATE: 84 BPM | OXYGEN SATURATION: 95 % | TEMPERATURE: 97.5 F

## 2018-01-01 VITALS
HEIGHT: 60 IN | WEIGHT: 86.6 LBS | OXYGEN SATURATION: 93 % | BODY MASS INDEX: 17 KG/M2 | DIASTOLIC BLOOD PRESSURE: 63 MMHG | HEART RATE: 79 BPM | TEMPERATURE: 98.1 F | RESPIRATION RATE: 28 BRPM | SYSTOLIC BLOOD PRESSURE: 151 MMHG

## 2018-01-01 VITALS
HEART RATE: 72 BPM | SYSTOLIC BLOOD PRESSURE: 138 MMHG | WEIGHT: 91 LBS | OXYGEN SATURATION: 93 % | DIASTOLIC BLOOD PRESSURE: 58 MMHG | HEIGHT: 60 IN | BODY MASS INDEX: 17.87 KG/M2

## 2018-01-01 VITALS
HEIGHT: 60 IN | DIASTOLIC BLOOD PRESSURE: 72 MMHG | HEART RATE: 80 BPM | BODY MASS INDEX: 15.57 KG/M2 | WEIGHT: 79.3 LBS | SYSTOLIC BLOOD PRESSURE: 120 MMHG

## 2018-01-01 VITALS
DIASTOLIC BLOOD PRESSURE: 60 MMHG | HEART RATE: 76 BPM | BODY MASS INDEX: 17.6 KG/M2 | OXYGEN SATURATION: 98 % | WEIGHT: 90.1 LBS | SYSTOLIC BLOOD PRESSURE: 114 MMHG

## 2018-01-01 VITALS
TEMPERATURE: 97.9 F | HEIGHT: 60 IN | HEART RATE: 70 BPM | BODY MASS INDEX: 18.2 KG/M2 | DIASTOLIC BLOOD PRESSURE: 80 MMHG | OXYGEN SATURATION: 97 % | SYSTOLIC BLOOD PRESSURE: 130 MMHG | WEIGHT: 92.7 LBS

## 2018-01-01 VITALS
RESPIRATION RATE: 20 BRPM | TEMPERATURE: 97.4 F | DIASTOLIC BLOOD PRESSURE: 63 MMHG | OXYGEN SATURATION: 95 % | SYSTOLIC BLOOD PRESSURE: 152 MMHG

## 2018-01-01 VITALS
OXYGEN SATURATION: 93 % | HEIGHT: 60 IN | WEIGHT: 89.8 LBS | RESPIRATION RATE: 16 BRPM | DIASTOLIC BLOOD PRESSURE: 70 MMHG | HEART RATE: 98 BPM | SYSTOLIC BLOOD PRESSURE: 110 MMHG | BODY MASS INDEX: 17.63 KG/M2 | TEMPERATURE: 98 F

## 2018-01-01 VITALS
BODY MASS INDEX: 15.9 KG/M2 | DIASTOLIC BLOOD PRESSURE: 74 MMHG | SYSTOLIC BLOOD PRESSURE: 126 MMHG | HEART RATE: 71 BPM | WEIGHT: 81 LBS | HEIGHT: 60 IN

## 2018-01-01 VITALS
DIASTOLIC BLOOD PRESSURE: 88 MMHG | HEART RATE: 98 BPM | RESPIRATION RATE: 20 BRPM | BODY MASS INDEX: 17.97 KG/M2 | WEIGHT: 92 LBS | OXYGEN SATURATION: 99 % | SYSTOLIC BLOOD PRESSURE: 130 MMHG | TEMPERATURE: 97.3 F

## 2018-01-01 VITALS
HEART RATE: 90 BPM | DIASTOLIC BLOOD PRESSURE: 71 MMHG | TEMPERATURE: 99.5 F | OXYGEN SATURATION: 96 % | RESPIRATION RATE: 18 BRPM | HEIGHT: 60 IN | SYSTOLIC BLOOD PRESSURE: 117 MMHG | BODY MASS INDEX: 17.4 KG/M2 | WEIGHT: 88.6 LBS

## 2018-01-01 VITALS
HEIGHT: 60 IN | DIASTOLIC BLOOD PRESSURE: 72 MMHG | HEART RATE: 98 BPM | SYSTOLIC BLOOD PRESSURE: 112 MMHG | OXYGEN SATURATION: 98 % | TEMPERATURE: 97.7 F | BODY MASS INDEX: 17.67 KG/M2 | WEIGHT: 90 LBS

## 2018-01-01 VITALS
DIASTOLIC BLOOD PRESSURE: 68 MMHG | SYSTOLIC BLOOD PRESSURE: 120 MMHG | RESPIRATION RATE: 30 BRPM | HEART RATE: 98 BPM | TEMPERATURE: 97.9 F | HEIGHT: 60 IN | BODY MASS INDEX: 15.82 KG/M2 | OXYGEN SATURATION: 95 % | WEIGHT: 80.6 LBS

## 2018-01-01 VITALS
BODY MASS INDEX: 19.44 KG/M2 | SYSTOLIC BLOOD PRESSURE: 118 MMHG | OXYGEN SATURATION: 97 % | WEIGHT: 99 LBS | HEART RATE: 97 BPM | TEMPERATURE: 97.6 F | DIASTOLIC BLOOD PRESSURE: 62 MMHG | HEIGHT: 60 IN

## 2018-01-01 VITALS
SYSTOLIC BLOOD PRESSURE: 90 MMHG | WEIGHT: 96 LBS | HEIGHT: 60 IN | OXYGEN SATURATION: 96 % | HEART RATE: 71 BPM | BODY MASS INDEX: 18.85 KG/M2 | DIASTOLIC BLOOD PRESSURE: 60 MMHG

## 2018-01-01 VITALS
TEMPERATURE: 98.6 F | SYSTOLIC BLOOD PRESSURE: 160 MMHG | DIASTOLIC BLOOD PRESSURE: 76 MMHG | OXYGEN SATURATION: 95 % | BODY MASS INDEX: 15.62 KG/M2 | HEART RATE: 70 BPM | WEIGHT: 80 LBS | RESPIRATION RATE: 23 BRPM

## 2018-01-01 VITALS
HEIGHT: 60 IN | DIASTOLIC BLOOD PRESSURE: 60 MMHG | SYSTOLIC BLOOD PRESSURE: 122 MMHG | OXYGEN SATURATION: 94 % | WEIGHT: 93 LBS | HEART RATE: 92 BPM | TEMPERATURE: 97.7 F | BODY MASS INDEX: 18.26 KG/M2

## 2018-01-01 VITALS
DIASTOLIC BLOOD PRESSURE: 66 MMHG | BODY MASS INDEX: 17.11 KG/M2 | WEIGHT: 87.6 LBS | HEART RATE: 104 BPM | OXYGEN SATURATION: 98 % | SYSTOLIC BLOOD PRESSURE: 122 MMHG

## 2018-01-01 VITALS
HEIGHT: 60 IN | SYSTOLIC BLOOD PRESSURE: 122 MMHG | WEIGHT: 94 LBS | BODY MASS INDEX: 18.46 KG/M2 | OXYGEN SATURATION: 96 % | TEMPERATURE: 98.4 F | DIASTOLIC BLOOD PRESSURE: 66 MMHG | HEART RATE: 94 BPM

## 2018-01-01 VITALS
SYSTOLIC BLOOD PRESSURE: 158 MMHG | HEIGHT: 60 IN | WEIGHT: 93.6 LBS | BODY MASS INDEX: 18.38 KG/M2 | DIASTOLIC BLOOD PRESSURE: 78 MMHG | HEART RATE: 70 BPM | OXYGEN SATURATION: 97 %

## 2018-01-01 VITALS
HEART RATE: 71 BPM | RESPIRATION RATE: 20 BRPM | WEIGHT: 100.31 LBS | SYSTOLIC BLOOD PRESSURE: 132 MMHG | BODY MASS INDEX: 19.59 KG/M2 | TEMPERATURE: 97 F | DIASTOLIC BLOOD PRESSURE: 86 MMHG | OXYGEN SATURATION: 94 %

## 2018-01-01 VITALS
HEART RATE: 88 BPM | DIASTOLIC BLOOD PRESSURE: 76 MMHG | BODY MASS INDEX: 18.98 KG/M2 | WEIGHT: 96.7 LBS | SYSTOLIC BLOOD PRESSURE: 124 MMHG | HEIGHT: 60 IN

## 2018-01-01 VITALS
SYSTOLIC BLOOD PRESSURE: 128 MMHG | OXYGEN SATURATION: 97 % | WEIGHT: 96 LBS | TEMPERATURE: 97.5 F | HEART RATE: 75 BPM | DIASTOLIC BLOOD PRESSURE: 69 MMHG | BODY MASS INDEX: 18.75 KG/M2

## 2018-01-01 DIAGNOSIS — I42.9 CARDIOMYOPATHY, UNSPECIFIED TYPE (H): ICD-10-CM

## 2018-01-01 DIAGNOSIS — K43.9 VENTRAL HERNIA WITHOUT OBSTRUCTION OR GANGRENE: ICD-10-CM

## 2018-01-01 DIAGNOSIS — M81.0 SENILE OSTEOPOROSIS: ICD-10-CM

## 2018-01-01 DIAGNOSIS — R07.89 CHEST WALL PAIN: ICD-10-CM

## 2018-01-01 DIAGNOSIS — M35.3 POLYMYALGIA RHEUMATICA (H): ICD-10-CM

## 2018-01-01 DIAGNOSIS — M35.3 PMR (POLYMYALGIA RHEUMATICA) (H): ICD-10-CM

## 2018-01-01 DIAGNOSIS — I48.20 CHRONIC ATRIAL FIBRILLATION (H): ICD-10-CM

## 2018-01-01 DIAGNOSIS — I50.32 CHRONIC DIASTOLIC CONGESTIVE HEART FAILURE (H): ICD-10-CM

## 2018-01-01 DIAGNOSIS — I10 ESSENTIAL HYPERTENSION: Primary | ICD-10-CM

## 2018-01-01 DIAGNOSIS — I10 ESSENTIAL HYPERTENSION: ICD-10-CM

## 2018-01-01 DIAGNOSIS — I50.43 ACUTE ON CHRONIC COMBINED SYSTOLIC AND DIASTOLIC HEART FAILURE (H): ICD-10-CM

## 2018-01-01 DIAGNOSIS — R60.9 EDEMA, UNSPECIFIED TYPE: ICD-10-CM

## 2018-01-01 DIAGNOSIS — I25.5 ISCHEMIC CARDIOMYOPATHY: ICD-10-CM

## 2018-01-01 DIAGNOSIS — I50.22 CHRONIC SYSTOLIC CONGESTIVE HEART FAILURE (H): Primary | ICD-10-CM

## 2018-01-01 DIAGNOSIS — I25.5 ISCHEMIC CARDIOMYOPATHY: Primary | ICD-10-CM

## 2018-01-01 DIAGNOSIS — I50.22 CHRONIC SYSTOLIC CONGESTIVE HEART FAILURE (H): ICD-10-CM

## 2018-01-01 DIAGNOSIS — R60.0 LOCALIZED EDEMA: Primary | ICD-10-CM

## 2018-01-01 DIAGNOSIS — Z95.0 CARDIAC PACEMAKER IN SITU: Primary | ICD-10-CM

## 2018-01-01 DIAGNOSIS — G25.81 RESTLESS LEG SYNDROME: ICD-10-CM

## 2018-01-01 DIAGNOSIS — L03.115 CELLULITIS OF RIGHT FOOT: ICD-10-CM

## 2018-01-01 DIAGNOSIS — L03.115 CELLULITIS OF RIGHT LOWER EXTREMITY: ICD-10-CM

## 2018-01-01 DIAGNOSIS — J90 PLEURAL EFFUSION: ICD-10-CM

## 2018-01-01 DIAGNOSIS — Z95.0 CARDIAC PACEMAKER IN SITU: ICD-10-CM

## 2018-01-01 DIAGNOSIS — I48.91 ATRIAL FIBRILLATION WITH RAPID VENTRICULAR RESPONSE (H): Primary | ICD-10-CM

## 2018-01-01 DIAGNOSIS — R10.84 GENERALIZED ABDOMINAL DISCOMFORT: ICD-10-CM

## 2018-01-01 DIAGNOSIS — I48.91 ATRIAL FIBRILLATION WITH RVR (H): Primary | ICD-10-CM

## 2018-01-01 DIAGNOSIS — G25.81 RLS (RESTLESS LEGS SYNDROME): ICD-10-CM

## 2018-01-01 DIAGNOSIS — T14.8XXA HEMATOMA OF SKIN: Primary | ICD-10-CM

## 2018-01-01 DIAGNOSIS — I50.23 ACUTE ON CHRONIC SYSTOLIC CONGESTIVE HEART FAILURE (H): ICD-10-CM

## 2018-01-01 DIAGNOSIS — Z53.9 DIAGNOSIS NOT YET DEFINED: Primary | ICD-10-CM

## 2018-01-01 DIAGNOSIS — E87.6 HYPOKALEMIA: Primary | ICD-10-CM

## 2018-01-01 DIAGNOSIS — R53.83 FATIGUE, UNSPECIFIED TYPE: ICD-10-CM

## 2018-01-01 DIAGNOSIS — R60.0 LOWER EXTREMITY EDEMA: ICD-10-CM

## 2018-01-01 DIAGNOSIS — I50.42 CHRONIC COMBINED SYSTOLIC AND DIASTOLIC HRT FAIL (H): ICD-10-CM

## 2018-01-01 DIAGNOSIS — I50.9 ACUTE CONGESTIVE HEART FAILURE, UNSPECIFIED CONGESTIVE HEART FAILURE TYPE: ICD-10-CM

## 2018-01-01 DIAGNOSIS — D64.9 ANEMIA, UNSPECIFIED TYPE: ICD-10-CM

## 2018-01-01 DIAGNOSIS — I48.91 ATRIAL FIBRILLATION WITH RVR (H): ICD-10-CM

## 2018-01-01 DIAGNOSIS — R06.02 SOB (SHORTNESS OF BREATH): ICD-10-CM

## 2018-01-01 DIAGNOSIS — Z78.9 IMPAIRED MOBILITY AND ADLS: ICD-10-CM

## 2018-01-01 DIAGNOSIS — I49.5 SSS (SICK SINUS SYNDROME) (H): ICD-10-CM

## 2018-01-01 DIAGNOSIS — R19.7 DIARRHEA, UNSPECIFIED TYPE: ICD-10-CM

## 2018-01-01 DIAGNOSIS — R06.02 SOB (SHORTNESS OF BREATH): Primary | ICD-10-CM

## 2018-01-01 DIAGNOSIS — E87.1 HYPONATREMIA: ICD-10-CM

## 2018-01-01 DIAGNOSIS — I35.1 AORTIC VALVE INSUFFICIENCY, ETIOLOGY OF CARDIAC VALVE DISEASE UNSPECIFIED: ICD-10-CM

## 2018-01-01 DIAGNOSIS — I50.9 CONGESTIVE HEART FAILURE, UNSPECIFIED CONGESTIVE HEART FAILURE CHRONICITY, UNSPECIFIED CONGESTIVE HEART FAILURE TYPE: ICD-10-CM

## 2018-01-01 DIAGNOSIS — S80.11XA CONTUSION OF RIGHT LEG, INITIAL ENCOUNTER: ICD-10-CM

## 2018-01-01 DIAGNOSIS — K46.9 ABDOMINAL HERNIA WITHOUT OBSTRUCTION AND WITHOUT GANGRENE, RECURRENCE NOT SPECIFIED, UNSPECIFIED HERNIA TYPE: Primary | ICD-10-CM

## 2018-01-01 DIAGNOSIS — R10.9 ABDOMINAL DISCOMFORT: Primary | ICD-10-CM

## 2018-01-01 DIAGNOSIS — Z74.09 IMPAIRED MOBILITY AND ADLS: ICD-10-CM

## 2018-01-01 DIAGNOSIS — I50.42: ICD-10-CM

## 2018-01-01 DIAGNOSIS — I50.32 CHRONIC DIASTOLIC CONGESTIVE HEART FAILURE (H): Primary | ICD-10-CM

## 2018-01-01 DIAGNOSIS — R60.0 LOCALIZED EDEMA: ICD-10-CM

## 2018-01-01 DIAGNOSIS — I38 VALVULAR HEART DISEASE: ICD-10-CM

## 2018-01-01 DIAGNOSIS — G47.00 INSOMNIA, UNSPECIFIED TYPE: Primary | ICD-10-CM

## 2018-01-01 DIAGNOSIS — I48.0 PAROXYSMAL ATRIAL FIBRILLATION (H): ICD-10-CM

## 2018-01-01 DIAGNOSIS — I50.9 ACUTE CONGESTIVE HEART FAILURE, UNSPECIFIED CONGESTIVE HEART FAILURE TYPE: Primary | ICD-10-CM

## 2018-01-01 DIAGNOSIS — R53.81 PHYSICAL DECONDITIONING: ICD-10-CM

## 2018-01-01 DIAGNOSIS — M35.3 PMR (POLYMYALGIA RHEUMATICA) (H): Primary | ICD-10-CM

## 2018-01-01 DIAGNOSIS — L02.619 CELLULITIS AND ABSCESS OF FOOT, EXCEPT TOES: ICD-10-CM

## 2018-01-01 DIAGNOSIS — N28.9 RENAL INSUFFICIENCY: ICD-10-CM

## 2018-01-01 DIAGNOSIS — M19.90 ARTHRITIS: Primary | ICD-10-CM

## 2018-01-01 DIAGNOSIS — I83.93 VARICOSE VEINS OF BOTH LOWER EXTREMITIES: ICD-10-CM

## 2018-01-01 DIAGNOSIS — L03.119 CELLULITIS AND ABSCESS OF FOOT, EXCEPT TOES: ICD-10-CM

## 2018-01-01 DIAGNOSIS — R58 ECCHYMOSIS: ICD-10-CM

## 2018-01-01 LAB
ACANTHOCYTES BLD QL SMEAR: ABNORMAL
ACANTHOCYTES BLD QL SMEAR: SLIGHT
ALBUMIN SERPL-MCNC: 2.4 G/DL (ref 3.4–5)
ALBUMIN SERPL-MCNC: 2.7 G/DL (ref 3.4–5)
ALBUMIN SERPL-MCNC: 2.9 G/DL (ref 3.4–5)
ALBUMIN SERPL-MCNC: 2.9 G/DL (ref 3.4–5)
ALBUMIN SERPL-MCNC: 3.1 G/DL (ref 3.4–5)
ALBUMIN SERPL-MCNC: 3.3 G/DL (ref 3.4–5)
ALBUMIN UR-MCNC: 100 MG/DL
ALBUMIN UR-MCNC: >499 MG/DL
ALBUMIN UR-MCNC: >=300 MG/DL
ALP SERPL-CCNC: 109 U/L (ref 40–150)
ALP SERPL-CCNC: 123 U/L (ref 40–150)
ALP SERPL-CCNC: 139 U/L (ref 40–150)
ALP SERPL-CCNC: 156 U/L (ref 40–150)
ALP SERPL-CCNC: 166 U/L (ref 40–150)
ALP SERPL-CCNC: 176 U/L (ref 40–150)
ALT SERPL W P-5'-P-CCNC: 21 U/L (ref 0–50)
ALT SERPL W P-5'-P-CCNC: 25 U/L (ref 0–50)
ALT SERPL W P-5'-P-CCNC: 33 U/L (ref 0–50)
ALT SERPL W P-5'-P-CCNC: 34 U/L (ref 0–50)
AMORPH CRY #/AREA URNS HPF: ABNORMAL /HPF
ANION GAP SERPL CALCULATED.3IONS-SCNC: 10 MMOL/L (ref 3–14)
ANION GAP SERPL CALCULATED.3IONS-SCNC: 10 MMOL/L (ref 6–17)
ANION GAP SERPL CALCULATED.3IONS-SCNC: 11 MMOL/L (ref 3–14)
ANION GAP SERPL CALCULATED.3IONS-SCNC: 11 MMOL/L (ref 3–14)
ANION GAP SERPL CALCULATED.3IONS-SCNC: 3 MMOL/L (ref 3–14)
ANION GAP SERPL CALCULATED.3IONS-SCNC: 4 MMOL/L (ref 3–14)
ANION GAP SERPL CALCULATED.3IONS-SCNC: 4 MMOL/L (ref 3–14)
ANION GAP SERPL CALCULATED.3IONS-SCNC: 5 MMOL/L (ref 3–14)
ANION GAP SERPL CALCULATED.3IONS-SCNC: 6 MMOL/L (ref 3–14)
ANION GAP SERPL CALCULATED.3IONS-SCNC: 7 MMOL/L (ref 3–14)
ANION GAP SERPL CALCULATED.3IONS-SCNC: 8 MMOL/L (ref 3–14)
ANION GAP SERPL CALCULATED.3IONS-SCNC: 9 MMOL/L (ref 3–14)
ANION GAP SERPL CALCULATED.3IONS-SCNC: 9 MMOL/L (ref 3–14)
ANISOCYTOSIS BLD QL SMEAR: ABNORMAL
ANISOCYTOSIS BLD QL SMEAR: ABNORMAL
APPEARANCE UR: ABNORMAL
APPEARANCE UR: CLEAR
APPEARANCE UR: CLEAR
AST SERPL W P-5'-P-CCNC: 20 U/L (ref 0–45)
AST SERPL W P-5'-P-CCNC: 28 U/L (ref 0–45)
AST SERPL W P-5'-P-CCNC: 28 U/L (ref 0–45)
AST SERPL W P-5'-P-CCNC: 29 U/L (ref 0–45)
AST SERPL W P-5'-P-CCNC: 37 U/L (ref 0–45)
AST SERPL W P-5'-P-CCNC: 59 U/L (ref 0–45)
BACTERIA #/AREA URNS HPF: ABNORMAL /HPF
BACTERIA #/AREA URNS HPF: ABNORMAL /HPF
BACTERIA SPEC CULT: NO GROWTH
BACTERIA SPEC CULT: NO GROWTH
BACTERIA SPEC CULT: NORMAL
BASOPHILS # BLD AUTO: 0 10E9/L (ref 0–0.2)
BASOPHILS NFR BLD AUTO: 0 %
BASOPHILS NFR BLD AUTO: 0.1 %
BASOPHILS NFR BLD AUTO: 0.4 %
BILIRUB SERPL-MCNC: 0.5 MG/DL (ref 0.2–1.3)
BILIRUB SERPL-MCNC: 0.6 MG/DL (ref 0.2–1.3)
BILIRUB SERPL-MCNC: 0.9 MG/DL (ref 0.2–1.3)
BILIRUB SERPL-MCNC: 0.9 MG/DL (ref 0.2–1.3)
BILIRUB SERPL-MCNC: 1 MG/DL (ref 0.2–1.3)
BILIRUB SERPL-MCNC: 1.3 MG/DL (ref 0.2–1.3)
BILIRUB UR QL STRIP: NEGATIVE
BUN SERPL-MCNC: 15 MG/DL (ref 7–30)
BUN SERPL-MCNC: 22 MG/DL (ref 7–30)
BUN SERPL-MCNC: 22 MG/DL (ref 7–30)
BUN SERPL-MCNC: 28 MG/DL (ref 7–30)
BUN SERPL-MCNC: 30 MG/DL (ref 7–30)
BUN SERPL-MCNC: 31 MG/DL (ref 7–30)
BUN SERPL-MCNC: 33 MG/DL (ref 7–30)
BUN SERPL-MCNC: 44 MG/DL (ref 7–30)
BUN SERPL-MCNC: 45 MG/DL (ref 7–30)
BUN SERPL-MCNC: 46 MG/DL (ref 7–30)
BUN SERPL-MCNC: 49 MG/DL (ref 7–30)
BUN SERPL-MCNC: 51 MG/DL (ref 7–30)
BUN SERPL-MCNC: 54 MG/DL (ref 7–30)
BUN SERPL-MCNC: 56 MG/DL (ref 7–30)
BUN SERPL-MCNC: 58 MG/DL (ref 7–30)
BUN SERPL-MCNC: 59 MG/DL (ref 7–30)
BUN SERPL-MCNC: 61 MG/DL (ref 7–30)
BUN SERPL-MCNC: 65 MG/DL (ref 7–30)
BUN SERPL-MCNC: 66 MG/DL (ref 7–30)
BUN SERPL-MCNC: 69 MG/DL (ref 7–30)
BUN SERPL-MCNC: 69 MG/DL (ref 7–30)
BUN SERPL-MCNC: 71 MG/DL (ref 7–30)
BUN SERPL-MCNC: 73 MG/DL (ref 7–30)
BUN SERPL-MCNC: 76 MG/DL (ref 7–30)
BUN SERPL-MCNC: 84 MG/DL (ref 7–30)
BURR CELLS BLD QL SMEAR: ABNORMAL
C COLI+JEJUNI+LARI FUSA STL QL NAA+PROBE: NOT DETECTED
CALCIUM SERPL-MCNC: 7.3 MG/DL (ref 8.5–10.1)
CALCIUM SERPL-MCNC: 7.4 MG/DL (ref 8.5–10.1)
CALCIUM SERPL-MCNC: 7.4 MG/DL (ref 8.5–10.1)
CALCIUM SERPL-MCNC: 7.6 MG/DL (ref 8.5–10.1)
CALCIUM SERPL-MCNC: 7.6 MG/DL (ref 8.5–10.1)
CALCIUM SERPL-MCNC: 8.1 MG/DL (ref 8.5–10.1)
CALCIUM SERPL-MCNC: 8.1 MG/DL (ref 8.5–10.1)
CALCIUM SERPL-MCNC: 8.3 MG/DL (ref 8.5–10.1)
CALCIUM SERPL-MCNC: 8.4 MG/DL (ref 8.5–10.1)
CALCIUM SERPL-MCNC: 8.5 MG/DL (ref 8.5–10.1)
CALCIUM SERPL-MCNC: 8.5 MG/DL (ref 8.5–10.1)
CALCIUM SERPL-MCNC: 8.6 MG/DL (ref 8.5–10.1)
CALCIUM SERPL-MCNC: 8.7 MG/DL (ref 8.5–10.1)
CALCIUM SERPL-MCNC: 8.7 MG/DL (ref 8.5–10.1)
CALCIUM SERPL-MCNC: 8.8 MG/DL (ref 8.5–10.1)
CALCIUM SERPL-MCNC: 8.9 MG/DL (ref 8.5–10.1)
CALCIUM SERPL-MCNC: 9 MG/DL (ref 8.5–10.1)
CALCIUM SERPL-MCNC: 9.3 MG/DL (ref 8.5–10.1)
CALCIUM SERPL-MCNC: 9.4 MG/DL (ref 8.5–10.5)
CALCIUM SERPL-MCNC: 9.5 MG/DL (ref 8.5–10.1)
CALCIUM SERPL-MCNC: 9.6 MG/DL (ref 8.5–10.1)
CHLORIDE SERPL-SCNC: 101 MMOL/L (ref 94–109)
CHLORIDE SERPL-SCNC: 102 MMOL/L (ref 94–109)
CHLORIDE SERPL-SCNC: 103 MMOL/L (ref 94–109)
CHLORIDE SERPL-SCNC: 104 MMOL/L (ref 94–109)
CHLORIDE SERPL-SCNC: 105 MMOL/L (ref 94–109)
CHLORIDE SERPL-SCNC: 105 MMOL/L (ref 94–109)
CHLORIDE SERPL-SCNC: 106 MMOL/L (ref 94–109)
CHLORIDE SERPL-SCNC: 107 MMOL/L (ref 94–109)
CHLORIDE SERPL-SCNC: 93 MMOL/L (ref 94–109)
CHLORIDE SERPL-SCNC: 94 MMOL/L (ref 94–109)
CHLORIDE SERPL-SCNC: 95 MMOL/L (ref 94–109)
CHLORIDE SERPL-SCNC: 95 MMOL/L (ref 98–107)
CHLORIDE SERPL-SCNC: 96 MMOL/L (ref 94–109)
CHLORIDE SERPL-SCNC: 96 MMOL/L (ref 94–109)
CHLORIDE SERPL-SCNC: 99 MMOL/L (ref 94–109)
CHLORIDE SERPL-SCNC: 99 MMOL/L (ref 94–109)
CK SERPL-CCNC: 138 U/L (ref 30–225)
CO2 SERPL-SCNC: 26 MMOL/L (ref 20–32)
CO2 SERPL-SCNC: 26 MMOL/L (ref 20–32)
CO2 SERPL-SCNC: 28 MMOL/L (ref 20–32)
CO2 SERPL-SCNC: 30 MMOL/L (ref 20–32)
CO2 SERPL-SCNC: 31 MMOL/L (ref 20–32)
CO2 SERPL-SCNC: 32 MMOL/L (ref 20–32)
CO2 SERPL-SCNC: 33 MMOL/L (ref 20–32)
CO2 SERPL-SCNC: 33 MMOL/L (ref 20–32)
CO2 SERPL-SCNC: 36 MMOL/L (ref 20–32)
CO2 SERPL-SCNC: 37 MMOL/L (ref 20–32)
CO2 SERPL-SCNC: 38 MMOL/L (ref 20–32)
CO2 SERPL-SCNC: 40 MMOL/L (ref 23–29)
COLOR UR AUTO: YELLOW
CREAT SERPL-MCNC: 0.78 MG/DL (ref 0.52–1.04)
CREAT SERPL-MCNC: 0.79 MG/DL (ref 0.52–1.04)
CREAT SERPL-MCNC: 0.81 MG/DL (ref 0.52–1.04)
CREAT SERPL-MCNC: 0.82 MG/DL (ref 0.52–1.04)
CREAT SERPL-MCNC: 0.9 MG/DL (ref 0.52–1.04)
CREAT SERPL-MCNC: 0.91 MG/DL (ref 0.52–1.04)
CREAT SERPL-MCNC: 0.94 MG/DL (ref 0.7–1.3)
CREAT SERPL-MCNC: 0.99 MG/DL (ref 0.52–1.04)
CREAT SERPL-MCNC: 0.99 MG/DL (ref 0.52–1.04)
CREAT SERPL-MCNC: 1 MG/DL (ref 0.52–1.04)
CREAT SERPL-MCNC: 1.08 MG/DL (ref 0.52–1.04)
CREAT SERPL-MCNC: 1.08 MG/DL (ref 0.52–1.04)
CREAT SERPL-MCNC: 1.09 MG/DL (ref 0.52–1.04)
CREAT SERPL-MCNC: 1.12 MG/DL (ref 0.52–1.04)
CREAT SERPL-MCNC: 1.18 MG/DL (ref 0.52–1.04)
CREAT SERPL-MCNC: 1.24 MG/DL (ref 0.52–1.04)
CREAT SERPL-MCNC: 1.33 MG/DL (ref 0.52–1.04)
CREAT SERPL-MCNC: 1.38 MG/DL (ref 0.52–1.04)
CREAT SERPL-MCNC: 1.47 MG/DL (ref 0.52–1.04)
CREAT SERPL-MCNC: 1.58 MG/DL (ref 0.52–1.04)
CREAT SERPL-MCNC: 1.64 MG/DL (ref 0.52–1.04)
CREAT SERPL-MCNC: 2.29 MG/DL (ref 0.52–1.04)
CREAT SERPL-MCNC: 2.32 MG/DL (ref 0.52–1.04)
CREAT SERPL-MCNC: 2.37 MG/DL (ref 0.52–1.04)
CREAT SERPL-MCNC: 2.52 MG/DL (ref 0.52–1.04)
CREAT SERPL-MCNC: 2.75 MG/DL (ref 0.52–1.04)
CREAT SERPL-MCNC: 3.01 MG/DL (ref 0.52–1.04)
CREAT SERPL-MCNC: 3.21 MG/DL (ref 0.52–1.04)
CRP SERPL HS-MCNC: 182 MG/L
CRP SERPL-MCNC: 14 MG/L (ref 0–8)
CRP SERPL-MCNC: 150 MG/L (ref 0–8)
CRP SERPL-MCNC: 220 MG/L (ref 0–8)
CRP SERPL-MCNC: 90 MG/L (ref 0–8)
D DIMER PPP FEU-MCNC: 2.3 UG/ML FEU (ref 0–0.5)
DACRYOCYTES BLD QL SMEAR: SLIGHT
DIFFERENTIAL METHOD BLD: ABNORMAL
EC STX1 GENE STL QL NAA+PROBE: NOT DETECTED
EC STX2 GENE STL QL NAA+PROBE: NOT DETECTED
ENTERIC PATHOGEN COMMENT: NORMAL
EOSINOPHIL # BLD AUTO: 0 10E9/L (ref 0–0.7)
EOSINOPHIL NFR BLD AUTO: 0 %
EOSINOPHIL NFR BLD AUTO: 0.1 %
EOSINOPHIL NFR BLD AUTO: 0.1 %
ERYTHROCYTE [DISTWIDTH] IN BLOOD BY AUTOMATED COUNT: 13.9 % (ref 10–15)
ERYTHROCYTE [DISTWIDTH] IN BLOOD BY AUTOMATED COUNT: 16.5 % (ref 10–15)
ERYTHROCYTE [DISTWIDTH] IN BLOOD BY AUTOMATED COUNT: 16.5 % (ref 10–15)
ERYTHROCYTE [DISTWIDTH] IN BLOOD BY AUTOMATED COUNT: 16.6 % (ref 10–15)
ERYTHROCYTE [DISTWIDTH] IN BLOOD BY AUTOMATED COUNT: 16.7 % (ref 10–15)
ERYTHROCYTE [DISTWIDTH] IN BLOOD BY AUTOMATED COUNT: 17.2 % (ref 10–15)
ERYTHROCYTE [DISTWIDTH] IN BLOOD BY AUTOMATED COUNT: 17.5 % (ref 10–15)
ERYTHROCYTE [DISTWIDTH] IN BLOOD BY AUTOMATED COUNT: 20.2 % (ref 10–15)
ERYTHROCYTE [DISTWIDTH] IN BLOOD BY AUTOMATED COUNT: 20.3 % (ref 10–15)
ERYTHROCYTE [DISTWIDTH] IN BLOOD BY AUTOMATED COUNT: 20.8 % (ref 10–15)
ERYTHROCYTE [SEDIMENTATION RATE] IN BLOOD BY WESTERGREN METHOD: 16 MM/H (ref 0–30)
ERYTHROCYTE [SEDIMENTATION RATE] IN BLOOD BY WESTERGREN METHOD: 34 MM/H (ref 0–30)
ERYTHROCYTE [SEDIMENTATION RATE] IN BLOOD BY WESTERGREN METHOD: 46 MM/H (ref 0–30)
ERYTHROCYTE [SEDIMENTATION RATE] IN BLOOD BY WESTERGREN METHOD: 84 MM/H (ref 0–30)
G LAMBLIA AG STL QL IA: NORMAL
GFR SERPL CREATININE-BSD FRML MDRD: 14 ML/MIN/1.7M2
GFR SERPL CREATININE-BSD FRML MDRD: 15 ML/MIN/1.7M2
GFR SERPL CREATININE-BSD FRML MDRD: 16 ML/MIN/1.7M2
GFR SERPL CREATININE-BSD FRML MDRD: 18 ML/MIN/1.7M2
GFR SERPL CREATININE-BSD FRML MDRD: 19 ML/MIN/1.7M2
GFR SERPL CREATININE-BSD FRML MDRD: 20 ML/MIN/1.7M2
GFR SERPL CREATININE-BSD FRML MDRD: 20 ML/MIN/1.7M2
GFR SERPL CREATININE-BSD FRML MDRD: 30 ML/MIN/1.7M2
GFR SERPL CREATININE-BSD FRML MDRD: 31 ML/MIN/1.7M2
GFR SERPL CREATININE-BSD FRML MDRD: 34 ML/MIN/1.7M2
GFR SERPL CREATININE-BSD FRML MDRD: 36 ML/MIN/1.7M2
GFR SERPL CREATININE-BSD FRML MDRD: 38 ML/MIN/1.7M2
GFR SERPL CREATININE-BSD FRML MDRD: 41 ML/MIN/1.7M2
GFR SERPL CREATININE-BSD FRML MDRD: 43 ML/MIN/1.7M2
GFR SERPL CREATININE-BSD FRML MDRD: 46 ML/MIN/1.7M2
GFR SERPL CREATININE-BSD FRML MDRD: 47 ML/MIN/1.7M2
GFR SERPL CREATININE-BSD FRML MDRD: 48 ML/MIN/1.7M2
GFR SERPL CREATININE-BSD FRML MDRD: 48 ML/MIN/1.7M2
GFR SERPL CREATININE-BSD FRML MDRD: 52 ML/MIN/1.7M2
GFR SERPL CREATININE-BSD FRML MDRD: 53 ML/MIN/1.7M2
GFR SERPL CREATININE-BSD FRML MDRD: 53 ML/MIN/1.7M2
GFR SERPL CREATININE-BSD FRML MDRD: 56 ML/MIN/1.7M2
GFR SERPL CREATININE-BSD FRML MDRD: 58 ML/MIN/1.7M2
GFR SERPL CREATININE-BSD FRML MDRD: 59 ML/MIN/1.7M2
GFR SERPL CREATININE-BSD FRML MDRD: 66 ML/MIN/1.7M2
GFR SERPL CREATININE-BSD FRML MDRD: 67 ML/MIN/1.7M2
GFR SERPL CREATININE-BSD FRML MDRD: 69 ML/MIN/1.7M2
GFR SERPL CREATININE-BSD FRML MDRD: 69 ML/MIN/1.7M2
GLUCOSE BLDC GLUCOMTR-MCNC: 103 MG/DL (ref 70–99)
GLUCOSE BLDC GLUCOMTR-MCNC: 107 MG/DL (ref 70–99)
GLUCOSE BLDC GLUCOMTR-MCNC: 120 MG/DL (ref 70–99)
GLUCOSE BLDC GLUCOMTR-MCNC: 129 MG/DL (ref 70–99)
GLUCOSE BLDC GLUCOMTR-MCNC: 76 MG/DL (ref 70–99)
GLUCOSE SERPL-MCNC: 100 MG/DL (ref 70–99)
GLUCOSE SERPL-MCNC: 101 MG/DL (ref 70–99)
GLUCOSE SERPL-MCNC: 102 MG/DL (ref 70–99)
GLUCOSE SERPL-MCNC: 104 MG/DL (ref 70–99)
GLUCOSE SERPL-MCNC: 109 MG/DL (ref 70–99)
GLUCOSE SERPL-MCNC: 110 MG/DL (ref 70–99)
GLUCOSE SERPL-MCNC: 110 MG/DL (ref 70–99)
GLUCOSE SERPL-MCNC: 111 MG/DL (ref 70–99)
GLUCOSE SERPL-MCNC: 112 MG/DL (ref 70–99)
GLUCOSE SERPL-MCNC: 119 MG/DL (ref 70–99)
GLUCOSE SERPL-MCNC: 121 MG/DL (ref 70–99)
GLUCOSE SERPL-MCNC: 126 MG/DL (ref 70–99)
GLUCOSE SERPL-MCNC: 130 MG/DL (ref 70–99)
GLUCOSE SERPL-MCNC: 139 MG/DL (ref 70–99)
GLUCOSE SERPL-MCNC: 149 MG/DL (ref 70–99)
GLUCOSE SERPL-MCNC: 76 MG/DL (ref 70–99)
GLUCOSE SERPL-MCNC: 79 MG/DL (ref 70–99)
GLUCOSE SERPL-MCNC: 83 MG/DL (ref 70–99)
GLUCOSE SERPL-MCNC: 89 MG/DL (ref 70–99)
GLUCOSE SERPL-MCNC: 89 MG/DL (ref 70–99)
GLUCOSE SERPL-MCNC: 92 MG/DL (ref 70–105)
GLUCOSE SERPL-MCNC: 96 MG/DL (ref 70–99)
GLUCOSE SERPL-MCNC: 97 MG/DL (ref 70–99)
GLUCOSE SERPL-MCNC: 98 MG/DL (ref 70–99)
GLUCOSE SERPL-MCNC: 99 MG/DL (ref 70–99)
GLUCOSE UR STRIP-MCNC: NEGATIVE MG/DL
HCT VFR BLD AUTO: 31.9 % (ref 35–47)
HCT VFR BLD AUTO: 34.4 % (ref 35–47)
HCT VFR BLD AUTO: 35.1 % (ref 35–47)
HCT VFR BLD AUTO: 35.2 % (ref 35–47)
HCT VFR BLD AUTO: 36.5 % (ref 35–47)
HCT VFR BLD AUTO: 37 % (ref 35–47)
HCT VFR BLD AUTO: 38 % (ref 35–47)
HCT VFR BLD AUTO: 38.5 % (ref 35–47)
HCT VFR BLD AUTO: 38.8 % (ref 35–47)
HCT VFR BLD AUTO: 39.1 % (ref 35–47)
HCT VFR BLD AUTO: 39.5 % (ref 35–47)
HCT VFR BLD AUTO: 41.2 % (ref 35–47)
HGB BLD-MCNC: 10.3 G/DL (ref 11.7–15.7)
HGB BLD-MCNC: 10.8 G/DL (ref 11.7–15.7)
HGB BLD-MCNC: 10.9 G/DL (ref 11.7–15.7)
HGB BLD-MCNC: 11.1 G/DL (ref 11.7–15.7)
HGB BLD-MCNC: 11.2 G/DL (ref 11.7–15.7)
HGB BLD-MCNC: 11.3 G/DL (ref 11.7–15.7)
HGB BLD-MCNC: 11.5 G/DL (ref 11.7–15.7)
HGB BLD-MCNC: 11.8 G/DL (ref 11.7–15.7)
HGB BLD-MCNC: 11.9 G/DL (ref 11.7–15.7)
HGB BLD-MCNC: 12.6 G/DL (ref 11.7–15.7)
HGB UR QL STRIP: ABNORMAL
HGB UR QL STRIP: ABNORMAL
HGB UR QL STRIP: NEGATIVE
HYALINE CASTS #/AREA URNS LPF: 45 /LPF (ref 0–2)
HYALINE CASTS #/AREA URNS LPF: 69 /LPF (ref 0–2)
HYALINE CASTS #/AREA URNS LPF: 7 /LPF (ref 0–2)
HYALINE CASTS #/AREA URNS LPF: ABNORMAL /LPF
IMM GRANULOCYTES # BLD: 0 10E9/L (ref 0–0.4)
IMM GRANULOCYTES # BLD: 0.1 10E9/L (ref 0–0.4)
IMM GRANULOCYTES # BLD: 0.1 10E9/L (ref 0–0.4)
IMM GRANULOCYTES # BLD: 0.2 10E9/L (ref 0–0.4)
IMM GRANULOCYTES # BLD: 0.6 10E9/L (ref 0–0.4)
IMM GRANULOCYTES NFR BLD: 0.4 %
IMM GRANULOCYTES NFR BLD: 0.8 %
IMM GRANULOCYTES NFR BLD: 1 %
IMM GRANULOCYTES NFR BLD: 1.3 %
IMM GRANULOCYTES NFR BLD: 3 %
INR PPP: 1.52 (ref 0.86–1.14)
INR PPP: 2.25 (ref 0.86–1.14)
INTERPRETATION ECG - MUSE: NORMAL
IRON SATN MFR SERPL: 13 % (ref 15–46)
IRON SERPL-MCNC: 36 UG/DL (ref 35–180)
KETONES UR STRIP-MCNC: 15 MG/DL
KETONES UR STRIP-MCNC: NEGATIVE MG/DL
LACTATE BLD-SCNC: 1 MMOL/L (ref 0.4–1.9)
LACTATE BLD-SCNC: 1.6 MMOL/L (ref 0.4–1.9)
LACTATE BLD-SCNC: 2 MMOL/L (ref 0.7–2)
LACTATE BLD-SCNC: 2.1 MMOL/L (ref 0.7–2)
LACTATE BLD-SCNC: 2.2 MMOL/L (ref 0.4–1.9)
LACTATE BLD-SCNC: 3.3 MMOL/L (ref 0.7–2)
LACTATE BLD-SCNC: 3.8 MMOL/L (ref 0.7–2)
LACTATE BLD-SCNC: 4.6 MMOL/L (ref 0.7–2)
LEUKOCYTE ESTERASE UR QL STRIP: NEGATIVE
LYMPHOCYTES # BLD AUTO: 0.5 10E9/L (ref 0.8–5.3)
LYMPHOCYTES # BLD AUTO: 0.7 10E9/L (ref 0.8–5.3)
LYMPHOCYTES # BLD AUTO: 0.8 10E9/L (ref 0.8–5.3)
LYMPHOCYTES # BLD AUTO: 0.9 10E9/L (ref 0.8–5.3)
LYMPHOCYTES # BLD AUTO: 1 10E9/L (ref 0.8–5.3)
LYMPHOCYTES # BLD AUTO: 1.2 10E9/L (ref 0.8–5.3)
LYMPHOCYTES # BLD AUTO: 1.2 10E9/L (ref 0.8–5.3)
LYMPHOCYTES # BLD AUTO: 1.4 10E9/L (ref 0.8–5.3)
LYMPHOCYTES NFR BLD AUTO: 13.2 %
LYMPHOCYTES NFR BLD AUTO: 13.4 %
LYMPHOCYTES NFR BLD AUTO: 15.6 %
LYMPHOCYTES NFR BLD AUTO: 19 %
LYMPHOCYTES NFR BLD AUTO: 3 %
LYMPHOCYTES NFR BLD AUTO: 3.1 %
LYMPHOCYTES NFR BLD AUTO: 4.7 %
LYMPHOCYTES NFR BLD AUTO: 7.4 %
Lab: NORMAL
Lab: NORMAL
MAGNESIUM SERPL-MCNC: 2.2 MG/DL (ref 1.6–2.3)
MAGNESIUM SERPL-MCNC: 2.7 MG/DL (ref 1.6–2.3)
MAGNESIUM SERPL-MCNC: 2.7 MG/DL (ref 1.6–2.3)
MCH RBC QN AUTO: 26.8 PG (ref 26.5–33)
MCH RBC QN AUTO: 26.9 PG (ref 26.5–33)
MCH RBC QN AUTO: 27.1 PG (ref 26.5–33)
MCH RBC QN AUTO: 27.2 PG (ref 26.5–33)
MCH RBC QN AUTO: 27.3 PG (ref 26.5–33)
MCH RBC QN AUTO: 27.6 PG (ref 26.5–33)
MCH RBC QN AUTO: 28.1 PG (ref 26.5–33)
MCH RBC QN AUTO: 28.4 PG (ref 26.5–33)
MCH RBC QN AUTO: 28.5 PG (ref 26.5–33)
MCH RBC QN AUTO: 28.7 PG (ref 26.5–33)
MCH RBC QN AUTO: 28.7 PG (ref 26.5–33)
MCH RBC QN AUTO: 28.9 PG (ref 26.5–33)
MCH RBC QN AUTO: 29.1 PG (ref 26.5–33)
MCH RBC QN AUTO: 29.3 PG (ref 26.5–33)
MCHC RBC AUTO-ENTMCNC: 29.9 G/DL (ref 31.5–36.5)
MCHC RBC AUTO-ENTMCNC: 29.9 G/DL (ref 31.5–36.5)
MCHC RBC AUTO-ENTMCNC: 30.3 G/DL (ref 31.5–36.5)
MCHC RBC AUTO-ENTMCNC: 30.5 G/DL (ref 31.5–36.5)
MCHC RBC AUTO-ENTMCNC: 30.6 G/DL (ref 31.5–36.5)
MCHC RBC AUTO-ENTMCNC: 30.8 G/DL (ref 31.5–36.5)
MCHC RBC AUTO-ENTMCNC: 30.9 G/DL (ref 31.5–36.5)
MCHC RBC AUTO-ENTMCNC: 31.3 G/DL (ref 31.5–36.5)
MCHC RBC AUTO-ENTMCNC: 31.3 G/DL (ref 31.5–36.5)
MCHC RBC AUTO-ENTMCNC: 31.5 G/DL (ref 31.5–36.5)
MCHC RBC AUTO-ENTMCNC: 32.2 G/DL (ref 31.5–36.5)
MCHC RBC AUTO-ENTMCNC: 32.3 G/DL (ref 31.5–36.5)
MCHC RBC AUTO-ENTMCNC: 32.5 G/DL (ref 31.5–36.5)
MCHC RBC AUTO-ENTMCNC: 32.6 G/DL (ref 31.5–36.5)
MCV RBC AUTO: 88 FL (ref 78–100)
MCV RBC AUTO: 88 FL (ref 78–100)
MCV RBC AUTO: 89 FL (ref 78–100)
MCV RBC AUTO: 90 FL (ref 78–100)
MCV RBC AUTO: 90 FL (ref 78–100)
MCV RBC AUTO: 91 FL (ref 78–100)
MCV RBC AUTO: 92 FL (ref 78–100)
MONOCYTES # BLD AUTO: 0 10E9/L (ref 0–1.3)
MONOCYTES # BLD AUTO: 0.7 10E9/L (ref 0–1.3)
MONOCYTES # BLD AUTO: 0.8 10E9/L (ref 0–1.3)
MONOCYTES # BLD AUTO: 0.9 10E9/L (ref 0–1.3)
MONOCYTES # BLD AUTO: 1.1 10E9/L (ref 0–1.3)
MONOCYTES # BLD AUTO: 1.1 10E9/L (ref 0–1.3)
MONOCYTES # BLD AUTO: 1.2 10E9/L (ref 0–1.3)
MONOCYTES # BLD AUTO: 1.4 10E9/L (ref 0–1.3)
MONOCYTES NFR BLD AUTO: 0 %
MONOCYTES NFR BLD AUTO: 10 %
MONOCYTES NFR BLD AUTO: 11.8 %
MONOCYTES NFR BLD AUTO: 12.6 %
MONOCYTES NFR BLD AUTO: 14.1 %
MONOCYTES NFR BLD AUTO: 4.2 %
MONOCYTES NFR BLD AUTO: 7.8 %
MONOCYTES NFR BLD AUTO: 9.9 %
MUCOUS THREADS #/AREA URNS LPF: PRESENT /LPF
NEUTROPHILS # BLD AUTO: 13.5 10E9/L (ref 1.6–8.3)
NEUTROPHILS # BLD AUTO: 17.7 10E9/L (ref 1.6–8.3)
NEUTROPHILS # BLD AUTO: 21.1 10E9/L (ref 1.6–8.3)
NEUTROPHILS # BLD AUTO: 5.3 10E9/L (ref 1.6–8.3)
NEUTROPHILS # BLD AUTO: 5.3 10E9/L (ref 1.6–8.3)
NEUTROPHILS # BLD AUTO: 5.5 10E9/L (ref 1.6–8.3)
NEUTROPHILS # BLD AUTO: 6.7 10E9/L (ref 1.6–8.3)
NEUTROPHILS # BLD AUTO: 8.9 10E9/L (ref 1.6–8.3)
NEUTROPHILS NFR BLD AUTO: 69.8 %
NEUTROPHILS NFR BLD AUTO: 71 %
NEUTROPHILS NFR BLD AUTO: 74.2 %
NEUTROPHILS NFR BLD AUTO: 75.8 %
NEUTROPHILS NFR BLD AUTO: 78.6 %
NEUTROPHILS NFR BLD AUTO: 88 %
NEUTROPHILS NFR BLD AUTO: 88.2 %
NEUTROPHILS NFR BLD AUTO: 96 %
NEUTS BAND # BLD AUTO: 0.2 10E9/L (ref 0–0.6)
NEUTS BAND NFR BLD MANUAL: 1 %
NITRATE UR QL: NEGATIVE
NON-SQ EPI CELLS #/AREA URNS LPF: ABNORMAL /LPF
NOROV GI+II ORF1-ORF2 JNC STL QL NAA+PR: NOT DETECTED
NRBC # BLD AUTO: 0 10*3/UL
NRBC BLD AUTO-RTO: 0 /100
NT-PROBNP SERPL-MCNC: 3296 PG/ML (ref 0–450)
NT-PROBNP SERPL-MCNC: 3343 PG/ML (ref 0–450)
NT-PROBNP SERPL-MCNC: 3762 PG/ML (ref 0–1800)
NT-PROBNP SERPL-MCNC: 5597 PG/ML (ref 0–450)
NT-PROBNP SERPL-MCNC: 9764 PG/ML (ref 0–1800)
NT-PROBNP SERPL-MCNC: ABNORMAL PG/ML (ref 0–1800)
O+P STL MICRO: NORMAL
O+P STL MICRO: NORMAL
OVALOCYTES BLD QL SMEAR: SLIGHT
PH UR STRIP: 5 PH (ref 5–7)
PH UR STRIP: 5.5 PH (ref 5–7)
PH UR STRIP: 6 PH (ref 5–7)
PLATELET # BLD AUTO: 138 10E9/L (ref 150–450)
PLATELET # BLD AUTO: 146 10E9/L (ref 150–450)
PLATELET # BLD AUTO: 155 10E9/L (ref 150–450)
PLATELET # BLD AUTO: 169 10E9/L (ref 150–450)
PLATELET # BLD AUTO: 180 10E9/L (ref 150–450)
PLATELET # BLD AUTO: 213 10E9/L (ref 150–450)
PLATELET # BLD AUTO: 218 10E9/L (ref 150–450)
PLATELET # BLD AUTO: 231 10E9/L (ref 150–450)
PLATELET # BLD AUTO: 237 10E9/L (ref 150–450)
PLATELET # BLD AUTO: 247 10E9/L (ref 150–450)
PLATELET # BLD AUTO: 294 10E9/L (ref 150–450)
PLATELET # BLD AUTO: 306 10E9/L (ref 150–450)
PLATELET # BLD AUTO: 312 10E9/L (ref 150–450)
PLATELET # BLD AUTO: 335 10E9/L (ref 150–450)
PLATELET # BLD EST: ABNORMAL 10*3/UL
PLATELET # BLD EST: ABNORMAL 10*3/UL
POIKILOCYTOSIS BLD QL SMEAR: ABNORMAL
POIKILOCYTOSIS BLD QL SMEAR: ABNORMAL
POLYCHROMASIA BLD QL SMEAR: SLIGHT
POTASSIUM SERPL-SCNC: 3.2 MMOL/L (ref 3.4–5.3)
POTASSIUM SERPL-SCNC: 3.6 MMOL/L (ref 3.4–5.3)
POTASSIUM SERPL-SCNC: 3.7 MMOL/L (ref 3.4–5.3)
POTASSIUM SERPL-SCNC: 3.8 MMOL/L (ref 3.4–5.3)
POTASSIUM SERPL-SCNC: 3.9 MMOL/L (ref 3.4–5.3)
POTASSIUM SERPL-SCNC: 4 MMOL/L (ref 3.4–5.3)
POTASSIUM SERPL-SCNC: 4 MMOL/L (ref 3.4–5.3)
POTASSIUM SERPL-SCNC: 4 MMOL/L (ref 3.5–5.1)
POTASSIUM SERPL-SCNC: 4.1 MMOL/L (ref 3.4–5.3)
POTASSIUM SERPL-SCNC: 4.1 MMOL/L (ref 3.4–5.3)
POTASSIUM SERPL-SCNC: 4.3 MMOL/L (ref 3.4–5.3)
POTASSIUM SERPL-SCNC: 4.4 MMOL/L (ref 3.4–5.3)
POTASSIUM SERPL-SCNC: 5.3 MMOL/L (ref 3.4–5.3)
POTASSIUM SERPL-SCNC: 5.5 MMOL/L (ref 3.4–5.3)
POTASSIUM SERPL-SCNC: 5.6 MMOL/L (ref 3.4–5.3)
POTASSIUM SERPL-SCNC: 5.7 MMOL/L (ref 3.4–5.3)
POTASSIUM SERPL-SCNC: 5.7 MMOL/L (ref 3.4–5.3)
POTASSIUM SERPL-SCNC: 5.8 MMOL/L (ref 3.4–5.3)
POTASSIUM SERPL-SCNC: 5.9 MMOL/L (ref 3.4–5.3)
PROCALCITONIN SERPL-MCNC: 11.97 NG/ML
PROT SERPL-MCNC: 6.1 G/DL (ref 6.8–8.8)
PROT SERPL-MCNC: 6.6 G/DL (ref 6.8–8.8)
PROT SERPL-MCNC: 6.8 G/DL (ref 6.8–8.8)
PROT SERPL-MCNC: 6.8 G/DL (ref 6.8–8.8)
PROT SERPL-MCNC: 7 G/DL (ref 6.8–8.8)
PROT SERPL-MCNC: 7.8 G/DL (ref 6.8–8.8)
RBC # BLD AUTO: 3.52 10E12/L (ref 3.8–5.2)
RBC # BLD AUTO: 3.82 10E12/L (ref 3.8–5.2)
RBC # BLD AUTO: 3.92 10E12/L (ref 3.8–5.2)
RBC # BLD AUTO: 3.93 10E12/L (ref 3.8–5.2)
RBC # BLD AUTO: 4.07 10E12/L (ref 3.8–5.2)
RBC # BLD AUTO: 4.15 10E12/L (ref 3.8–5.2)
RBC # BLD AUTO: 4.15 10E12/L (ref 3.8–5.2)
RBC # BLD AUTO: 4.16 10E12/L (ref 3.8–5.2)
RBC # BLD AUTO: 4.24 10E12/L (ref 3.8–5.2)
RBC # BLD AUTO: 4.27 10E12/L (ref 3.8–5.2)
RBC # BLD AUTO: 4.35 10E12/L (ref 3.8–5.2)
RBC # BLD AUTO: 4.36 10E12/L (ref 3.8–5.2)
RBC # BLD AUTO: 4.43 10E12/L (ref 3.8–5.2)
RBC # BLD AUTO: 4.61 10E12/L (ref 3.8–5.2)
RBC #/AREA URNS AUTO: 15 /HPF (ref 0–2)
RBC #/AREA URNS AUTO: 15 /HPF (ref 0–2)
RBC #/AREA URNS AUTO: 3 /HPF (ref 0–2)
RBC #/AREA URNS AUTO: 42 /HPF (ref 0–2)
RBC #/AREA URNS AUTO: ABNORMAL /HPF
RVA NSP5 STL QL NAA+PROBE: NOT DETECTED
SALMONELLA SP RPOD STL QL NAA+PROBE: NOT DETECTED
SHIGELLA SP+EIEC IPAH STL QL NAA+PROBE: NOT DETECTED
SODIUM SERPL-SCNC: 131 MMOL/L (ref 133–144)
SODIUM SERPL-SCNC: 132 MMOL/L (ref 133–144)
SODIUM SERPL-SCNC: 133 MMOL/L (ref 133–144)
SODIUM SERPL-SCNC: 134 MMOL/L (ref 133–144)
SODIUM SERPL-SCNC: 134 MMOL/L (ref 133–144)
SODIUM SERPL-SCNC: 138 MMOL/L (ref 133–144)
SODIUM SERPL-SCNC: 138 MMOL/L (ref 133–144)
SODIUM SERPL-SCNC: 139 MMOL/L (ref 133–144)
SODIUM SERPL-SCNC: 139 MMOL/L (ref 133–144)
SODIUM SERPL-SCNC: 140 MMOL/L (ref 133–144)
SODIUM SERPL-SCNC: 141 MMOL/L (ref 133–144)
SODIUM SERPL-SCNC: 141 MMOL/L (ref 136–145)
SODIUM SERPL-SCNC: 142 MMOL/L (ref 133–144)
SODIUM SERPL-SCNC: 143 MMOL/L (ref 133–144)
SODIUM SERPL-SCNC: 144 MMOL/L (ref 133–144)
SODIUM SERPL-SCNC: 145 MMOL/L (ref 133–144)
SOURCE: ABNORMAL
SP GR UR STRIP: 1.01 (ref 1–1.03)
SP GR UR STRIP: 1.01 (ref 1–1.03)
SP GR UR STRIP: 1.02 (ref 1–1.03)
SP GR UR STRIP: 1.02 (ref 1–1.03)
SP GR UR STRIP: >1.03 (ref 1–1.03)
SPECIMEN SOURCE: NORMAL
SPHEROCYTES BLD QL SMEAR: SLIGHT
SQUAMOUS #/AREA URNS AUTO: 1 /HPF (ref 0–1)
SQUAMOUS #/AREA URNS AUTO: <1 /HPF (ref 0–1)
TIBC SERPL-MCNC: 281 UG/DL (ref 240–430)
TRANSFERRIN SERPL-MCNC: 233 MG/DL (ref 210–360)
TROPONIN I SERPL-MCNC: <0.015 UG/L (ref 0–0.04)
TSH SERPL DL<=0.005 MIU/L-ACNC: 3.08 MU/L (ref 0.4–4)
TSH SERPL DL<=0.005 MIU/L-ACNC: 3.98 MU/L (ref 0.4–4)
URATE SERPL-MCNC: 3.7 MG/DL (ref 2.6–6)
UROBILINOGEN UR STRIP-ACNC: 1 EU/DL (ref 0.2–1)
UROBILINOGEN UR STRIP-MCNC: 0 MG/DL (ref 0–2)
V CHOL+PARA RFBL+TRKH+TNAA STL QL NAA+PR: NOT DETECTED
VARIANT LYMPHS BLD QL SMEAR: PRESENT
WBC # BLD AUTO: 11.3 10E9/L (ref 4–11)
WBC # BLD AUTO: 11.7 10E9/L (ref 4–11)
WBC # BLD AUTO: 15.3 10E9/L (ref 4–11)
WBC # BLD AUTO: 17.3 10E9/L (ref 4–11)
WBC # BLD AUTO: 20.1 10E9/L (ref 4–11)
WBC # BLD AUTO: 20.2 10E9/L (ref 4–11)
WBC # BLD AUTO: 22 10E9/L (ref 4–11)
WBC # BLD AUTO: 5.9 10E9/L (ref 4–11)
WBC # BLD AUTO: 6 10E9/L (ref 4–11)
WBC # BLD AUTO: 7.3 10E9/L (ref 4–11)
WBC # BLD AUTO: 7.5 10E9/L (ref 4–11)
WBC # BLD AUTO: 7.6 10E9/L (ref 4–11)
WBC # BLD AUTO: 7.7 10E9/L (ref 4–11)
WBC # BLD AUTO: 9 10E9/L (ref 4–11)
WBC #/AREA URNS AUTO: 1 /HPF (ref 0–5)
WBC #/AREA URNS AUTO: 1 /HPF (ref 0–5)
WBC #/AREA URNS AUTO: 2 /HPF (ref 0–5)
WBC #/AREA URNS AUTO: 5 /HPF (ref 0–5)
WBC #/AREA URNS AUTO: ABNORMAL /HPF
Y ENTERO RECN STL QL NAA+PROBE: NOT DETECTED

## 2018-01-01 PROCEDURE — 33225 L VENTRIC PACING LEAD ADD-ON: CPT | Performed by: INTERNAL MEDICINE

## 2018-01-01 PROCEDURE — 25000125 ZZHC RX 250: Performed by: INTERNAL MEDICINE

## 2018-01-01 PROCEDURE — 99285 EMERGENCY DEPT VISIT HI MDM: CPT | Mod: 25

## 2018-01-01 PROCEDURE — A9270 NON-COVERED ITEM OR SERVICE: HCPCS | Mod: GY | Performed by: INTERNAL MEDICINE

## 2018-01-01 PROCEDURE — 80048 BASIC METABOLIC PNL TOTAL CA: CPT | Performed by: INTERNAL MEDICINE

## 2018-01-01 PROCEDURE — 80053 COMPREHEN METABOLIC PANEL: CPT | Performed by: EMERGENCY MEDICINE

## 2018-01-01 PROCEDURE — 71046 X-RAY EXAM CHEST 2 VIEWS: CPT

## 2018-01-01 PROCEDURE — 96366 THER/PROPH/DIAG IV INF ADDON: CPT

## 2018-01-01 PROCEDURE — 25000125 ZZHC RX 250: Performed by: EMERGENCY MEDICINE

## 2018-01-01 PROCEDURE — 93306 TTE W/DOPPLER COMPLETE: CPT | Mod: 26 | Performed by: INTERNAL MEDICINE

## 2018-01-01 PROCEDURE — 85652 RBC SED RATE AUTOMATED: CPT | Performed by: INTERNAL MEDICINE

## 2018-01-01 PROCEDURE — 36415 COLL VENOUS BLD VENIPUNCTURE: CPT | Performed by: INTERNAL MEDICINE

## 2018-01-01 PROCEDURE — 40000852 ZZH STATISTIC HEART CATH LAB OR EP LAB

## 2018-01-01 PROCEDURE — 99284 EMERGENCY DEPT VISIT MOD MDM: CPT

## 2018-01-01 PROCEDURE — 36415 COLL VENOUS BLD VENIPUNCTURE: CPT | Performed by: EMERGENCY MEDICINE

## 2018-01-01 PROCEDURE — 25000128 H RX IP 250 OP 636: Performed by: INTERNAL MEDICINE

## 2018-01-01 PROCEDURE — 27210795 ZZH PAD DEFIB QUICK CR4

## 2018-01-01 PROCEDURE — 83880 ASSAY OF NATRIURETIC PEPTIDE: CPT | Performed by: PHYSICIAN ASSISTANT

## 2018-01-01 PROCEDURE — 80048 BASIC METABOLIC PNL TOTAL CA: CPT | Performed by: NURSE PRACTITIONER

## 2018-01-01 PROCEDURE — 12000000 ZZH R&B MED SURG/OB

## 2018-01-01 PROCEDURE — 83540 ASSAY OF IRON: CPT | Performed by: INTERNAL MEDICINE

## 2018-01-01 PROCEDURE — 33225 L VENTRIC PACING LEAD ADD-ON: CPT

## 2018-01-01 PROCEDURE — 86140 C-REACTIVE PROTEIN: CPT | Performed by: INTERNAL MEDICINE

## 2018-01-01 PROCEDURE — 99239 HOSP IP/OBS DSCHRG MGMT >30: CPT | Performed by: INTERNAL MEDICINE

## 2018-01-01 PROCEDURE — 80048 BASIC METABOLIC PNL TOTAL CA: CPT | Performed by: PHYSICIAN ASSISTANT

## 2018-01-01 PROCEDURE — 93650 ICAR CATH ABLTJ AV NODE FUNC: CPT

## 2018-01-01 PROCEDURE — 96365 THER/PROPH/DIAG IV INF INIT: CPT

## 2018-01-01 PROCEDURE — 87040 BLOOD CULTURE FOR BACTERIA: CPT | Performed by: EMERGENCY MEDICINE

## 2018-01-01 PROCEDURE — 84132 ASSAY OF SERUM POTASSIUM: CPT | Performed by: INTERNAL MEDICINE

## 2018-01-01 PROCEDURE — 25000132 ZZH RX MED GY IP 250 OP 250 PS 637: Performed by: INTERNAL MEDICINE

## 2018-01-01 PROCEDURE — 25000132 ZZH RX MED GY IP 250 OP 250 PS 637: Mod: GY | Performed by: INTERNAL MEDICINE

## 2018-01-01 PROCEDURE — 83735 ASSAY OF MAGNESIUM: CPT | Performed by: INTERNAL MEDICINE

## 2018-01-01 PROCEDURE — 40000986 XR CHEST 2 VW

## 2018-01-01 PROCEDURE — 83880 ASSAY OF NATRIURETIC PEPTIDE: CPT | Performed by: EMERGENCY MEDICINE

## 2018-01-01 PROCEDURE — 99285 EMERGENCY DEPT VISIT HI MDM: CPT

## 2018-01-01 PROCEDURE — 85025 COMPLETE CBC W/AUTO DIFF WBC: CPT | Performed by: INTERNAL MEDICINE

## 2018-01-01 PROCEDURE — 99233 SBSQ HOSP IP/OBS HIGH 50: CPT | Performed by: INTERNAL MEDICINE

## 2018-01-01 PROCEDURE — 96376 TX/PRO/DX INJ SAME DRUG ADON: CPT

## 2018-01-01 PROCEDURE — 93018 CV STRESS TEST I&R ONLY: CPT | Performed by: INTERNAL MEDICINE

## 2018-01-01 PROCEDURE — 93306 TTE W/DOPPLER COMPLETE: CPT

## 2018-01-01 PROCEDURE — 27211289 ZZ H KIT CATH IV 4FR 8 CM OR 10 CM, POWERWAND QUICK XL

## 2018-01-01 PROCEDURE — 25000128 H RX IP 250 OP 636: Performed by: EMERGENCY MEDICINE

## 2018-01-01 PROCEDURE — 83605 ASSAY OF LACTIC ACID: CPT | Performed by: INTERNAL MEDICINE

## 2018-01-01 PROCEDURE — 99232 SBSQ HOSP IP/OBS MODERATE 35: CPT | Performed by: INTERNAL MEDICINE

## 2018-01-01 PROCEDURE — 85027 COMPLETE CBC AUTOMATED: CPT | Performed by: INTERNAL MEDICINE

## 2018-01-01 PROCEDURE — 81001 URINALYSIS AUTO W/SCOPE: CPT | Performed by: EMERGENCY MEDICINE

## 2018-01-01 PROCEDURE — 93650 ICAR CATH ABLTJ AV NODE FUNC: CPT | Performed by: INTERNAL MEDICINE

## 2018-01-01 PROCEDURE — 99214 OFFICE O/P EST MOD 30 MIN: CPT | Performed by: INTERNAL MEDICINE

## 2018-01-01 PROCEDURE — 87209 SMEAR COMPLEX STAIN: CPT | Performed by: INTERNAL MEDICINE

## 2018-01-01 PROCEDURE — 27210886 ZZH ACCESSORY CR5

## 2018-01-01 PROCEDURE — 93005 ELECTROCARDIOGRAM TRACING: CPT

## 2018-01-01 PROCEDURE — 71260 CT THORAX DX C+: CPT

## 2018-01-01 PROCEDURE — 25500064 ZZH RX 255 OP 636: Performed by: INTERNAL MEDICINE

## 2018-01-01 PROCEDURE — 84466 ASSAY OF TRANSFERRIN: CPT | Performed by: INTERNAL MEDICINE

## 2018-01-01 PROCEDURE — A9270 NON-COVERED ITEM OR SERVICE: HCPCS | Performed by: INTERNAL MEDICINE

## 2018-01-01 PROCEDURE — 81001 URINALYSIS AUTO W/SCOPE: CPT | Performed by: INTERNAL MEDICINE

## 2018-01-01 PROCEDURE — 12000007 ZZH R&B INTERMEDIATE

## 2018-01-01 PROCEDURE — 96375 TX/PRO/DX INJ NEW DRUG ADDON: CPT

## 2018-01-01 PROCEDURE — 25000128 H RX IP 250 OP 636

## 2018-01-01 PROCEDURE — 99223 1ST HOSP IP/OBS HIGH 75: CPT | Mod: AI | Performed by: INTERNAL MEDICINE

## 2018-01-01 PROCEDURE — 20000003 ZZH R&B ICU

## 2018-01-01 PROCEDURE — 73610 X-RAY EXAM OF ANKLE: CPT | Mod: RT

## 2018-01-01 PROCEDURE — 80048 BASIC METABOLIC PNL TOTAL CA: CPT | Performed by: EMERGENCY MEDICINE

## 2018-01-01 PROCEDURE — 3E043XZ INTRODUCTION OF VASOPRESSOR INTO CENTRAL VEIN, PERCUTANEOUS APPROACH: ICD-10-PCS | Performed by: INTERNAL MEDICINE

## 2018-01-01 PROCEDURE — 40000986 XR CHEST PORT 1 VW

## 2018-01-01 PROCEDURE — 85610 PROTHROMBIN TIME: CPT | Performed by: EMERGENCY MEDICINE

## 2018-01-01 PROCEDURE — C1892 INTRO/SHEATH,FIXED,PEEL-AWAY: HCPCS

## 2018-01-01 PROCEDURE — 36415 COLL VENOUS BLD VENIPUNCTURE: CPT | Performed by: NURSE PRACTITIONER

## 2018-01-01 PROCEDURE — 83735 ASSAY OF MAGNESIUM: CPT | Performed by: EMERGENCY MEDICINE

## 2018-01-01 PROCEDURE — 73700 CT LOWER EXTREMITY W/O DYE: CPT | Mod: RT

## 2018-01-01 PROCEDURE — 40000235 ZZH STATISTIC TELEMETRY

## 2018-01-01 PROCEDURE — 83880 ASSAY OF NATRIURETIC PEPTIDE: CPT | Performed by: INTERNAL MEDICINE

## 2018-01-01 PROCEDURE — 87081 CULTURE SCREEN ONLY: CPT | Performed by: INTERNAL MEDICINE

## 2018-01-01 PROCEDURE — 74177 CT ABD & PELVIS W/CONTRAST: CPT

## 2018-01-01 PROCEDURE — 99222 1ST HOSP IP/OBS MODERATE 55: CPT | Mod: AI | Performed by: INTERNAL MEDICINE

## 2018-01-01 PROCEDURE — C2621 PMKR, OTHER THAN SING/DUAL: HCPCS

## 2018-01-01 PROCEDURE — 99207 ZZC CDG-MDM COMPONENT: MEETS LOW - DOWN CODED: CPT | Performed by: INTERNAL MEDICINE

## 2018-01-01 PROCEDURE — C1898 LEAD, PMKR, OTHER THAN TRANS: HCPCS

## 2018-01-01 PROCEDURE — 99284 EMERGENCY DEPT VISIT MOD MDM: CPT | Mod: 25

## 2018-01-01 PROCEDURE — 27210782 ZZH KIT EP TOTES DISP CR7

## 2018-01-01 PROCEDURE — 99213 OFFICE O/P EST LOW 20 MIN: CPT | Performed by: INTERNAL MEDICINE

## 2018-01-01 PROCEDURE — 99214 OFFICE O/P EST MOD 30 MIN: CPT | Performed by: PHYSICIAN ASSISTANT

## 2018-01-01 PROCEDURE — 93000 ELECTROCARDIOGRAM COMPLETE: CPT | Performed by: INTERNAL MEDICINE

## 2018-01-01 PROCEDURE — 87506 IADNA-DNA/RNA PROBE TQ 6-11: CPT | Performed by: INTERNAL MEDICINE

## 2018-01-01 PROCEDURE — 85027 COMPLETE CBC AUTOMATED: CPT | Performed by: EMERGENCY MEDICINE

## 2018-01-01 PROCEDURE — 00000146 ZZHCL STATISTIC GLUCOSE BY METER IP

## 2018-01-01 PROCEDURE — 73630 X-RAY EXAM OF FOOT: CPT | Mod: RT

## 2018-01-01 PROCEDURE — 99215 OFFICE O/P EST HI 40 MIN: CPT | Performed by: PHYSICIAN ASSISTANT

## 2018-01-01 PROCEDURE — C1893 INTRO/SHEATH, FIXED,NON-PEEL: HCPCS

## 2018-01-01 PROCEDURE — 96365 THER/PROPH/DIAG IV INF INIT: CPT | Mod: 59

## 2018-01-01 PROCEDURE — 33249 INSJ/RPLCMT DEFIB W/LEAD(S): CPT

## 2018-01-01 PROCEDURE — A9502 TC99M TETROFOSMIN: HCPCS | Performed by: INTERNAL MEDICINE

## 2018-01-01 PROCEDURE — 36415 COLL VENOUS BLD VENIPUNCTURE: CPT | Performed by: PHYSICIAN ASSISTANT

## 2018-01-01 PROCEDURE — 99153 MOD SED SAME PHYS/QHP EA: CPT

## 2018-01-01 PROCEDURE — 85025 COMPLETE CBC W/AUTO DIFF WBC: CPT | Performed by: EMERGENCY MEDICINE

## 2018-01-01 PROCEDURE — 93005 ELECTROCARDIOGRAM TRACING: CPT | Mod: 76

## 2018-01-01 PROCEDURE — 93281 PM DEVICE PROGR EVAL MULTI: CPT | Performed by: INTERNAL MEDICINE

## 2018-01-01 PROCEDURE — 25000128 H RX IP 250 OP 636: Performed by: SURGERY

## 2018-01-01 PROCEDURE — 99152 MOD SED SAME PHYS/QHP 5/>YRS: CPT | Performed by: INTERNAL MEDICINE

## 2018-01-01 PROCEDURE — 36569 INSJ PICC 5 YR+ W/O IMAGING: CPT

## 2018-01-01 PROCEDURE — 84484 ASSAY OF TROPONIN QUANT: CPT | Performed by: EMERGENCY MEDICINE

## 2018-01-01 PROCEDURE — 87177 OVA AND PARASITES SMEARS: CPT | Performed by: INTERNAL MEDICINE

## 2018-01-01 PROCEDURE — 27210995 ZZH RX 272: Performed by: INTERNAL MEDICINE

## 2018-01-01 PROCEDURE — 96374 THER/PROPH/DIAG INJ IV PUSH: CPT

## 2018-01-01 PROCEDURE — 78452 HT MUSCLE IMAGE SPECT MULT: CPT

## 2018-01-01 PROCEDURE — 93017 CV STRESS TEST TRACING ONLY: CPT

## 2018-01-01 PROCEDURE — 99215 OFFICE O/P EST HI 40 MIN: CPT | Performed by: INTERNAL MEDICINE

## 2018-01-01 PROCEDURE — 84550 ASSAY OF BLOOD/URIC ACID: CPT | Performed by: INTERNAL MEDICINE

## 2018-01-01 PROCEDURE — 99205 OFFICE O/P NEW HI 60 MIN: CPT | Mod: 57 | Performed by: INTERNAL MEDICINE

## 2018-01-01 PROCEDURE — 85379 FIBRIN DEGRADATION QUANT: CPT | Performed by: EMERGENCY MEDICINE

## 2018-01-01 PROCEDURE — 73590 X-RAY EXAM OF LOWER LEG: CPT | Mod: RT

## 2018-01-01 PROCEDURE — 93000 ELECTROCARDIOGRAM COMPLETE: CPT | Performed by: PHYSICIAN ASSISTANT

## 2018-01-01 PROCEDURE — 80053 COMPREHEN METABOLIC PANEL: CPT | Performed by: INTERNAL MEDICINE

## 2018-01-01 PROCEDURE — 99222 1ST HOSP IP/OBS MODERATE 55: CPT | Performed by: INTERNAL MEDICINE

## 2018-01-01 PROCEDURE — 99213 OFFICE O/P EST LOW 20 MIN: CPT | Performed by: FAMILY MEDICINE

## 2018-01-01 PROCEDURE — P9047 ALBUMIN (HUMAN), 25%, 50ML: HCPCS | Performed by: INTERNAL MEDICINE

## 2018-01-01 PROCEDURE — 84145 PROCALCITONIN (PCT): CPT | Performed by: INTERNAL MEDICINE

## 2018-01-01 PROCEDURE — 99214 OFFICE O/P EST MOD 30 MIN: CPT | Mod: 24 | Performed by: NURSE PRACTITIONER

## 2018-01-01 PROCEDURE — 86141 C-REACTIVE PROTEIN HS: CPT | Performed by: INTERNAL MEDICINE

## 2018-01-01 PROCEDURE — C1900 LEAD, CORONARY VENOUS: HCPCS

## 2018-01-01 PROCEDURE — 83550 IRON BINDING TEST: CPT | Performed by: INTERNAL MEDICINE

## 2018-01-01 PROCEDURE — 99233 SBSQ HOSP IP/OBS HIGH 50: CPT | Performed by: PODIATRIST

## 2018-01-01 PROCEDURE — 27210347 ZZH DRESSING D STAT DRY WRAP SPEC

## 2018-01-01 PROCEDURE — 76882 US LMTD JT/FCL EVL NVASC XTR: CPT | Mod: RT

## 2018-01-01 PROCEDURE — 34300033 ZZH RX 343: Performed by: INTERNAL MEDICINE

## 2018-01-01 PROCEDURE — 84443 ASSAY THYROID STIM HORMONE: CPT | Performed by: EMERGENCY MEDICINE

## 2018-01-01 PROCEDURE — 0296T ZIO PATCH HOLTER: CPT | Performed by: INTERNAL MEDICINE

## 2018-01-01 PROCEDURE — 75635 CT ANGIO ABDOMINAL ARTERIES: CPT

## 2018-01-01 PROCEDURE — 87329 GIARDIA AG IA: CPT | Performed by: INTERNAL MEDICINE

## 2018-01-01 PROCEDURE — 82565 ASSAY OF CREATININE: CPT | Performed by: INTERNAL MEDICINE

## 2018-01-01 PROCEDURE — 84443 ASSAY THYROID STIM HORMONE: CPT | Performed by: INTERNAL MEDICINE

## 2018-01-01 PROCEDURE — 99222 1ST HOSP IP/OBS MODERATE 55: CPT | Performed by: PODIATRIST

## 2018-01-01 PROCEDURE — 78452 HT MUSCLE IMAGE SPECT MULT: CPT | Mod: 26 | Performed by: INTERNAL MEDICINE

## 2018-01-01 PROCEDURE — 83605 ASSAY OF LACTIC ACID: CPT | Performed by: EMERGENCY MEDICINE

## 2018-01-01 PROCEDURE — 99214 OFFICE O/P EST MOD 30 MIN: CPT | Performed by: NURSE PRACTITIONER

## 2018-01-01 PROCEDURE — 33207 INSERT HEART PM VENTRICULAR: CPT | Mod: KX

## 2018-01-01 PROCEDURE — 40000257 ZZH STATISTIC CONSULT NO CHARGE VASC ACCESS

## 2018-01-01 PROCEDURE — G0180 MD CERTIFICATION HHA PATIENT: HCPCS | Performed by: INTERNAL MEDICINE

## 2018-01-01 PROCEDURE — 93016 CV STRESS TEST SUPVJ ONLY: CPT | Performed by: INTERNAL MEDICINE

## 2018-01-01 PROCEDURE — 70450 CT HEAD/BRAIN W/O DYE: CPT

## 2018-01-01 PROCEDURE — 33208 INSRT HEART PM ATRIAL & VENT: CPT | Mod: KX

## 2018-01-01 PROCEDURE — 82550 ASSAY OF CK (CPK): CPT | Performed by: EMERGENCY MEDICINE

## 2018-01-01 PROCEDURE — 93970 EXTREMITY STUDY: CPT

## 2018-01-01 PROCEDURE — C2630 CATH, EP, COOL-TIP: HCPCS

## 2018-01-01 PROCEDURE — C1730 CATH, EP, 19 OR FEW ELECT: HCPCS

## 2018-01-01 PROCEDURE — 84484 ASSAY OF TROPONIN QUANT: CPT | Performed by: INTERNAL MEDICINE

## 2018-01-01 PROCEDURE — 99215 OFFICE O/P EST HI 40 MIN: CPT | Mod: 25 | Performed by: INTERNAL MEDICINE

## 2018-01-01 PROCEDURE — 33207 INSERT HEART PM VENTRICULAR: CPT | Mod: KX | Performed by: INTERNAL MEDICINE

## 2018-01-01 PROCEDURE — 40000065 ZZH STATISTIC EKG NON-CHARGEABLE

## 2018-01-01 PROCEDURE — 99152 MOD SED SAME PHYS/QHP 5/>YRS: CPT

## 2018-01-01 RX ORDER — BUMETANIDE 0.5 MG/1
1 TABLET ORAL 2 TIMES DAILY
Status: DISCONTINUED | OUTPATIENT
Start: 2018-01-01 | End: 2018-01-01

## 2018-01-01 RX ORDER — LISINOPRIL 2.5 MG/1
2.5 TABLET ORAL DAILY
Status: DISCONTINUED | OUTPATIENT
Start: 2018-01-01 | End: 2018-01-01 | Stop reason: HOSPADM

## 2018-01-01 RX ORDER — BUMETANIDE 1 MG/1
1 TABLET ORAL EVERY EVENING
COMMUNITY
End: 2018-01-01

## 2018-01-01 RX ORDER — ALBUMIN (HUMAN) 12.5 G/50ML
12.5 SOLUTION INTRAVENOUS ONCE
Status: COMPLETED | OUTPATIENT
Start: 2018-01-01 | End: 2018-01-01

## 2018-01-01 RX ORDER — PROCHLORPERAZINE MALEATE 5 MG
5 TABLET ORAL EVERY 6 HOURS PRN
Qty: 30 TABLET | Refills: 0 | Status: SHIPPED | OUTPATIENT
Start: 2018-01-01

## 2018-01-01 RX ORDER — BUMETANIDE 0.5 MG/1
TABLET ORAL
Qty: 150 TABLET | Refills: 1 | Status: SHIPPED | OUTPATIENT
Start: 2018-01-01 | End: 2018-01-01

## 2018-01-01 RX ORDER — DIPHENHYDRAMINE HYDROCHLORIDE 50 MG/ML
25-50 INJECTION INTRAMUSCULAR; INTRAVENOUS
Status: DISCONTINUED | OUTPATIENT
Start: 2018-01-01 | End: 2018-01-01 | Stop reason: HOSPADM

## 2018-01-01 RX ORDER — ROPINIROLE 1 MG/1
1.5 TABLET, FILM COATED ORAL AT BEDTIME
Qty: 45 TABLET | Refills: 0 | Status: ON HOLD | OUTPATIENT
Start: 2018-01-01 | End: 2018-01-01

## 2018-01-01 RX ORDER — LIDOCAINE HYDROCHLORIDE 10 MG/ML
10-30 INJECTION, SOLUTION EPIDURAL; INFILTRATION; INTRACAUDAL; PERINEURAL
Status: COMPLETED | OUTPATIENT
Start: 2018-01-01 | End: 2018-01-01

## 2018-01-01 RX ORDER — POTASSIUM CHLORIDE 1.5 G/1.58G
20-40 POWDER, FOR SOLUTION ORAL
Status: DISCONTINUED | OUTPATIENT
Start: 2018-01-01 | End: 2018-01-01 | Stop reason: HOSPADM

## 2018-01-01 RX ORDER — LORAZEPAM 0.5 MG/1
.5-1 TABLET ORAL
Qty: 60 TABLET | Refills: 0 | Status: SHIPPED | OUTPATIENT
Start: 2018-01-01

## 2018-01-01 RX ORDER — ROPINIROLE 0.25 MG/1
0.5 TABLET, FILM COATED ORAL
Status: DISCONTINUED | OUTPATIENT
Start: 2018-01-01 | End: 2018-01-01

## 2018-01-01 RX ORDER — OLOPATADINE HYDROCHLORIDE 1 MG/ML
1 SOLUTION/ DROPS OPHTHALMIC 2 TIMES DAILY PRN
Status: DISCONTINUED | OUTPATIENT
Start: 2018-01-01 | End: 2018-01-01 | Stop reason: HOSPADM

## 2018-01-01 RX ORDER — ROPINIROLE 1 MG/1
2 TABLET, FILM COATED ORAL AT BEDTIME
Status: ON HOLD | COMMUNITY
End: 2018-01-01

## 2018-01-01 RX ORDER — CLINDAMYCIN PHOSPHATE 600 MG/50ML
600 INJECTION, SOLUTION INTRAVENOUS EVERY 8 HOURS
Status: DISCONTINUED | OUTPATIENT
Start: 2018-01-01 | End: 2018-01-01

## 2018-01-01 RX ORDER — POTASSIUM CHLORIDE 29.8 MG/ML
20 INJECTION INTRAVENOUS
Status: DISCONTINUED | OUTPATIENT
Start: 2018-01-01 | End: 2018-01-01 | Stop reason: CLARIF

## 2018-01-01 RX ORDER — SODIUM CHLORIDE 450 MG/100ML
INJECTION, SOLUTION INTRAVENOUS CONTINUOUS
Status: DISCONTINUED | OUTPATIENT
Start: 2018-01-01 | End: 2018-01-01 | Stop reason: HOSPADM

## 2018-01-01 RX ORDER — ROPINIROLE 1 MG/1
1 TABLET, FILM COATED ORAL
Status: DISCONTINUED | OUTPATIENT
Start: 2018-01-01 | End: 2018-01-01 | Stop reason: HOSPADM

## 2018-01-01 RX ORDER — MAGNESIUM SULFATE HEPTAHYDRATE 40 MG/ML
4 INJECTION, SOLUTION INTRAVENOUS EVERY 4 HOURS PRN
Status: DISCONTINUED | OUTPATIENT
Start: 2018-01-01 | End: 2018-01-01 | Stop reason: HOSPADM

## 2018-01-01 RX ORDER — BUPIVACAINE HYDROCHLORIDE 2.5 MG/ML
10-30 INJECTION, SOLUTION EPIDURAL; INFILTRATION; INTRACAUDAL
Status: COMPLETED | OUTPATIENT
Start: 2018-01-01 | End: 2018-01-01

## 2018-01-01 RX ORDER — CEFAZOLIN SODIUM 1 G/50ML
1 INJECTION, SOLUTION INTRAVENOUS EVERY 8 HOURS
Status: DISCONTINUED | OUTPATIENT
Start: 2018-01-01 | End: 2018-01-01

## 2018-01-01 RX ORDER — PROCHLORPERAZINE 25 MG
12.5 SUPPOSITORY, RECTAL RECTAL EVERY 12 HOURS PRN
Status: DISCONTINUED | OUTPATIENT
Start: 2018-01-01 | End: 2018-01-01 | Stop reason: HOSPADM

## 2018-01-01 RX ORDER — METOPROLOL SUCCINATE 100 MG/1
TABLET, EXTENDED RELEASE ORAL
Qty: 60 TABLET | Refills: 11 | Status: SHIPPED | OUTPATIENT
Start: 2018-01-01 | End: 2018-01-01

## 2018-01-01 RX ORDER — BUMETANIDE 0.5 MG/1
1 TABLET ORAL EVERY EVENING
Status: DISCONTINUED | OUTPATIENT
Start: 2018-01-01 | End: 2018-01-01 | Stop reason: HOSPADM

## 2018-01-01 RX ORDER — FUROSEMIDE 10 MG/ML
40 INJECTION INTRAMUSCULAR; INTRAVENOUS EVERY 12 HOURS
Status: DISCONTINUED | OUTPATIENT
Start: 2018-01-01 | End: 2018-01-01

## 2018-01-01 RX ORDER — METOPROLOL SUCCINATE 100 MG/1
100 TABLET, EXTENDED RELEASE ORAL
Qty: 60 TABLET | Refills: 11 | Status: SHIPPED | OUTPATIENT
Start: 2018-01-01 | End: 2018-01-01

## 2018-01-01 RX ORDER — ATROPINE SULFATE 10 MG/ML
1-2 SOLUTION/ DROPS OPHTHALMIC
Qty: 1 BOTTLE | Refills: 0 | Status: SHIPPED | OUTPATIENT
Start: 2018-01-01

## 2018-01-01 RX ORDER — FENTANYL CITRATE 50 UG/ML
25-50 INJECTION, SOLUTION INTRAMUSCULAR; INTRAVENOUS
Status: DISCONTINUED | OUTPATIENT
Start: 2018-01-01 | End: 2018-01-01 | Stop reason: HOSPADM

## 2018-01-01 RX ORDER — SODIUM POLYSTYRENE SULFONATE 15 G/60ML
30 SUSPENSION ORAL; RECTAL ONCE
Status: COMPLETED | OUTPATIENT
Start: 2018-01-01 | End: 2018-01-01

## 2018-01-01 RX ORDER — BISACODYL 5 MG
5 TABLET, DELAYED RELEASE (ENTERIC COATED) ORAL DAILY PRN
Status: DISCONTINUED | OUTPATIENT
Start: 2018-01-01 | End: 2018-01-01 | Stop reason: HOSPADM

## 2018-01-01 RX ORDER — ONDANSETRON 2 MG/ML
4 INJECTION INTRAMUSCULAR; INTRAVENOUS EVERY 6 HOURS PRN
Status: DISCONTINUED | OUTPATIENT
Start: 2018-01-01 | End: 2018-01-01

## 2018-01-01 RX ORDER — ACETAMINOPHEN 325 MG/1
325-650 TABLET ORAL EVERY 4 HOURS PRN
Qty: 100 TABLET | Refills: 0 | Status: SHIPPED | OUTPATIENT
Start: 2018-01-01

## 2018-01-01 RX ORDER — POTASSIUM CL/LIDO/0.9 % NACL 10MEQ/0.1L
10 INTRAVENOUS SOLUTION, PIGGYBACK (ML) INTRAVENOUS
Status: DISCONTINUED | OUTPATIENT
Start: 2018-01-01 | End: 2018-01-01 | Stop reason: HOSPADM

## 2018-01-01 RX ORDER — CLINDAMYCIN PHOSPHATE 900 MG/50ML
900 INJECTION, SOLUTION INTRAVENOUS ONCE
Status: COMPLETED | OUTPATIENT
Start: 2018-01-01 | End: 2018-01-01

## 2018-01-01 RX ORDER — BUMETANIDE 0.25 MG/ML
1 INJECTION INTRAMUSCULAR; INTRAVENOUS ONCE
Status: COMPLETED | OUTPATIENT
Start: 2018-01-01 | End: 2018-01-01

## 2018-01-01 RX ORDER — ROPINIROLE 0.25 MG/1
0.5 TABLET, FILM COATED ORAL
Status: DISCONTINUED | OUTPATIENT
Start: 2018-01-01 | End: 2018-01-01 | Stop reason: HOSPADM

## 2018-01-01 RX ORDER — REGADENOSON 0.08 MG/ML
INJECTION, SOLUTION INTRAVENOUS
Status: COMPLETED
Start: 2018-01-01 | End: 2018-01-01

## 2018-01-01 RX ORDER — LIDOCAINE 40 MG/G
CREAM TOPICAL
Status: DISCONTINUED | OUTPATIENT
Start: 2018-01-01 | End: 2018-01-01 | Stop reason: HOSPADM

## 2018-01-01 RX ORDER — METOPROLOL SUCCINATE 50 MG/1
50 TABLET, EXTENDED RELEASE ORAL
Qty: 180 TABLET | Refills: 3 | Status: ON HOLD | OUTPATIENT
Start: 2018-01-01 | End: 2018-01-01

## 2018-01-01 RX ORDER — DILTIAZEM HYDROCHLORIDE 5 MG/ML
15 INJECTION INTRAVENOUS ONCE
Status: COMPLETED | OUTPATIENT
Start: 2018-01-01 | End: 2018-01-01

## 2018-01-01 RX ORDER — POLYETHYLENE GLYCOL 3350 17 G/17G
17 POWDER, FOR SOLUTION ORAL DAILY PRN
Status: DISCONTINUED | OUTPATIENT
Start: 2018-01-01 | End: 2018-01-01 | Stop reason: HOSPADM

## 2018-01-01 RX ORDER — POTASSIUM CHLORIDE 1500 MG/1
20 TABLET, EXTENDED RELEASE ORAL DAILY
Status: DISCONTINUED | OUTPATIENT
Start: 2018-01-01 | End: 2018-01-01

## 2018-01-01 RX ORDER — POTASSIUM CHLORIDE 1500 MG/1
20 TABLET, EXTENDED RELEASE ORAL DAILY
Qty: 30 TABLET | Refills: 3 | Status: ON HOLD | OUTPATIENT
Start: 2018-01-01 | End: 2018-01-01

## 2018-01-01 RX ORDER — OLANZAPINE 5 MG/1
5 TABLET, ORALLY DISINTEGRATING ORAL EVERY 6 HOURS PRN
Qty: 30 TABLET | Refills: 0 | Status: SHIPPED | OUTPATIENT
Start: 2018-01-01

## 2018-01-01 RX ORDER — LIDOCAINE 40 MG/G
CREAM TOPICAL
Status: CANCELLED | OUTPATIENT
Start: 2018-01-01

## 2018-01-01 RX ORDER — ADENOSINE 3 MG/ML
6-12 INJECTION, SOLUTION INTRAVENOUS EVERY 5 MIN PRN
Status: DISCONTINUED | OUTPATIENT
Start: 2018-01-01 | End: 2018-01-01 | Stop reason: HOSPADM

## 2018-01-01 RX ORDER — LORAZEPAM 0.5 MG/1
.5-1 TABLET ORAL
Status: DISCONTINUED | OUTPATIENT
Start: 2018-01-01 | End: 2018-01-01 | Stop reason: HOSPADM

## 2018-01-01 RX ORDER — PROCHLORPERAZINE 25 MG
12.5 SUPPOSITORY, RECTAL RECTAL EVERY 12 HOURS PRN
Qty: 30 SUPPOSITORY | Refills: 0 | Status: SHIPPED | OUTPATIENT
Start: 2018-01-01

## 2018-01-01 RX ORDER — ROPINIROLE 0.5 MG/1
TABLET, FILM COATED ORAL
Qty: 30 TABLET | Refills: 5 | Status: SHIPPED | OUTPATIENT
Start: 2018-01-01 | End: 2018-01-01

## 2018-01-01 RX ORDER — FUROSEMIDE 10 MG/ML
40 INJECTION INTRAMUSCULAR; INTRAVENOUS ONCE
Status: COMPLETED | OUTPATIENT
Start: 2018-01-01 | End: 2018-01-01

## 2018-01-01 RX ORDER — PREDNISONE 1 MG/1
TABLET ORAL
Qty: 120 TABLET | Refills: 3 | COMMUNITY
Start: 2018-01-01 | End: 2018-01-01

## 2018-01-01 RX ORDER — CLINDAMYCIN PHOSPHATE 900 MG/50ML
900 INJECTION, SOLUTION INTRAVENOUS
Status: COMPLETED | OUTPATIENT
Start: 2018-01-01 | End: 2018-01-01

## 2018-01-01 RX ORDER — POTASSIUM CHLORIDE 7.45 MG/ML
10 INJECTION INTRAVENOUS
Status: DISCONTINUED | OUTPATIENT
Start: 2018-01-01 | End: 2018-01-01 | Stop reason: HOSPADM

## 2018-01-01 RX ORDER — POTASSIUM CHLORIDE 1500 MG/1
20-40 TABLET, EXTENDED RELEASE ORAL
Status: DISCONTINUED | OUTPATIENT
Start: 2018-01-01 | End: 2018-01-01 | Stop reason: HOSPADM

## 2018-01-01 RX ORDER — BUMETANIDE 2 MG/1
2 TABLET ORAL EVERY MORNING
Status: DISCONTINUED | OUTPATIENT
Start: 2018-01-01 | End: 2018-01-01 | Stop reason: HOSPADM

## 2018-01-01 RX ORDER — LORAZEPAM 2 MG/ML
.5-1 INJECTION INTRAMUSCULAR EVERY 4 HOURS PRN
Status: DISCONTINUED | OUTPATIENT
Start: 2018-01-01 | End: 2018-01-01 | Stop reason: HOSPADM

## 2018-01-01 RX ORDER — NALOXONE HYDROCHLORIDE 0.4 MG/ML
.1-.4 INJECTION, SOLUTION INTRAMUSCULAR; INTRAVENOUS; SUBCUTANEOUS
Status: DISCONTINUED | OUTPATIENT
Start: 2018-01-01 | End: 2018-01-01 | Stop reason: HOSPADM

## 2018-01-01 RX ORDER — BUMETANIDE 1 MG/1
1 TABLET ORAL DAILY
Qty: 45 TABLET | Refills: 3 | Status: SHIPPED | OUTPATIENT
Start: 2018-01-01 | End: 2018-01-01

## 2018-01-01 RX ORDER — LIDOCAINE 40 MG/G
CREAM TOPICAL
Status: DISCONTINUED | OUTPATIENT
Start: 2018-01-01 | End: 2018-01-01 | Stop reason: CLARIF

## 2018-01-01 RX ORDER — MORPHINE SULFATE 100 MG/5ML
5-10 SOLUTION ORAL
Qty: 1 BOTTLE | Refills: 0 | Status: SHIPPED | OUTPATIENT
Start: 2018-01-01

## 2018-01-01 RX ORDER — ASCORBIC ACID 500 MG
500 TABLET ORAL DAILY
COMMUNITY
End: 2018-01-01

## 2018-01-01 RX ORDER — NALOXONE HYDROCHLORIDE 0.4 MG/ML
0.4 INJECTION, SOLUTION INTRAMUSCULAR; INTRAVENOUS; SUBCUTANEOUS EVERY 5 MIN PRN
Status: DISCONTINUED | OUTPATIENT
Start: 2018-01-01 | End: 2018-01-01 | Stop reason: HOSPADM

## 2018-01-01 RX ORDER — ROPINIROLE 1 MG/1
1.5 TABLET, FILM COATED ORAL AT BEDTIME
Qty: 3 TABLET | Refills: 0 | Status: SHIPPED | OUTPATIENT
Start: 2018-01-01 | End: 2018-01-01

## 2018-01-01 RX ORDER — BUMETANIDE 1 MG/1
1 TABLET ORAL 2 TIMES DAILY
Status: ON HOLD | COMMUNITY
End: 2018-01-01

## 2018-01-01 RX ORDER — IOPAMIDOL 755 MG/ML
500 INJECTION, SOLUTION INTRAVASCULAR ONCE
Status: COMPLETED | OUTPATIENT
Start: 2018-01-01 | End: 2018-01-01

## 2018-01-01 RX ORDER — KETOROLAC TROMETHAMINE 15 MG/ML
15 INJECTION, SOLUTION INTRAMUSCULAR; INTRAVENOUS
Status: DISCONTINUED | OUTPATIENT
Start: 2018-01-01 | End: 2018-01-01 | Stop reason: HOSPADM

## 2018-01-01 RX ORDER — BUMETANIDE 1 MG/1
2 TABLET ORAL EVERY MORNING
COMMUNITY
End: 2018-01-01

## 2018-01-01 RX ORDER — LISINOPRIL 2.5 MG/1
2.5 TABLET ORAL DAILY
Qty: 90 TABLET | Refills: 0 | Status: SHIPPED | OUTPATIENT
Start: 2018-01-01 | End: 2018-01-01

## 2018-01-01 RX ORDER — ACETAMINOPHEN 325 MG/1
650 TABLET ORAL EVERY 4 HOURS PRN
Status: DISCONTINUED | OUTPATIENT
Start: 2018-01-01 | End: 2018-01-01 | Stop reason: HOSPADM

## 2018-01-01 RX ORDER — METOPROLOL SUCCINATE 50 MG/1
100 TABLET, EXTENDED RELEASE ORAL DAILY
Qty: 180 TABLET | Refills: 0 | Status: ON HOLD | OUTPATIENT
Start: 2018-01-01 | End: 2018-01-01

## 2018-01-01 RX ORDER — DILTIAZEM HYDROCHLORIDE 60 MG/1
120 CAPSULE, EXTENDED RELEASE ORAL 2 TIMES DAILY
Status: DISCONTINUED | OUTPATIENT
Start: 2018-01-01 | End: 2018-01-01 | Stop reason: HOSPADM

## 2018-01-01 RX ORDER — LEVOFLOXACIN 5 MG/ML
500 INJECTION, SOLUTION INTRAVENOUS ONCE
Status: COMPLETED | OUTPATIENT
Start: 2018-01-01 | End: 2018-01-01

## 2018-01-01 RX ORDER — METOPROLOL SUCCINATE 50 MG/1
50 TABLET, EXTENDED RELEASE ORAL
Status: DISCONTINUED | OUTPATIENT
Start: 2018-01-01 | End: 2018-01-01 | Stop reason: HOSPADM

## 2018-01-01 RX ORDER — PROTAMINE SULFATE 10 MG/ML
5-40 INJECTION, SOLUTION INTRAVENOUS EVERY 10 MIN PRN
Status: DISCONTINUED | OUTPATIENT
Start: 2018-01-01 | End: 2018-01-01 | Stop reason: HOSPADM

## 2018-01-01 RX ORDER — ATROPINE SULFATE 10 MG/ML
1-2 SOLUTION/ DROPS OPHTHALMIC
Status: DISCONTINUED | OUTPATIENT
Start: 2018-01-01 | End: 2018-01-01 | Stop reason: HOSPADM

## 2018-01-01 RX ORDER — METOPROLOL SUCCINATE 100 MG/1
100 TABLET, EXTENDED RELEASE ORAL DAILY
Status: DISCONTINUED | OUTPATIENT
Start: 2018-01-01 | End: 2018-01-01 | Stop reason: HOSPADM

## 2018-01-01 RX ORDER — BISACODYL 5 MG
10 TABLET, DELAYED RELEASE (ENTERIC COATED) ORAL DAILY PRN
Status: DISCONTINUED | OUTPATIENT
Start: 2018-01-01 | End: 2018-01-01 | Stop reason: HOSPADM

## 2018-01-01 RX ORDER — HEPARIN SODIUM,PORCINE 10 UNIT/ML
2-5 VIAL (ML) INTRAVENOUS
Status: DISCONTINUED | OUTPATIENT
Start: 2018-01-01 | End: 2018-01-01 | Stop reason: CLARIF

## 2018-01-01 RX ORDER — MORPHINE SULFATE 2 MG/ML
1-2 INJECTION, SOLUTION INTRAMUSCULAR; INTRAVENOUS EVERY 5 MIN PRN
Status: DISCONTINUED | OUTPATIENT
Start: 2018-01-01 | End: 2018-01-01 | Stop reason: HOSPADM

## 2018-01-01 RX ORDER — BUMETANIDE 0.5 MG/1
TABLET ORAL
Qty: 90 TABLET | Refills: 1 | OUTPATIENT
Start: 2018-01-01

## 2018-01-01 RX ORDER — RIVAROXABAN 20 MG/1
TABLET, FILM COATED ORAL
Qty: 30 TABLET | Refills: 8 | Status: SHIPPED | OUTPATIENT
Start: 2018-01-01 | End: 2018-01-01

## 2018-01-01 RX ORDER — METOPROLOL SUCCINATE 100 MG/1
100 TABLET, EXTENDED RELEASE ORAL
Status: DISCONTINUED | OUTPATIENT
Start: 2018-01-01 | End: 2018-01-01 | Stop reason: HOSPADM

## 2018-01-01 RX ORDER — DILTIAZEM HYDROCHLORIDE 30 MG/1
60 TABLET, FILM COATED ORAL EVERY 4 HOURS PRN
Status: DISCONTINUED | OUTPATIENT
Start: 2018-01-01 | End: 2018-01-01 | Stop reason: HOSPADM

## 2018-01-01 RX ORDER — BUMETANIDE 1 MG/1
2 TABLET ORAL EVERY MORNING
Status: DISCONTINUED | OUTPATIENT
Start: 2018-01-01 | End: 2018-01-01 | Stop reason: HOSPADM

## 2018-01-01 RX ORDER — ALBUMIN (HUMAN) 12.5 G/50ML
SOLUTION INTRAVENOUS
Status: DISCONTINUED
Start: 2018-01-01 | End: 2018-01-01 | Stop reason: HOSPADM

## 2018-01-01 RX ORDER — DIGOXIN 125 MCG
125 TABLET ORAL DAILY
Status: DISCONTINUED | OUTPATIENT
Start: 2018-01-01 | End: 2018-01-01 | Stop reason: HOSPADM

## 2018-01-01 RX ORDER — HYDROMORPHONE HCL/0.9% NACL/PF 0.2MG/0.2
0.2 SYRINGE (ML) INTRAVENOUS ONCE
Status: COMPLETED | OUTPATIENT
Start: 2018-01-01 | End: 2018-01-01

## 2018-01-01 RX ORDER — DIGOXIN 125 MCG
125 TABLET ORAL DAILY
Qty: 30 TABLET | Refills: 1 | Status: ON HOLD | OUTPATIENT
Start: 2018-01-01 | End: 2018-01-01

## 2018-01-01 RX ORDER — METOPROLOL SUCCINATE 50 MG/1
50 TABLET, EXTENDED RELEASE ORAL
Status: DISCONTINUED | OUTPATIENT
Start: 2018-01-01 | End: 2018-01-01

## 2018-01-01 RX ORDER — SODIUM CHLORIDE 450 MG/100ML
INJECTION, SOLUTION INTRAVENOUS CONTINUOUS
Status: CANCELLED | OUTPATIENT
Start: 2018-01-01

## 2018-01-01 RX ORDER — HEPARIN SODIUM 1000 [USP'U]/ML
1000-10000 INJECTION, SOLUTION INTRAVENOUS; SUBCUTANEOUS EVERY 5 MIN PRN
Status: DISCONTINUED | OUTPATIENT
Start: 2018-01-01 | End: 2018-01-01 | Stop reason: HOSPADM

## 2018-01-01 RX ORDER — HALOPERIDOL 5 MG/ML
2 INJECTION INTRAMUSCULAR EVERY 6 HOURS PRN
Status: DISCONTINUED | OUTPATIENT
Start: 2018-01-01 | End: 2018-01-01 | Stop reason: HOSPADM

## 2018-01-01 RX ORDER — OXYCODONE AND ACETAMINOPHEN 5; 325 MG/1; MG/1
1 TABLET ORAL EVERY 4 HOURS PRN
Status: DISCONTINUED | OUTPATIENT
Start: 2018-01-01 | End: 2018-01-01 | Stop reason: HOSPADM

## 2018-01-01 RX ORDER — BUMETANIDE 1 MG/1
TABLET ORAL
Qty: 90 TABLET | Refills: 3 | Status: ON HOLD | OUTPATIENT
Start: 2018-01-01 | End: 2018-01-01

## 2018-01-01 RX ORDER — CHOLECALCIFEROL (VITAMIN D3) 25 MCG
3 TABLET ORAL
Qty: 30 TABLET | Refills: 0 | Status: SHIPPED | OUTPATIENT
Start: 2018-01-01 | End: 2018-01-01

## 2018-01-01 RX ORDER — CEFAZOLIN SODIUM 1 G/50ML
1 INJECTION, SOLUTION INTRAVENOUS EVERY 12 HOURS
Status: DISCONTINUED | OUTPATIENT
Start: 2018-01-01 | End: 2018-01-01

## 2018-01-01 RX ORDER — BISACODYL 10 MG
10 SUPPOSITORY, RECTAL RECTAL DAILY PRN
Status: DISCONTINUED | OUTPATIENT
Start: 2018-01-01 | End: 2018-01-01 | Stop reason: HOSPADM

## 2018-01-01 RX ORDER — FUROSEMIDE 10 MG/ML
20-100 INJECTION INTRAMUSCULAR; INTRAVENOUS
Status: DISCONTINUED | OUTPATIENT
Start: 2018-01-01 | End: 2018-01-01 | Stop reason: HOSPADM

## 2018-01-01 RX ORDER — DILTIAZEM HYDROCHLORIDE 240 MG/1
240 CAPSULE, COATED, EXTENDED RELEASE ORAL DAILY
Qty: 30 CAPSULE | Refills: 1 | Status: ON HOLD | OUTPATIENT
Start: 2018-01-01 | End: 2018-01-01

## 2018-01-01 RX ORDER — DILTIAZEM HYDROCHLORIDE 5 MG/ML
10 INJECTION INTRAVENOUS ONCE
Status: DISCONTINUED | OUTPATIENT
Start: 2018-01-01 | End: 2018-01-01

## 2018-01-01 RX ORDER — DIGOXIN 0.25 MG/ML
125 INJECTION INTRAMUSCULAR; INTRAVENOUS EVERY 6 HOURS
Status: COMPLETED | OUTPATIENT
Start: 2018-01-01 | End: 2018-01-01

## 2018-01-01 RX ORDER — VANCOMYCIN HYDROCHLORIDE 1 G/200ML
1000 INJECTION, SOLUTION INTRAVENOUS ONCE
Status: COMPLETED | OUTPATIENT
Start: 2018-01-01 | End: 2018-01-01

## 2018-01-01 RX ORDER — NOREPINEPHRINE BITARTRATE/D5W 16MG/250ML
PLASTIC BAG, INJECTION (ML) INTRAVENOUS
Status: DISCONTINUED
Start: 2018-01-01 | End: 2018-01-01 | Stop reason: HOSPADM

## 2018-01-01 RX ORDER — ONDANSETRON 4 MG/1
4 TABLET, ORALLY DISINTEGRATING ORAL EVERY 6 HOURS PRN
Status: DISCONTINUED | OUTPATIENT
Start: 2018-01-01 | End: 2018-01-01 | Stop reason: HOSPADM

## 2018-01-01 RX ORDER — DILTIAZEM HYDROCHLORIDE 5 MG/ML
10 INJECTION INTRAVENOUS ONCE
Status: COMPLETED | OUTPATIENT
Start: 2018-01-01 | End: 2018-01-01

## 2018-01-01 RX ORDER — CLINDAMYCIN PHOSPHATE 900 MG/50ML
900 INJECTION, SOLUTION INTRAVENOUS
Status: CANCELLED | OUTPATIENT
Start: 2018-01-01

## 2018-01-01 RX ORDER — LISINOPRIL 2.5 MG/1
2.5 TABLET ORAL DAILY
Qty: 30 TABLET | Refills: 3 | Status: SHIPPED | OUTPATIENT
Start: 2018-01-01 | End: 2018-01-01

## 2018-01-01 RX ORDER — BISACODYL 5 MG
15 TABLET, DELAYED RELEASE (ENTERIC COATED) ORAL DAILY PRN
Status: DISCONTINUED | OUTPATIENT
Start: 2018-01-01 | End: 2018-01-01 | Stop reason: HOSPADM

## 2018-01-01 RX ORDER — BUMETANIDE 2 MG/1
2 TABLET ORAL DAILY
Status: DISCONTINUED | OUTPATIENT
Start: 2018-01-01 | End: 2018-01-01 | Stop reason: HOSPADM

## 2018-01-01 RX ORDER — LIDOCAINE HYDROCHLORIDE AND EPINEPHRINE 10; 10 MG/ML; UG/ML
10-30 INJECTION, SOLUTION INFILTRATION; PERINEURAL
Status: DISCONTINUED | OUTPATIENT
Start: 2018-01-01 | End: 2018-01-01 | Stop reason: HOSPADM

## 2018-01-01 RX ORDER — MORPHINE SULFATE 4 MG/ML
0.5 INJECTION, SOLUTION INTRAMUSCULAR; INTRAVENOUS ONCE
Status: COMPLETED | OUTPATIENT
Start: 2018-01-01 | End: 2018-01-01

## 2018-01-01 RX ORDER — DILTIAZEM HYDROCHLORIDE 120 MG/1
120 CAPSULE, COATED, EXTENDED RELEASE ORAL DAILY
Status: DISCONTINUED | OUTPATIENT
Start: 2018-01-01 | End: 2018-01-01

## 2018-01-01 RX ORDER — POTASSIUM CHLORIDE 29.8 MG/ML
20 INJECTION INTRAVENOUS
Status: DISCONTINUED | OUTPATIENT
Start: 2018-01-01 | End: 2018-01-01 | Stop reason: HOSPADM

## 2018-01-01 RX ORDER — ACETAMINOPHEN 325 MG/1
325-650 TABLET ORAL EVERY 4 HOURS PRN
Status: DISCONTINUED | OUTPATIENT
Start: 2018-01-01 | End: 2018-01-01 | Stop reason: HOSPADM

## 2018-01-01 RX ORDER — ONDANSETRON 4 MG/1
4 TABLET, ORALLY DISINTEGRATING ORAL EVERY 6 HOURS PRN
Status: DISCONTINUED | OUTPATIENT
Start: 2018-01-01 | End: 2018-01-01

## 2018-01-01 RX ORDER — VITAMIN E 268 MG
400 CAPSULE ORAL DAILY
COMMUNITY
End: 2018-01-01

## 2018-01-01 RX ORDER — DILTIAZEM HYDROCHLORIDE 120 MG/1
240 CAPSULE, COATED, EXTENDED RELEASE ORAL DAILY
Status: DISCONTINUED | OUTPATIENT
Start: 2018-01-01 | End: 2018-01-01 | Stop reason: HOSPADM

## 2018-01-01 RX ORDER — ROPINIROLE 0.5 MG/1
0.5 TABLET, FILM COATED ORAL
Status: ON HOLD | COMMUNITY
End: 2018-01-01

## 2018-01-01 RX ORDER — IBUTILIDE FUMARATE 1 MG/10ML
1 INJECTION, SOLUTION INTRAVENOUS
Status: DISCONTINUED | OUTPATIENT
Start: 2018-01-01 | End: 2018-01-01 | Stop reason: HOSPADM

## 2018-01-01 RX ORDER — ONDANSETRON 2 MG/ML
4 INJECTION INTRAMUSCULAR; INTRAVENOUS EVERY 6 HOURS PRN
Status: DISCONTINUED | OUTPATIENT
Start: 2018-01-01 | End: 2018-01-01 | Stop reason: HOSPADM

## 2018-01-01 RX ORDER — ACETAMINOPHEN 325 MG/1
325-650 TABLET ORAL EVERY 4 HOURS PRN
Status: ON HOLD | COMMUNITY
End: 2018-01-01

## 2018-01-01 RX ORDER — FLUMAZENIL 0.1 MG/ML
0.2 INJECTION, SOLUTION INTRAVENOUS
Status: DISCONTINUED | OUTPATIENT
Start: 2018-01-01 | End: 2018-01-01 | Stop reason: HOSPADM

## 2018-01-01 RX ORDER — METOPROLOL SUCCINATE 50 MG/1
TABLET, EXTENDED RELEASE ORAL
Qty: 90 TABLET | Refills: 1 | Status: SHIPPED | OUTPATIENT
Start: 2018-01-01 | End: 2018-01-01

## 2018-01-01 RX ORDER — ONDANSETRON 2 MG/ML
4 INJECTION INTRAMUSCULAR; INTRAVENOUS EVERY 4 HOURS PRN
Status: DISCONTINUED | OUTPATIENT
Start: 2018-01-01 | End: 2018-01-01 | Stop reason: HOSPADM

## 2018-01-01 RX ORDER — NOREPINEPHRINE BITARTRATE/D5W 16MG/250ML
.03-.4 PLASTIC BAG, INJECTION (ML) INTRAVENOUS CONTINUOUS
Status: DISCONTINUED | OUTPATIENT
Start: 2018-01-01 | End: 2018-01-01

## 2018-01-01 RX ORDER — ATROPINE SULFATE 0.1 MG/ML
.5-1 INJECTION INTRAVENOUS
Status: DISCONTINUED | OUTPATIENT
Start: 2018-01-01 | End: 2018-01-01 | Stop reason: HOSPADM

## 2018-01-01 RX ORDER — VIT A/VIT C/VIT E/ZINC/COPPER 4296-226
CAPSULE ORAL
Qty: 30 CAPSULE | Status: ON HOLD | COMMUNITY
Start: 2018-01-01 | End: 2018-01-01

## 2018-01-01 RX ORDER — DOBUTAMINE HYDROCHLORIDE 200 MG/100ML
5-40 INJECTION INTRAVENOUS CONTINUOUS PRN
Status: DISCONTINUED | OUTPATIENT
Start: 2018-01-01 | End: 2018-01-01 | Stop reason: HOSPADM

## 2018-01-01 RX ORDER — MAGNESIUM SULFATE HEPTAHYDRATE 40 MG/ML
2 INJECTION, SOLUTION INTRAVENOUS DAILY PRN
Status: DISCONTINUED | OUTPATIENT
Start: 2018-01-01 | End: 2018-01-01 | Stop reason: HOSPADM

## 2018-01-01 RX ORDER — ROPINIROLE 1 MG/1
2 TABLET, FILM COATED ORAL AT BEDTIME
Status: DISCONTINUED | OUTPATIENT
Start: 2018-01-01 | End: 2018-01-01 | Stop reason: HOSPADM

## 2018-01-01 RX ORDER — DILTIAZEM HYDROCHLORIDE 180 MG/1
180 CAPSULE, COATED, EXTENDED RELEASE ORAL DAILY
Status: DISCONTINUED | OUTPATIENT
Start: 2018-01-01 | End: 2018-01-01

## 2018-01-01 RX ORDER — MORPHINE SULFATE 4 MG/ML
1-2 INJECTION, SOLUTION INTRAMUSCULAR; INTRAVENOUS
Status: DISCONTINUED | OUTPATIENT
Start: 2018-01-01 | End: 2018-01-01 | Stop reason: HOSPADM

## 2018-01-01 RX ORDER — MORPHINE SULFATE 10 MG/5ML
5-10 SOLUTION ORAL
Status: DISCONTINUED | OUTPATIENT
Start: 2018-01-01 | End: 2018-01-01 | Stop reason: HOSPADM

## 2018-01-01 RX ORDER — PREDNISONE 1 MG/1
TABLET ORAL
Qty: 120 TABLET | Refills: 3 | Status: SHIPPED | OUTPATIENT
Start: 2018-01-01 | End: 2018-01-01

## 2018-01-01 RX ORDER — MORPHINE SULFATE 100 MG/5ML
5-10 SOLUTION ORAL
Status: DISCONTINUED | OUTPATIENT
Start: 2018-01-01 | End: 2018-01-01 | Stop reason: HOSPADM

## 2018-01-01 RX ORDER — ONDANSETRON 4 MG/1
4 TABLET, ORALLY DISINTEGRATING ORAL EVERY 6 HOURS PRN
Qty: 120 TABLET | Refills: 0 | Status: SHIPPED | OUTPATIENT
Start: 2018-01-01

## 2018-01-01 RX ORDER — PROCHLORPERAZINE MALEATE 5 MG
5 TABLET ORAL EVERY 6 HOURS PRN
Status: DISCONTINUED | OUTPATIENT
Start: 2018-01-01 | End: 2018-01-01 | Stop reason: HOSPADM

## 2018-01-01 RX ORDER — CEPHALEXIN 500 MG/1
500 CAPSULE ORAL 4 TIMES DAILY
Qty: 28 CAPSULE | Refills: 0 | Status: SHIPPED | OUTPATIENT
Start: 2018-01-01 | End: 2018-01-01

## 2018-01-01 RX ORDER — SODIUM CHLORIDE 9 MG/ML
INJECTION, SOLUTION INTRAVENOUS CONTINUOUS
Status: DISCONTINUED | OUTPATIENT
Start: 2018-01-01 | End: 2018-01-01

## 2018-01-01 RX ORDER — IBUTILIDE FUMARATE 1 MG/10ML
0.01 INJECTION, SOLUTION INTRAVENOUS
Status: DISCONTINUED | OUTPATIENT
Start: 2018-01-01 | End: 2018-01-01 | Stop reason: HOSPADM

## 2018-01-01 RX ORDER — LORAZEPAM 2 MG/ML
.5-2 INJECTION INTRAMUSCULAR EVERY 10 MIN PRN
Status: DISCONTINUED | OUTPATIENT
Start: 2018-01-01 | End: 2018-01-01 | Stop reason: HOSPADM

## 2018-01-01 RX ORDER — BUPIVACAINE HYDROCHLORIDE 2.5 MG/ML
10-30 INJECTION, SOLUTION EPIDURAL; INFILTRATION; INTRACAUDAL
Status: DISCONTINUED | OUTPATIENT
Start: 2018-01-01 | End: 2018-01-01 | Stop reason: HOSPADM

## 2018-01-01 RX ORDER — PROTAMINE SULFATE 10 MG/ML
1-5 INJECTION, SOLUTION INTRAVENOUS
Status: DISCONTINUED | OUTPATIENT
Start: 2018-01-01 | End: 2018-01-01 | Stop reason: HOSPADM

## 2018-01-01 RX ORDER — LEVOFLOXACIN 5 MG/ML
250 INJECTION, SOLUTION INTRAVENOUS
Status: DISCONTINUED | OUTPATIENT
Start: 2018-01-01 | End: 2018-01-01

## 2018-01-01 RX ORDER — DILTIAZEM HYDROCHLORIDE 60 MG/1
60 CAPSULE, EXTENDED RELEASE ORAL ONCE
Status: DISCONTINUED | OUTPATIENT
Start: 2018-01-01 | End: 2018-01-01 | Stop reason: HOSPADM

## 2018-01-01 RX ADMIN — DILTIAZEM HYDROCHLORIDE 120 MG: 60 CAPSULE, EXTENDED RELEASE ORAL at 09:04

## 2018-01-01 RX ADMIN — METOPROLOL SUCCINATE 100 MG: 100 TABLET, EXTENDED RELEASE ORAL at 18:37

## 2018-01-01 RX ADMIN — POTASSIUM CHLORIDE 20 MEQ: 1500 TABLET, EXTENDED RELEASE ORAL at 09:20

## 2018-01-01 RX ADMIN — ACETAMINOPHEN 650 MG: 325 TABLET, FILM COATED ORAL at 01:33

## 2018-01-01 RX ADMIN — SODIUM CHLORIDE 500 ML: 9 INJECTION, SOLUTION INTRAVENOUS at 01:19

## 2018-01-01 RX ADMIN — CEFAZOLIN SODIUM 1 G: 1 INJECTION, SOLUTION INTRAVENOUS at 01:26

## 2018-01-01 RX ADMIN — POTASSIUM CHLORIDE 20 MEQ: 1500 TABLET, EXTENDED RELEASE ORAL at 09:24

## 2018-01-01 RX ADMIN — BUPIVACAINE HYDROCHLORIDE 25 MG: 2.5 INJECTION, SOLUTION EPIDURAL; INFILTRATION; INTRACAUDAL at 14:00

## 2018-01-01 RX ADMIN — LISINOPRIL 2.5 MG: 2.5 TABLET ORAL at 14:18

## 2018-01-01 RX ADMIN — BUMETANIDE 2 MG: 1 TABLET ORAL at 09:45

## 2018-01-01 RX ADMIN — SODIUM CHLORIDE 80 ML: 9 INJECTION, SOLUTION INTRAVENOUS at 20:42

## 2018-01-01 RX ADMIN — Medication 2.5 MG: at 04:32

## 2018-01-01 RX ADMIN — DIGOXIN 125 MCG: 0.12 TABLET ORAL at 07:44

## 2018-01-01 RX ADMIN — CLINDAMYCIN PHOSPHATE 600 MG: 600 INJECTION, SOLUTION INTRAVENOUS at 04:15

## 2018-01-01 RX ADMIN — ATROPINE SULFATE 1 DROP: 10 SOLUTION/ DROPS OPHTHALMIC at 17:47

## 2018-01-01 RX ADMIN — DILTIAZEM HYDROCHLORIDE 60 MG: 30 TABLET, FILM COATED ORAL at 01:41

## 2018-01-01 RX ADMIN — DILTIAZEM HYDROCHLORIDE 10 MG: 5 INJECTION INTRAVENOUS at 01:17

## 2018-01-01 RX ADMIN — RIVAROXABAN 15 MG: 15 TABLET, FILM COATED ORAL at 16:12

## 2018-01-01 RX ADMIN — DILTIAZEM HYDROCHLORIDE 2.5 MG/HR: 5 INJECTION INTRAVENOUS at 23:56

## 2018-01-01 RX ADMIN — FUROSEMIDE 40 MG: 10 INJECTION, SOLUTION INTRAVENOUS at 00:30

## 2018-01-01 RX ADMIN — DEXTRAN 70, AND HYPROMELLOSE 2910 1 DROP: 1; 3 SOLUTION/ DROPS OPHTHALMIC at 08:24

## 2018-01-01 RX ADMIN — BUMETANIDE 1 MG: 0.5 TABLET ORAL at 00:45

## 2018-01-01 RX ADMIN — SODIUM POLYSTYRENE SULFONATE 30 G: 15 SUSPENSION ORAL; RECTAL at 17:26

## 2018-01-01 RX ADMIN — RIVAROXABAN 15 MG: 15 TABLET, FILM COATED ORAL at 17:58

## 2018-01-01 RX ADMIN — BUMETANIDE 2 MG: 2 TABLET ORAL at 09:16

## 2018-01-01 RX ADMIN — MORPHINE SULFATE 1 MG: 4 INJECTION INTRAVENOUS at 21:46

## 2018-01-01 RX ADMIN — ROPINIROLE 2 MG: 1 TABLET, FILM COATED ORAL at 21:41

## 2018-01-01 RX ADMIN — MORPHINE SULFATE 1 MG: 4 INJECTION INTRAVENOUS at 22:59

## 2018-01-01 RX ADMIN — FUROSEMIDE 40 MG: 10 INJECTION, SOLUTION INTRAVENOUS at 08:50

## 2018-01-01 RX ADMIN — MORPHINE SULFATE 1 MG: 4 INJECTION INTRAVENOUS at 12:55

## 2018-01-01 RX ADMIN — FENTANYL CITRATE 50 MCG: 50 INJECTION, SOLUTION INTRAMUSCULAR; INTRAVENOUS at 13:50

## 2018-01-01 RX ADMIN — DEXTRAN 70, AND HYPROMELLOSE 2910 1 DROP: 1; 3 SOLUTION/ DROPS OPHTHALMIC at 12:52

## 2018-01-01 RX ADMIN — BUMETANIDE 1 MG: 0.25 INJECTION INTRAMUSCULAR; INTRAVENOUS at 20:42

## 2018-01-01 RX ADMIN — IOPAMIDOL 48 ML: 755 INJECTION, SOLUTION INTRAVENOUS at 08:34

## 2018-01-01 RX ADMIN — METOPROLOL SUCCINATE 100 MG: 100 TABLET, EXTENDED RELEASE ORAL at 09:45

## 2018-01-01 RX ADMIN — BACITRACIN 25000 UNITS: 5000 INJECTION, POWDER, FOR SOLUTION INTRAMUSCULAR at 14:49

## 2018-01-01 RX ADMIN — Medication 2.5 MG: at 18:04

## 2018-01-01 RX ADMIN — BUMETANIDE 1 MG: 0.5 TABLET ORAL at 09:24

## 2018-01-01 RX ADMIN — CLINDAMYCIN PHOSPHATE 600 MG: 600 INJECTION, SOLUTION INTRAVENOUS at 21:21

## 2018-01-01 RX ADMIN — METOPROLOL SUCCINATE 50 MG: 50 TABLET, EXTENDED RELEASE ORAL at 09:23

## 2018-01-01 RX ADMIN — ACETAMINOPHEN 650 MG: 325 TABLET, FILM COATED ORAL at 09:05

## 2018-01-01 RX ADMIN — DILTIAZEM HYDROCHLORIDE 15 MG: 5 INJECTION INTRAVENOUS at 23:12

## 2018-01-01 RX ADMIN — FUROSEMIDE 40 MG: 10 INJECTION, SOLUTION INTRAVENOUS at 11:29

## 2018-01-01 RX ADMIN — METOPROLOL SUCCINATE 50 MG: 50 TABLET, EXTENDED RELEASE ORAL at 16:51

## 2018-01-01 RX ADMIN — VANCOMYCIN HYDROCHLORIDE 1000 MG: 1 INJECTION, SOLUTION INTRAVENOUS at 04:41

## 2018-01-01 RX ADMIN — BUMETANIDE 2 MG: 2 TABLET ORAL at 07:44

## 2018-01-01 RX ADMIN — ACETAMINOPHEN 650 MG: 325 TABLET, FILM COATED ORAL at 22:03

## 2018-01-01 RX ADMIN — FENTANYL CITRATE 25 MCG: 50 INJECTION, SOLUTION INTRAMUSCULAR; INTRAVENOUS at 15:00

## 2018-01-01 RX ADMIN — FENTANYL CITRATE 25 MCG: 50 INJECTION, SOLUTION INTRAMUSCULAR; INTRAVENOUS at 14:30

## 2018-01-01 RX ADMIN — ACETAMINOPHEN 650 MG: 325 TABLET, FILM COATED ORAL at 00:03

## 2018-01-01 RX ADMIN — METOPROLOL SUCCINATE 100 MG: 100 TABLET, EXTENDED RELEASE ORAL at 16:53

## 2018-01-01 RX ADMIN — IOHEXOL 10 ML: 240 INJECTION, SOLUTION INTRATHECAL; INTRAVASCULAR; INTRAVENOUS; ORAL at 15:00

## 2018-01-01 RX ADMIN — METOPROLOL SUCCINATE 100 MG: 100 TABLET, EXTENDED RELEASE ORAL at 08:01

## 2018-01-01 RX ADMIN — LISINOPRIL 2.5 MG: 2.5 TABLET ORAL at 09:15

## 2018-01-01 RX ADMIN — LIDOCAINE HYDROCHLORIDE 10 ML: 10 INJECTION, SOLUTION EPIDURAL; INFILTRATION; INTRACAUDAL; PERINEURAL at 14:00

## 2018-01-01 RX ADMIN — DILTIAZEM HYDROCHLORIDE 5 MG/HR: 5 INJECTION INTRAVENOUS at 11:44

## 2018-01-01 RX ADMIN — CEFAZOLIN SODIUM 1 G: 1 INJECTION, SOLUTION INTRAVENOUS at 00:57

## 2018-01-01 RX ADMIN — SODIUM CHLORIDE: 9 INJECTION, SOLUTION INTRAVENOUS at 01:40

## 2018-01-01 RX ADMIN — Medication 2.5 MG: at 00:45

## 2018-01-01 RX ADMIN — ACETAMINOPHEN 650 MG: 325 TABLET, FILM COATED ORAL at 00:14

## 2018-01-01 RX ADMIN — CLINDAMYCIN PHOSPHATE 600 MG: 600 INJECTION, SOLUTION INTRAVENOUS at 12:58

## 2018-01-01 RX ADMIN — FUROSEMIDE 40 MG: 10 INJECTION, SOLUTION INTRAVENOUS at 18:26

## 2018-01-01 RX ADMIN — ACETAMINOPHEN 650 MG: 325 TABLET, FILM COATED ORAL at 02:24

## 2018-01-01 RX ADMIN — BUMETANIDE 2 MG: 2 TABLET ORAL at 08:28

## 2018-01-01 RX ADMIN — LISINOPRIL 2.5 MG: 2.5 TABLET ORAL at 08:50

## 2018-01-01 RX ADMIN — TETROFOSMIN 31 MCI.: 1.38 INJECTION, POWDER, LYOPHILIZED, FOR SOLUTION INTRAVENOUS at 15:10

## 2018-01-01 RX ADMIN — SODIUM CHLORIDE 500 ML: 9 INJECTION, SOLUTION INTRAVENOUS at 01:29

## 2018-01-01 RX ADMIN — CLINDAMYCIN PHOSPHATE 900 MG: 18 INJECTION, SOLUTION INTRAVENOUS at 13:40

## 2018-01-01 RX ADMIN — DILTIAZEM HYDROCHLORIDE 120 MG: 60 CAPSULE, EXTENDED RELEASE ORAL at 09:16

## 2018-01-01 RX ADMIN — IOPAMIDOL 100 ML: 755 INJECTION, SOLUTION INTRAVENOUS at 20:42

## 2018-01-01 RX ADMIN — TETROFOSMIN 10.5 MCI.: 1.38 INJECTION, POWDER, LYOPHILIZED, FOR SOLUTION INTRAVENOUS at 13:06

## 2018-01-01 RX ADMIN — Medication 3 ML: at 15:00

## 2018-01-01 RX ADMIN — MIDAZOLAM 1 MG: 1 INJECTION INTRAMUSCULAR; INTRAVENOUS at 13:50

## 2018-01-01 RX ADMIN — ACETAMINOPHEN 650 MG: 325 TABLET, FILM COATED ORAL at 13:35

## 2018-01-01 RX ADMIN — BUMETANIDE 2 MG: 2 TABLET ORAL at 08:24

## 2018-01-01 RX ADMIN — LIDOCAINE HYDROCHLORIDE 2 ML: 10 INJECTION, SOLUTION EPIDURAL; INFILTRATION; INTRACAUDAL; PERINEURAL at 15:20

## 2018-01-01 RX ADMIN — SODIUM CHLORIDE 42 ML: 9 INJECTION, SOLUTION INTRAVENOUS at 08:34

## 2018-01-01 RX ADMIN — RIVAROXABAN 15 MG: 15 TABLET, FILM COATED ORAL at 17:13

## 2018-01-01 RX ADMIN — METOPROLOL SUCCINATE 100 MG: 100 TABLET, EXTENDED RELEASE ORAL at 08:28

## 2018-01-01 RX ADMIN — Medication 0.2 MG: at 18:48

## 2018-01-01 RX ADMIN — DIGOXIN 125 MCG: 0.12 TABLET ORAL at 08:28

## 2018-01-01 RX ADMIN — DILTIAZEM HYDROCHLORIDE 240 MG: 120 CAPSULE, COATED, EXTENDED RELEASE ORAL at 08:28

## 2018-01-01 RX ADMIN — DILTIAZEM HYDROCHLORIDE 240 MG: 120 CAPSULE, COATED, EXTENDED RELEASE ORAL at 07:44

## 2018-01-01 RX ADMIN — DEXTROSE AND SODIUM CHLORIDE: 5; 900 INJECTION, SOLUTION INTRAVENOUS at 12:25

## 2018-01-01 RX ADMIN — LISINOPRIL 2.5 MG: 2.5 TABLET ORAL at 19:54

## 2018-01-01 RX ADMIN — METOPROLOL SUCCINATE 50 MG: 50 TABLET, EXTENDED RELEASE ORAL at 17:10

## 2018-01-01 RX ADMIN — CLINDAMYCIN PHOSPHATE 900 MG: 900 INJECTION, SOLUTION INTRAVENOUS at 21:04

## 2018-01-01 RX ADMIN — CLINDAMYCIN PHOSPHATE 600 MG: 600 INJECTION, SOLUTION INTRAVENOUS at 12:06

## 2018-01-01 RX ADMIN — METOPROLOL SUCCINATE 100 MG: 100 TABLET, EXTENDED RELEASE ORAL at 15:53

## 2018-01-01 RX ADMIN — METOPROLOL SUCCINATE 100 MG: 100 TABLET, EXTENDED RELEASE ORAL at 16:12

## 2018-01-01 RX ADMIN — METOPROLOL SUCCINATE 100 MG: 100 TABLET, EXTENDED RELEASE ORAL at 09:15

## 2018-01-01 RX ADMIN — LEVOFLOXACIN 500 MG: 5 INJECTION, SOLUTION INTRAVENOUS at 02:29

## 2018-01-01 RX ADMIN — CLINDAMYCIN PHOSPHATE 600 MG: 600 INJECTION, SOLUTION INTRAVENOUS at 03:59

## 2018-01-01 RX ADMIN — METOPROLOL SUCCINATE 100 MG: 100 TABLET, EXTENDED RELEASE ORAL at 08:50

## 2018-01-01 RX ADMIN — SODIUM CHLORIDE 1000 ML: 9 INJECTION, SOLUTION INTRAVENOUS at 09:16

## 2018-01-01 RX ADMIN — LISINOPRIL 2.5 MG: 2.5 TABLET ORAL at 08:30

## 2018-01-01 RX ADMIN — LORAZEPAM 0.5 MG: 2 INJECTION INTRAMUSCULAR; INTRAVENOUS at 06:44

## 2018-01-01 RX ADMIN — METOPROLOL SUCCINATE 100 MG: 100 TABLET, EXTENDED RELEASE ORAL at 16:41

## 2018-01-01 RX ADMIN — BUMETANIDE 2 MG: 2 TABLET ORAL at 14:22

## 2018-01-01 RX ADMIN — LISINOPRIL 2.5 MG: 2.5 TABLET ORAL at 08:25

## 2018-01-01 RX ADMIN — SODIUM CHLORIDE 56 ML: 9 INJECTION, SOLUTION INTRAVENOUS at 02:06

## 2018-01-01 RX ADMIN — DILTIAZEM HYDROCHLORIDE 180 MG: 180 CAPSULE, COATED, EXTENDED RELEASE ORAL at 09:16

## 2018-01-01 RX ADMIN — CEFAZOLIN SODIUM 1 G: 1 INJECTION, SOLUTION INTRAVENOUS at 01:40

## 2018-01-01 RX ADMIN — REGADENOSON 0.4 MG: 0.08 INJECTION, SOLUTION INTRAVENOUS at 14:55

## 2018-01-01 RX ADMIN — MORPHINE SULFATE 1 MG: 4 INJECTION INTRAVENOUS at 05:15

## 2018-01-01 RX ADMIN — RIVAROXABAN 15 MG: 15 TABLET, FILM COATED ORAL at 16:53

## 2018-01-01 RX ADMIN — DIGOXIN 125 MCG: 0.12 TABLET ORAL at 08:24

## 2018-01-01 RX ADMIN — DIGOXIN 125 MCG: 0.25 INJECTION INTRAMUSCULAR; INTRAVENOUS at 13:50

## 2018-01-01 RX ADMIN — NOREPINEPHRINE BITARTRATE 0.03 MCG/KG/MIN: 1 INJECTION INTRAVENOUS at 15:00

## 2018-01-01 RX ADMIN — MORPHINE SULFATE 1 MG: 4 INJECTION INTRAVENOUS at 19:36

## 2018-01-01 RX ADMIN — LISINOPRIL 2.5 MG: 2.5 TABLET ORAL at 09:45

## 2018-01-01 RX ADMIN — METOPROLOL SUCCINATE 100 MG: 100 TABLET, EXTENDED RELEASE ORAL at 07:44

## 2018-01-01 RX ADMIN — ROPINIROLE HYDROCHLORIDE 0.5 MG: 0.25 TABLET, FILM COATED ORAL at 21:54

## 2018-01-01 RX ADMIN — BUMETANIDE 1 MG: 0.5 TABLET ORAL at 17:14

## 2018-01-01 RX ADMIN — CLINDAMYCIN PHOSPHATE 600 MG: 600 INJECTION, SOLUTION INTRAVENOUS at 19:30

## 2018-01-01 RX ADMIN — DILTIAZEM HYDROCHLORIDE 120 MG: 120 CAPSULE, COATED, EXTENDED RELEASE ORAL at 18:26

## 2018-01-01 RX ADMIN — LISINOPRIL 2.5 MG: 2.5 TABLET ORAL at 07:44

## 2018-01-01 RX ADMIN — RIVAROXABAN 15 MG: 15 TABLET, FILM COATED ORAL at 00:45

## 2018-01-01 RX ADMIN — HALOPERIDOL LACTATE 2 MG: 5 INJECTION, SOLUTION INTRAMUSCULAR at 01:31

## 2018-01-01 RX ADMIN — METOPROLOL SUCCINATE 100 MG: 100 TABLET, EXTENDED RELEASE ORAL at 09:04

## 2018-01-01 RX ADMIN — ACETAMINOPHEN 650 MG: 325 TABLET, FILM COATED ORAL at 21:40

## 2018-01-01 RX ADMIN — CEFAZOLIN SODIUM 1 G: 1 INJECTION, SOLUTION INTRAVENOUS at 13:20

## 2018-01-01 RX ADMIN — DILTIAZEM HYDROCHLORIDE 240 MG: 120 CAPSULE, COATED, EXTENDED RELEASE ORAL at 08:24

## 2018-01-01 RX ADMIN — DILTIAZEM HYDROCHLORIDE 120 MG: 60 CAPSULE, EXTENDED RELEASE ORAL at 21:06

## 2018-01-01 RX ADMIN — DIGOXIN 125 MCG: 0.25 INJECTION INTRAMUSCULAR; INTRAVENOUS at 20:22

## 2018-01-01 RX ADMIN — ROPINIROLE 1.5 MG: 1 TABLET, FILM COATED ORAL at 03:50

## 2018-01-01 RX ADMIN — DILTIAZEM HYDROCHLORIDE 180 MG: 180 CAPSULE, COATED, EXTENDED RELEASE ORAL at 08:50

## 2018-01-01 RX ADMIN — METOPROLOL SUCCINATE 100 MG: 100 TABLET, EXTENDED RELEASE ORAL at 08:24

## 2018-01-01 RX ADMIN — CEFAZOLIN SODIUM 1 G: 1 INJECTION, SOLUTION INTRAVENOUS at 16:51

## 2018-01-01 RX ADMIN — CEFAZOLIN SODIUM 1 G: 1 INJECTION, SOLUTION INTRAVENOUS at 09:24

## 2018-01-01 RX ADMIN — ACETAMINOPHEN 650 MG: 325 TABLET, FILM COATED ORAL at 00:02

## 2018-01-01 RX ADMIN — SODIUM CHLORIDE, POTASSIUM CHLORIDE, SODIUM LACTATE AND CALCIUM CHLORIDE 1000 ML: 600; 310; 30; 20 INJECTION, SOLUTION INTRAVENOUS at 23:22

## 2018-01-01 RX ADMIN — MORPHINE SULFATE 1 MG: 4 INJECTION INTRAVENOUS at 21:29

## 2018-01-01 RX ADMIN — MIDAZOLAM 1 MG: 1 INJECTION INTRAMUSCULAR; INTRAVENOUS at 14:30

## 2018-01-01 RX ADMIN — MORPHINE SULFATE 0.5 MG: 4 INJECTION INTRAVENOUS at 09:25

## 2018-01-01 RX ADMIN — ONDANSETRON 4 MG: 2 INJECTION INTRAMUSCULAR; INTRAVENOUS at 17:44

## 2018-01-01 RX ADMIN — POTASSIUM CHLORIDE 20 MEQ: 1500 TABLET, EXTENDED RELEASE ORAL at 14:19

## 2018-01-01 RX ADMIN — SODIUM CHLORIDE: 4.5 INJECTION, SOLUTION INTRAVENOUS at 12:06

## 2018-01-01 RX ADMIN — BUMETANIDE 2 MG: 2 TABLET ORAL at 08:00

## 2018-01-01 RX ADMIN — RIVAROXABAN 20 MG: 20 TABLET, FILM COATED ORAL at 16:41

## 2018-01-01 RX ADMIN — IOPAMIDOL 45 ML: 755 INJECTION, SOLUTION INTRAVENOUS at 02:06

## 2018-01-01 RX ADMIN — ALBUMIN HUMAN 12.5 G: 0.25 SOLUTION INTRAVENOUS at 12:32

## 2018-01-01 RX ADMIN — BUMETANIDE 1 MG: 0.5 TABLET ORAL at 19:30

## 2018-01-01 ASSESSMENT — ACTIVITIES OF DAILY LIVING (ADL)
ADLS_ACUITY_SCORE: 10
ADLS_ACUITY_SCORE: 10
ADLS_ACUITY_SCORE: 12
ADLS_ACUITY_SCORE: 12
ADLS_ACUITY_SCORE: 10
ADLS_ACUITY_SCORE: 11
ADLS_ACUITY_SCORE: 10
DEPENDENT_IADLS:: INDEPENDENT
ADLS_ACUITY_SCORE: 11
ADLS_ACUITY_SCORE: 10
ADLS_ACUITY_SCORE: 11
ADLS_ACUITY_SCORE: 10
ADLS_ACUITY_SCORE: 10
SWALLOWING: 0-->SWALLOWS FOODS/LIQUIDS WITHOUT DIFFICULTY
TRANSFERRING: 0-->INDEPENDENT
ADLS_ACUITY_SCORE: 10
ADLS_ACUITY_SCORE: 12
ADLS_ACUITY_SCORE: 12
ADLS_ACUITY_SCORE: 10
ADLS_ACUITY_SCORE: 11
ADLS_ACUITY_SCORE: 10
ADLS_ACUITY_SCORE: 14
DEPENDENT_IADLS:: INDEPENDENT
ADLS_ACUITY_SCORE: 10
ADLS_ACUITY_SCORE: 12
ADLS_ACUITY_SCORE: 10
ADLS_ACUITY_SCORE: 11
ADLS_ACUITY_SCORE: 11
ADLS_ACUITY_SCORE: 14
ADLS_ACUITY_SCORE: 14
ADLS_ACUITY_SCORE: 10
FALL_HISTORY_WITHIN_LAST_SIX_MONTHS: NO
ADLS_ACUITY_SCORE: 11
ADLS_ACUITY_SCORE: 14
ADLS_ACUITY_SCORE: 10
ADLS_ACUITY_SCORE: 10
ADLS_ACUITY_SCORE: 14
ADLS_ACUITY_SCORE: 11
ADLS_ACUITY_SCORE: 14
ADLS_ACUITY_SCORE: 10
ADLS_ACUITY_SCORE: 10
ADLS_ACUITY_SCORE: 13
ADLS_ACUITY_SCORE: 10
ADLS_ACUITY_SCORE: 11
ADLS_ACUITY_SCORE: 14
ADLS_ACUITY_SCORE: 11
ADLS_ACUITY_SCORE: 10
ADLS_ACUITY_SCORE: 14
ADLS_ACUITY_SCORE: 14
ADLS_ACUITY_SCORE: 10
ADLS_ACUITY_SCORE: 14
ADLS_ACUITY_SCORE: 10
ADLS_ACUITY_SCORE: 14
BATHING: 0-->INDEPENDENT
ADLS_ACUITY_SCORE: 14
ADLS_ACUITY_SCORE: 12
ADLS_ACUITY_SCORE: 12
ADLS_ACUITY_SCORE: 14
ADLS_ACUITY_SCORE: 16
ADLS_ACUITY_SCORE: 14
ADLS_ACUITY_SCORE: 14
COGNITION: 0 - NO COGNITION ISSUES REPORTED
RETIRED_EATING: 0-->INDEPENDENT
ADLS_ACUITY_SCORE: 10
RETIRED_COMMUNICATION: 0-->UNDERSTANDS/COMMUNICATES WITHOUT DIFFICULTY
ADLS_ACUITY_SCORE: 11
ADLS_ACUITY_SCORE: 12
ADLS_ACUITY_SCORE: 11
ADLS_ACUITY_SCORE: 14
ADLS_ACUITY_SCORE: 14
ADLS_ACUITY_SCORE: 11
ADLS_ACUITY_SCORE: 11
ADLS_ACUITY_SCORE: 14
ADLS_ACUITY_SCORE: 12
TOILETING: 0-->INDEPENDENT
AMBULATION: 0-->INDEPENDENT
ADLS_ACUITY_SCORE: 10
ADLS_ACUITY_SCORE: 16
ADLS_ACUITY_SCORE: 10
DRESS: 0-->INDEPENDENT
ADLS_ACUITY_SCORE: 10
ADLS_ACUITY_SCORE: 12
ADLS_ACUITY_SCORE: 12

## 2018-01-01 ASSESSMENT — ENCOUNTER SYMPTOMS
RHINORRHEA: 0
WEAKNESS: 1
HEADACHES: 0
COUGH: 1
COUGH: 1
DIARRHEA: 0
SORE THROAT: 0
DIZZINESS: 0
HEADACHES: 0
COLOR CHANGE: 1
VOMITING: 0
COLOR CHANGE: 1
DYSURIA: 0
FATIGUE: 1
PALPITATIONS: 0
DIARRHEA: 0
ABDOMINAL PAIN: 0
FEVER: 0
HEMATURIA: 0
JOINT SWELLING: 1
ARTHRALGIAS: 1
FEVER: 0
SHORTNESS OF BREATH: 1
FREQUENCY: 0
VOMITING: 0
SHORTNESS OF BREATH: 1

## 2018-01-01 ASSESSMENT — ROUTINE ASSESSMENT OF PATIENT INDEX DATA (RAPID3)
TOTAL RAPID3 SCORE: 8.7
RAPID3 INTERPRETATION: MODERATE 6.1-12.0

## 2018-01-01 ASSESSMENT — PAIN SCALES - GENERAL: PAINLEVEL: NO PAIN (0)

## 2018-01-19 NOTE — NURSING NOTE
Chief Complaint   Patient presents with     Establish Care       Initial /80  Pulse 70  Temp 97.9  F (36.6  C) (Oral)  Ht 1.524 m (5')  Wt 42 kg (92 lb 11.2 oz)  SpO2 97%  BMI 18.1 kg/m2 Estimated body mass index is 18.1 kg/(m^2) as calculated from the following:    Height as of this encounter: 1.524 m (5').    Weight as of this encounter: 42 kg (92 lb 11.2 oz).  Medication Reconciliation:     Have you ever seen a rheumatologist : dr. lora / amrit  Joint pain history  Onset: 2016  Involved joints: all joints  Pain scale: 0/10     Wakes the patient from sleep : no  Morning stiffness:no  Meds used: prednisone 1mg and 5mg    Interim history  Since last visit:  1. Infections - no  2. New symptoms/medical problem - no  3. Any side effects from Rheum medications - no  3. ER visits/Hospitalizations/surgeries - no  4. Last PCP visit: 8/30/17 office visit schld for 1/19/18 today  Wt Readings from Last 4 Encounters:   01/19/18 42 kg (92 lb 11.2 oz)   11/14/17 41.5 kg (91 lb 9.6 oz)   08/30/17 38.7 kg (85 lb 4.8 oz)   07/18/17 37.9 kg (83 lb 9.6 oz)     BP Readings from Last 3 Encounters:   01/19/18 130/80   11/14/17 134/80   08/30/17 146/58

## 2018-01-19 NOTE — MR AVS SNAPSHOT
After Visit Summary   1/19/2018    Ana Cristina Dominguez    MRN: [id number]           Patient Information     Date Of Birth          6/6/1931        Visit Information        Provider Department      1/19/2018 10:15 AM Danny Avelar MD [facility]        Today's Diagnoses     Arthritis    -  1    Polymyalgia rheumatica (H)        PMR (polymyalgia rheumatica) (H)           Follow-ups after your visit        Follow-up notes from your care team     Return in about 4 months (around 5/19/2018).      Your next 10 appointments already scheduled     Jan 19, 2018  1:00 PM CST   SHORT with Dez [facility] MD   [facility]    [address]  [facility] [address] [phone]              Who to contact     If you have questions or need follow up information about today's clinic visit or your schedule please contact [facility] directly at [phone].  Normal or non-critical lab and imaging results will be communicated to you by [employer], letter or phone within 4 business days after the clinic has received the results. If you do not hear from us within 7 days, please contact the clinic through [employer] or phone. If you have a critical or abnormal lab result, we will notify you by phone as soon as possible.  Submit refill requests through [employer] or call your pharmacy and they will forward the refill request to us. Please allow 3 business days for your refill to be completed.          Additional Information About Your Visit        MyChart Information     [employer] gives you secure access to your electronic health record. If you see a primary care provider, you can also send messages to your care team and make appointments. If you have questions, please call your primary care clinic.  If you do not have a primary care provider, please call [phone] and they will assist you.        Care EveryWhere ID     This is your Care  EveryWhere ID. This could be used by other organizations to access your [place] medical records  [id number]        Your Vitals Were     Pulse Temperature Height Pulse Oximetry BMI (Body Mass Index)       70 97.9  F (36.6  C) (Oral) 1.524 m (5') 97% 18.1 kg/m2        Blood Pressure from Last 3 Encounters:   01/19/18 130/80   11/14/17 134/80   08/30/17 146/58    Weight from Last 3 Encounters:   01/19/18 42 kg (92 lb 11.2 oz)   11/14/17 41.5 kg (91 lb 9.6 oz)   08/30/17 38.7 kg (85 lb 4.8 oz)              We Performed the Following     CRP inflammation     Erythrocyte sedimentation rate auto     Uric acid          Today's Medication Changes          These changes are accurate as of: 1/19/18 10:58 AM.  If you have any questions, ask your nurse or doctor.               These medicines have changed or have updated prescriptions.        Dose/Directions    predniSONE 1 MG tablet   Commonly known as:  DELTASONE   This may have changed:    - additional instructions  - Another medication with the same name was removed. Continue taking this medication, and follow the directions you see here.   Used for:  PMR (polymyalgia rheumatica) (H)   Changed by:  Danny Avelar MD        3mg PO daily, taper by 1 mg monthly   Quantity:  120 tablet   Refills:  3                Primary Care Provider Office Phone # Fax #    Dez Urban [phone]       [address]  [facility] [address]        Equal Access to Services     [facility]: Hadii shanelle ku hadasho Soomaali, waaxda luqadaha, qaybta kaalmada adeegyada, wax gladys burciaga . So [facility] [phone].    ATENCIÓN: Si habla español, tiene a dominguez disposición servicios gratuitos de asistencia lingüística. Llame al [phone].    We comply with applicable federal civil rights laws and [place] laws. We do not discriminate on the basis of race, color, national origin, age, disability, sex, sexual orientation, or gender  identity.            Thank you!     Thank you for choosing [facility]  for your care. Our goal is always to provide you with excellent care. Hearing back from our patients is one way we can continue to improve our services. Please take a few minutes to complete the written survey that you may receive in the mail after your visit with us. Thank you!             Your Updated Medication List - Protect others around you: Learn how to safely use, store and throw away your medicines at www.disposemymeds.org.          This list is accurate as of: 1/19/18 10:58 AM.  Always use your most recent med list.                   Brand Name Dispense Instructions for use Diagnosis    ascorbic acid 500 MG tablet    VITAMIN C    100    1 TABLET DAILY        bumetanide 0.5 MG tablet    BUMEX    90 tablet    TAKE 2 TABLETS BY MOUTH EVERY MORNING AND 1 TABLET EVERY EVENING    Edema, unspecified type       CALCIUM 600 + D 600-200 MG-UNIT Tabs      2 qd        diltiazem 120 MG Cp12 12 hr SR capsule    CARDIZEM SR    180 capsule    TAKE ONE CAPSULE BY MOUTH TWICE A DAY    Chronic atrial fibrillation (H)       metoprolol succinate 50 MG 24 hr tablet    TOPROL-XL    135 tablet    TAKE 1.5 TABLETS (75 MG) BY MOUTH DAILY    Essential hypertension       MULTIVITAMIN TABS   OR      1 qd        olopatadine 0.1 % ophthalmic solution    PATANOL     Place 1 drop into both eyes 2 times daily as needed for allergies During Spring        predniSONE 1 MG tablet    DELTASONE    120 tablet    3mg PO daily, taper by 1 mg monthly    PMR (polymyalgia rheumatica) (H)       rivaroxaban ANTICOAGULANT 20 MG Tabs tablet    XARELTO    30 tablet    Take 1 tablet (20 mg) by mouth daily (with dinner)    Chronic atrial fibrillation (H)       rOPINIRole 0.5 MG tablet    REQUIP    30 tablet    TAKE 1 TABLET BY MOUTH AT BEDTIME    RLS (restless legs syndrome)       TYLENOL PO      Take by mouth every 4 hours as needed for mild pain or fever        vitamin E 400  UNIT capsule     100    ONE CAP PO QD

## 2018-01-19 NOTE — PATIENT INSTRUCTIONS
"The \"lump\" that you are feeling in your abdomen seems to be from a worsening of the previously known abdominal hernia, rather than any new mass or worrisome finding.     The varicose veins may be left untreated if no pain or bad swelling.     You may try taking two of the Requip tablets at night, either both at bedtime, or one at bedtime and one later in the night.     Okay to stop taking Xarelto about 7 days prior to any procedures.     If you ever need to reach me, best to call the clinic and ask to speak with the \"Triage nurse\".   "

## 2018-01-19 NOTE — MR AVS SNAPSHOT
"              After Visit Summary   1/19/2018    Ana Cristina Dominguez    MRN: [id number]           Patient Information     Date Of Birth          6/6/1931        Visit Information        Provider Department      1/19/2018 1:00 PM Dez Urban MD [facility]        Today's Diagnoses     Abdominal hernia without obstruction and without gangrene, recurrence not specified, unspecified hernia type    -  1    Varicose veins of both lower extremities        RLS (restless legs syndrome)        Chronic atrial fibrillation (H)          Care Instructions    The \"lump\" that you are feeling in your abdomen seems to be from a worsening of the previously known abdominal hernia, rather than any new mass or worrisome finding.     The varicose veins may be left untreated if no pain or bad swelling.     You may try taking two of the Requip tablets at night, either both at bedtime, or one at bedtime and one later in the night.     Okay to stop taking Xarelto about 7 days prior to any procedures.     If you ever need to reach me, best to call the clinic and ask to speak with the \"Triage nurse\".           Follow-ups after your visit        Follow-up notes from your care team     Return for Routine Visit.      Your next 10 appointments already scheduled     May 21, 2018  9:15 AM CDT   [facility] Visit with Danny Avelar MD   [facility]    [address] [place] [id number] [phone]              Who to contact     If you have questions or need follow up information about today's clinic visit or your schedule please contact [facility] directly at [phone].  Normal or non-critical lab and imaging results will be communicated to you by MyChart, letter or phone within 4 business days after the clinic has received the results. If you do not hear from us within 7 days, please contact the clinic through MyChart or phone. If you have a " critical or abnormal lab result, we will notify you by phone as soon as possible.  Submit refill requests through [employer] or call your pharmacy and they will forward the refill request to us. Please allow 3 business days for your refill to be completed.          Additional Information About Your Visit        PWAhart Information     [employer] gives you secure access to your electronic health record. If you see a primary care provider, you can also send messages to your care team and make appointments. If you have questions, please call your primary care clinic.  If you do not have a primary care provider, please call [phone] and they will assist you.        Care EveryWhere ID     This is your Care EveryWhere ID. This could be used by other organizations to access your [facility] medical records  [id number]        Your Vitals Were     Pulse Temperature Height Pulse Oximetry BMI (Body Mass Index)       92 97.7  F (36.5  C) (Oral) 5' (1.524 m) 94% 18.16 kg/m2        Blood Pressure from Last 3 Encounters:   01/19/18 122/60   01/19/18 130/80   11/14/17 134/80    Weight from Last 3 Encounters:   01/19/18 93 lb (42.2 kg)   01/19/18 92 lb 11.2 oz (42 kg)   11/14/17 91 lb 9.6 oz (41.5 kg)              Today, you had the following     No orders found for display         Today's Medication Changes          These changes are accurate as of: 1/19/18  1:34 PM.  If you have any questions, ask your nurse or doctor.               These medicines have changed or have updated prescriptions.        Dose/Directions    predniSONE 1 MG tablet   Commonly known as:  DELTASONE   This may have changed:    - additional instructions  - Another medication with the same name was removed. Continue taking this medication, and follow the directions you see here.   Used for:  PMR (polymyalgia rheumatica) (H)   Changed by:  Danny Avelar MD        3mg PO daily, taper by 1 mg monthly   Quantity:  120 tablet   Refills:  3                 Primary Care Provider Office Phone # Fax #    Dez Urban [phone]       [address] [facility] [address]  [facility] [address]        Equal Access to Services     HATTIEKRYSTLE FRANCISCA : Hadmathew shanelle hamilton emao Tatyana, waaxda luqadaha, qaybta kaalmada nessa, rinku bradymalinda camille. So [facility] [phone].    ATENCIÓN: Si habla español, tiene a dominguez disposición servicios gratuitos de asistencia lingüística. Llame al [phone].    We comply with applicable federal civil rights laws and [place] laws. We do not discriminate on the basis of race, color, national origin, age, disability, sex, sexual orientation, or gender identity.            Thank you!     Thank you for choosing [facility]  for your care. Our goal is always to provide you with excellent care. Hearing back from our patients is one way we can continue to improve our services. Please take a few minutes to complete the written survey that you may receive in the mail after your visit with us. Thank you!             Your Updated Medication List - Protect others around you: Learn how to safely use, store and throw away your medicines at www.disposemymeds.org.          This list is accurate as of: 1/19/18  1:34 PM.  Always use your most recent med list.                   Brand Name Dispense Instructions for use Diagnosis    ascorbic acid 500 MG tablet    VITAMIN C    100    1 TABLET DAILY        bumetanide 0.5 MG tablet    BUMEX    90 tablet    TAKE 2 TABLETS BY MOUTH EVERY MORNING AND 1 TABLET EVERY EVENING    Edema, unspecified type       CALCIUM 600 + D 600-200 MG-UNIT Tabs      2 qd        diltiazem 120 MG Cp12 12 hr SR capsule    CARDIZEM SR    180 capsule    TAKE ONE CAPSULE BY MOUTH TWICE A DAY    Chronic atrial fibrillation (H)       metoprolol succinate 50 MG 24 hr tablet    TOPROL-XL    135 tablet    TAKE 1.5 TABLETS (75 MG) BY MOUTH DAILY    Essential hypertension       MULTIVITAMIN TABS   OR      1 qd         olopatadine 0.1 % ophthalmic solution    PATANOL     Place 1 drop into both eyes 2 times daily as needed for allergies During Spring        predniSONE 1 MG tablet    DELTASONE    120 tablet    3mg PO daily, taper by 1 mg monthly    PMR (polymyalgia rheumatica) (H)       rivaroxaban ANTICOAGULANT 20 MG Tabs tablet    XARELTO    30 tablet    Take 1 tablet (20 mg) by mouth daily (with dinner)    Chronic atrial fibrillation (H)       rOPINIRole 0.5 MG tablet    REQUIP    30 tablet    TAKE 1 TABLET BY MOUTH AT BEDTIME    RLS (restless legs syndrome)       TYLENOL PO      Take by mouth every 4 hours as needed for mild pain or fever        vitamin E 400 UNIT capsule     100    ONE CAP PO QD

## 2018-01-19 NOTE — NURSING NOTE
Chief Complaint   Patient presents with     Musculoskeletal Problem   hard area on abdomin , seems to have moved     Initial /60  Pulse 92  Temp 97.7  F (36.5  C) (Oral)  Ht 5' (1.524 m)  Wt 93 lb (42.2 kg)  SpO2 94%  BMI 18.16 kg/m2 Estimated body mass index is 18.16 kg/(m^2) as calculated from the following:    Height as of this encounter: 5' (1.524 m).    Weight as of this encounter: 93 lb (42.2 kg).  Medication Reconciliation: complete

## 2018-01-19 NOTE — PROGRESS NOTES
"[facility] - Rheumatology Clinic Visit     Ana Cristina Dominguez MRN# [id number]   YOB: 1931    Primary care provider: Dez Urban  Jan 19, 2018          Assessment and Plan:   # Polymyalgia rheumatica Dx 2017  # Chronic anemia- seen hematology  # Elevated inflammatory markers  # Macular degeneration of eye  # H/o skin cancer  # H/o MGUS  # Atrial fibrillation; on Xarelto    Dr. Hidalgo's patient re-established care with me 1/18.    Prednisone was \"magic\" and it has made a big difference in her symptoms. Patient denies any side effects. Current dose is 3mg/day  of prednisone. Patient's PMR is well controlled. Continue taper by 1mg every month until off.     Recommend elemental calcium 1200mg PO daily and vitamin D 800 IU daily. Bone density scan needed if patient is not able to taper off of prednisone this year. Cushingoid features noted.     Her hand arthritis is not painful and not bothersome for her with chronic deformities. Patient declines a hand xray. This is likely osteoarthritis but the left 2nd finger has a lesion which made me suspicious for tophaceous gout. We will check uric acid level.     The labs, imaging from patient records are reviewed.     I will be back in touch with the patient through mychart/letter when results are available.     Most Recent Immunizations   Administered Date(s) Administered     Pneumococcal 23 valent 11/29/2013     TD (ADULT, 7+) 06/16/2006     TDAP Vaccine (Adacel) 10/28/2013       Orders Placed This Encounter   Procedures     Erythrocyte sedimentation rate auto     CRP inflammation     Uric acid       Data Unavailable    Medications Discontinued During This Encounter   Medication Reason     predniSONE (DELTASONE) 5 MG tablet      predniSONE (DELTASONE) 1 MG tablet Reorder     Current Outpatient Prescriptions   Medication Sig Dispense Refill     predniSONE (DELTASONE) 1 MG tablet 3mg PO daily, taper by 1 mg monthly 120 tablet 3     diltiazem (CARDIZEM SR) 120 MG " CP12 12 hr SR capsule TAKE ONE CAPSULE BY MOUTH TWICE A  capsule 2     rOPINIRole (REQUIP) 0.5 MG tablet TAKE 1 TABLET BY MOUTH AT BEDTIME 30 tablet 5     metoprolol (TOPROL-XL) 50 MG 24 hr tablet TAKE 1.5 TABLETS (75 MG) BY MOUTH DAILY 135 tablet 1     bumetanide (BUMEX) 0.5 MG tablet TAKE 2 TABLETS BY MOUTH EVERY MORNING AND 1 TABLET EVERY EVENING 90 tablet 1     Acetaminophen (TYLENOL PO) Take by mouth every 4 hours as needed for mild pain or fever       rivaroxaban ANTICOAGULANT (XARELTO) 20 MG TABS tablet Take 1 tablet (20 mg) by mouth daily (with dinner) 30 tablet 11     olopatadine (PATANOL) 0.1 % ophthalmic solution Place 1 drop into both eyes 2 times daily as needed for allergies During Spring       VITAMIN E 400 UNIT OR CAPS ONE CAP PO  0     VITAMIN C 500 MG OR TABS 1 TABLET DAILY 100 0     MULTIVITAMIN TABS   OR 1 qd       CALCIUM 600 + D 600-200 MG-IU OR TABS 2 qd         Danny Avelar MD  [facility]          Active Problem List:     Patient Active Problem List    Diagnosis Date Noted     Normochromic normocytic anemia 08/30/2017     Priority: Medium     PMR (polymyalgia rheumatica) (H) 08/30/2017     Priority: Medium     MGUS (monoclonal gammopathy of unknown significance) 08/30/2017     Priority: Medium     Chronic diastolic congestive heart failure (H) 08/30/2017     Priority: Medium     Anemia 06/22/2017     Priority: Medium     Pain in both knees, unspecified chronicity 04/11/2017     Priority: Medium     Edema, unspecified type 02/28/2017     Priority: Medium     Diarrhea 10/05/2016     Priority: Medium     Atrial fibrillation (H) 10/28/2013     Priority: Medium     HTN (hypertension) 10/28/2013     Priority: Medium     Senile osteoporosis 08/29/2013     Priority: Medium     RLS (restless legs syndrome) 08/29/2013     Priority: Medium     Abnormal blood sugar 08/29/2013     Priority: Medium     Achilles bursitis or tendinitis 07/19/2011     Priority: Medium      HYPERLIPIDEMIA LDL GOAL <160 10/31/2010     Priority: Medium     Fracture, pelvis closed (H) 10/01/2009     Priority: Medium            History of Present Illness:     Chief Complaint   Patient presents with     Establish Care       January 19, 2018    Have you ever seen a rheumatologist : dr. lora / amrit  Joint pain history  Onset: 2016  Involved joints: all joints  Pain scale: 0/10     Wakes the patient from sleep : no  Morning stiffness:no  Meds used: prednisone 3mg PO daily     Interim history  Since last visit:  1. Infections - no  2. New symptoms/medical problem - no  3. Any side effects from Rheum medications - no  3. ER visits/Hospitalizations/surgeries - no  4. Last PCP visit: 8/30/17 office visit schld for 1/19/18 today    Wt Readings from Last 4 Encounters:   01/19/18 42 kg (92 lb 11.2 oz)   11/14/17 41.5 kg (91 lb 9.6 oz)   08/30/17 38.7 kg (85 lb 4.8 oz)   07/18/17 37.9 kg (83 lb 9.6 oz)     shortness of breath on and off; has h/o valvular heart disease    No h/o gout  No h/o persistent cough, chest pain  No h/o persistent nausea, vomiting, constipation, diarrhea, abdominal pain  No h/o hematochezia, hematuria, hemoptysis    BP Readings from Last 3 Encounters:   01/19/18 130/80   11/14/17 134/80   08/30/17 146/58              Review of Systems:   Complete ROS negative except for symptoms mentioned in the HPI          Past Medical History:     Past Medical History:   Diagnosis Date     Other and unspecified hyperlipidemia      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     RLS (restless legs syndrome)      Senile osteoporosis     Reclast 11/16/09, 11/10, 11/11     Past Surgical History:   Procedure Laterality Date     APPENDECTOMY       CATARACT IOL, RT/LT  1/19/09    right     HC EXCIS PRIMARY GANGLION WRIST              Social History:     Social History     Occupational History      Retired     Social History Main Topics     Smoking  status: Former Smoker     Quit date: 5/6/1973     Smokeless tobacco: Never Used     Alcohol use Yes      Comment: Socially     Drug use: No     Sexual activity: No            Family History:     Family History   Problem Relation Age of Onset     C.A.D. Father      Family History Negative Mother      Genitourinary Problems Sister      Renal failure     HEART DISEASE Sister      Rhythm issues            Allergies:     Allergies   Allergen Reactions     Amoxicillin Rash     Codeine GI Disturbance     Stomach pain            Medications:     Current Outpatient Prescriptions   Medication Sig Dispense Refill     predniSONE (DELTASONE) 1 MG tablet 3mg PO daily, taper by 1 mg monthly 120 tablet 3     diltiazem (CARDIZEM SR) 120 MG CP12 12 hr SR capsule TAKE ONE CAPSULE BY MOUTH TWICE A  capsule 2     rOPINIRole (REQUIP) 0.5 MG tablet TAKE 1 TABLET BY MOUTH AT BEDTIME 30 tablet 5     metoprolol (TOPROL-XL) 50 MG 24 hr tablet TAKE 1.5 TABLETS (75 MG) BY MOUTH DAILY 135 tablet 1     bumetanide (BUMEX) 0.5 MG tablet TAKE 2 TABLETS BY MOUTH EVERY MORNING AND 1 TABLET EVERY EVENING 90 tablet 1     Acetaminophen (TYLENOL PO) Take by mouth every 4 hours as needed for mild pain or fever       rivaroxaban ANTICOAGULANT (XARELTO) 20 MG TABS tablet Take 1 tablet (20 mg) by mouth daily (with dinner) 30 tablet 11     olopatadine (PATANOL) 0.1 % ophthalmic solution Place 1 drop into both eyes 2 times daily as needed for allergies During Spring       VITAMIN E 400 UNIT OR CAPS ONE CAP PO  0     VITAMIN C 500 MG OR TABS 1 TABLET DAILY 100 0     MULTIVITAMIN TABS   OR 1 qd       CALCIUM 600 + D 600-200 MG-IU OR TABS 2 qd              Physical Exam:   Blood pressure 130/80, pulse 70, temperature 97.9  F (36.6  C), temperature source Oral, height 1.524 m (5'), weight 42 kg (92 lb 11.2 oz), SpO2 97 %, not currently breastfeeding.  Wt Readings from Last 4 Encounters:   01/19/18 42 kg (92 lb 11.2 oz)   11/14/17 41.5 kg (91 lb 9.6 oz)    08/30/17 38.7 kg (85 lb 4.8 oz)   07/18/17 37.9 kg (83 lb 9.6 oz)       Constitutional: well-developed, appearing stated age; cooperative ; moon face  Eyes: PERRLA, normal conjunctiva, sclera  ENT: nl external ears, nose, lips.No mucous membrane lesions, normal saliva pool  Neck: no cervical lymphadenopathy  Resp: lungs clear to auscultation,   CV: RRR, no added sounds, no edema  GI: Abdomen soft and no tenderness  : not tested  Lymph: no cervical, supraclavicular or epitrochlear nodes  MS: Heberden's and bouchards nodules and deformities noted in hands bilateral. Left 2nd finger- swelling around PIP with whitish material (callus like).  All shoulder, elbow, wrist, MCP/PIP/DIP, hip, knee, ankle, and foot MTP/IP joints were examined and  found normal. No active synovitis or deformity. Full ROM.  No dactylitis,  tenosynovitis, enthesopathy.  Skin: no rash in exposed areas  Psych: nl judgement, orientation, memory, affect.         Data:         Danny Avelar MD    [facility]

## 2018-01-24 NOTE — PROGRESS NOTES
Results released to [facility]:  Dear Ms. Dominguez,  Your uric acid gout test is normal.  Your inflammatory markers are elevated but significantly better than 7 months ago. Good news!    Sincerely    Danny Avelar MD  [facility]

## 2018-02-07 NOTE — TELEPHONE ENCOUNTER
Pharmacy is calling-patient is out of Prednisone.  Last office appointment with Dr. Avelar-01/19/18.  Prednisone is listed historically at that office appointment, but not forwarded to a pharmacy.  Order pended.  Please sign if in agreement.    Refill request for Prednisone. Current dose: per office appt note- Current dose is 3mg/day  of prednisone. Patient's PMR is well controlled. Continue taper by 1mg every month until off.     Medication last filled 07/18/17 Quantity 120 Refills 3    Last rheumatology office visit 01/19/18  Future rheumatology office visit 05/21/18    Lab Results   Component Value Date    WBC 10.0 06/22/2017    HGB 9.8 06/22/2017    MCV 86 06/22/2017    MCH 27.9 06/22/2017    MCHC 32.3 06/22/2017    RDW 16.5 06/22/2017     06/22/2017     Lab Results   Component Value Date    ALT 24 06/22/2017     Lab Results   Component Value Date    AST 14 06/22/2017     Lab Results   Component Value Date    CR 0.76 06/29/2017     Lab Results   Component Value Date    URIC 3.7 01/19/2018     JOSHUA Richardson RN

## 2018-02-28 NOTE — TELEPHONE ENCOUNTER
"Son Luis calling back, discussed mother's symptoms.  Patient is apparently hard of hearing and cannot use phone well so Luis gave what information he knew.  He was not able to give a lot of details but is on his way to see pt now and will call back with any worrisome findings.  Patient has tentatively been scheduled to see PCP tomorrow 3/1/18 at 2:20 p.m.    Patient complain of loose stools, \"not diarrhea\" x1 week, Luis unable to give number of stools per day.  Pt denies vomiting or fever.  Also complains of \"feet swelling up again\".    Advised Luis to question mother once he gets there and ask if she is eating/drinking, urinating at least every 8 hours, if she is taking her diuretic as directed (Willing thinks she uses it only as needed), if she is having any SOB or weakness and if she is then she needs to be seen in ER.  Also advised Luis that pt should be seen in ER if she has worsening symptoms before tomorrow's appt.  ZULMA Persaud R.N.    "

## 2018-02-28 NOTE — TELEPHONE ENCOUNTER
Patients son Luis calling is on the CTC, patient is having loose stools, no diarrhea and no vomiting x 1 week.  Please call him back at [phone], ok to leave a message.

## 2018-03-01 NOTE — TELEPHONE ENCOUNTER
"Last OV 1/19/18. Provider approval needed. Low HGB.        Requested Prescriptions   Pending Prescriptions Disp Refills     XARELTO 20 MG TABS tablet [Pharmacy Med Name: XARELTO 20 MG TABLET] 30 tablet 8     Sig: TAKE 1 TABLET (20 MG) BY MOUTH DAILY (WITH DINNER)    Platelet Inhibitors Failed    2/27/2018 11:24 AM       Failed - No positive pregnancy test in past 12 months       Passed - Normal ALT on file in past 12 months    Recent Labs   Lab Test  06/22/17   1434   ALT  24            Passed - Normal HGB on file in past 12 months    Recent Labs   Lab Test  06/22/17   1434   HGB  9.8*              Passed - Normal AST on file in past 12 months    Recent Labs   Lab Test  06/22/17   1434   AST  14            Passed - Normal Platelets on file in past 12 months    Recent Labs   Lab Test  06/22/17   1434   PLT  383              Passed - Recent or future visit with authorizing provider's specialty    Patient had office visit in the last year or has a visit in the next 30 days with authorizing provider.  See \"Patient Info\" tab in inbasket, or \"Choose Columns\" in Meds & Orders section of the refill encounter.            Passed - Patient is age 18 or older       Passed - No active pregnancy on record       Passed - Normal serum creatinine on file in past 12 months    Recent Labs   Lab Test  06/29/17   0846   CR  0.76               "

## 2018-03-01 NOTE — MR AVS SNAPSHOT
"              After Visit Summary   3/1/2018    Ana Cristina Dominguez    MRN: [id number]           Patient Information     Date Of Birth          6/6/1931        Visit Information        Provider Department      3/1/2018 2:20 PM Dez Urban MD [facility]        Today's Diagnoses     Abdominal discomfort    -  1    Ventral hernia without obstruction or gangrene        Diarrhea, unspecified type        Fatigue, unspecified type        PMR (polymyalgia rheumatica) (H)        Anemia, unspecified type          Care Instructions    I'm not sure of the cause for the funny stools and the \"hardening\" of your abdomen.     If these symptoms fail to gradually improve, we may need to consider a couple of tests:  Another abdominal CT scan, and possibly   Upper endoscopy (scope of esophagus/stomach).     Let's check some blood (and urine) tests in the lab today, to look for obvious causes of fatigue.   The lab will also send home with you some specimen containers and directions for bringing in stool samples. You should do that if the stools don't improve in the next few days.     No medication changes.     Send me a MyChart update next week. I can review labs with you, and you can tell me about how your symptoms are doing.           Follow-ups after your visit        Your next 10 appointments already scheduled     May 21, 2018  9:15 AM CDT   Return Visit with Danny Avelar MD   [facility] ([facility]    [address] [place] [id number] [phone]              Future tests that were ordered for you today     Open Future Orders        Priority Expected Expires Ordered    Giardia antigen Routine  3/1/2019 3/1/2018    Ova and Parasite Exam Routine Routine  3/1/2019 3/1/2018    Enteric Bacteria and Virus Panel by JUSTUS Stool Routine  3/1/2019 3/1/2018            Who to contact     If you have questions or need follow up information about today's clinic " visit or your schedule please contact [facility] directly at [phone].  Normal or non-critical lab and imaging results will be communicated to you by MyChart, letter or phone within 4 business days after the clinic has received the results. If you do not hear from us within 7 days, please contact the clinic through ThemBidhart or phone. If you have a critical or abnormal lab result, we will notify you by phone as soon as possible.  Submit refill requests through mylearnadfriend or call your pharmacy and they will forward the refill request to us. Please allow 3 business days for your refill to be completed.          Additional Information About Your Visit        ThemBidharErrplane Information     mylearnadfriend gives you secure access to your electronic health record. If you see a primary care provider, you can also send messages to your care team and make appointments. If you have questions, please call your primary care clinic.  If you do not have a primary care provider, please call [phone] and they will assist you.        Care EveryWhere ID     This is your Care EveryWhere ID. This could be used by other organizations to access your [facility] medical records  [id number]        Your Vitals Were     Pulse Temperature Height Pulse Oximetry Breastfeeding? BMI (Body Mass Index)    94 98.4  F (36.9  C) (Oral) 5' (1.524 m) 96% No 18.36 kg/m2       Blood Pressure from Last 3 Encounters:   03/01/18 122/66   01/19/18 122/60   01/19/18 130/80    Weight from Last 3 Encounters:   03/01/18 94 lb (42.6 kg)   01/19/18 93 lb (42.2 kg)   01/19/18 92 lb 11.2 oz (42 kg)              We Performed the Following     CBC with platelets differential     Comprehensive metabolic panel     CRP, inflammation     ESR: Erythrocyte sedimentation rate     TSH with free T4 reflex     UA reflex to Microscopic        Primary Care Provider Office Phone # Fax #    Dez Urban [phone]       [address]  [facility]  [address]        Equal Access to Services     [facility]: Hadii shanelle hamilton rach Joe, waindirada luqsorin, qaybta karamonsilvia szymanski, rinku holmanirmabrannon obrien. So [facility] [phone].    ATENCIÓN: Si habla español, tiene a dominguez disposición servicios gratuitos de asistencia lingüística. Amandeepame al [phone].    We comply with applicable federal civil rights laws and [place] laws. We do not discriminate on the basis of race, color, national origin, age, disability, sex, sexual orientation, or gender identity.            Thank you!     Thank you for choosing [facility]  for your care. Our goal is always to provide you with excellent care. Hearing back from our patients is one way we can continue to improve our services. Please take a few minutes to complete the written survey that you may receive in the mail after your visit with us. Thank you!             Your Updated Medication List - Protect others around you: Learn how to safely use, store and throw away your medicines at www.disposemymeds.org.          This list is accurate as of 3/1/18  2:47 PM.  Always use your most recent med list.                   Brand Name Dispense Instructions for use Diagnosis    ascorbic acid 500 MG tablet    VITAMIN C    100    1 TABLET DAILY        bumetanide 0.5 MG tablet    BUMEX    90 tablet    TAKE 2 TABLETS BY MOUTH EVERY MORNING AND 1 TABLET EVERY EVENING    Edema, unspecified type       CALCIUM 600 + D 600-200 MG-UNIT Tabs      2 qd        diltiazem 120 MG Cp12 12 hr SR capsule    CARDIZEM SR    180 capsule    TAKE ONE CAPSULE BY MOUTH TWICE A DAY    Chronic atrial fibrillation (H)       metoprolol succinate 50 MG 24 hr tablet    TOPROL-XL    135 tablet    TAKE 1.5 TABLETS (75 MG) BY MOUTH DAILY    Essential hypertension       MULTIVITAMIN TABS   OR      1 qd        olopatadine 0.1 % ophthalmic solution    PATANOL     Place 1 drop into both eyes 2 times daily as needed for allergies During Spring         predniSONE 1 MG tablet    DELTASONE    120 tablet    3mg PO daily, taper by 1 mg monthly    PMR (polymyalgia rheumatica) (H)       rOPINIRole 0.5 MG tablet    REQUIP    30 tablet    TAKE 1 TABLET BY MOUTH AT BEDTIME    RLS (restless legs syndrome)       TYLENOL PO      Take by mouth every 4 hours as needed for mild pain or fever        vitamin E 400 UNIT capsule     100    ONE CAP PO QD        XARELTO 20 MG Tabs tablet   Generic drug:  rivaroxaban ANTICOAGULANT     30 tablet    TAKE 1 TABLET (20 MG) BY MOUTH DAILY (WITH DINNER)    Chronic atrial fibrillation (H)

## 2018-03-01 NOTE — NURSING NOTE
Chief Complaint   Patient presents with     Diarrhea     Abdominal Pain       Initial /66 (BP Location: Right arm, Patient Position: Sitting, Cuff Size: Adult Regular)  Pulse 94  Temp 98.4  F (36.9  C) (Oral)  Ht 5' (1.524 m)  Wt 94 lb (42.6 kg)  SpO2 96%  Breastfeeding? No  BMI 18.36 kg/m2 Estimated body mass index is 18.36 kg/(m^2) as calculated from the following:    Height as of this encounter: 5' (1.524 m).    Weight as of this encounter: 94 lb (42.6 kg).  Medication Reconciliation: complete   Krael MOORE

## 2018-03-01 NOTE — PATIENT INSTRUCTIONS
"I'm not sure of the cause for the funny stools and the \"hardening\" of your abdomen.     If these symptoms fail to gradually improve, we may need to consider a couple of tests:  Another abdominal CT scan, and possibly   Upper endoscopy (scope of esophagus/stomach).     Let's check some blood (and urine) tests in the lab today, to look for obvious causes of fatigue.   The lab will also send home with you some specimen containers and directions for bringing in stool samples. You should do that if the stools don't improve in the next few days.     No medication changes.     Send me a MyChart update next week. I can review labs with you, and you can tell me about how your symptoms are doing.   "

## 2018-03-01 NOTE — PROGRESS NOTES
"  SUBJECTIVE:   1410 --- 1447    Ana Cristina Dominguez is a 86 year old female who presents with her son to clinic today for health counseling regarding the following health issues.  37 minutes were spent with the patient face to face today, over 50% of which was devoted to health counseling and education regarding these issues.     Abnormal stool  Patient notes abnormal stools present for 10 days. She began noticing the abnormal bowel movements after eating a small amount of food and her abdomen became large and \"hard like a rock.\" She notes early satiety. The fullness and hardness is not worsened by specific foods. Stools will worsen throughout the day - more formed in the morning and become more \"squiggly - like a worm\" throughout the day. Patient notes bowel movements 5-6 times per day, with small amounts of stool passing while urinating as well. She notes the bowel movements seem to be stable and not worsening. Denies nausea, vomiting, hard stools, constipation, fever, chills, cold symptoms, no recent consumption of well water. No unusual travel.   No recent antibiotic use. Of note, patient does not have her appendix.     Difficulty swallowing  Patient notes a feeling of food getting stuck. This usually occurs at night. Walking around and coughing will resolve the feeling.    Fatigue  Patient notes recent fatigue. Fatigue is worst in the morning. Patient relates it feels that something is draining the energy from her when awake. She notes walking across the room will make her tired. Patient reports she is reducing her prednisone dose - starting March 1st she is taking 1 mg.     Lower extremity edema  Patient notes ankle edema present 1 day ago. She took a diuretic last night and the symptoms resolved. Denies lower extremity edema today.     Past/recent records reviewed and discussed for:  - US abdomen 03/01/2017 - normal  - Reviewed US abdomen from 10/05/2016  - Follows with rheumatologist Dr. Joshi. Taking " prednisone since 07/2017. Advised to follow up if difficulty with prednisone.   - No longer follows cardiologist Dr. Bower. Last office visit 06/29/2017.  - Taking xarelto for atrial fibrillation  - Colonoscopy 09/11/2006.    Problem list and histories reviewed & adjusted, as indicated.  Additional history: as documented      Reviewed and updated as needed this visit by clinical staff  Tobacco  Allergies  Meds  Med Hx  Surg Hx  Fam Hx  Soc Hx      Reviewed and updated as needed this visit by Provider         ROS:  POSITIVE for fatigue, difficulty swallowing, abnormal stool, early satiety, abdominal hardness    No dyspnea or cough. No chest discomfort, dizziness or palpitations. No rectal bleeding.   No acute problems with vision or speech, lateralizing weakness or paresthesias.    ROS: as above or negative for Constitutional, ENT, Respiratory, CV, GI,  systems.     This document serves as a record of the services and decisions personally performed and made by Dez Urban MD. It was created on his behalf by Petra Medellin, a trained medical scribe. The creation of this document is based on the provider's statements to the medical scribe.  Petra Medellin March 1, 2018 2:12 PM     OBJECTIVE:   /66 (BP Location: Right arm, Patient Position: Sitting, Cuff Size: Adult Regular)  Pulse 94  Temp 98.4  F (36.9  C) (Oral)  Ht 1.524 m (5')  Wt 42.6 kg (94 lb)  SpO2 96%  Breastfeeding? No  BMI 18.36 kg/m2  Body mass index is 18.36 kg/(m^2).  GENERAL: healthy, alert and no distress  RESP: lungs clear to auscultation - no rales, rhonchi or wheezes  CV: regular rate and rhythm, normal S1 S2, no S3 or S4, no murmur, click or rub, no peripheral edema and peripheral pulses strong  ABDOMEN: Vertical abdominal ventral hernia, small umbilical hernia  MS: no gross musculoskeletal defects noted, no edema  NEURO: Normal strength and tone, mentation intact and speech normal  PSYCH: mentation appears normal, affect  "normal/bright     Diagnostic Test Results:  none     ASSESSMENT/PLAN:   1410 --- 1447  37 minutes were spent with the patient face to face today, over 50% of which was devoted to health counseling and education regarding these issues.    (R10.9) Abdominal discomfort  (primary encounter diagnosis)  Comment: Early satiety and discomfort noted after eating for 10 days. Not associated with any specific foods. Discomfort followed by abnormal bowel movements. Advised patient/son to send a getbetter!t message next week to review labs and update on symptoms.   Plan: UA reflex to Microscopic, Comprehensive         metabolic panel, CBC with platelets         differential          (K43.9) Ventral hernia without obstruction or gangrene  Comment: Noted on 01/19/2018. Will continue to monitor.     (R19.7) Diarrhea, unspecified type  Comment: Abnormal stools present for 10 days. Stable over last 2 days. Worsen throughout the day. Recommended patient to bring in stool samples if symptoms do not improve within the next few days. See epic orders. Advised patient/son to send a Shot Stats message next week to review labs and update on symptoms  Plan: TSH with free T4 reflex, Giardia antigen, Ova         and Parasite Exam Routine, Enteric Bacteia and        Virus Panel by [facility] Stool          (R53.83) Fatigue, unspecified type  Comment: Update thyroid and blood count labs.   Plan: TSH with free T4 reflex          (M35.3) PMR (polymyalgia rheumatica) (H)  Comment: Stable. Update labs - see Epic orders. Continue following with specialists as recommended and continue prednisone taper as advised.   Plan: ESR: Erythrocyte sedimentation rate, CRP,         inflammation          (D64.9) Anemia, unspecified type  Comment: Recent fatigue. Update CBC.     Patient Instructions   I'm not sure of the cause for the funny stools and the \"hardening\" of your abdomen.     If these symptoms fail to gradually improve, we may need to consider a couple of " tests:  Another abdominal CT scan, and possibly   Upper endoscopy (scope of esophagus/stomach).     Let's check some blood (and urine) tests in the lab today, to look for obvious causes of fatigue.   The lab will also send home with you some specimen containers and directions for bringing in stool samples. You should do that if the stools don't improve in the next few days.     No medication changes.     Send me a MyChart update next week. I can review labs with you, and you can tell me about how your symptoms are doing.     The information in this document, created by the medical scribe for me, accurately reflects the services I personally performed and the decisions made by me. I have reviewed and approved this document for accuracy prior to leaving the patient care area.  March 1, 2018 3:05 PM    Dez Urban MD  [facility]

## 2018-03-15 NOTE — TELEPHONE ENCOUNTER
Patient's son, Ziyad, calls. Asking for an appointment with Dr. Avelar.  Needs to be seen for a follow up to prednisone and swelling in her feet and ankles.      She is doing a prednisone taper, 3mg PO daily, tapering by 1 mg monthly, on 2 mg now.  States that the welling in her feet and ankles was improved drastically on 3 mg dose, after the decrease, about 7 days ago, the edema started to return.  She also notes that the has had some SOB that has started one week ago and still persists, but same, not worse.     Advised that she needs to f/u with cardiology, son conforms and states that she has an appointment scheduled for Tuesday, 3/20.  Advised she should see her PCP or a covering provider today or tomorrow to address the edema and SOB.  Taking Bumex as directed as well. Son in agreement, will schedule.    Son also asking for appointment with Dr. CORREIA.  Will route to see if able to fit patient in-patient can be seen anytime-there are no openings for a while-able to fit patient in to discuss prednisone?  Ziyad can be reached at [phone].    Patricia Neal, TIA  Message handled by Nurse Triage.

## 2018-03-15 NOTE — TELEPHONE ENCOUNTER
Received a message yesterday from Ziyad, patient's son. Patient is more SOB and has more edema in her ankles and feet.     Spoke to patient. Patient does not weigh herself daily. Patient stated that she is more SOB and that her feet and ankles are 3 times the size they are normally.  I scheduled patient to see Dr. Bower on Tuesday 03-20-18. Patient was advised to see her PMD in the meantime.     I spoke to Ziyad, and gave him patient's appt date and time. Ziyad stated, patient's edema is better when she is taking higher doses of steroids. Patient is on a tapering dose of Prednisone and will be out of her Prednisone soon. I advised Ziyad to have patient see her PMD, so someone can get their eyes on her to make sure she is okay. If Dr. Urban is not available, she can see one of his partners. Ziyad had no questions.     Patient was diagnosed with polymyalgia rheumatica last year. Per Dr. Bower, patient's polymyalgia was likely the force leading to her peripheral edema.      TGarbers RN  [facility]

## 2018-03-16 NOTE — TELEPHONE ENCOUNTER
Ideally, we need to continue prednisone taper by 1mg every month until off. Prednisone is not prescribed for the leg edema. That is a separate problem which needs to be discussed with PCP or cardiology.   However, if patient is concerned, I am okay to continue prednisone at 3mg PO daily until the next available clinic visit with me to discuss that. It is low dose only. So risk is low. I changed the dose in med list.

## 2018-03-16 NOTE — NURSING NOTE
Chief Complaint   Patient presents with     Edema     feet and ankles     Shortness of Breath       Initial /62 (BP Location: Right arm, Patient Position: Sitting, Cuff Size: Adult Regular)  Pulse 97  Temp 97.6  F (36.4  C) (Oral)  Ht 5' (1.524 m)  Wt 99 lb (44.9 kg)  SpO2 97%  Breastfeeding? No  BMI 19.33 kg/m2 Estimated body mass index is 19.33 kg/(m^2) as calculated from the following:    Height as of this encounter: 5' (1.524 m).    Weight as of this encounter: 99 lb (44.9 kg).  Medication Reconciliation: complete   Karel MOORE

## 2018-03-16 NOTE — TELEPHONE ENCOUNTER
Patient was seen by PCP today.  Call to patient's son-LM to call back.  Also, called patient, LM to call back as well.  Patricia Neal RN  Message handled by Nurse Triage.

## 2018-03-16 NOTE — MR AVS SNAPSHOT
After Visit Summary   3/16/2018    Ana Cristina Dominguez    MRN: [id number]           Patient Information     Date Of Birth          6/6/1931        Visit Information        Provider Department      3/16/2018 1:20 PM Dez Urban MD [facility]        Today's Diagnoses     Acute congestive heart failure, unspecified congestive heart failure type (H)    -  1    Chronic diastolic congestive heart failure (H)        Chronic atrial fibrillation (H)        Edema, unspecified type        Essential hypertension        Hyponatremia        Aortic valve insufficiency, etiology of cardiac valve disease unspecified          Care Instructions    I feel that the increased swelling is more related to heart function, rather than to the prednisone withdrawal.     Given the shortness of breath with activity and the increased swelling, I'm concerned that your heart is working less efficiently. This could be related to one or more of a few possible issues:  ?Pump function (was okay in 5/2017)  ?valve function (was stable in 5/2017)  ?heart beating too fast--could improve by slowing down with more metoprolol.     SO...    While waiting to see Dr Bower on Tuesday, I'd like to do these things:  1) Check blood test today to assure all is stable there.    2) Change bumetanide 0.5 mg tabs--from 2 in AM and 1 in PM, up to 3 in AM and 2 in PM. (will remove fluid)    3) Raise Toprol XL 50 mg tabs--from 1.5 tabs per day, up to 2 tabs per day. (this will slow heart rate, and hopefully make it beat more efficiently)      Then on Monday:  1) Repeat the blood test again (assure sodium, potassium and kidneys stable with the med changes.    2) try to get the Echocardiogram updated before your appointment with Dr Bower on Tuesday.           Follow-ups after your visit        Your next 10 appointments already scheduled     Mar 20, 2018 10:15 AM CDT   Return Visit with Fabiano Bower MD   [facility]  [facility] ([facility])    [address]  [facility] [id number] [phone]            May 21, 2018  9:15 AM CDT   Return Visit with Danny Avelar MD   [facility] [place] ([facility])    [address]  [place] [address] [phone]              Future tests that were ordered for you today     Open Future Orders        Priority Expected Expires Ordered    Echocardiogram Complete ASAP  3/16/2019 3/16/2018    Basic metabolic panel Routine 3/19/2018 3/16/2019 3/16/2018            Who to contact     If you have questions or need follow up information about today's clinic visit or your schedule please contact [facility] directly at [phone].  Normal or non-critical lab and imaging results will be communicated to you by MyChart, letter or phone within 4 business days after the clinic has received the results. If you do not hear from us within 7 days, please contact the clinic through [employer] or phone. If you have a critical or abnormal lab result, we will notify you by phone as soon as possible.  Submit refill requests through [email or web address] or call your pharmacy and they will forward the refill request to us. Please allow 3 business days for your refill to be completed.          Additional Information About Your Visit        [employer] Information     [email or web address] gives you secure access to your electronic health record. If you see a primary care provider, you can also send messages to your care team and make appointments. If you have questions, please call your primary care clinic.  If you do not have a primary care provider, please call [phone] and they will assist you.        Care EveryWhere ID     This is your Care EveryWhere ID. This could be used by other organizations to access your [place] medical records  [id number]        Your Vitals Were     Pulse Temperature Height Pulse Oximetry  Breastfeeding? BMI (Body Mass Index)    97 97.6  F (36.4  C) (Oral) 5' (1.524 m) 97% No 19.33 kg/m2       Blood Pressure from Last 3 Encounters:   03/16/18 118/62   03/01/18 122/66   01/19/18 122/60    Weight from Last 3 Encounters:   03/16/18 99 lb (44.9 kg)   03/01/18 94 lb (42.6 kg)   01/19/18 93 lb (42.2 kg)              We Performed the Following     Basic metabolic panel          Today's Medication Changes          These changes are accurate as of 3/16/18  2:02 PM.  If you have any questions, ask your nurse or doctor.               These medicines have changed or have updated prescriptions.        Dose/Directions    bumetanide 0.5 MG tablet   Commonly known as:  BUMEX   This may have changed:  See the new instructions.   Used for:  Edema, unspecified type   Changed by:  Dez Urban MD        Take 3 tabs each AM and 2 tabs each evening   Quantity:  150 tablet   Refills:  1       metoprolol succinate 50 MG 24 hr tablet   Commonly known as:  TOPROL-XL   This may have changed:  See the new instructions.   Used for:  Essential hypertension, Chronic atrial fibrillation (H)   Changed by:  Dez Urban MD        Dose:  100 mg   Take 2 tablets (100 mg) by mouth daily   Quantity:  180 tablet   Refills:  0            Where to get your medicines      These medications were sent to [facility]/pharmacy #[id number] - [place], [place] - [address], [place] [address]     Phone:  [phone]     bumetanide 0.5 MG tablet    metoprolol succinate 50 MG 24 hr tablet                Primary Care Provider Office Phone # Fax #    Dez Urban [phone]       [address]  [facility] [address]        Equal Access to Services     KRYSTLE ESPINOSA : Carol Joe, jorge lucarly, qapeñata kaalmada nessa, rinku obrien. So [facility] [phone].    ATENCIÓN: Si habla español, tiene a dominguez disposición servicios gratuitos de asistencia  lingüística. Fabian al [phone].    We comply with applicable federal civil rights laws and [place] laws. We do not discriminate on the basis of race, color, national origin, age, disability, sex, sexual orientation, or gender identity.            Thank you!     Thank you for choosing [facility]  for your care. Our goal is always to provide you with excellent care. Hearing back from our patients is one way we can continue to improve our services. Please take a few minutes to complete the written survey that you may receive in the mail after your visit with us. Thank you!             Your Updated Medication List - Protect others around you: Learn how to safely use, store and throw away your medicines at www.disposemymeds.org.          This list is accurate as of 3/16/18  2:02 PM.  Always use your most recent med list.                   Brand Name Dispense Instructions for use Diagnosis    ascorbic acid 500 MG tablet    VITAMIN C    100    1 TABLET DAILY        bumetanide 0.5 MG tablet    BUMEX    150 tablet    Take 3 tabs each AM and 2 tabs each evening    Edema, unspecified type       CALCIUM 600 + D 600-200 MG-UNIT Tabs      2 qd        diltiazem 120 MG Cp12 12 hr SR capsule    CARDIZEM SR    180 capsule    TAKE ONE CAPSULE BY MOUTH TWICE A DAY    Chronic atrial fibrillation (H)       metoprolol succinate 50 MG 24 hr tablet    TOPROL-XL    180 tablet    Take 2 tablets (100 mg) by mouth daily    Essential hypertension, Chronic atrial fibrillation (H)       MULTIVITAMIN TABS   OR      1 qd        olopatadine 0.1 % ophthalmic solution    PATANOL     Place 1 drop into both eyes 2 times daily as needed for allergies During Spring        predniSONE 1 MG tablet    DELTASONE    120 tablet    3mg PO daily, taper by 1 mg monthly    PMR (polymyalgia rheumatica) (H)       PRESERVISION AREDS PO      Take 1 capsule by mouth daily        rOPINIRole 0.5 MG tablet    REQUIP    30 tablet    TAKE 1 TABLET BY  MOUTH AT BEDTIME    RLS (restless legs syndrome)       TYLENOL PO      Take by mouth every 4 hours as needed for mild pain or fever        vitamin E 400 UNIT capsule     100    ONE CAP PO QD        XARELTO 20 MG Tabs tablet   Generic drug:  rivaroxaban ANTICOAGULANT     30 tablet    TAKE 1 TABLET (20 MG) BY MOUTH DAILY (WITH DINNER)    Chronic atrial fibrillation (H)

## 2018-03-16 NOTE — PROGRESS NOTES
"  SUBJECTIVE:   1330---1405    Ana Cristina Dominguez is a 86 year old female who presents to clinic today for health counseling regarding several health issues. 35 minutes were spent with the patient face to face today, over 50% of which was devoted to health counseling and education regarding these issues.     Abdominal discomfort  Patient was seen 3/1/2018 here in clinic. She complained of diarrhea, abnormal stools, and abdominal pain. Abdominal CT scan from 3/14/2018 revealed trace amount of ascites, enlarged uterus most likely containing fibroids, and small cyst on left kidney. She continues to have irregular bowel movements and is constipated. Every couple of days stool will be formed and the rest of the time her stool is small and \"stone like\".    Recommend increasing fruit and vegetable consumption. May try prune juice. Suggested Miralax daily if interested.    SOB  She has become more SOB with exertion. Getting dressed in the mornings causes her to start panting. SOB does not occur when she lies flat. Denies any chest tightness or pressure, dizziness, or palpitations. She does not wake up in the middle of the night with SOB. Abdominal CT scan on 3/14/2018 indicated small to moderate bilateral pleural effusions and cardiac enlargement compared to previous image CT scan.    Patient expresses that she is not interested in any surgeries or extensive treatment at this stage in her life.     Echocardiogram from 5/22/2017 indicated that ventricular ejection was normal, but there was moderate aortic and tricuspid regurgitation. Patient has an appointment with Dr. Bower of cardiology on 3/20/2018.     Lower extremity edema  Patient reports that the edema has worsened and is painful. She is taking 2 tablets of bumex in the morning and one at night, but has not seen any improvement.     PMR  She is now down to 1 mg of prednisone and will finished 4/1/2018. Notes that she has not been able to contact Dr. Avelar, " she was told he is out and his estimated date of return is unknown. When she first developed PMR she experienced all over pain. She has not experienced similar pain with taper of prednisone. Recommend continuing prednisone through this month.     Problem list and histories reviewed & adjusted, as indicated.  Additional history: as documented    Reviewed and updated as needed this visit by clinical staff  Tobacco  Allergies  Meds  Med Hx  Surg Hx  Fam Hx  Soc Hx      Reviewed and updated as needed this visit by Provider       ROS:  No dyspnea or cough. No chest discomfort, dizziness or palpitations. No diarrhea, abdominal pain or rectal bleeding.   No acute problems with vision or speech, lateralizing weakness or paresthesias.    ROS: as above or negative for Respiratory, CV, GI, endocrine, neuro systems.    RESP: POSITIVE for SOB with exertion  GI: POSITIVE for abnormal stools and constipation  MUSC: POSITIVE for lower extremity edema    This document serves as a record of the services and decisions personally performed and made by Dez Urban MD. It was created on his behalf by Tricia Pineda, a trained medical scribe. The creation of this document is based on the provider's statements to the medical scribe.  Tricia Pineda March 16, 2018 1:27 PM     OBJECTIVE:     /62 (BP Location: Right arm, Patient Position: Sitting, Cuff Size: Adult Regular)  Pulse 97  Temp 97.6  F (36.4  C) (Oral)  Ht 1.524 m (5')  Wt 44.9 kg (99 lb)  SpO2 97%  Breastfeeding? No  BMI 19.33 kg/m2  Body mass index is 19.33 kg/(m^2).    GENERAL: healthy, alert and no distress  EYES: Eyes grossly normal to inspection, PERRL and conjunctivae and sclerae normal  RESP: lungs clear to auscultation - no rales, rhonchi or wheezes  CV: Rapid heart rate noted, irregular rhythm, normal S1 S2, no S3 or S4, no murmur appreciated, no click or rub  MS: no gross musculoskeletal defects noted. Bilateral 3+ pretibial pitting edema, lower legs  and feet  SKIN: no suspicious lesions or rashes  NEURO: Normal strength and tone, mentation intact and speech normal  PSYCH: mentation appears normal, affect normal/bright    ASSESSMENT/PLAN:   Health counseling regarding several health issues. 35 minutes were spent with the patient face to face today, over 50% of which was devoted to health counseling and education regarding these issues.     (I50.9) Acute congestive heart failure, unspecified congestive heart failure type (H)  (primary encounter diagnosis)  Comment: Advised patient that I do not believe the edema is symptoms of prednisone withdrawal. Discussed that symptoms are more likely CHF given her SOB and edema. Will increase bumex dosage and update labs and CT scan before appointment with Dr. Bower on 3/20.  Plan: Basic metabolic panel, Basic metabolic panel,         Echocardiogram Complete          (I50.32) Chronic diastolic congestive heart failure (H)  Comment: Updating BMP and echocardiogram. Follow up with Dr. Bower as planned  Plan: Basic metabolic panel, Basic metabolic panel,         Echocardiogram Complete          (I48.2) Chronic atrial fibrillation (H)  Comment: Heart rate elevated during physical exam. Increasing metoprolol dosage to two 50 mg tablets daily.   Plan: metoprolol succinate (TOPROL-XL) 50 MG 24 hr         tablet          (R60.9) Edema, unspecified type  Comment:  Increase Bumex dosage to 0.5 mg tablets 3x in the mornings and 2 tablets in the evenings.  Plan: bumetanide (BUMEX) 0.5 MG tablet          (I10) Essential hypertension  Comment: BP at target today. Increasing metoprolol dosage to two 50 mg tablets due to elevated heart rate.   Plan: metoprolol succinate (TOPROL-XL) 50 MG 24 hr         tablet          (E87.1) Hyponatremia  Comment: Updating BMP   Plan: Basic metabolic panel, Basic metabolic panel          (I35.1) Aortic valve insufficiency, etiology of cardiac valve disease unspecified  Comment: Updating echocardiogram  before appointment with Dr. Bower on 3/20/2018.   Plan: Echocardiogram Complete         FUTURE APPOINTMENTS:       - Follow-up visit Monday 3/19/2018.     Patient Instructions   I feel that the increased swelling is more related to heart function, rather than to the prednisone withdrawal.     Given the shortness of breath with activity and the increased swelling, I'm concerned that your heart is working less efficiently. This could be related to one or more of a few possible issues:  ?Pump function (was okay in 5/2017)  ?valve function (was stable in 5/2017)  ?heart beating too fast--could improve by slowing down with more metoprolol.     SO...    While waiting to see Dr Bower on Tuesday, I'd like to do these things:  1) Check blood test today to assure all is stable there.    2) Change bumetanide 0.5 mg tabs--from 2 in AM and 1 in PM, up to 3 in AM and 2 in PM. (will remove fluid)    3) Raise Toprol XL 50 mg tabs--from 1.5 tabs per day, up to 2 tabs per day. (this will slow heart rate, and hopefully make it beat more efficiently)      Then on Monday:  1) Repeat the blood test again (assure sodium, potassium and kidneys stable with the med changes.    2) try to get the Echocardiogram updated before your appointment with Dr Bower on Tuesday.     The information in this document, created by the medical scribe for me, accurately reflects the services I personally performed and the decisions made by me. I have reviewed and approved this document for accuracy prior to leaving the patient care area.  March 16, 2018 1:27 PM    Dez Urban MD  [facility]

## 2018-03-16 NOTE — PATIENT INSTRUCTIONS
I feel that the increased swelling is more related to heart function, rather than to the prednisone withdrawal.     Given the shortness of breath with activity and the increased swelling, I'm concerned that your heart is working less efficiently. This could be related to one or more of a few possible issues:  ?Pump function (was okay in 5/2017)  ?valve function (was stable in 5/2017)  ?heart beating too fast--could improve by slowing down with more metoprolol.     SO...    While waiting to see Dr Bower on Tuesday, I'd like to do these things:  1) Check blood test today to assure all is stable there.    2) Change bumetanide 0.5 mg tabs--from 2 in AM and 1 in PM, up to 3 in AM and 2 in PM. (will remove fluid)    3) Raise Toprol XL 50 mg tabs--from 1.5 tabs per day, up to 2 tabs per day. (this will slow heart rate, and hopefully make it beat more efficiently)      Then on Monday:  1) Repeat the blood test again (assure sodium, potassium and kidneys stable with the med changes.    2) try to get the Echocardiogram updated before your appointment with Dr Bower on Tuesday.

## 2018-03-20 NOTE — MR AVS SNAPSHOT
After Visit Summary   3/20/2018    Ana Cristina Dominguez    MRN: [id number]           Patient Information     Date Of Birth          6/6/1931        Visit Information        Provider Department      3/20/2018 10:15 AM Fabiano Bower MD [facility]        Today's Diagnoses     Chronic diastolic congestive heart failure (H)    -  1    Polymyalgia rheumatica (H)           Follow-ups after your visit        Your next 10 appointments already scheduled     Mar 20, 2018 11:45 AM CDT   LAB with RU LAB   [facility] ([facility])    [address]  [facility] [id number] [phone]           Please do not eat 10-12 hours before your appointment if you are coming in fasting for labs on lipids, cholesterol, or glucose (sugar). This does not apply to pregnant women. Water, hot tea and black coffee (with nothing added) are okay. Do not drink other fluids, diet soda or chew gum.            Mar 21, 2018  2:45 PM CDT   Ech Complete with [address] ([facility])    [address]  [facility] [id number] [phone]           1. Please bring or wear a comfortable two-piece outfit. 2. You may eat, drink and take your normal medicines. 3. For any questions that cannot be answered, please contact the ordering physician ***Please check-in at the [facility] located in [address] in the [facility] building. When you are finished registering, please go to [address] and have a seat. The technician will call your name for the test.            May 21, 2018  9:15 AM CDT   Return Visit with Danny Avelar MD   [facility] [place] ([facility] [place])    [address] [address] [phone]              Who to contact     If you have questions or need follow up  information about today's clinic visit or your schedule please contact [facility] directly at [phone].  Normal or non-critical lab and imaging results will be communicated to you by MyChart, letter or phone within 4 business days after the clinic has received the results. If you do not hear from us within 7 days, please contact the clinic through [employer] or phone. If you have a critical or abnormal lab result, we will notify you by phone as soon as possible.  Submit refill requests through [employer] or call your pharmacy and they will forward the refill request to us. Please allow 3 business days for your refill to be completed.          Additional Information About Your Visit        [employer] Information     [employer] gives you secure access to your electronic health record. If you see a primary care provider, you can also send messages to your care team and make appointments. If you have questions, please call your primary care clinic.  If you do not have a primary care provider, please call [phone] and they will assist you.        Care EveryWhere ID     This is your Care EveryWhere ID. This could be used by other organizations to access your [facility] medical records  [id number]        Your Vitals Were     Pulse Height BMI (Body Mass Index)             88 1.524 m (5') 18.89 kg/m2          Blood Pressure from Last 3 Encounters:   03/20/18 124/76   03/16/18 118/62   03/01/18 122/66    Weight from Last 3 Encounters:   03/20/18 43.9 kg (96 lb 11.2 oz)   03/16/18 44.9 kg (99 lb)   03/01/18 42.6 kg (94 lb)              We Performed the Following     EKG 12-lead complete w/read - Clinics        Primary Care Provider Office Phone # Fax #    Dez Urban [phone]       [address]  [facility] [address]        Equal Access to Services     ABBE ESPINOSA : Carol Joe, waindirada luqadaha, qaybta kaalmary szymanski, rinku graham  sravan jhonget mandabeata burciaga ah. So [facility] [phone].    ATENCIÓN: Si [facility], tiene a dominguez disposición servicios gratuitos de asistencia lingüística. Fabian caldwell [phone].    We comply with applicable federal civil rights laws and [place] laws. We do not discriminate on the basis of race, color, national origin, age, disability, sex, sexual orientation, or gender identity.            Thank you!     Thank you for choosing [facility]  for your care. Our goal is always to provide you with excellent care. Hearing back from our patients is one way we can continue to improve our services. Please take a few minutes to complete the written survey that you may receive in the mail after your visit with us. Thank you!             Your Updated Medication List - Protect others around you: Learn how to safely use, store and throw away your medicines at www.disposemymeds.org.          This list is accurate as of 3/20/18 11:36 AM.  Always use your most recent med list.                   Brand Name Dispense Instructions for use Diagnosis    ascorbic acid 500 MG tablet    VITAMIN C    100    1 TABLET DAILY        bumetanide 0.5 MG tablet    BUMEX    150 tablet    Take 3 tabs each AM and 2 tabs each evening    Edema, unspecified type       CALCIUM 600 + D 600-200 MG-UNIT Tabs      2 qd        diltiazem 120 MG Cp12 12 hr SR capsule    CARDIZEM SR    180 capsule    TAKE ONE CAPSULE BY MOUTH TWICE A DAY    Chronic atrial fibrillation (H)       metoprolol succinate 50 MG 24 hr tablet    TOPROL-XL    180 tablet    Take 2 tablets (100 mg) by mouth daily    Essential hypertension, Chronic atrial fibrillation (H)       MULTIVITAMIN TABS   OR      1 qd        olopatadine 0.1 % ophthalmic solution    PATANOL     Place 1 drop into both eyes 2 times daily as needed for allergies During Spring        predniSONE 1 MG tablet    DELTASONE    120 tablet    3mg PO daily    PMR (polymyalgia rheumatica)  (H)       PRESERVISION AREDS PO      Take 1 capsule by mouth daily        rOPINIRole 0.5 MG tablet    REQUIP    30 tablet    TAKE 1 TABLET BY MOUTH AT BEDTIME    RLS (restless legs syndrome)       TYLENOL PO      Take by mouth every 4 hours as needed for mild pain or fever        vitamin E 400 UNIT capsule     100    ONE CAP PO QD        XARELTO 20 MG Tabs tablet   Generic drug:  rivaroxaban ANTICOAGULANT     30 tablet    TAKE 1 TABLET (20 MG) BY MOUTH DAILY (WITH DINNER)    Chronic atrial fibrillation (H)

## 2018-03-20 NOTE — LETTER
3/20/2018    Dez Urban MD, MD  [address] [facility] [address]  [facility] [address]    RE: Ana Cristina Dominguez       Dear Colleague,    I had the pleasure of seeing Ana Cristina Dominguez in the [facility].    HISTORY:    Ana Cristina Dominguez is a delightful elderly patient accompanied by her attentive son today. She has a history of chronic atrial fibrillation and hypertension as well as hyperlipidemia. She was seen last spurring with sudden onset of peripheral edema. An echocardiogram suggested diastolic dysfunction and diuretic therapy was initiated without much success. At the same time she complained of severe extensive joint discomfort, also new in onset. She was seen by rheumatology who diagnosed her with polymyalgia rheumatica and started her on high-dose steroids. Her peripheral edema and comfort resolved also immediately. Her diuretics were discontinued and her edema did not recur.    Today Ana Cristina that she has continued a very slow steroid taper and is currently using prednisone 1 mg per day.  She has been cutting back on a monthly basis and was switched to this dose the first of March. She reports that for the last 2-3 weeks she has noticed an onset of lower extremity edema. She was restarted on Bumex and the dose has been increased to 5 tablets per day (0.5 mg each). This has had no significant effect on her edema. She has not been experiencing any further discomfort of her polymyalgia. She does, however, noticed that she is much more short of breath with activity. She was not short of breath when initially diagnosed with PMR. She denies any exertional chest, arm, neck, or jaw discomfort. Her sleep pattern has not changed. She does have some insomnia and uses 2 pillows but has not experienced PND or orthopnea.      ASSESSMENT/PLAN:    1.  Congestive heart failure, class III.  HFpEF. Today's pro BNP was measured and found to be 3300. I did not have this available at the time  of my visit but it confirms likely CHF. Her exam does not suggest volume overload. I am surprised that she has not responded to fairly aggressive diuretic therapy. Her renal function is normal. Her last echocardiogram showed diastolic dysfunction but normal LV function and moderate AI and TR. ECG done today did not suggest a new infarct or other problems. I will have her echocardiogram repeated as well. If no new problems are seen on her echocardiogram we will need to be more aggressive with her diuretic therapy. It is possible that the reduction of steroid dose led to recurrence of her peripheral edema, which was the cause in the past. The threshold for recurrence of edema may be different than the threshold for recurrence of joint pain. However, in the past she did not have significant dyspnea, which is now a prominent symptom.    Thank you for asking me to participate in your patient's care. I spent 45 minutes face-to-face time, greater than 50% counseling. Please don't hesitate to call if I can be of further assistance    Orders Placed This Encounter   Procedures     N terminal pro BNP outpatient     EKG 12-lead complete w/read - Clinics     No orders of the defined types were placed in this encounter.    There are no discontinued medications.      Encounter Diagnoses   Name Primary?     Chronic diastolic congestive heart failure (H) Yes     Polymyalgia rheumatica (H)        CURRENT MEDICATIONS:  Current Outpatient Prescriptions   Medication Sig Dispense Refill     Multiple Vitamins-Minerals (PRESERVISION AREDS PO) Take 1 capsule by mouth daily       metoprolol succinate (TOPROL-XL) 50 MG 24 hr tablet Take 2 tablets (100 mg) by mouth daily 180 tablet 0     bumetanide (BUMEX) 0.5 MG tablet Take 3 tabs each AM and 2 tabs each evening 150 tablet 1     predniSONE (DELTASONE) 1 MG tablet 3mg PO daily 120 tablet 3     XARELTO 20 MG TABS tablet TAKE 1 TABLET (20 MG) BY MOUTH DAILY (WITH DINNER) 30 tablet 8      diltiazem (CARDIZEM SR) 120 MG CP12 12 hr SR capsule TAKE ONE CAPSULE BY MOUTH TWICE A  capsule 2     rOPINIRole (REQUIP) 0.5 MG tablet TAKE 1 TABLET BY MOUTH AT BEDTIME 30 tablet 5     Acetaminophen (TYLENOL PO) Take by mouth every 4 hours as needed for mild pain or fever       olopatadine (PATANOL) 0.1 % ophthalmic solution Place 1 drop into both eyes 2 times daily as needed for allergies During Spring       VITAMIN E 400 UNIT OR CAPS ONE CAP PO  0     VITAMIN C 500 MG OR TABS 1 TABLET DAILY 100 0     MULTIVITAMIN TABS   OR 1 qd       CALCIUM 600 + D 600-200 MG-IU OR TABS 2 qd         ALLERGIES     Allergies   Allergen Reactions     Amoxicillin Rash     Codeine GI Disturbance     Stomach pain       PAST MEDICAL HISTORY:  Past Medical History:   Diagnosis Date     Chronic diastolic heart failure (H)      Other and unspecified hyperlipidemia      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 Father      Family History Negative Mother      Genitourinary Problems Sister      Renal failure     HEART DISEASE Sister      Rhythm issues       SOCIAL HISTORY:  Social History     Social History     Marital status:      Spouse name: N/A     Number of children: 3     Years of education: N/A     Occupational History      Retired     Social History Main Topics     Smoking status: Former Smoker     Quit date: 5/6/1973     Smokeless tobacco: Never Used     Alcohol use Yes      Comment: Socially     Drug use: No     Sexual activity: No     Other Topics Concern     Exercise Yes     Seat Belt Yes     Social  History Narrative       Review of Systems:  Skin:  Positive for     Eyes:  Positive for glasses  ENT:  Positive for hearing loss  Respiratory:  Positive for dyspnea on exertion;cough  Cardiovascular:    Positive for;edema;fatigue  Gastroenterology: Negative    Genitourinary:  Positive for urinary frequency  Musculoskeletal:  Positive for arthritis  Neurologic:  Positive for numbness or tingling of feet  Psychiatric:  Positive for sleep disturbances  Heme/Lymph/Imm:  Positive for hay fever  Endocrine:  Negative      Physical Exam:  Vitals: /76 (BP Location: Right arm, Patient Position: Chair, Cuff Size: Adult Regular)  Pulse 88  Ht 1.524 m (5')  Wt 43.9 kg (96 lb 11.2 oz)  BMI 18.89 kg/m2    Constitutional:  cooperative, alert and oriented, well developed, well nourished, in no acute distress        Skin:  warm and dry to the touch        Head:  normocephalic        Eyes:  no xanthalasma        ENT:  no pallor or cyanosis        Neck:  carotid pulses are full and equal bilaterally   JVP 6-8 cm    Chest:  normal breath sounds, clear to auscultation, normal A-P diameter, normal symmetry, normal respiratory excursion, no use of accessory muscles        Cardiac: normal S1 and S2;no S3 or S4 irregular rhythm     systolic murmur;holosystolic murmur;grade 1;throughout precordium systolic murmur;apical;grade 1        Abdomen:  abdomen soft;BS normoactive        Vascular: pulses full and equal                                      Extremities and Back:      extending to mid shin    Neurological:  no gross motor deficits          Recent Lab Results:  LIPID RESULTS:  Lab Results   Component Value Date    CHOL 107 10/07/2016    HDL 22 (L) 10/07/2016    LDL 65 10/07/2016    TRIG 100 10/07/2016    CHOLHDLRATIO 3.3 08/29/2013       LIVER ENZYME RESULTS:  Lab Results   Component Value Date    AST 20 03/01/2018    ALT 21 03/01/2018       CBC RESULTS:  Lab Results   Component Value Date    WBC 7.7 03/01/2018    RBC 3.52 (L)  03/01/2018    HGB 10.3 (L) 03/01/2018    HCT 31.9 (L) 03/01/2018    MCV 91 03/01/2018    MCH 29.3 03/01/2018    MCHC 32.3 03/01/2018    RDW 13.9 03/01/2018     03/01/2018       BMP RESULTS:  Lab Results   Component Value Date     03/19/2018    POTASSIUM 3.7 03/19/2018    CHLORIDE 94 03/19/2018    CO2 32 03/19/2018    ANIONGAP 7 03/19/2018     (H) 03/19/2018    BUN 22 03/19/2018    CR 0.78 03/19/2018    GFRESTIMATED 69 03/19/2018    GFRESTBLACK 84 03/19/2018    KARTHIK 8.9 03/19/2018        A1C RESULTS:  Lab Results   Component Value Date    A1C 5.7 12/16/2015       INR RESULTS:  Lab Results   Component Value Date    INR 1.24 (H) 10/08/2016    INR 1.35 (H) 10/05/2016       Thank you for allowing me to participate in the care of your patient.    Sincerely,     Fabiano Bower MD     [facility]

## 2018-03-20 NOTE — PROGRESS NOTES
HISTORY:    Ana Cristina Dominguez is a delightful elderly patient accompanied by her attentive son today. She has a history of chronic atrial fibrillation and hypertension as well as hyperlipidemia. She was seen last spurring with sudden onset of peripheral edema. An echocardiogram suggested diastolic dysfunction and diuretic therapy was initiated without much success. At the same time she complained of severe extensive joint discomfort, also new in onset. She was seen by rheumatology who diagnosed her with polymyalgia rheumatica and started her on high-dose steroids. Her peripheral edema and comfort resolved also immediately. Her diuretics were discontinued and her edema did not recur.    Today Ana Cristina that she has continued a very slow steroid taper and is currently using prednisone 1 mg per day.  She has been cutting back on a monthly basis and was switched to this dose the first of March. She reports that for the last 2-3 weeks she has noticed an onset of lower extremity edema. She was restarted on Bumex and the dose has been increased to 5 tablets per day (0.5 mg each). This has had no significant effect on her edema. She has not been experiencing any further discomfort of her polymyalgia. She does, however, noticed that she is much more short of breath with activity. She was not short of breath when initially diagnosed with PMR. She denies any exertional chest, arm, neck, or jaw discomfort. Her sleep pattern has not changed. She does have some insomnia and uses 2 pillows but has not experienced PND or orthopnea.      ASSESSMENT/PLAN:    1.  Congestive heart failure, class III.  HFpEF. Today's pro BNP was measured and found to be 3300. I did not have this available at the time of my visit but it confirms likely CHF. Her exam does not suggest volume overload. I am surprised that she has not responded to fairly aggressive diuretic therapy. Her renal function is normal. Her last echocardiogram showed diastolic  dysfunction but normal LV function and moderate AI and TR. ECG done today did not suggest a new infarct or other problems. I will have her echocardiogram repeated as well. If no new problems are seen on her echocardiogram we will need to be more aggressive with her diuretic therapy. It is possible that the reduction of steroid dose led to recurrence of her peripheral edema, which was the cause in the past. The threshold for recurrence of edema may be different than the threshold for recurrence of joint pain. However, in the past she did not have significant dyspnea, which is now a prominent symptom.    Thank you for asking me to participate in your patient's care. I spent 45 minutes face-to-face time, greater than 50% counseling. Please don't hesitate to call if I can be of further assistance    Orders Placed This Encounter   Procedures     N terminal pro BNP outpatient     EKG 12-lead complete w/read - Clinics     No orders of the defined types were placed in this encounter.    There are no discontinued medications.      Encounter Diagnoses   Name Primary?     Chronic diastolic congestive heart failure (H) Yes     Polymyalgia rheumatica (H)        CURRENT MEDICATIONS:  Current Outpatient Prescriptions   Medication Sig Dispense Refill     Multiple Vitamins-Minerals (PRESERVISION AREDS PO) Take 1 capsule by mouth daily       metoprolol succinate (TOPROL-XL) 50 MG 24 hr tablet Take 2 tablets (100 mg) by mouth daily 180 tablet 0     bumetanide (BUMEX) 0.5 MG tablet Take 3 tabs each AM and 2 tabs each evening 150 tablet 1     predniSONE (DELTASONE) 1 MG tablet 3mg PO daily 120 tablet 3     XARELTO 20 MG TABS tablet TAKE 1 TABLET (20 MG) BY MOUTH DAILY (WITH DINNER) 30 tablet 8     diltiazem (CARDIZEM SR) 120 MG CP12 12 hr SR capsule TAKE ONE CAPSULE BY MOUTH TWICE A  capsule 2     rOPINIRole (REQUIP) 0.5 MG tablet TAKE 1 TABLET BY MOUTH AT BEDTIME 30 tablet 5     Acetaminophen (TYLENOL PO) Take by mouth every 4  hours as needed for mild pain or fever       olopatadine (PATANOL) 0.1 % ophthalmic solution Place 1 drop into both eyes 2 times daily as needed for allergies During Spring       VITAMIN E 400 UNIT OR CAPS ONE CAP PO  0     VITAMIN C 500 MG OR TABS 1 TABLET DAILY 100 0     MULTIVITAMIN TABS   OR 1 qd       CALCIUM 600 + D 600-200 MG-IU OR TABS 2 qd         ALLERGIES     Allergies   Allergen Reactions     Amoxicillin Rash     Codeine GI Disturbance     Stomach pain       PAST MEDICAL HISTORY:  Past Medical History:   Diagnosis Date     Chronic diastolic heart failure (H)      Other and unspecified hyperlipidemia      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 Father      Family History Negative Mother      Genitourinary Problems Sister      Renal failure     HEART DISEASE Sister      Rhythm issues       SOCIAL HISTORY:  Social History     Social History     Marital status:      Spouse name: N/A     Number of children: 3     Years of education: N/A     Occupational History      Retired     Social History Main Topics     Smoking status: Former Smoker     Quit date: 5/6/1973     Smokeless tobacco: Never Used     Alcohol use Yes      Comment: Socially     Drug use: No     Sexual activity: No     Other Topics Concern     Exercise Yes     Seat Belt Yes     Social History Narrative       Review of Systems:  Skin:  Positive for     Eyes:  Positive for glasses  ENT:  Positive for hearing loss  Respiratory:  Positive for dyspnea on exertion;cough  Cardiovascular:    Positive  for;edema;fatigue  Gastroenterology: Negative    Genitourinary:  Positive for urinary frequency  Musculoskeletal:  Positive for arthritis  Neurologic:  Positive for numbness or tingling of feet  Psychiatric:  Positive for sleep disturbances  Heme/Lymph/Imm:  Positive for hay fever  Endocrine:  Negative      Physical Exam:  Vitals: /76 (BP Location: Right arm, Patient Position: Chair, Cuff Size: Adult Regular)  Pulse 88  Ht 1.524 m (5')  Wt 43.9 kg (96 lb 11.2 oz)  BMI 18.89 kg/m2    Constitutional:  cooperative, alert and oriented, well developed, well nourished, in no acute distress        Skin:  warm and dry to the touch        Head:  normocephalic        Eyes:  no xanthalasma        ENT:  no pallor or cyanosis        Neck:  carotid pulses are full and equal bilaterally   JVP 6-8 cm    Chest:  normal breath sounds, clear to auscultation, normal A-P diameter, normal symmetry, normal respiratory excursion, no use of accessory muscles        Cardiac: normal S1 and S2;no S3 or S4 irregular rhythm     systolic murmur;holosystolic murmur;grade 1;throughout precordium systolic murmur;apical;grade 1        Abdomen:  abdomen soft;BS normoactive        Vascular: pulses full and equal                                      Extremities and Back:      extending to mid shin    Neurological:  no gross motor deficits          Recent Lab Results:  LIPID RESULTS:  Lab Results   Component Value Date    CHOL 107 10/07/2016    HDL 22 (L) 10/07/2016    LDL 65 10/07/2016    TRIG 100 10/07/2016    CHOLHDLRATIO 3.3 08/29/2013       LIVER ENZYME RESULTS:  Lab Results   Component Value Date    AST 20 03/01/2018    ALT 21 03/01/2018       CBC RESULTS:  Lab Results   Component Value Date    WBC 7.7 03/01/2018    RBC 3.52 (L) 03/01/2018    HGB 10.3 (L) 03/01/2018    HCT 31.9 (L) 03/01/2018    MCV 91 03/01/2018    MCH 29.3 03/01/2018    MCHC 32.3 03/01/2018    RDW 13.9 03/01/2018     03/01/2018       BMP RESULTS:  Lab Results    Component Value Date     03/19/2018    POTASSIUM 3.7 03/19/2018    CHLORIDE 94 03/19/2018    CO2 32 03/19/2018    ANIONGAP 7 03/19/2018     (H) 03/19/2018    BUN 22 03/19/2018    CR 0.78 03/19/2018    GFRESTIMATED 69 03/19/2018    GFRESTBLACK 84 03/19/2018    KARTHIK 8.9 03/19/2018        A1C RESULTS:  Lab Results   Component Value Date    A1C 5.7 12/16/2015       INR RESULTS:  Lab Results   Component Value Date    INR 1.24 (H) 10/08/2016    INR 1.35 (H) 10/05/2016         Fabiano Bower MD, [facility]    CC  No referring provider defined for this encounter.

## 2018-03-22 NOTE — TELEPHONE ENCOUNTER
"FYI--pt calling.  States \"size of feet slowly shrinking, hooray, hooray\".    No need for a call back.  "

## 2018-03-22 NOTE — TELEPHONE ENCOUNTER
"Received a message from patient.     Patient stated she did not need a call back.     \"Please give Dr. Bower a message for me, size of feet slowly, shrinking, hoorah, hoorah\"     Message sent Dr. Bower.     TGaroberto WEBER  [facility]      "

## 2018-04-30 NOTE — TELEPHONE ENCOUNTER
Reason for Call:   Ankles and feet continue to be swollen    Detailed comments: Patient is currently taking 5 water pills per day and still has swelling in feet and ankles would like to know if she should try to see Dr. Bower or address with Dr. Avelar at her appointment on 5/21/18? She believes the Polymyalgia has returned. Please advise.    Phone Number Patient can be reached at: Home number on file [phone] (home)    Best Time: any    Can we leave a detailed message on this number? No - no answering machine    Call taken on 4/30/2018 at 10:46 AM by Marce Rogers

## 2018-04-30 NOTE — TELEPHONE ENCOUNTER
"See 4/30 phone note. Message from pt sent to [facility]. Pt having Edema.     Refill to soon. Will refuse for now incase [facility] makes any changes      Requested Prescriptions   Pending Prescriptions Disp Refills     bumetanide (BUMEX) 0.5 MG tablet [Pharmacy Med Name: BUMETANIDE 0.5 MG TABLET] 90 tablet 1     Sig: TAKE 2 TABLETS BY MOUTH EVERY MORNING AND 1 TABLET EVERY EVENING    Diuretics (Including Combos) Protocol Passed    4/29/2018  6:25 AM       Passed - Blood pressure under 140/90 in past 12 months    BP Readings from Last 3 Encounters:   03/20/18 124/76   03/16/18 118/62   03/01/18 122/66                Passed - Recent (12 mo) or future (30 days) visit within the authorizing provider's specialty    Patient had office visit in the last 12 months or has a visit in the next 30 days with authorizing provider or within the authorizing provider's specialty.  See \"Patient Info\" tab in inbasket, or \"Choose Columns\" in Meds & Orders section of the refill encounter.           Passed - Patient is age 18 or older       Passed - No active pregancy on record       Passed - Normal serum creatinine on file in past 12 months    Recent Labs   Lab Test  03/19/18   0912   CR  0.78             Passed - Normal serum potassium on file in past 12 months    Recent Labs   Lab Test  03/19/18   0912   POTASSIUM  3.7                   Passed - Normal serum sodium on file in past 12 months    Recent Labs   Lab Test  03/19/18   0912   NA  133             Passed - No positive pregnancy test in past 12 months          "

## 2018-05-02 NOTE — TELEPHONE ENCOUNTER
I'm more concerned that this is related to her heart than to a rheumatology problem, and would recommend seeing Dr Bower.   She may need to be seen here if Dr Bower can't see her for a while.   Tried leaving a message on her home phone--no answer and no option to leave a message.     Please try again later to reach patient with above message.

## 2018-05-03 NOTE — PROGRESS NOTES
Walk-In    Patient stopped by the office. She wanted to discuss her swelling in her lower extremities. She wants to see her Rheumatologist (she has an appt scheduled on the 21st of this month), but her PMD stated she needed to f/u with Dr. Bower.     Dr. Bower saw patient in clinic on 03-20-18. Patient has congestive heart failure, class III.  HFpEF. BNP was elevated at 3300. Patient's exam did not suggest volume overload. Patient did not respond to diuretic therapy. Dr. Bower recommended a repeat echo.     For some reason, echo was ordered under Dr. Urban and not Dr. Bower so we did not get her results.     Echo from  showed an EF of 55-60%, moderate (2+) aortic regurgitation, and moderate (2+) tricuspid regurgitation,.     Echo from 03-21-18 showed an EF of 35-40%, left pericardial effusion, pleural effusion, moderate to mod-severe (2-3+) mitral regurgitation, moderately severe (3+) tricuspid regurgitation, moderate to mod-severe (2-3+) aortic regurgitation, mild aortic root dilatation, and ascending aorta is mildly dilated. Compared to the prior study dated 5/22/17, aorta, atria are larger, MR,TR, AI are worse, and EF is worse.      Patient is reporting that she has been feeling fatigued.     Patient has Afib and is currently on Metoprolol and Xarelto.      Patient was advised to see Dr. Bower in clinic. Patient agreed and made an appt to see Dr. Bower on 05-09-18.  ~Aldair WEBER

## 2018-05-09 NOTE — LETTER
5/9/2018    Dez Urban MD, MD  [address] [facility] [address]  [facility] [address]    RE: Ana Cristina Dominguez       Dear Colleague,    I had the pleasure of seeing Ana Cristina Dominguez in the [facility].    HISTORY:    Ana Cristina Dominguez is a delightful 86-year-old female accompanied by her son today.  She has a history of chronic atrial fibrillation and hypertension as well as hyperlipidemia.  She had presented with new onset of severe peripheral edema about a year ago with an echocardiogram showing a normal ejection fraction with diastolic dysfunction and moderate valvular disease.  Diuretic therapy was initiated without success.  She had severe associated joint discomfort and saw a rheumatologist who diagnosed her with polymyalgia rheumatica.  She was treated with high-dose steroids with immediate relief of both her joint discomfort and her peripheral edema.  Her diuretics were discontinued and her peripheral edema did not recur.    At the time of our last visit about a month and a half ago, Ana Cristina was near the end of her slow steroid taper.  She had developed significant bilateral peripheral edema for which she was started on Bumex without much change.  She was not experiencing any further joint discomfort.  In addition, she was noticing that she was becoming more short of breath with activity.    Ana Cristina Chun reports that she continues to experience edema but it is involving only her left leg, from the mid calf to the ankle.  This is associated with an achiness of that leg, similar to what she recalls having when she was pregnant and had varicose veins.  She reports that the degree of edema seems to wax and wane with no real pattern, but it is been there constantly for more than a week now.  It also seems to be progressing up her leg somewhat.    The main complaint, however, is progressively worsening weakness and fatigue.  She states that she becomes exhausted with minimal  activity.  She cannot walk a full block and she cannot walk up a flight of steps without stopping and resting at least once or twice.  She continues to sleep well at night.  She uses 2 pillows for comfort but not because of dyspnea.  She has had no PND or orthopnea.  She notices that when she is stressed she has a racing heart and she is very bothered by restless leg syndrome which disrupts her sleep every night.  She tried using Requip but found it did not help.  She continues to deny any type of joint discomfort like she had her initial presentation a year ago.  She also denies any type of exertional chest, arm, neck, or jaw discomfort.    An echocardiogram was repeated because of her worsening dyspnea.  It showed that her ejection fraction, previously normal, had diminished to 35-40%.  This was due to global hypokinesis.  It also appeared that her mitral regurgitation, tricuspid regurgitation, and aortic regurgitation had all worsened to a slight degree.  In addition, she was noted to be in atrial fibrillation, which was new from her last echocardiogram.  A pleural effusion was also noted.    ASSESSMENT/PLAN:    1.  Congestive heart failure, class III.  Still some mild volume overload with mild JVD and a few crackles at the right base.  Will increase dose of Bumex to 2 mg in the morning, 1 mg in the afternoon.  Prescription written.  2.  Worsening LV function.  Cause is unclear.  I will arrange a Lexiscan study to exclude coronary disease.  Her LV dysfunction appears global so I am more suspicious of a myocardial process.  She will be a candidate for discontinuation of Cardizem and initiation of beta blockade, although I did not make this change today.  3.  Peripheral edema.  She has redeveloped peripheral edema but interestingly it involves only her left leg with no edema of her right.  Venous studies will be done to exclude DVT.  4.  Hypertension.  Well-controlled on current medications, continue same.  5.  Hyperlipidemia.  Also well-controlled on current medications, continue same.    Thank you for asking me to participate in your patient's care.  Please do not hesitate to call if I can be of further assistance.  45 minutes face-to-face time today, greater than 50% counseling.    Orders Placed This Encounter   Procedures     US Lower Extremity Venous Duplex Bilateral     NM Lexiscan stress test (nuc card)     Follow-Up with Cardiologist     Orders Placed This Encounter   Medications     bumetanide (BUMEX) 1 MG tablet     Sig: Take 2 tabs each AM and 1 tab each evening     Dispense:  90 tablet     Refill:  3     Medications Discontinued During This Encounter   Medication Reason     bumetanide (BUMEX) 0.5 MG tablet Reorder         Encounter Diagnoses   Name Primary?     Localized edema Yes     Edema, unspecified type      Ischemic cardiomyopathy        CURRENT MEDICATIONS:  Current Outpatient Prescriptions   Medication Sig Dispense Refill     Acetaminophen (TYLENOL PO) Take by mouth every 4 hours as needed for mild pain or fever       bumetanide (BUMEX) 1 MG tablet Take 2 tabs each AM and 1 tab each evening 90 tablet 3     CALCIUM 600 + D 600-200 MG-IU OR TABS 2 qd       diltiazem (CARDIZEM SR) 120 MG CP12 12 hr SR capsule TAKE ONE CAPSULE BY MOUTH TWICE A  capsule 2     metoprolol succinate (TOPROL-XL) 50 MG 24 hr tablet Take 2 tablets (100 mg) by mouth daily 180 tablet 0     Multiple Vitamins-Minerals (PRESERVISION AREDS PO) Take 1 capsule by mouth daily       olopatadine (PATANOL) 0.1 % ophthalmic solution Place 1 drop into both eyes 2 times daily as needed for allergies During Spring       rOPINIRole (REQUIP) 0.5 MG tablet TAKE 1 TABLET BY MOUTH AT BEDTIME (Patient taking differently: TAKE 1.5 TABLET BY MOUTH AT BEDTIME) 30 tablet 5     VITAMIN C 500 MG OR TABS 1 TABLET DAILY 100 0     VITAMIN E 400 UNIT OR CAPS ONE CAP PO  0     XARELTO 20 MG TABS tablet TAKE 1 TABLET (20 MG) BY MOUTH DAILY (WITH  DINNER) 30 tablet 8     MULTIVITAMIN TABS   OR 1 qd (Patient not taking: Reported on 5/9/2018)       predniSONE (DELTASONE) 1 MG tablet 3mg PO daily 120 tablet 3     [DISCONTINUED] bumetanide (BUMEX) 0.5 MG tablet Take 3 tabs each AM and 2 tabs each evening 150 tablet 1       ALLERGIES     Allergies   Allergen Reactions     Amoxicillin Rash     Codeine GI Disturbance     Stomach pain       PAST MEDICAL HISTORY:  Past Medical History:   Diagnosis Date     Chronic diastolic heart failure (H)      Other and unspecified hyperlipidemia      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 Father      Family History Negative Mother      Genitourinary Problems Sister      Renal failure     HEART DISEASE Sister      Rhythm issues       SOCIAL HISTORY:  Social History     Social History     Marital status:      Spouse name: N/A     Number of children: 3     Years of education: N/A     Occupational History      Retired     Social History Main Topics     Smoking status: Former Smoker     Quit date: 5/6/1973     Smokeless tobacco: Never Used     Alcohol use Yes      Comment: Socially     Drug use: No     Sexual activity: No     Other Topics Concern     Exercise Yes     Seat Belt Yes     Social History Narrative       Review of Systems:  Skin:  Positive for     Eyes:  Positive for glasses  ENT:  Positive for hearing loss  Respiratory:  Positive for dyspnea on exertion;cough;shortness of breath  Cardiovascular:    Positive for;edema;fatigue  Gastroenterology: Negative    Genitourinary:  Positive for urinary  frequency  Musculoskeletal:  Positive for arthritis;joint swelling  Neurologic:  Positive for numbness or tingling of feet  Psychiatric:  Positive for sleep disturbances;anxiety  Heme/Lymph/Imm:  Positive for hay fever  Endocrine:  Negative      Physical Exam:  Vitals: /60 (BP Location: Right arm, Patient Position: Chair, Cuff Size: Adult Regular)  Pulse 76  Wt 40.9 kg (90 lb 1.6 oz)  SpO2 98%  BMI 17.6 kg/m2    Constitutional:  cooperative, alert and oriented, well developed, well nourished, in no acute distress        Skin:  warm and dry to the touch        Head:  normocephalic        Eyes:  no xanthalasma        ENT:  no pallor or cyanosis        Neck:  carotid pulses are full and equal bilaterally JVP 8-10      Chest:  normal symmetry   There are a few crackles at the right base, none at the left.  No wheezes.    Cardiac: normal S1 and S2;no S3 or S4 irregularly irregular rhythm     systolic murmur;holosystolic murmur;grade 1;throughout precordium systolic murmur;apical;grade 1        Abdomen:  abdomen soft;BS normoactive        Vascular: pulses full and equal                                      Extremities and Back:           Neurological:  no gross motor deficits          Recent Lab Results:  LIPID RESULTS:  Lab Results   Component Value Date    CHOL 107 10/07/2016    HDL 22 (L) 10/07/2016    LDL 65 10/07/2016    TRIG 100 10/07/2016    CHOLHDLRATIO 3.3 08/29/2013       LIVER ENZYME RESULTS:  Lab Results   Component Value Date    AST 20 03/01/2018    ALT 21 03/01/2018       CBC RESULTS:  Lab Results   Component Value Date    WBC 7.7 03/01/2018    RBC 3.52 (L) 03/01/2018    HGB 10.3 (L) 03/01/2018    HCT 31.9 (L) 03/01/2018    MCV 91 03/01/2018    MCH 29.3 03/01/2018    MCHC 32.3 03/01/2018    RDW 13.9 03/01/2018     03/01/2018       BMP RESULTS:  Lab Results   Component Value Date     03/19/2018    POTASSIUM 3.7 03/19/2018    CHLORIDE 94 03/19/2018    CO2 32 03/19/2018    ANIONGAP 7  03/19/2018     (H) 03/19/2018    BUN 22 03/19/2018    CR 0.78 03/19/2018    GFRESTIMATED 69 03/19/2018    GFRESTBLACK 84 03/19/2018    KARTHIK 8.9 03/19/2018        A1C RESULTS:  Lab Results   Component Value Date    A1C 5.7 12/16/2015       INR RESULTS:  Lab Results   Component Value Date    INR 1.24 (H) 10/08/2016    INR 1.35 (H) 10/05/2016         Thank you for allowing me to participate in the care of your patient.    Sincerely,     Fabiano Bower MD     [facility]

## 2018-05-09 NOTE — PROGRESS NOTES
HISTORY:    Ana Cristina Dominguez is a delightful 86-year-old female accompanied by her son today.  She has a history of chronic atrial fibrillation and hypertension as well as hyperlipidemia.  She had presented with new onset of severe peripheral edema about a year ago with an echocardiogram showing a normal ejection fraction with diastolic dysfunction and moderate valvular disease.  Diuretic therapy was initiated without success.  She had severe associated joint discomfort and saw a rheumatologist who diagnosed her with polymyalgia rheumatica.  She was treated with high-dose steroids with immediate relief of both her joint discomfort and her peripheral edema.  Her diuretics were discontinued and her peripheral edema did not recur.    At the time of our last visit about a month and a half ago, Ana Cristina was near the end of her slow steroid taper.  She had developed significant bilateral peripheral edema for which she was started on Bumex without much change.  She was not experiencing any further joint discomfort.  In addition, she was noticing that she was becoming more short of breath with activity.    Ana Cristina Chun reports that she continues to experience edema but it is involving only her left leg, from the mid calf to the ankle.  This is associated with an achiness of that leg, similar to what she recalls having when she was pregnant and had varicose veins.  She reports that the degree of edema seems to wax and wane with no real pattern, but it is been there constantly for more than a week now.  It also seems to be progressing up her leg somewhat.    The main complaint, however, is progressively worsening weakness and fatigue.  She states that she becomes exhausted with minimal activity.  She cannot walk a full block and she cannot walk up a flight of steps without stopping and resting at least once or twice.  She continues to sleep well at night.  She uses 2 pillows for comfort but not because of dyspnea.  She  has had no PND or orthopnea.  She notices that when she is stressed she has a racing heart and she is very bothered by restless leg syndrome which disrupts her sleep every night.  She tried using Requip but found it did not help.  She continues to deny any type of joint discomfort like she had her initial presentation a year ago.  She also denies any type of exertional chest, arm, neck, or jaw discomfort.    An echocardiogram was repeated because of her worsening dyspnea.  It showed that her ejection fraction, previously normal, had diminished to 35-40%.  This was due to global hypokinesis.  It also appeared that her mitral regurgitation, tricuspid regurgitation, and aortic regurgitation had all worsened to a slight degree.  In addition, she was noted to be in atrial fibrillation, which was new from her last echocardiogram.  A pleural effusion was also noted.    ASSESSMENT/PLAN:    1.  Congestive heart failure, class III.  Still some mild volume overload with mild JVD and a few crackles at the right base.  Will increase dose of Bumex to 2 mg in the morning, 1 mg in the afternoon.  Prescription written.  2.  Worsening LV function.  Cause is unclear.  I will arrange a Lexiscan study to exclude coronary disease.  Her LV dysfunction appears global so I am more suspicious of a myocardial process.  She will be a candidate for discontinuation of Cardizem and initiation of beta blockade, although I did not make this change today.  3.  Peripheral edema.  She has redeveloped peripheral edema but interestingly it involves only her left leg with no edema of her right.  Venous studies will be done to exclude DVT.  4.  Hypertension.  Well-controlled on current medications, continue same.  5. Hyperlipidemia.  Also well-controlled on current medications, continue same.    Thank you for asking me to participate in your patient's care.  Please do not hesitate to call if I can be of further assistance.  45 minutes face-to-face time  today, greater than 50% counseling.    Orders Placed This Encounter   Procedures     US Lower Extremity Venous Duplex Bilateral     NM Lexiscan stress test (nuc card)     Follow-Up with Cardiologist     Orders Placed This Encounter   Medications     bumetanide (BUMEX) 1 MG tablet     Sig: Take 2 tabs each AM and 1 tab each evening     Dispense:  90 tablet     Refill:  3     Medications Discontinued During This Encounter   Medication Reason     bumetanide (BUMEX) 0.5 MG tablet Reorder         Encounter Diagnoses   Name Primary?     Localized edema Yes     Edema, unspecified type      Ischemic cardiomyopathy        CURRENT MEDICATIONS:  Current Outpatient Prescriptions   Medication Sig Dispense Refill     Acetaminophen (TYLENOL PO) Take by mouth every 4 hours as needed for mild pain or fever       bumetanide (BUMEX) 1 MG tablet Take 2 tabs each AM and 1 tab each evening 90 tablet 3     CALCIUM 600 + D 600-200 MG-IU OR TABS 2 qd       diltiazem (CARDIZEM SR) 120 MG CP12 12 hr SR capsule TAKE ONE CAPSULE BY MOUTH TWICE A  capsule 2     metoprolol succinate (TOPROL-XL) 50 MG 24 hr tablet Take 2 tablets (100 mg) by mouth daily 180 tablet 0     Multiple Vitamins-Minerals (PRESERVISION AREDS PO) Take 1 capsule by mouth daily       olopatadine (PATANOL) 0.1 % ophthalmic solution Place 1 drop into both eyes 2 times daily as needed for allergies During Spring       rOPINIRole (REQUIP) 0.5 MG tablet TAKE 1 TABLET BY MOUTH AT BEDTIME (Patient taking differently: TAKE 1.5 TABLET BY MOUTH AT BEDTIME) 30 tablet 5     VITAMIN C 500 MG OR TABS 1 TABLET DAILY 100 0     VITAMIN E 400 UNIT OR CAPS ONE CAP PO  0     XARELTO 20 MG TABS tablet TAKE 1 TABLET (20 MG) BY MOUTH DAILY (WITH DINNER) 30 tablet 8     MULTIVITAMIN TABS   OR 1 qd (Patient not taking: Reported on 5/9/2018)       predniSONE (DELTASONE) 1 MG tablet 3mg PO daily 120 tablet 3     [DISCONTINUED] bumetanide (BUMEX) 0.5 MG tablet Take 3 tabs each AM and 2 tabs  each evening 150 tablet 1       ALLERGIES     Allergies   Allergen Reactions     Amoxicillin Rash     Codeine GI Disturbance     Stomach pain       PAST MEDICAL HISTORY:  Past Medical History:   Diagnosis Date     Chronic diastolic heart failure (H)      Other and unspecified hyperlipidemia      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 Father      Family History Negative Mother      Genitourinary Problems Sister      Renal failure     HEART DISEASE Sister      Rhythm issues       SOCIAL HISTORY:  Social History     Social History     Marital status:      Spouse name: N/A     Number of children: 3     Years of education: N/A     Occupational History      Retired     Social History Main Topics     Smoking status: Former Smoker     Quit date: 5/6/1973     Smokeless tobacco: Never Used     Alcohol use Yes      Comment: Socially     Drug use: No     Sexual activity: No     Other Topics Concern     Exercise Yes     Seat Belt Yes     Social History Narrative       Review of Systems:  Skin:  Positive for     Eyes:  Positive for glasses  ENT:  Positive for hearing loss  Respiratory:  Positive for dyspnea on exertion;cough;shortness of breath  Cardiovascular:    Positive for;edema;fatigue  Gastroenterology: Negative    Genitourinary:  Positive for urinary frequency  Musculoskeletal:  Positive for arthritis;joint swelling  Neurologic:  Positive for numbness or tingling of feet  Psychiatric:  Positive for sleep disturbances;anxiety  Heme/Lymph/Imm:  Positive for hay fever  Endocrine:  Negative      Physical  Exam:  Vitals: /60 (BP Location: Right arm, Patient Position: Chair, Cuff Size: Adult Regular)  Pulse 76  Wt 40.9 kg (90 lb 1.6 oz)  SpO2 98%  BMI 17.6 kg/m2    Constitutional:  cooperative, alert and oriented, well developed, well nourished, in no acute distress        Skin:  warm and dry to the touch        Head:  normocephalic        Eyes:  no xanthalasma        ENT:  no pallor or cyanosis        Neck:  carotid pulses are full and equal bilaterally JVP 8-10      Chest:  normal symmetry   There are a few crackles at the right base, none at the left.  No wheezes.    Cardiac: normal S1 and S2;no S3 or S4 irregularly irregular rhythm     systolic murmur;holosystolic murmur;grade 1;throughout precordium systolic murmur;apical;grade 1        Abdomen:  abdomen soft;BS normoactive        Vascular: pulses full and equal                                      Extremities and Back:           Neurological:  no gross motor deficits          Recent Lab Results:  LIPID RESULTS:  Lab Results   Component Value Date    CHOL 107 10/07/2016    HDL 22 (L) 10/07/2016    LDL 65 10/07/2016    TRIG 100 10/07/2016    CHOLHDLRATIO 3.3 08/29/2013       LIVER ENZYME RESULTS:  Lab Results   Component Value Date    AST 20 03/01/2018    ALT 21 03/01/2018       CBC RESULTS:  Lab Results   Component Value Date    WBC 7.7 03/01/2018    RBC 3.52 (L) 03/01/2018    HGB 10.3 (L) 03/01/2018    HCT 31.9 (L) 03/01/2018    MCV 91 03/01/2018    MCH 29.3 03/01/2018    MCHC 32.3 03/01/2018    RDW 13.9 03/01/2018     03/01/2018       BMP RESULTS:  Lab Results   Component Value Date     03/19/2018    POTASSIUM 3.7 03/19/2018    CHLORIDE 94 03/19/2018    CO2 32 03/19/2018    ANIONGAP 7 03/19/2018     (H) 03/19/2018    BUN 22 03/19/2018    CR 0.78 03/19/2018    GFRESTIMATED 69 03/19/2018    GFRESTBLACK 84 03/19/2018    KARTHIK 8.9 03/19/2018        A1C RESULTS:  Lab Results   Component Value Date    A1C 5.7 12/16/2015       INR  RESULTS:  Lab Results   Component Value Date    INR 1.24 (H) 10/08/2016    INR 1.35 (H) 10/05/2016         Fabiano Bower MD, [facility]    CC  No referring provider defined for this encounter.

## 2018-05-14 NOTE — PROGRESS NOTES
Pre-procedure:    Initial vital signs: /77, HR 83, RR 12  Allergies reviewed:    Rhythm:   Medications taken within 48 hours of procedure:    Last Caffeine:   Lung sounds: CTA, no wheezing, crackles or rtx  Health History (COPD, Asthma, etc):     Procedure: Lexiscan  Reaction/symptoms after receiving Jackie injection:   Intensity of Pain:   Rhythm:   1. Vital Signs:/67, HR 94, RR 15  2. Vital Signs:/70, HR 96, RR 13     Reversal agent:     Post:   Resolution of symptoms?: YES  Vital signs: /71, HR 94, RR 13  Vital signs: BP /, HR , RR   Rhythm:   Walk: NO  Comment:   Return to Radiology

## 2018-05-16 NOTE — PROGRESS NOTES
"[facility] - Rheumatology Clinic Visit     Ana Cristina Dominguez MRN# [id number]   YOB: 1931    Primary care provider: Dez Urban  May 21, 2018          Assessment and Plan:   # Polymyalgia rheumatica Dx 2017- Diagnosed by Dr. Hidalgo  # Chronic anemia- seen hematology  # Elevated inflammatory markers  # Macular degeneration of eye  # H/o skin cancer  # H/o MGUS  # Atrial fibrillation; on Xarelto    Dr. Hidalgo's patient re-established care with me 1/18.  We discussed today that patient's diagnosis of polymyalgia rheumatica is not straight forward and that not all rheumatologists would agree with this diagnosis in her case.     Prednisone was \"magic\" and it has made a big difference in her symptoms. Patient denies any side effects.  She is off of prednisone since about 4/18. Patient thinks prednisone makes her feel better.     We discussed that her left leg edema is not related to PMR and is related to her CHF.   Patient mentioned that she is tempted to go back on prednisone. I mentioned that we can use prednisone at low dose if needed in future for symptom control considering her age. We will watch her off of prednisone for the next 6 weeks. Patient to contact us if she notices any flare up of symptoms. okay to resume prednisone at 2mg PO dialy if flare up.    Recommended elemental calcium 1200mg PO daily and vitamin D 800 IU daily. Bone density scan needed if patient is not able to taper off of prednisone this year. Cushingoid features noted.     Her hand arthritis is not painful and not bothersome for her with chronic deformities. Patient declined a hand xray last visit. This is likely osteoarthritis but the left 2nd finger has a lesion which made me suspicious for tophaceous gout. But uric acid level < 5.     The labs, imaging from patient records are reviewed.     I will be back in touch with the patient through mychart/letter when results are available.     Most Recent Immunizations "   Administered Date(s) Administered     Pneumococcal 23 valent 11/29/2013     TD (ADULT, 7+) 06/16/2006     TDAP Vaccine (Adacel) 10/28/2013       No orders of the defined types were placed in this encounter.      Data Unavailable    There are no discontinued medications.  Current Outpatient Prescriptions   Medication Sig Dispense Refill     Acetaminophen (TYLENOL PO) Take by mouth every 4 hours as needed for mild pain or fever       bumetanide (BUMEX) 1 MG tablet Take 2 tabs each AM and 1 tab each evening 90 tablet 3     CALCIUM 600 + D 600-200 MG-IU OR TABS 2 qd       diltiazem (CARDIZEM SR) 120 MG CP12 12 hr SR capsule TAKE ONE CAPSULE BY MOUTH TWICE A  capsule 2     metoprolol succinate (TOPROL-XL) 50 MG 24 hr tablet Take 2 tablets (100 mg) by mouth daily 180 tablet 0     Multiple Vitamins-Minerals (PRESERVISION AREDS PO) Take 1 capsule by mouth daily       MULTIVITAMIN TABS   OR 1 qd (Patient not taking: Reported on 5/9/2018)       olopatadine (PATANOL) 0.1 % ophthalmic solution Place 1 drop into both eyes 2 times daily as needed for allergies During Spring       predniSONE (DELTASONE) 1 MG tablet 3mg PO daily 120 tablet 3     rOPINIRole (REQUIP) 0.5 MG tablet Take 2 tablets (1 mg) by mouth At Bedtime 60 tablet 0     VITAMIN C 500 MG OR TABS 1 TABLET DAILY 100 0     VITAMIN E 400 UNIT OR CAPS ONE CAP PO  0     XARELTO 20 MG TABS tablet TAKE 1 TABLET (20 MG) BY MOUTH DAILY (WITH DINNER) 30 tablet 8       Parisa Blunt LPN  [facility]          Active Problem List:     Patient Active Problem List    Diagnosis Date Noted     Normochromic normocytic anemia 08/30/2017     Priority: Medium     PMR (polymyalgia rheumatica) (H) 08/30/2017     Priority: Medium     MGUS (monoclonal gammopathy of unknown significance) 08/30/2017     Priority: Medium     Chronic diastolic congestive heart failure (H) 08/30/2017     Priority: Medium     Anemia 06/22/2017     Priority: Medium     Pain in both  knees, unspecified chronicity 04/11/2017     Priority: Medium     Edema, unspecified type 02/28/2017     Priority: Medium     Diarrhea 10/05/2016     Priority: Medium     Atrial fibrillation (H) 10/28/2013     Priority: Medium     HTN (hypertension) 10/28/2013     Priority: Medium     Senile osteoporosis 08/29/2013     Priority: Medium     RLS (restless legs syndrome) 08/29/2013     Priority: Medium     Abnormal blood sugar 08/29/2013     Priority: Medium     Achilles bursitis or tendinitis 07/19/2011     Priority: Medium     HYPERLIPIDEMIA LDL GOAL <160 10/31/2010     Priority: Medium     Fracture, pelvis closed (H) 10/01/2009     Priority: Medium            History of Present Illness:     No chief complaint on file.      January 19, 2018    Have you ever seen a rheumatologist : dr. lora / [facility]  Joint pain history  Onset: 2016  Involved joints: all joints  Pain scale: 0/10     Wakes the patient from sleep : no  Morning stiffness:no  Meds used: prednisone 3mg PO daily     Interim history  Since last visit:  1. Infections - no  2. New symptoms/medical problem - no  3. Any side effects from Rheum medications - no  3. ER visits/Hospitalizations/surgeries - no  4. Last PCP visit: 8/30/17 office visit schld for 1/19/18 today    Wt Readings from Last 4 Encounters:   05/09/18 40.9 kg (90 lb 1.6 oz)   03/20/18 43.9 kg (96 lb 11.2 oz)   03/16/18 44.9 kg (99 lb)   03/01/18 42.6 kg (94 lb)     shortness of breath on and off; has h/o valvular heart disease    No h/o gout  No h/o persistent cough, chest pain  No h/o persistent nausea, vomiting, constipation, diarrhea, abdominal pain  No h/o hematochezia, hematuria, hemoptysis    May 21, 2018  Have you ever seen a rheumatologist Yes Who You When 1/19/18  Joint pain history  Onset: pt states that she is here to find out if the PMR is back. She has been having swelling in her left leg.. Been having some pain.   Involved joints: see above  Pain scale:  3/10     Wakes the patient  from sleep : No/ does not sleep well.   Morning stiffness:Yes for 60 minutes  Meds used:tylenol; last dose of prednisone 4/18     Interim history  Since last visit:  1. Infections - No  2. New symptoms/medical problem - No  3. Any side effects from Rheum medications -NA  3. ER visits/Hospitalizations/surgeries - Yes/ seen [facility] ER on 5/18/18  4. Last PCP visit: 3/16/18    BP Readings from Last 3 Encounters:   05/09/18 114/60   03/20/18 124/76   03/16/18 118/62              Review of Systems:   Complete ROS negative except for symptoms mentioned in the HPI          Past Medical History:     Past Medical History:   Diagnosis Date     Chronic diastolic heart failure (H)      Other and unspecified hyperlipidemia      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     RLS (restless legs syndrome)      Senile osteoporosis     Reclast 11/16/09, 11/10, 11/11     Past Surgical History:   Procedure Laterality Date     APPENDECTOMY       CATARACT IOL, RT/LT  1/19/09    right     HC EXCIS PRIMARY GANGLION WRIST              Social History:     Social History     Occupational History      Retired     Social History Main Topics     Smoking status: Former Smoker     Quit date: 5/6/1973     Smokeless tobacco: Never Used     Alcohol use Yes      Comment: Socially     Drug use: No     Sexual activity: No            Family History:     Family History   Problem Relation Age of Onset     C.A.D. Father      Family History Negative Mother      Genitourinary Problems Sister      Renal failure     HEART DISEASE Sister      Rhythm issues            Allergies:     Allergies   Allergen Reactions     Amoxicillin Rash     Codeine GI Disturbance     Stomach pain            Medications:     Current Outpatient Prescriptions   Medication Sig Dispense Refill     Acetaminophen (TYLENOL PO) Take by mouth every 4 hours as needed for mild pain or fever       bumetanide (BUMEX) 1 MG tablet Take 2  tabs each AM and 1 tab each evening 90 tablet 3     CALCIUM 600 + D 600-200 MG-IU OR TABS 2 qd       diltiazem (CARDIZEM SR) 120 MG CP12 12 hr SR capsule TAKE ONE CAPSULE BY MOUTH TWICE A  capsule 2     metoprolol succinate (TOPROL-XL) 50 MG 24 hr tablet Take 2 tablets (100 mg) by mouth daily 180 tablet 0     Multiple Vitamins-Minerals (PRESERVISION AREDS PO) Take 1 capsule by mouth daily       MULTIVITAMIN TABS   OR 1 qd (Patient not taking: Reported on 5/9/2018)       olopatadine (PATANOL) 0.1 % ophthalmic solution Place 1 drop into both eyes 2 times daily as needed for allergies During Spring       predniSONE (DELTASONE) 1 MG tablet 3mg PO daily 120 tablet 3     rOPINIRole (REQUIP) 0.5 MG tablet Take 2 tablets (1 mg) by mouth At Bedtime 60 tablet 0     VITAMIN C 500 MG OR TABS 1 TABLET DAILY 100 0     VITAMIN E 400 UNIT OR CAPS ONE CAP PO  0     XARELTO 20 MG TABS tablet TAKE 1 TABLET (20 MG) BY MOUTH DAILY (WITH DINNER) 30 tablet 8            Physical Exam:   not currently breastfeeding.  Wt Readings from Last 4 Encounters:   05/09/18 40.9 kg (90 lb 1.6 oz)   03/20/18 43.9 kg (96 lb 11.2 oz)   03/16/18 44.9 kg (99 lb)   03/01/18 42.6 kg (94 lb)       Constitutional: well-developed, appearing stated age; cooperative ; moon face  MS: Heberden's and bouchards nodules and deformities noted in hands bilateral. Left 2nd finger- swelling around PIP with whitish material (callus like).  Left leg edema ++  Skin: no rash in exposed areas  Psych: nl judgement, orientation, memory, affect.         Data:         Parisa Blunt LPN    [facility]

## 2018-05-18 NOTE — ED TRIAGE NOTES
86-year-old female presents to the ER with complaints of not being able to sleep for the last three nights. FP did order requip however she feels it isn't helping.

## 2018-05-18 NOTE — ED PROVIDER NOTES
"  History     Chief Complaint:  Insomnia     HPI   Ana Cristina Dominguez is a 86 year old female, with a history of atrial fibrillation, CHF diastolic dysfunction, and polymyalgia, who presents with insomnia. The patient states that she has been having chronic issues with insomnia. She has been evaluated by her PCP for this issue and was prescribed Requip, 0.5 mg, originally. Last week, the patient states that it was increased to 1 mg for ongoing symptoms.  Recently, the patient states that the she has not been sleeping for the past 3 days and has not been able to function during the day, prompting her ED visit. Here, the patient states that she believes it is due to her restless leg syndrome. She notes that when she walks around she has relief from her discomfort, but when she lays down or sits down it feels like \"knives are stabbing\" her in legs, making it difficult to sleep. The patient additionally endorses leg swelling that has increased over the last few weeks and has followed with cardiology for this issue. Her most recent appointment was on 5/14 where she had a bilateral leg US and a stress test that has no remarkable findings. While here, the patient denies any chest pain, palpitations, abdominal pain, diarrhea, or vomiting. She did take her Metoprolol prior to arrival.       Allergies:  Amoxicillin  Codeine      Medications:    Bumex  Diltiazem  Metoprolol  Requip  Xarelto    Past Medical History:    Diastolic heart failure  Hyperlipidemia  Malignant neoplasm of skin  Restless leg syndrome   Senile osteoporosis  Polymyalgia     Past Surgical History:    Appendectomy  Cataract, right  Excise primary ganglion wrist    Family History:    CAD  Renal failure  Heart disease    Social History:  Presents with her son.   Former Smoker - quit 1973   Positive for alcohol use.    Marital Status:   [5]     Review of Systems   Cardiovascular: Positive for leg swelling. Negative for chest pain and palpitations. "   Gastrointestinal: Negative for abdominal pain, diarrhea and vomiting.   Musculoskeletal:        Bilateral leg pain   All other systems reviewed and are negative.      Physical Exam   First Vitals:  BP: (!) 144/99  Pulse: 125  Temp: 97.3  F (36.3  C)  Resp: 16  Weight: 41.7 kg (92 lb)  SpO2: 96 %    Physical Exam  Constitutional: Alert, attentive, GCS 15, thin elderly woman sitting up in bed, in no acute distress, she is hard of hearing   HENT:    Nose: Nose normal.    Mouth/Throat: Oropharynx is clear, mucous membranes are moist   Eyes: Normal conjunctiva. Pupils are equal, round, and reactive to light.   CV: tachycardic rate and irregular rhythm  Chest: Effort normal and breath sounds normal. No wheezing, rhonchi, or rales  GI:  There is no tenderness to deep palpation. No distension. Normal bowel sounds  MSK: Normal range of motion, left leg with 2+ pitting edema up to knee, right leg without edema, no tenderness to palpation of calves.  Chronic venous stasis changes in bilateral legs.    Neurological: Alert, attentive, oriented x4, strength grossly intact  Skin: Skin is warm and dry.     Emergency Department Course   ECG:  Indication: insomnia  Time: 0633  Vent. Rate 105 bpm. MA interval *. QRS duration 76. QT/QTc 376/496. P-R-T axis * 51 98. atrial fibrillation with rapid ventricular response. Low voltage QRS. Septal infarct, age undetermined. Poor R wave. Heart rate is up by 10 bpm, new T wave inversion at V6. Abnormal ECG. Read time: 0636     Emergency Department Course:  Nursing notes and vitals reviewed.     0600  I performed an exam of the patient as documented above. EKG was obtained, findings above.      0640 I consulted with Pharmacy regarding the patient's history and presentation here in the emergency department.    0649 I rechecked the patient and discussed the results of her workup thus far.     Findings and plan explained to the Patient. Patient discharged home with instructions regarding  supportive care, medications, and reasons to return. The importance of close follow-up was reviewed. The patient was prescribed Requip and Melatonin.       Impression & Plan      Medical Decision Makin-year-old female with multiple medical problems including restless leg syndrome, presenting with 3 days of worsening insomnia.  Differential diagnosis includes circadian rhythm disturbance, exacerbation of restless leg syndrome, chronic insomnia, medication adverse effect.  She was previously on prednisone which can cause issues with sleeping, however she is not currently taking it.   Patient states that her legs are causing her discomfort at night, and this is making her harder to sleep.  She had her ropinirole increased 4 days ago from 0.5-1 mg at night.  I think that increasing it by an additional 0.5 mg to 1.5 mg nightly would be beneficial for the patient.  She has room to go up furt her on her ropinirole if her restless leg symptoms symptoms persist.  She has tried some over-the-counter medications, but is unsure which when she has been taking.  I do not think that traditional prescription sleep aid such as Ambien or trazodone would be indicated at this time, given risk for falls or CNS symptoms causing instability.  I will prescribe her melatonin 3 mg nightly and she can also go up to 6 mg at night.  This plan was discussed with the patient and her son.  She was also found to be in atrial fibrillation with heart rate of 90s-110s and is already on Xarelto, and does not need additional rate control.  She will follow-up with her primary care doctor next week to discuss if increased doses in her Ropinerole is indicated.  All questions answered.    Diagnosis:    ICD-10-CM   1. Insomnia, unspecified type G47.00   2. Restless leg syndrome G25.81       Disposition:  discharged to home    Discharge Medications:  New Prescriptions    MELATONIN ER 3 MG TABLET    Take 1 tablet (3 mg) by mouth nightly as needed for  sleep May increase to 2 tablets PO at night if 1 tablet is not having effect    ROPINIROLE (REQUIP) 1 MG TABLET    Take 1.5 tablets (1.5 mg) by mouth At Bedtime     Karma BOOKER, farnaz serving as a scribe on 5/18/2018 at 6:09 AM to personally document services performed by Luisana Malloy MD based on my observations and the provider's statements to me.      Karma Dexter  5/18/2018   [facility] EMERGENCY DEPARTMENT       Luisana Malloy MD  05/18/18 0740

## 2018-05-18 NOTE — ED AVS SNAPSHOT
[facility] Emergency Department    [address]    [facility] [address]    Phone:  [phone]    Fax:  [phone]                                       Ana Cristina Dominguez   MRN: [id number]    Department:  [facility] Emergency Department   Date of Visit:  5/18/2018           After Visit Summary Signature Page     I have received my discharge instructions, and my questions have been answered. I have discussed any challenges I see with this plan with the nurse or doctor.    ..........................................................................................................................................  Patient/Patient Representative Signature      ..........................................................................................................................................  Patient Representative Print Name and Relationship to Patient    ..................................................               ................................................  Date                                            Time    ..........................................................................................................................................  Reviewed by Signature/Title    ...................................................              ..............................................  Date                                                            Time

## 2018-05-18 NOTE — DISCHARGE INSTRUCTIONS
Insomnia  Insomnia is repeated difficulty going to sleep or staying asleep, or both. Whether you have insomnia is not defined by a specific amount of sleep. Different people need different amounts of sleep, and you may need more or less sleep at different times of your life.  There are 3 major types of insomnia:  short-term, chronic, and  other.   Short-term, or acute insomnia lasts less than 3 months.  The symptoms are temporary and can be linked directly to a stressor, such as the death of a loved one, financial problems, or a new physical problem.  Short-term insomnia stops when the stressor resolves or the person adapts to its presence.  Chronic insomnia occurs at least 3 times a week and lasts longer than 3 months.  Chronic insomnia can occur when either the cause of the sleeping problem is not clear, or the insomnia does not get better when the stressor is resolved. A number of other criteria are also used to make the diagnosis of chronic insomnia.    Other insomnia  is the third type of insomnia-related sleep disorders.  This description applies to people who have problems getting to sleep or staying asleep, but do not meet all of the factors that describe either short-term or chronic insomnia.    Many things cause insomnia. Different people may have different causes. It can be from an underlying medical or psychological condition, or lifestyle. It can also be primary insomnia, which means no cause can be found.  Causes of insomnia include:    Chronic medical problems- heart disease, gastrointestinal problems, hormonal changes, breathing problems    Anxiety    Stress    Depression    Pain    Work schedule    Sleep apnea    Illegal drugs    Certain medicines  Many different medidcines can affect your sleep, such as stimulants, caffeine, alcohol, some decongestants, and diet pills. Other medicines may include some types of blood pressure pills, steroids, asthma medicines, antihistamines, antidepressants,  seizure medicines and statins. Not all of these will affect your sleep, and they shouldn t be stopped without talking to your doctor.  Symptoms of insomnia can include:    Lying awake for long periods at night before falling asleep    Waking up several times during the night    Waking up early in the morning and not being able to get back to sleep    Feeling tired and not refreshed by sleep    Not being able to function properly during the day and finding it hard to concentrate    Irritability    Tiredness and fatigue during the day  Home care    Review your medicines with your doctor or pharmacist to find out if they can cause insomnia. Not all medicines will affect your sleep, but they shouldn't be stopped without reviewing them with your doctor. There may be serious side effects and consequences from suddenly stopping your medicines. Not taking them may cause strokes, heart attacks, and many other problems.    Caffeine, smoking and alcohol also affect sleep. Limit your daily use and do not use these before bedtime. Alcohol may make you sleepy at first, but as its effects wear off, you may awaken a few hours later and have trouble returning to sleep.    Do not exercise, eat or drink large amounts of liquid within 2 hours of your bedtime.    Improve your sleep habits. Have a fixed bed and wake-up time. Try to keep noise, light and heat in your bedroom at a comfortable level. Try using earplugs or eyeshades if needed.     Avoid watching TV in bed.    If you do not fall asleep within 30 minutes, try to relax by reading or listening to soft music.    Limit daytime napping to one 30 minute period, early in the day.    Get regular exercise. Find other ways to lessen your stress level.    If a medicine was prescribed to help reset your sleep patterns, take it as directed. Sleeping pills are intended for short-term use, only. If taken for too long, the effect wears off while the risk of physical addiction and  psychological dependence increases.  Sleep diary  If the cause isn t obvious and it is not improving, try keeping a  sleep diary  for a couple of weeks. Include in it:    The time you go to bed    How long it takes to fall asleep    How many times you wake up    What time you wake up    Your meal times and what you eat    What time you drink alcohol    Your exercise habits and times  Follow-up care  Follow up with your healthcare provider, or as advised. If X-rays or CT scans were done, you will be notified if there is a change in the reading, especially if it affects treatment.  Call 911  Call 911 if any of these occur:    Trouble breathing    Confusion or trouble waking    Fainting or loss of consciousness    Rapid heart rate    New chest, arm, shoulder, neck or upper back pain    Trouble with speech or vision, weakness of an arm or leg    Trouble walking or talking, loss of balance, numbness or weakness in one side of your body, facial droop  When to seek medical advice  Call your healthcare provider right away if any of these occur:    Extreme restlessness or irritability    Confusion or hallucinations (seeing or hearing things that are not there)    Anxiety, depression    Several days without sleeping  Date Last Reviewed: 11/19/2015 2000-2017 [employer]. [address] [place] [address]. All rights reserved. This information is not intended as a substitute for professional medical care. Always follow your healthcare professional's instructions.        Restless Legs Syndrome: What You Can Do    Symptoms of restless leg syndrome (RLS) can be treated. Together, you and your healthcare provider can work on your treatment plan. If needed, medicines may be prescribed. Also learn what you can do to ease your discomfort. Good sleep habits and a healthy lifestyle will help you rest better at night and have more energy during the day.  Working with your healthcare provider  RLS may occur on its  own and may be passed on in families. It is sometimes linked to other medical problems. Low iron may cause some RLS symptoms. Your healthcare provider may order a lab test to check your iron level. Other medical problems associated with RLS are kidney disease, diabetes, Parkinson disease, and multiple sclerosis. Your doctor may prescribe medicines to reduce your symptoms and help you sleep better.  Tips for temporary relief  To reduce your discomfort, try the following:  Walking or stretching  Rubbing your legs  Having a massage  Taking a hot or cold bath  Doing activities that make muscles in your hands or legs work  Relaxing with yoga or meditation  Good sleep habits  Even though you have RLS, you can still have restful sleep. Try these good sleeping habits:  Keep a regular sleep schedule. Go to bed and get up at the same time each day.  Avoid or limit naps.  Make sure the bedroom is quiet, dark, and not too hot or too cold.  Use your bed only for sleep and sex.  Healthy lifestyle  Your lifestyle affects your health and your sleep. Here are some healthy habits:  Eat a balanced diet. To get enough vitamins and minerals, you may also need to take supplements.  Manage stress and learn ways to relax. Deep breathing techniques and visualization can help to relax your muscles and calm your mind.  Exercise regularly. It can help reduce stress. Also, you will have more energy during the day and be more tired at bedtime. Afternoon exercise is best. Nighttime exercise may affect how well you sleep.  Date Last Reviewed: 9/1/2017 2000-2017 [employer]. [address] [place] [address], [place] [address]. All rights reserved. This information is not intended as a substitute for professional medical care. Always follow your healthcare professional's instructions.

## 2018-05-18 NOTE — ED AVS SNAPSHOT
[facility] Emergency Department    [address]    [facility] [address]    Phone:  [phone]    Fax:  [phone]                                       Ana Cristina Dominguez   MRN: [id number]    Department:  [facility] Emergency Department   Date of Visit:  5/18/2018           Patient Information     Date Of Birth          6/6/1931        Your diagnoses for this visit were:     Insomnia, unspecified type     Restless leg syndrome        You were seen by Luisana Malloy MD.      Follow-up Information     Follow up with Dez Urban MD.    Specialty:  Internal Medicine    Contact information:    [address]  [facility] [address]  [phone]          Discharge Instructions         Insomnia  Insomnia is repeated difficulty going to sleep or staying asleep, or both. Whether you have insomnia is not defined by a specific amount of sleep. Different people need different amounts of sleep, and you may need more or less sleep at different times of your life.  There are 3 major types of insomnia:  short-term, chronic, and  other.   Short-term, or acute insomnia lasts less than 3 months.  The symptoms are temporary and can be linked directly to a stressor, such as the death of a loved one, financial problems, or a new physical problem.  Short-term insomnia stops when the stressor resolves or the person adapts to its presence.  Chronic insomnia occurs at least 3 times a week and lasts longer than 3 months.  Chronic insomnia can occur when either the cause of the sleeping problem is not clear, or the insomnia does not get better when the stressor is resolved. A number of other criteria are also used to make the diagnosis of chronic insomnia.    Other insomnia  is the third type of insomnia-related sleep disorders.  This description applies to people who have problems getting to sleep or staying asleep, but do not meet all of the factors that describe either  short-term or chronic insomnia.    Many things cause insomnia. Different people may have different causes. It can be from an underlying medical or psychological condition, or lifestyle. It can also be primary insomnia, which means no cause can be found.  Causes of insomnia include:    Chronic medical problems- heart disease, gastrointestinal problems, hormonal changes, breathing problems    Anxiety    Stress    Depression    Pain    Work schedule    Sleep apnea    Illegal drugs    Certain medicines  Many different medidcines can affect your sleep, such as stimulants, caffeine, alcohol, some decongestants, and diet pills. Other medicines may include some types of blood pressure pills, steroids, asthma medicines, antihistamines, antidepressants, seizure medicines and statins. Not all of these will affect your sleep, and they shouldn t be stopped without talking to your doctor.  Symptoms of insomnia can include:    Lying awake for long periods at night before falling asleep    Waking up several times during the night    Waking up early in the morning and not being able to get back to sleep    Feeling tired and not refreshed by sleep    Not being able to function properly during the day and finding it hard to concentrate    Irritability    Tiredness and fatigue during the day  Home care    Review your medicines with your doctor or pharmacist to find out if they can cause insomnia. Not all medicines will affect your sleep, but they shouldn't be stopped without reviewing them with your doctor. There may be serious side effects and consequences from suddenly stopping your medicines. Not taking them may cause strokes, heart attacks, and many other problems.    Caffeine, smoking and alcohol also affect sleep. Limit your daily use and do not use these before bedtime. Alcohol may make you sleepy at first, but as its effects wear off, you may awaken a few hours later and have trouble returning to sleep.    Do not exercise, eat  or drink large amounts of liquid within 2 hours of your bedtime.    Improve your sleep habits. Have a fixed bed and wake-up time. Try to keep noise, light and heat in your bedroom at a comfortable level. Try using earplugs or eyeshades if needed.     Avoid watching TV in bed.    If you do not fall asleep within 30 minutes, try to relax by reading or listening to soft music.    Limit daytime napping to one 30 minute period, early in the day.    Get regular exercise. Find other ways to lessen your stress level.    If a medicine was prescribed to help reset your sleep patterns, take it as directed. Sleeping pills are intended for short-term use, only. If taken for too long, the effect wears off while the risk of physical addiction and psychological dependence increases.  Sleep diary  If the cause isn t obvious and it is not improving, try keeping a  sleep diary  for a couple of weeks. Include in it:    The time you go to bed    How long it takes to fall asleep    How many times you wake up    What time you wake up    Your meal times and what you eat    What time you drink alcohol    Your exercise habits and times  Follow-up care  Follow up with your healthcare provider, or as advised. If X-rays or CT scans were done, you will be notified if there is a change in the reading, especially if it affects treatment.  Call 911  Call 911 if any of these occur:    Trouble breathing    Confusion or trouble waking    Fainting or loss of consciousness    Rapid heart rate    New chest, arm, shoulder, neck or upper back pain    Trouble with speech or vision, weakness of an arm or leg    Trouble walking or talking, loss of balance, numbness or weakness in one side of your body, facial droop  When to seek medical advice  Call your healthcare provider right away if any of these occur:    Extreme restlessness or irritability    Confusion or hallucinations (seeing or hearing things that are not there)    Anxiety, depression    Several  days without sleeping  Date Last Reviewed: 11/19/2015 2000-2017 [employer]. [address] [place] [address], [place] [address]. All rights reserved. This information is not intended as a substitute for professional medical care. Always follow your healthcare professional's instructions.        Restless Legs Syndrome: What You Can Do    Symptoms of restless leg syndrome (RLS) can be treated. Together, you and your healthcare provider can work on your treatment plan. If needed, medicines may be prescribed. Also learn what you can do to ease your discomfort. Good sleep habits and a healthy lifestyle will help you rest better at night and have more energy during the day.  Working with your healthcare provider  RLS may occur on its own and may be passed on in families. It is sometimes linked to other medical problems. Low iron may cause some RLS symptoms. Your healthcare provider may order a lab test to check your iron level. Other medical problems associated with RLS are kidney disease, diabetes, Parkinson disease, and multiple sclerosis. Your doctor may prescribe medicines to reduce your symptoms and help you sleep better.  Tips for temporary relief  To reduce your discomfort, try the following:  Walking or stretching  Rubbing your legs  Having a massage  Taking a hot or cold bath  Doing activities that make muscles in your hands or legs work  Relaxing with yoga or meditation  Good sleep habits  Even though you have RLS, you can still have restful sleep. Try these good sleeping habits:  Keep a regular sleep schedule. Go to bed and get up at the same time each day.  Avoid or limit naps.  Make sure the bedroom is quiet, dark, and not too hot or too cold.  Use your bed only for sleep and sex.  Healthy lifestyle  Your lifestyle affects your health and your sleep. Here are some healthy habits:  Eat a balanced diet. To get enough vitamins and minerals, you may also need to take supplements.  Manage stress and learn  ways to relax. Deep breathing techniques and visualization can help to relax your muscles and calm your mind.  Exercise regularly. It can help reduce stress. Also, you will have more energy during the day and be more tired at bedtime. Afternoon exercise is best. Nighttime exercise may affect how well you sleep.  Date Last Reviewed: 9/1/2017 2000-2017 [employer]. [address] [place] [address]. All rights reserved. This information is not intended as a substitute for professional medical care. Always follow your healthcare professional's instructions.          Your next 10 appointments already scheduled     May 21, 2018  9:30 AM CDT   Return Visit with Danny Avelar MD   [facility] ([facility])    [address] [place] [id number]   [phone]            May 30, 2018  9:00 AM CDT   L/Vision Eval with Darlyn Santillan OT   [facility] ([facility])    [address] [place] [address]  [address]  [facility] [id number] [phone]            Jun 20, 2018  9:45 AM CDT   Return Visit with Fabiano Bower MD   [facility] ([facility])    [address] [place] [address]  [address]  [facility] [address]   [phone]              24 Hour Appointment Hotline       To make an appointment at any [facility], call [phone] ([phone]). If you don't have a family doctor or clinic, we will help you find one. [facility] are conveniently located to serve the needs of you and your family.             Review of your medicines      START taking        Dose / Directions Last dose taken    melatonin ER 3 MG tablet   Dose:  3 mg   Quantity:  30 tablet        Take 1 tablet (3 mg) by mouth nightly as needed for sleep May increase to 2 tablets PO at night if 1 tablet is not having effect   Refills:  0          CONTINUE these medicines which may have CHANGED, or  have new prescriptions. If we are uncertain of the size of tablets/capsules you have at home, strength may be listed as something that might have changed.        Dose / Directions Last dose taken    rOPINIRole 1 MG tablet   Commonly known as:  REQUIP   Dose:  1.5 mg   What changed:    - medication strength  - how much to take   Quantity:  3 tablet        Take 1.5 tablets (1.5 mg) by mouth At Bedtime   Refills:  0          Our records show that you are taking the medicines listed below. If these are incorrect, please call your family doctor or clinic.        Dose / Directions Last dose taken    ascorbic acid 500 MG tablet   Commonly known as:  VITAMIN C   Quantity:  100        1 TABLET DAILY   Refills:  0        bumetanide 1 MG tablet   Commonly known as:  BUMEX   Quantity:  90 tablet        Take 2 tabs each AM and 1 tab each evening   Refills:  3        CALCIUM 600 + D 600-200 MG-UNIT Tabs        2 qd   Refills:  0        diltiazem 120 MG Cp12 12 hr SR capsule   Commonly known as:  CARDIZEM SR   Quantity:  180 capsule        TAKE ONE CAPSULE BY MOUTH TWICE A DAY   Refills:  2        metoprolol succinate 50 MG 24 hr tablet   Commonly known as:  TOPROL-XL   Dose:  100 mg   Quantity:  180 tablet        Take 2 tablets (100 mg) by mouth daily   Refills:  0        MULTIVITAMIN TABS   OR        1 qd   Refills:  0        olopatadine 0.1 % ophthalmic solution   Commonly known as:  PATANOL   Dose:  1 drop        Place 1 drop into both eyes 2 times daily as needed for allergies During Spring   Refills:  0        predniSONE 1 MG tablet   Commonly known as:  DELTASONE   Quantity:  120 tablet        3mg PO daily   Refills:  3        PRESERVISION AREDS PO   Dose:  1 capsule        Take 1 capsule by mouth daily   Refills:  0        TYLENOL PO        Take by mouth every 4 hours as needed for mild pain or fever   Refills:  0        vitamin E 400 UNIT capsule   Quantity:  100        ONE CAP PO QD   Refills:  0        XARELTO 20 MG  Tabs tablet   Quantity:  30 tablet   Generic drug:  rivaroxaban ANTICOAGULANT        TAKE 1 TABLET (20 MG) BY MOUTH DAILY (WITH DINNER)   Refills:  8                Prescriptions were sent or printed at these locations (2 Prescriptions)                   Other Prescriptions                Printed at Department/Unit printer (2 of 2)         melatonin ER 3 MG tablet               rOPINIRole (REQUIP) 1 MG tablet                Procedures and tests performed during your visit     EKG 12 lead      Orders Needing Specimen Collection     None      Pending Results     No orders found from 5/16/2018 to 5/19/2018.            Pending Culture Results     No orders found from 5/16/2018 to 5/19/2018.            Pending Results Instructions     If you had any lab results that were not finalized at the time of your Discharge, you can call the ED Lab Result RN at [phone]. You will be contacted by this team for any positive Lab results or changes in treatment. The nurses are available 7 days a week from 10A to 6:30P.  You can leave a message 24 hours per day and they will return your call.        Test Results From Your Hospital Stay               Clinical Quality Measure: Blood Pressure Screening     Your blood pressure was checked while you were in the emergency department today. The last reading we obtained was  BP: (!) 144/99 . Please read the guidelines below about what these numbers mean and what you should do about them.  If your systolic blood pressure (the top number) is less than 120 and your diastolic blood pressure (the bottom number) is less than 80, then your blood pressure is normal. There is nothing more that you need to do about it.  If your systolic blood pressure (the top number) is 120-139 or your diastolic blood pressure (the bottom number) is 80-89, your blood pressure may be higher than it should be. You should have your blood pressure rechecked within a year by a primary care provider.  If your systolic  blood pressure (the top number) is 140 or greater or your diastolic blood pressure (the bottom number) is 90 or greater, you may have high blood pressure. High blood pressure is treatable, but if left untreated over time it can put you at risk for heart attack, stroke, or kidney failure. You should have your blood pressure rechecked by a primary care provider within the next 4 weeks.  If your provider in the emergency department today gave you specific instructions to follow-up with your doctor or provider even sooner than that, you should follow that instruction and not wait for up to 4 weeks for your follow-up visit.        Thank you for choosing [facility]       Thank you for choosing [facility] for your care. Our goal is always to provide you with excellent care. Hearing back from our patients is one way we can continue to improve our services. Please take a few minutes to complete the written survey that you may receive in the mail after you visit with us. Thank you!        [employer] Information     [employer] gives you secure access to your electronic health record. If you see a primary care provider, you can also send messages to your care team and make appointments. If you have questions, please call your primary care clinic.  If you do not have a primary care provider, please call [phone] and they will assist you.        Care EveryWhere ID     This is your Care EveryWhere ID. This could be used by other organizations to access your [facility] medical records  [id number]        Equal Access to Services     ABBE ESPINOSA : Hadii shanelle Joe, waaxda luqadaha, qaybta kaalmada nessa, rinku obrien. So [facility] [phone].    ATENCIÓN: Si habla español, tiene a dominguez disposición servicios gratuitos de asistencia lingüística. Llame al [phone].    We comply with applicable federal civil rights laws and [place] laws. We do not discriminate on the basis of race, color,  national origin, age, disability, sex, sexual orientation, or gender identity.            After Visit Summary       This is your record. Keep this with you and show to your community pharmacist(s) and doctor(s) at your next visit.

## 2018-05-21 NOTE — MR AVS SNAPSHOT
After Visit Summary   5/21/2018    Ana Cristina Dominguez    MRN: [id number]           Patient Information     Date Of Birth          6/6/1931        Visit Information        Provider Department      5/21/2018 9:30 AM Danny Avelar MD [facility]         Follow-ups after your visit        Your next 10 appointments already scheduled     May 30, 2018  9:00 AM CDT   L/Vision Eval with Darlyn Santillan OT   [facility] ([facility])    [address]  [address]  [facility] [address]   [phone]            Jun 20, 2018  9:45 AM CDT   Return Visit with Fabiano Bower MD   [facility]   [place] ([facility])    [address]  [address]  [facility] [address]   [phone]              Who to contact     If you have questions or need follow up information about today's clinic visit or your schedule please contact [facility] directly at [phone].  Normal or non-critical lab and imaging results will be communicated to you by [employer], letter or phone within 4 business days after the clinic has received the results. If you do not hear from us within 7 days, please contact the clinic through [employer] or phone. If you have a critical or abnormal lab result, we will notify you by phone as soon as possible.  Submit refill requests through [employer] or call your pharmacy and they will forward the refill request to us. Please allow 3 business days for your refill to be completed.          Additional Information About Your Visit        [employer] Information     [employer] gives you secure access to your electronic health record. If you see a primary care provider, you can also send messages to your care team and make appointments. If you have questions, please call your primary care clinic.  If you do not have a primary care provider, please call [phone] and they will assist you.         Care EveryWhere ID     This is your Care EveryWhere ID. This could be used by other organizations to access your [place] medical records  [id number]        Your Vitals Were     Pulse Temperature Height Pulse Oximetry BMI (Body Mass Index)       98 97.7  F (36.5  C) (Oral) 1.524 m (5') 98% 17.58 kg/m2        Blood Pressure from Last 3 Encounters:   05/21/18 112/72   05/18/18 130/88   05/09/18 114/60    Weight from Last 3 Encounters:   05/21/18 40.8 kg (90 lb)   05/18/18 41.7 kg (92 lb)   05/09/18 40.9 kg (90 lb 1.6 oz)              Today, you had the following     No orders found for display         Today's Medication Changes          These changes are accurate as of 5/21/18 10:30 AM.  If you have any questions, ask your nurse or doctor.               Stop taking these medicines if you haven't already. Please contact your care team if you have questions.     melatonin ER 3 MG tablet   Stopped by:  Danny Avelar MD           predniSONE 1 MG tablet   Commonly known as:  DELTASONE   Stopped by:  Danny Avelar MD                    Primary Care Provider Office Phone # Fax #    Dez Urban [phone]       [address] [id number]        Equal Access to Services     [facility] AH: Hadii shanelle ku hadasho Soomaali, waaxda luqadaha, qaybta kaalmada nessa, rinku obrien. So [facility] [phone].    ATENCIÓN: Si habla español, tiene a dominguez disposición servicios gratuitos de asistencia lingüística. Fabian al [phone].    We comply with applicable federal civil rights laws and [place] laws. We do not discriminate on the basis of race, color, national origin, age, disability, sex, sexual orientation, or gender identity.            Thank you!     Thank you for choosing [facility]  for your care. Our goal is always to provide you with excellent care. Hearing back from our patients is one way we can continue to improve  our services. Please take a few minutes to complete the written survey that you may receive in the mail after your visit with us. Thank you!             Your Updated Medication List - Protect others around you: Learn how to safely use, store and throw away your medicines at www.disposemymeds.org.          This list is accurate as of 5/21/18 10:30 AM.  Always use your most recent med list.                   Brand Name Dispense Instructions for use Diagnosis    ascorbic acid 500 MG tablet    VITAMIN C    100    1 TABLET DAILY        bumetanide 1 MG tablet    BUMEX    90 tablet    Take 2 tabs each AM and 1 tab each evening    Edema, unspecified type       CALCIUM 600 + D 600-200 MG-UNIT Tabs      2 qd        diltiazem 120 MG Cp12 12 hr SR capsule    CARDIZEM SR    180 capsule    TAKE ONE CAPSULE BY MOUTH TWICE A DAY    Chronic atrial fibrillation (H)       metoprolol succinate 50 MG 24 hr tablet    TOPROL-XL    180 tablet    Take 2 tablets (100 mg) by mouth daily    Essential hypertension, Chronic atrial fibrillation (H)       MULTIVITAMIN TABS   OR      1 qd        olopatadine 0.1 % ophthalmic solution    PATANOL     Place 1 drop into both eyes 2 times daily as needed for allergies During Spring        * PRESERVISION AREDS PO      Take 1 capsule by mouth daily        * PRESERVISION AREDS Caps     30 capsule         rOPINIRole 1 MG tablet    REQUIP    45 tablet    Take 1.5 tablets (1.5 mg) by mouth At Bedtime        TYLENOL PO      Take by mouth every 4 hours as needed for mild pain or fever        vitamin E 400 UNIT capsule     100    ONE CAP PO QD        XARELTO 20 MG Tabs tablet   Generic drug:  rivaroxaban ANTICOAGULANT     30 tablet    TAKE 1 TABLET (20 MG) BY MOUTH DAILY (WITH DINNER)    Chronic atrial fibrillation (H)       * Notice:  This list has 2 medication(s) that are the same as other medications prescribed for you. Read the directions carefully, and ask your doctor or other care provider to review them  with you.

## 2018-05-21 NOTE — NURSING NOTE
Chief Complaint   Patient presents with     RECHECK       Initial /72 (BP Location: Right arm, Patient Position: Chair, Cuff Size: Adult Regular)  Pulse 98  Temp 97.7  F (36.5  C) (Oral)  Ht 1.524 m (5')  Wt 40.8 kg (90 lb)  SpO2 98%  BMI 17.58 kg/m2 Estimated body mass index is 17.58 kg/(m^2) as calculated from the following:    Height as of this encounter: 1.524 m (5').    Weight as of this encounter: 40.8 kg (90 lb).  Medication Reconciliation: complete    Have you ever seen a rheumatologist Yes Who You When 1/19/18  Joint pain history  Onset: pt states that she is here to find out if the PMR is back. She has been having swelling in her left leg.. Been having some pain.   Involved joints: see above  Pain scale:  3/10     Wakes the patient from sleep : No/ does not sleep well.   Morning stiffness:Yes for 60 minutes  Meds used:tylenol    Interim history  Since last visit:  1. Infections - No  2. New symptoms/medical problem - No  3. Any side effects from Rheum medications -NA  3. ER visits/Hospitalizations/surgeries - Yes/ seen [facility] ER on 5/18/18  4. Last PCP visit: 3/16/18  Wt Readings from Last 4 Encounters:   05/21/18 40.8 kg (90 lb)   05/18/18 41.7 kg (92 lb)   05/09/18 40.9 kg (90 lb 1.6 oz)   03/20/18 43.9 kg (96 lb 11.2 oz)     BP Readings from Last 3 Encounters:   05/21/18 112/72   05/18/18 130/88   05/09/18 114/60

## 2018-06-03 NOTE — IP AVS SNAPSHOT
MRN:[id number]                      After Visit Summary   6/3/2018    Ana Cristina Dominguez    MRN: [id number]           Thank you!     Thank you for choosing [facility] for your care. Our goal is always to provide you with excellent care. Hearing back from our patients is one way we can continue to improve our services. Please take a few minutes to complete the written survey that you may receive in the mail after you visit. If you would like to speak to someone directly about your visit please contact Patient Relations at [phone]. Thank you!          Patient Information     Date Of Birth          6/6/1931        About your hospital stay     You were admitted on:  June 4, 2018 You last received care in the:  [place] [address] [facility]    You were discharged on:  June 5, 2018       Who to Call     For medical emergencies, please call 911.  For non-urgent questions about your medical care, please call your primary care provider or clinic, [phone]          Attending Provider     Provider Specialty    Radha Barbour MD Emergency Medicine    Lalit, Miya LOCKWOOD MD Internal Medicine       Primary Care Provider Office Phone # Fax #    Dez Urban [phone]      After Care Instructions     Activity       Your activity upon discharge: activity as tolerated            Diet       Follow this diet upon discharge: regular diet                  Follow-up Appointments     Follow-up and recommended labs and tests        See Dr. Urban in 5-7 days for follow up after hospitalization    Consider follow up chest CT scan in 12 months to follow up on lung nodules and aortic aneurysm as we discussed this admission.  Discuss with Dr. Urban at next clinic visit who can arrange this for you    Follow up with Dr. Simons as previously scheduled                  Your next 10 appointments already scheduled     Jun 12, 2018 11:00 AM CDT   Office Visit with Dez VAZQUEZ  MD [place]   [facility]    [address]  [facility] [address] [phone]           Bring a current list of meds and any records pertaining to this visit. For Physicals, please bring immunization records and any forms needing to be filled out. Please arrive 10 minutes early to complete paperwork.            Jun 20, 2018  9:45 AM CDT   Return Visit with Fabiano Bower MD   [facility]   [place] ([facility])    [address]  [address]  [place] [address]   [phone]              Pending Results     No orders found from 6/1/2018 to 6/4/2018.            Statement of Approval     Ordered          06/05/18 1569  I have reviewed and agree with all the recommendations and orders detailed in this document.  EFFECTIVE NOW     Approved and electronically signed by:  Naga Mejia MD             Admission Information     Date & Time Provider Department Dept. Phone    6/3/2018 Miya Cohn MD [place] [address] [facility] [phone]      Your Vitals Were     Blood Pressure Pulse Temperature Respirations Height Weight    117/71 (BP Location: Right arm) 90 99.5  F (37.5  C) (Oral) 18 1.524 m (5') 40.2 kg (88 lb 9.6 oz)    Pulse Oximetry BMI (Body Mass Index)                96% 17.3 kg/m2          MyChart Information     [employer] gives you secure access to your electronic health record. If you see a primary care provider, you can also send messages to your care team and make appointments. If you have questions, please call your primary care clinic.  If you do not have a primary care provider, please call [phone] and they will assist you.        Care EveryWhere ID     This is your Care EveryWhere ID. This could be used by other organizations to access your [place] medical records  [id number]        Equal Access to Services     ABBE ESPINOSA : jorge Killian  олег denzelcristina gamboamariana szymanski, rinku lightaan ah. So [facility] [phone].    ATENCIÓN: Si [facility], tiene a dominguez disposición servicios gratuitos de asistencia lingüística. Fabian al [phone].    We comply with applicable federal civil rights laws and [place] laws. We do not discriminate on the basis of race, color, national origin, age, disability, sex, sexual orientation, or gender identity.               Review of your medicines      CONTINUE these medicines which may have CHANGED, or have new prescriptions. If we are uncertain of the size of tablets/capsules you have at home, strength may be listed as something that might have changed.        Dose / Directions    metoprolol succinate 50 MG 24 hr tablet   Commonly known as:  TOPROL-XL   This may have changed:    - how much to take  - how to take this  - when to take this  - additional instructions   Used for:  Essential hypertension, Chronic atrial fibrillation (H)        Take 2 tablets (100 mg) every morning and 1 tablet (50 mg) every evening.   Quantity:  90 tablet   Refills:  1         CONTINUE these medicines which have NOT CHANGED        Dose / Directions    acetaminophen 325 MG tablet   Commonly known as:  TYLENOL        Dose:  325-650 mg   Take 325-650 mg by mouth every 4 hours as needed for mild pain   Refills:  0       ascorbic acid 500 MG tablet   Commonly known as:  VITAMIN C        Dose:  500 mg   Take 500 mg by mouth daily   Refills:  0       * bumetanide 1 MG tablet   Commonly known as:  BUMEX        Dose:  2 mg   Take 2 mg by mouth every morning   Refills:  0       * bumetanide 1 MG tablet   Commonly known as:  BUMEX        Dose:  1 mg   Take 1 mg by mouth every evening   Refills:  0       Calcium-Vitamin D 500-400 MG-UNIT Tabs        Dose:  2 tablet   Take 2 tablets by mouth every morning   Refills:  0       diltiazem 120 MG Cp12 12 hr SR capsule   Commonly known as:  CARDIZEM SR   Used for:  Chronic atrial  fibrillation (H)        TAKE ONE CAPSULE BY MOUTH TWICE A DAY   Quantity:  180 capsule   Refills:  2       olopatadine 0.1 % ophthalmic solution   Commonly known as:  PATANOL        Dose:  1 drop   Place 1 drop into both eyes 2 times daily as needed for allergies During Spring   Refills:  0       * PRESERVISION AREDS PO        Dose:  1 capsule   Take 1 capsule by mouth 2 times daily   Refills:  0       * MULTIVITAMIN PO        Dose:  1 tablet   Take 1 tablet by mouth every morning   Refills:  0       rOPINIRole 1 MG tablet   Commonly known as:  REQUIP        Dose:  2 mg   Take 2 mg by mouth At Bedtime   Refills:  0       vitamin E 400 UNIT capsule        Dose:  400 Units   Take 400 Units by mouth daily   Refills:  0       XARELTO 20 MG Tabs tablet   Used for:  Chronic atrial fibrillation (H)   Generic drug:  rivaroxaban ANTICOAGULANT        TAKE 1 TABLET (20 MG) BY MOUTH DAILY (WITH DINNER)   Quantity:  30 tablet   Refills:  8       * Notice:  This list has 4 medication(s) that are the same as other medications prescribed for you. Read the directions carefully, and ask your doctor or other care provider to review them with you.         Where to get your medicines      These medications were sent to [facility] [address] [address] [address]     Phone:  [phone]     metoprolol succinate 50 MG 24 hr tablet                Protect others around you: Learn how to safely use, store and throw away your medicines at www.disposemymeds.org.             Medication List: This is a list of all your medications and when to take them. Check marks below indicate your daily home schedule. Keep this list as a reference.      Medications           Morning Afternoon Evening Bedtime As Needed    acetaminophen 325 MG tablet   Commonly known as:  TYLENOL   Take 325-650 mg by mouth every 4 hours as needed for mild pain   Last time this was given:  650 mg on  6/4/2018  9:05 AM                                   ascorbic acid 500 MG tablet   Commonly known as:  VITAMIN C   Take 500 mg by mouth daily   Next Dose Due:  6-6-18                                   * bumetanide 1 MG tablet   Commonly known as:  BUMEX   Take 2 mg by mouth every morning   Last time this was given:  2 mg on 6/5/2018  9:16 AM   Next Dose Due:  6-6-18                                   * bumetanide 1 MG tablet   Commonly known as:  BUMEX   Take 1 mg by mouth every evening   Last time this was given:  2 mg on 6/5/2018  9:16 AM   Next Dose Due:  6-5-18                                   Calcium-Vitamin D 500-400 MG-UNIT Tabs   Take 2 tablets by mouth every morning   Next Dose Due:  6-6-18                                   diltiazem 120 MG Cp12 12 hr SR capsule   Commonly known as:  CARDIZEM SR   TAKE ONE CAPSULE BY MOUTH TWICE A DAY   Last time this was given:  120 mg on 6/5/2018  9:16 AM   Next Dose Due:  6-5-18                                      metoprolol succinate 50 MG 24 hr tablet   Commonly known as:  TOPROL-XL   Take 2 tablets (100 mg) every morning and 1 tablet (50 mg) every evening.   Last time this was given:  100 mg on 6/5/2018  9:15 AM   Next Dose Due:  6-5-18            100 mg               50 mg           olopatadine 0.1 % ophthalmic solution   Commonly known as:  PATANOL   Place 1 drop into both eyes 2 times daily as needed for allergies During Spring                                   * PRESERVISION AREDS PO   Take 1 capsule by mouth 2 times daily   Next Dose Due:  6-5-18                                      * MULTIVITAMIN PO   Take 1 tablet by mouth every morning   Next Dose Due:  6-6-18                                   rOPINIRole 1 MG tablet   Commonly known as:  REQUIP   Take 2 mg by mouth At Bedtime   Last time this was given:  2 mg on 6/4/2018  9:41 PM   Next Dose Due:  6-5-18                                   vitamin E 400 UNIT capsule   Take 400 Units by mouth daily   Next Dose  Due:  6-6-18                                   XARELTO 20 MG Tabs tablet   TAKE 1 TABLET (20 MG) BY MOUTH DAILY (WITH DINNER)   Last time this was given:  15 mg on 6/4/2018  5:13 PM   Generic drug:  rivaroxaban ANTICOAGULANT   Next Dose Due:  6-5-18                                   * Notice:  This list has 4 medication(s) that are the same as other medications prescribed for you. Read the directions carefully, and ask your doctor or other care provider to review them with you.              More Information        Discharge Instructions for Atrial Fibrillation  You have been diagnosed with an abnormal heart rhythm called atrial fibrillation. With this condition, your heart s 2 upper chambers quiver rather than squeeze the blood out in a normal pattern. This leads to an irregular and sometimes rapid heartbeat. Some people will develop associated symptoms such as a flip-flopping heartbeat, chest pain, lightheadedness, or shortness of breath. Other people may have no symptoms at all. Atrial fibrillation is serious because it affects the heart s ability to fill with blood as it should. Blood clots may form. This increases the risk for stroke. Untreated atrial fibrillation can also lead to heart failure. Atrial fibrillation can be controlled. With treatment, most people with atrial fibrillation lead normal lives.  Treatment options  Recommended treatment for atrial fibrillation depends on your age, symptoms, how long you have had atrial fibrillation, and other factors. You will have a complete evaluation to find out if you have any abnormalities that caused your heart to go into atrial fibrillation. This might be blocked heart arteries or a thyroid problem. Your doctor will assess your particular case and discuss choices with you.  Treatment choices may include:    Treating an underlying disorder that puts you at risk for atrial fibrillation. For example, correcting an abnormal thyroid or electrolyte problem, or treating  a blocked heart artery.    Restoring a normal heart rhythm with an electrical shock (cardioversion) or with an antiarrhythmic medicine (chemical cardioversion)    Using medicine to control your heart rate in atrial fibrillation.    Preventing the risk for blood clot and stroke using blood-thinning medicines. Your doctor will tell you what he or she recommends. Choices may include aspirin, clopidogrel, warfarin, dabigatran, rivaroxaban, apixaban, and edoxaban.    Doing catheter ablation or a surgical maze procedure. These use different methods to destroy certain areas of heart tissue. This interrupts the electrical signals causing atrial fibrillation. One of these procedures may be a choice when medicines do not work, or as an alternative to long-term medicine.    Other treatment choices may be recommended for you by your doctor.  Managing risk factors for stroke and preventing heart failure are important parts of any treatment plan for atrial fibrillation.  Home care    Take your medicines exactly as directed. Don t skip doses.    Work with your doctor to find the right medicines and doses for you.    Learn to take your own pulse. Keep a record of your results. Ask your doctor which pulse rates mean that you need medical attention. Slowing your pulse is often the goal of treatment. Ask your doctor if it s OK for you to use an automatic machine to check your pulse at home. Sometimes these machines don t count the pulse correctly when you have atrial fibrillation.    Limit your intake of coffee, tea, cola, and other beverages with caffeine. Talk with your doctor about whether you should eliminate caffeine.    Avoid over-the-counter medicines that have caffeine in them.    Let your doctor know what medicines you take, including prescription and over-the-counter medicines, as well as any supplements. They interfere with some medicines given for atrial fibrillation.    Ask your doctor about whether you can drink alcohol.  Some people need to avoid alcohol to better treat atrial fibrillation. If you are taking blood-thinner medicines, alcohol may interfere with them by increasing their effect.    Never take stimulants such as amphetamines or cocaine. These drugs can speed up your heart rate and trigger atrial fibrillation.  Follow-up care  Follow up with your doctor, or as advised.     When should I call my healthcare provider  Call your healthcare provider right away if you have any of the following:    Weakness    Dizziness    Fainting    Fatigue    Shortness of breath    Chest pain with increased activity    A change in the usual regularity of your heartbeat, or an unusually fast heartbeat   Date Last Reviewed: 4/23/2016 2000-2017 [employer]. [address] [place] [address], [place] [address]. All rights reserved. This information is not intended as a substitute for professional medical care. Always follow your healthcare professional's instructions.

## 2018-06-03 NOTE — IP AVS SNAPSHOT
[place] [address] [facility]    [address]    [facility] [address]    Phone:  [phone]    Fax:  [phone]                                       After Visit Summary   6/3/2018    Ana Cristina Dominguez    MRN: [id number]           After Visit Summary Signature Page     I have received my discharge instructions, and my questions have been answered. I have discussed any challenges I see with this plan with the nurse or doctor.    ..........................................................................................................................................  Patient/Patient Representative Signature      ..........................................................................................................................................  Patient Representative Print Name and Relationship to Patient    ..................................................               ................................................  Date                                            Time    ..........................................................................................................................................  Reviewed by Signature/Title    ...................................................              ..............................................  Date                                                            Time

## 2018-06-03 NOTE — LETTER
Follow-up plan:  Future Appointments  Date Time Provider Department Center   6/12/2018 11:00 AM Dez Urban MD [facility]   6/20/2018 9:45 AM Fabiano Bower MD [facility]             Sr Recommendations:  Pt was admitted with afib rvr and elevated BNP of 9764. She is currently being treated on a dilt drip for her afib and IV lasix for elevated BNP. She states she lives alone in her condo and is independent with activities and cares. She follows a low Na diet at home. She should follow up as scheduled with PCP and cards.    Velia Landry RN CTS    AVS/Discharge Summary is the source of truth; this is a helpful guide for improved communication of patient story

## 2018-06-03 NOTE — LETTER
Transition Communication Hand-off for Care Transitions to Next Level of Care Provider    Name: Ana Cristina Dominguez  : 1931  MRN #: [id number]  Primary Care Provider: Dez Urban MD  Primary Care MD Name: Dr Urban  [facility] [address]  [facility] [address]  Primary Care Clinic Name: [facility]  Reason for Hospitalization:  Paroxysmal atrial fibrillation (H) [I48.0]  Renal insufficiency [N28.9]  Congestive heart failure, unspecified congestive heart failure chronicity, unspecified congestive heart failure type (H) [I50.9]  Admit Date/Time: 6/3/2018 10:52 PM  Discharge Date: 18  Payor Source: Payor: MEDICA / Plan: MEDICA PRIME SOLUTION / Product Type: Indemnity /     Readmission Assessment Measure (ALYSHA) Risk Score/category:  Average         Reason for Communication Hand-off Referral:  Pt admitted w/A-Fib w/RVR and CHF Exacerbation so is high risk for readmission.    Discharge Plan: Plans d/c back home w/son-spouse also lives w/son.       Concern for non-adherence with plan of care:   No  Discharge Needs Assessment:  Needs       Most Recent Value    Transportation Available car, family or friend will provide    # of Referrals Placed by CTS Scheduled Follow-up appointments        Follow-up specialty is recommended: Cardiology  Follow-up plan:  Future Appointments  Date Time Provider Department Center   2018 11:00 AM Dez Urban MD \[facility]\""   2018 9:45 AM Fabiano Bower MD [facility]       Any outstanding tests or procedures:  No      Referrals     Future Labs/Procedures    Home care nursing referral     Comments:    RN skilled nursing visit. RN to assess respiratory and cardiac status.    Your provider has ordered home care nursing services. If you have not been contacted within 2 days of your discharge please call the inpatient department phone number at [phone] .            Key Recommendations:  Pt was admitted with afib rvr and elevated BNP of 9764. She  is currently being treated on a dilt drip for her afib and IV lasix for elevated BNP. She states she lives alone in her condo and is independent with activities and cares. She follows a low Na diet at home. She should follow up as scheduled with PCP and cards.    Velia Roy (sent by)    AVS/Discharge Summary is the source of truth; this is a helpful guide for improved communication of patient story

## 2018-06-04 PROBLEM — I48.91 A-FIB (H): Status: ACTIVE | Noted: 2018-01-01

## 2018-06-04 NOTE — PROGRESS NOTES
Pt seen and examiend.  Agree with H&P per Jonnathan Cohn from earlier this AM    Remains on dilt gtt for HR control.  Usual dose of Toprol and diltiazem have been resumed.  Will add additional evening dose of Toprol at 50 mg daily.      Titrate off dilt gtt when HR adequately controlled with PO meds    Hoping for discharge tomorrow am

## 2018-06-04 NOTE — H&P
History and Physical     Ana Cristina Dominguez MRN# [id number]   YOB: 1931 Age: 86 year old      Date of Admission:  6/3/2018    Primary care provider: Dez Urban          Assessment and Plan:   This patient is a 86 year old female with PMH Afib on rivaroxaban for AC, and BB/CCB for rate control, newly diagnosed non-ischemic cardiomyopathy by echo in 3/2018, with hx of PMR complicated by joint pain and LE edema-resolved with prednisone who presents with chest pressure/pain pleuritic in nature, and is found to be in AF with RVR in the 160 range.  She's been placed on IV diltiazem with better control.  She'll be admitted to C    1.  AF with RVR:  Unclear trigger, ? Due to missing medications although seems quite with it to me  Cont dilt gtt overnight  Resumed BB  Tele monitoring  CT PE negative for PE    2.  Acute on chronic systolic HF with exacerbation  rec'd furosemide 40 mg IV x 1 in ER  I/O, Weights daily  Resume bumetanide in am    3.  Chronic systolic and diastolic HF  cont BB, diuretics (IV or PO based on #2)    4.  Htn:  Home regimen is metop xl 100 mg qd, diltiazem  mg bid, bumetanide 2 mg qam, 1 mg q pm  Resume both oral BB/CCB and wean dilt gtt  Resume oral diuretic in am     5.  Some concern expressed by son to ER MD re: confusion, ? If taking medications right.  She is able to tell me her meds and how she takes them although doesn't know the exact dosage.  She seems quite appropriate, and is A and O x 3 (even taking into account the change of date from 3 to 4), knows brendon/jeremiah.      6.  Incidental Finding:  Small indeterminate RUL nodule-has smoking hx so recs would be repeat CT in 12 months per rads report.  I did not discuss this finding with her this evening    7.  RLS:  Cont  Home ropinirole    PPX:  rivaroxaban  Code:  Full but would not want to be left on life sustaining therapy without a reasonable chance of a meaningful recovery  Dispo:  Admit IP for > than expected 2  mn stay                Chief Complaint:   Chest pressure/pain       History of Present Illness:   This patient is a 86 year old female with PMH Afib on rivaroxaban for AC, and BB/CCB for rate control, newly diagnosed non-ischemic cardiomyopathy by echo in 3/2018, with hx of PMR complicated by joint pain and LE edema-resolved with prednisone who presents with one week hx of chest pressure/discomfort.     She believes it began a week ago, not clearly intermittent but seems to wax and wane, worse with deep breathing.  She denies any palpitations or sense that she might pass out.  She has chronic LE edema L> R that has not been worse.  She denies any fevers, reports that she is chronically chilled which is unchanged.  She is able to walk about 1/2 a block unaided without difficulty or change in her chest pressure.  It may have been a bit worse today so she called her son and told him she needed to come into the ER. She denies any associated n/v/d    On presentation to the ER VS notable for HR in the 140-160 range with rhythm consistent with AF with RVR.  She's been started on a dilt drip and HR still in the 120-130 range.  She's been given a dose of IV furosemide for associated systolic heart failure with exacerbation.     In discussion with the ER MD, the son expressed some concern that she may not be thinking correctly and he is worried that she is not taking her medications appropriately.  He thinks she may need an alternative living situation.       The history is obtained in discussion with the ER provider  Dr Barbour, the patient with fair reliability.  She's quite good with dates/people and yes/no questions but there seem to be a lot of holes in her story that she is unable to fill in.         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     Polymyalgia rheumatica (H)      RLS (restless legs syndrome)      Senile osteoporosis     Reclast 11/16/09, 11/10, 11/11     Systolic heart failure (H)     by echo 3/2018, NM MPI negative for ischemia in 5/2108             Past Surgical History:     Past Surgical History:   Procedure Laterality Date     APPENDECTOMY       CATARACT IOL, RT/LT  1/19/09    right     HC EXCIS PRIMARY GANGLION WRIST               Social History:     Social History   Substance Use Topics     Smoking status: Former Smoker     Quit date: 5/6/1973     Smokeless tobacco: Never Used     Alcohol use Yes      Comment: Socially   lives alone, ambulates without assistance, still driving  Code:  Full but would not want to be left on life sustaining therapy without reasonable chance of a meaningful recovery.  Re-verified with the patient at the bedside, and consistent with living will scanned into epic          Family History:     Family History   Problem Relation Age of Onset     C.A.D. Father      Family History Negative Mother      Genitourinary Problems Sister      Renal failure     HEART DISEASE Sister      Rhythm issues            Allergies:     Allergies   Allergen Reactions     Amoxicillin Rash     Codeine GI Disturbance     Stomach pain     Melatonin Nausea     Eyesburned             Medications:   Per Epic    Current medications include    Metop xl 100 mg qd  dilt xr 120 mg bid  rivaroxaban 20 mg qhs  Ropinirole 1.5 mg qhs  Bumetanide 2 mg q am, 1 mg q pm             Review of Systems:   A Comprehensive greater than 10 system review of systems was carried out.  Pertinent positives and negatives are noted above.  Otherwise negative for contributory information.           Physical Exam:   Blood pressure 106/56, temperature 99.1  F (37.3  C), temperature source Oral, resp. rate 20, height 1.524 m (5'),  weight 40.8 kg (90 lb), SpO2 95 %, not currently breastfeeding.  Exam:    General:  Pleasant nad looks stated age  HEENT:  Head nc/at sclera clear PERRL O/P:  Moist mucus membranes no posterior pharyngeal erythema or exudate.  Neck is supple  Lungs: cta b nl effort   CV: rapid, IIR no m/r/g with trace - 1 + edema on the left, none on the right  Abd:  S/nt/nd no r/g  Neuro:  Cn 2-12 grossly intact and strength is intact in b ue and le  Alert and oriented affect appropriate   Knows trump/pence               Data:          Lab Results   Component Value Date     06/03/2018    Lab Results   Component Value Date    CHLORIDE 95 06/03/2018    Lab Results   Component Value Date    BUN 31 06/03/2018      Lab Results   Component Value Date    POTASSIUM 3.7 06/03/2018    Lab Results   Component Value Date    CO2 30 06/03/2018    Lab Results   Component Value Date    CR 1.08 06/03/2018        Lab Results   Component Value Date    NTBNPI 9764 (H) 06/03/2018    NTBNP 3296 (H) 03/20/2018     Lab Results   Component Value Date    WBC 11.3 (H) 06/03/2018    HGB 12.6 06/03/2018    HCT 39.1 06/03/2018    MCV 88 06/03/2018     06/03/2018     Lab Results   Component Value Date     (H) 06/03/2018     Lab Results   Component Value Date    AST 28 06/03/2018    ALT 34 06/03/2018    ALKPHOS 166 (H) 06/03/2018    BILITOTAL 1.0 06/03/2018    BILICONJ 0.0 11/11/2013     Lab Results   Component Value Date    TROPI <0.015 06/03/2018   D-dimer 2.3      Per my personal read:    EKG #1:  AF with RVR and  poor R wave progression in anterior leads, rate 151  EKG:  #2: AF with RVR, poor R-wave progression in anterior leads, rate 123           Imaging:     Recent Results (from the past 24 hour(s))   CT Head w/o Contrast    Narrative    CT HEAD WITHOUT CONTRAST  6/4/2018 12:13 AM     HISTORY: Confusion.     TECHNIQUE:  Axial images of the head and coronal reformations without  IV contrast material. Radiation dose for this scan was  reduced using  automated exposure control, adjustment of the mA and/or kV according  to patient size, or iterative reconstruction technique.    COMPARISON: None.    FINDINGS: No intracranial hemorrhage, mass or mass effect.  No acute  infarct identified. No shift of midline structures. Mild cortical  atrophy. Mild patchy low-attenuation areas are present in white matter  of the cerebral hemispheres that are nonspecific but consistent with  chronic small vessel ischemic changes in this age patient.      Impression    IMPRESSION: No acute intracranial findings.    JESSY FREITAS MD   Chest XR,  PA & LAT    Narrative    XR CHEST 2 VIEWS   6/4/2018 12:23 AM     INDICATION: Tachycardia.    COMPARISON: 10/7/2016.      Impression    IMPRESSION: Enlarged cardiac silhouette without pulmonary edema. Small  pleural effusions, greater on the left, less prominent than on the  previous exam.    JESSY FREITAS MD   Chest CT, IV contrast only - PE protocol    Narrative    CT CHEST PULMONARY EMBOLISM WITH CONTRAST 6/4/2018 2:08 AM     HISTORY: Chest pain.     TECHNIQUE: Volumetric acquisition of the chest  after the  administration of 45 mL Isovue-370 IV contrast. Radiation dose for  this scan was reduced using automated exposure control, adjustment of  the mA and/or kV according to patient size, or iterative  reconstruction technique.     COMPARISON: Chest x-ray 6/4/2018.    FINDINGS: No visualized pulmonary embolism. Enlarged heart. Small  pericardial effusion. Coronary artery calcifications. Atheromatous  changes of the thoracic aorta with aneurysmal dilatation of the  ascending aorta measuring approximately 4.4 cm at the level of the  right pulmonary artery. Small bilateral pleural effusions, greater on  the left. Adjacent bibasilar atelectasis. Slight hazy airspace opacity  in the central portions of both lungs, which may be due to edema.  Areas of slight bronchiectasis,  most prominent in the right middle  lobe and right  lower lobe, with associated mucoid impactions. Small  bilobed nodular density in the right upper lung laterally (series 5,  image 49) measures approximately 0.4 x 0.6 cm, technically  indeterminate. A few additional tiny nodules in the lower lungs  probably relate to mucoid impactions.      Impression    IMPRESSION:  1. No visualized pulmonary embolism.  2. Enlarged heart, small pericardial effusion and small pleural  effusions, greater on the left. Hazy airspace opacities in the central  aspect of both lungs may be due to mild pulmonary edema.  3. Aneurysmal ascending aorta.  4. Areas of bronchiectasis and mucoid impactions.  5. Small indeterminate nodule of right upper lung. See below.    Recommendations for one or multiple incidental lung nodules < 6mm :    Low risk patients: No routine follow-up.    High risk patients: Optional follow-up CT at 12 months; if  unchanged, no further follow-up.    *Low Risk: Minimal or absent history of smoking or other known risk  factors.  *Nonsolid (ground glass) or partly solid nodules may require longer  follow-up to exclude indolent adenocarcinoma.  *Recommendations based on Guidelines for the Management of Incidental  Pulmonary Nodules Detected at CT: From the Fleischner Society 2017,  Radiology 2017.    JESSY FREITAS MD       CXR to my personal read:  Left pleural effusion, cardiomegaly

## 2018-06-04 NOTE — CONSULTS
Care Transition Initial Assessment - RN    Reason For Consult: care coordination/care conference, discharge planning   Met with: Patient.  DATA   Active Problems:    A-fib (H)       Cognitive Status: alert and oriented.  [facility] Care [facility]  Primary Care MD Name: Dr Urban  Contact information and PCP information verified: Yes  Lives With: alone  Living Arrangements: condominium  Quality Of Family Relationships: supportive, helpful, involved  Description of Support System: Supportive, Involved   Who is your support system?: Children   Support Assessment: Adequate family and caregiver support   Insurance concerns: No Insurance issues identified  ASSESSMENT  Patient currently receives the following services:  None  Met with pt at bedside to discuss plan of care. Pt states she lives alone in a condo. She does not have any home care or home oxygen. She has assistance with cleaning her floors. She is independent with activity and cares and still drives a car. She does her own shopping, cleaning, cooking, laundry and medication administration. She states she has a turntable with her medications in the order she needs to take them and she feels this is a good system for her. She has a walker available at home but does not currently use it. She follows with Dr Joshi for Rheumatology and Dr Bower for Cardiology. She has an appt with Sathish in June. She follows a low Na diet at home. She does not weigh herself daily. She does not feel she has needs for dc, feels she is managing well at home.        Identified issues/concerns regarding health management: none    PLAN  Patient/family is agreeable to the plan?  Yes  Patient anticipates discharging to home .        Patient anticipates needs for home equipment: No  Plan/Disposition: Home   Appointments: [facility] with Dr Urban on 6/12 at 1100.    Dr Bower at [facility] on 6/20 at 945    Care  (CTS) will continue to follow as  needed. SW updated. Will cont to follow for dc plan of care. Handoff will be sent via [email or web address] to cts for PCP on dc.    Velia Landry RN CTS  Care Transitions  [phone]

## 2018-06-04 NOTE — ED NOTES
[facility]  ED Nurse Handoff Report    Ana Cristina Dominguez is a 86 year old female   ED Chief complaint: Chest pain    . ED Diagnosis:   Final diagnoses:   Paroxysmal atrial fibrillation (H)     Allergies:   Allergies   Allergen Reactions     Amoxicillin Rash     Codeine GI Disturbance     Stomach pain     Melatonin Nausea     Eyesburned       Code Status: Not on file  Activity level - Baseline/Home:  Independent. Activity Level - Current:   Stand with Assist. Lift room needed: No. Bariatric: No   Needed: No   Isolation: No. Infection: Not Applicable.     Vital Signs:   Vitals:    06/03/18 2301 06/03/18 2304 06/03/18 2315 06/03/18 2330   BP: 124/77 108/77 95/66 97/74   Resp: 20      Temp: 99.1  F (37.3  C)      TempSrc: Oral      SpO2: 94% 100% 92% 97%   Weight: 40.8 kg (90 lb)      Height: 1.524 m (5')          Cardiac Rhythm:  ,      Pain level:    Patient confused: Yes. Patient Falls Risk: Yes.   Elimination Status: Has not yet voided   Patient Report - Initial Complaint: Chest Pain. Focused Assessment: Pt presented to ED with chest pain that she believed to be due to hurting her ribs.  She was unsure how she hurt her ribs, but thought it could have happened by holding her steering wheel too tight.  She has chronic afib and states she thinks she has forgotten to take her medications lately.  Pt looks short of breath with tachypnea. Denies shortness of breath but states it hurts when she takes a deep breath.  Pt has been in afib with RVR since arrival.  Tests Performed:   Labs Ordered and Resulted from Time of ED Arrival Up to the Time of Departure from the ED   CBC WITH PLATELETS DIFFERENTIAL - Abnormal; Notable for the following:        Result Value    WBC 11.3 (*)     RDW 16.5 (*)     Absolute Neutrophil 8.9 (*)     Absolute Monocytes 1.4 (*)     All other components within normal limits   COMPREHENSIVE METABOLIC PANEL - Abnormal; Notable for the following:     Glucose 130 (*)     Urea  Nitrogen 31 (*)     Creatinine 1.08 (*)     GFR Estimate 48 (*)     GFR Estimate If Black 58 (*)     Albumin 3.3 (*)     Alkaline Phosphatase 166 (*)     All other components within normal limits   NT PROBNP INPATIENT - Abnormal; Notable for the following:     N-Terminal Pro BNP Inpatient 9764 (*)     All other components within normal limits   TROPONIN I   PERIPHERAL IV CATHETER      CT Head w/o Contrast    (Results Pending)   Chest XR,  PA & LAT    (Results Pending)         Treatments provided: See MAR  Family Comments:  at bedside, believes that pt is more confused than usual  OBS brochure/video discussed/provided to patient:  N/A  ED Medications:   Medications   diltiazem (CARDIZEM) 125 mg in sodium chloride 0.9 % 125 mL infusion (5 mg/hr Intravenous Rate/Dose Change 6/4/18 0036)   0.9% sodium chloride BOLUS (500 mLs Intravenous New Bag 6/4/18 0119)   diltiazem (CARDIZEM) injection 15 mg (15 mg Intravenous Given 6/3/18 2312)   furosemide (LASIX) injection 40 mg (40 mg Intravenous Given 6/4/18 0030)   diltiazem (CARDIZEM) injection 10 mg (10 mg Intravenous Given 6/4/18 0117)     Drips infusing:  Yes - cardizem at 2.5 mg/hour  For the majority of the shift, the patient's behavior Green. Interventions performed were Rounding.     Severe Sepsis OR Septic Shock Diagnosis Present: No      ED Nurse Name/Phone Number: Eva Marinelli,   11:50 PM    RECEIVING UNIT ED HANDOFF REVIEW    Above ED Nurse Handoff Report was reviewed: Yes  Reviewed by: Mary Anne Manzanares on June 4, 2018 at 12:33 AM

## 2018-06-04 NOTE — PROVIDER NOTIFICATION
MD escobedo - Pt  had 10 beats of Vtach  @ 0540 vss BP's 105/66 and 115/72, HR remains  1120-130's, Dilt gtt infusing 5 ml/hr NO C/O CP,

## 2018-06-04 NOTE — PHARMACY-ADMISSION MEDICATION HISTORY
Admission medication history interview status for this patient is complete. See [facility] admission navigator for allergy information, prior to admission medications and immunization status.     Medication history interview source(s):Patient  Medication history resources (including written lists, pill bottles, clinic record):Epic list, recent fill history  Primary pharmacy:[facility] on [address] in [place]    Changes made to PTA medication list:  Added: None  Deleted: Duplicate Preservision  Changed: Ropinirole (1.5 qhs --> 2 qhs)    Actions taken by pharmacist (provider contacted, etc):None     Additional medication history information:Patient was unable to take any of her medications yesterday    Medication reconciliation/reorder completed by provider prior to medication history? Yes    Do you take OTC medications (eg tylenol, ibuprofen, fish oil, eye/ear drops, etc)? Y(Y/N)    For patients on insulin therapy: N (Y/N)    Prior to Admission medications    Medication Sig Last Dose Taking? Auth Provider   acetaminophen (TYLENOL) 325 MG tablet Take 325-650 mg by mouth every 4 hours as needed for mild pain Past Month at Unknown time Yes Unknown, Entered By History   ascorbic acid (VITAMIN C) 500 MG tablet Take 500 mg by mouth daily 6/2/2018 at am Yes Unknown, Entered By History   bumetanide (BUMEX) 1 MG tablet Take 2 tablets by mouth in the morning and take one tablet by mouth in the evening 6/2/2018 at pm Yes Unknown, Entered By History   Calcium Carb-Cholecalciferol (CALCIUM-VITAMIN D) 500-400 MG-UNIT TABS Take 2 tablets by mouth every morning 6/2/2018 at am Yes Unknown, Entered By History   diltiazem (CARDIZEM SR) 120 MG CP12 12 hr SR capsule TAKE ONE CAPSULE BY MOUTH TWICE A DAY 6/2/2018 at pm Yes Alexsandra Davison MD   metoprolol succinate (TOPROL-XL) 50 MG 24 hr tablet Take 2 tablets (100 mg) by mouth daily 6/2/2018 at am Yes Dez Urban MD   Multiple Vitamins-Minerals (MULTIVITAMIN PO) Take 1 tablet by mouth every  morning 6/2/2018 at am Yes Unknown, Entered By History   Multiple Vitamins-Minerals (PRESERVISION AREDS PO) Take 1 capsule by mouth 2 times daily  6/2/2018 at pm Yes Reported, Patient   olopatadine (PATANOL) 0.1 % ophthalmic solution Place 1 drop into both eyes 2 times daily as needed for allergies During Spring Past Month at Unknown time Yes Unknown, Entered By History   rOPINIRole (REQUIP) 1 MG tablet Take 2 mg by mouth At Bedtime 6/2/2018 at hs Yes Unknown, Entered By History   vitamin E 400 UNIT capsule Take 400 Units by mouth daily 6/2/2018 at am Yes Unknown, Entered By History   XARELTO 20 MG TABS tablet TAKE 1 TABLET (20 MG) BY MOUTH DAILY (WITH DINNER) 6/2/2018 at pm Yes Dez Urban MD

## 2018-06-04 NOTE — PLAN OF CARE
Problem: Patient Care Overview  Goal: Plan of Care/Patient Progress Review  Patient arrived on unit around 0200 from ED with Son. Vitals upon arrival to unit: /63 (BP Location: Left arm)  Pulse 112  Temp 98.7  F (37.1  C) (Oral)  Resp 18  Ht 1.524 m (5')  Wt 39.6 kg (87 lb 3.2 oz)  SpO2 97%  BMI 17.03 kg/m2. Oxygen saturation 97% on RA. Alert and oriented X4. Oriented to room and call light. Patient verbalized understanding. Orders received. Continue to monitor and notify MD with changes and concerns.
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
## 2018-06-04 NOTE — PLAN OF CARE
Problem: Patient Care Overview  Goal: Plan of Care/Patient Progress Review  Outcome: Improving  HR continues to be Afib 90's-1teens, on Diltiazem gtt. Resumed metoprolol and po diltiazem this morning. Wean dilt gtt as able. On Xarelto, restarted PO Bumex.  A&Ox3. Up with SBA. C/o pain when coughing,yawning and taking a deep breath. Gave Tylenol. Elevated BNP on admission. Hx CHF EF 35-40%. SBP 87-mid 90's,  HR continues sustain  90's-1teens Md aware, changed parameters  decreased SBP 90 on Dilt gtt. HR improving later afternoon around 1415 maintaining BP HR 70's-90's after ambulating in hallway to desk and back.

## 2018-06-04 NOTE — PROVIDER NOTIFICATION
06/04/18 0642   Significant Event   Significant Event Other (see comments)  (lactic acid: 2.2)   RN notified

## 2018-06-04 NOTE — ED NOTES
"Pt called RN. Stated, \"My heart feels chowdhury. Are you pumping anything in?\" Let pt know that nothing was going in. Asked pt if chest pressure was getting worse. Pt thought about it for a minute and then stated \"yes.\" MD updated.  "

## 2018-06-05 NOTE — PROVIDER NOTIFICATION
MD Paged - Pt HR remain in 120's - 130's.  Dilt gtt stopped at 1830, 50mg metoprolol given @ 1700, 120mg Cardizem given @ 2100    0125  - 60 mg Cardizem q4h ordered per

## 2018-06-05 NOTE — PROVIDER NOTIFICATION
MD notified of pt HR increasing to and sustaining in the 120's, pt is lying in bed, and is asymptomatic. Dilt gtt stopped at 1815. Metoprolol and po dilt given this sabrina.

## 2018-06-05 NOTE — PROGRESS NOTES
Pt seen and examined.  feels well.  Wants to go home.  HR adequately controlled at  bpm.  No SOB.  Sons present at bedside.  D/w all f/u CT scan in 12 mos re: pulm nodules and aortic aneurysm.  I provided CT scan report to patient as well    Home today on increased dose of Toprol

## 2018-06-05 NOTE — PLAN OF CARE
Problem: Patient Care Overview  Goal: Plan of Care/Patient Progress Review  Outcome: Adequate for Discharge Date Met: 06/05/18  Reviewed discharge instructions with patient and sons. Family and pt verbalizes understanding. All questions answered. Pt to d/c home w/ sons.

## 2018-06-05 NOTE — PLAN OF CARE
Pt had 7 beats of Vtach - VS : /54  Pulse 93  Temp 97.1  F (36.2  C) (Oral)  Resp 20  Ht 1.524 m (5')  Wt 39.6 kg (87 lb 3.2 oz)  SpO2 94%  BMI 17.03 kg/m2. Oxygen saturation 94% on 1 LPM. NC. Wakes to voice but sleepy, no c/o of chest pain.  Pt sleeping in recliner.

## 2018-06-05 NOTE — PLAN OF CARE
Problem: Cardiac Output Decreased (Adult)  Goal: Identify Related Risk Factors and Signs and Symptoms  Related risk factors and signs and symptoms are identified upon initiation of Human Response Clinical Practice Guideline (CPG).   Outcome: Improving  VSS, afeb., LS are clear, 93% RA. Pt up w sba, ambulated halls x3 this shift.Pt denies pain. Appetite is fair, ate 50% for dinner. Dilt gtt stopped at 1815, HR has remained at or around 100, up into the 120's while ambulating. An additional dose of Metoprolol was added in the evening. Tele is Afib, mostly controlled.

## 2018-06-05 NOTE — PLAN OF CARE
Problem: Patient Care Overview  Goal: Plan of Care/Patient Progress Review  Tele A-fib with heart rate  most of shift. Up for meals in chair. Continent of B and B. To toilet with SBA and walker. Temp max 99.5. 1+ lower ext edema. O2 1 liter N/C with sat 93%,

## 2018-06-05 NOTE — PLAN OF CARE
Problem: Patient Care Overview  Goal: Plan of Care/Patient Progress Review  Outcome: No Change  Orientation: Alert and oriented x4. Awake, alert, cooperative, frustrated, agitated but very sleepy    VSS: /61  Pulse 115  Temp 97.1  F (36.2  C) (Oral)  Resp 22  Ht 1.524 m (5')  Wt 39.6 kg (87 lb 3.2 oz)  SpO2 94%  BMI 17.03 kg/m2. Oxygen saturation 94% on 1 LPM. NC. IVF: SL  Tele: Afib RVR, Pt HR remained in 120's beginning of shift, 60mg cardizem PRN q4h added, HR done to the 100-110's  LS: Clear to auscultation bilaterally but diminished throughout,  No wheezing, Pt coughing this shift, O2 sats 88-89% on RA, NC placed w/1LPM, sats improved 92%  GI: Passing gas. No BM. Denies N/V.  Normal bowel sounds, soft, non-distended, non-tender  : Adequate urine output. Clear yellow.   Skin:WDL/ intact. No rashes, no cyanosis, dry to touch  Activity: SBA to assist of 1 w/walker to bathroom and chair. Pt slept restless off and on, frustrated with restless legs, switching positions often, moved to chair, very sleepy and anxious about not feeling well and not sleeping. Ice packs applied to Left leg and legs elevated on pillows to help with muscle spasms in legs. Seems weaker and more forgetful tonight.   Pain: No c/o pain. No PRN interventions utilized. Just very uncomfortable with muscle spasms in legs  DC Plan: Continue with current cares.

## 2018-06-05 NOTE — DISCHARGE SUMMARY
Admit Date:     06/03/2018   Discharge Date:     06/05/2018      DISCHARGE DIAGNOSES:   1.  Atrial fibrillation with rapid ventricular response.   2.  Incidental finding of an ascending aortic aneurysm and pulmonary nodule on CT imaging this admission.  Recommend followup CT scan in 12 months to reassess.   3.  Acute on chronic systolic heart failure exacerbation this admission secondary to rapid atrial fibrillation, resolved with diuretic and rate control.      PAST MEDICAL HISTORY:   1.  History of atrial fibrillation, maintain Xarelto for anticoagulation.   2.  History of chronic systolic heart failure with ejection fraction near 40% on recent nuclear medicine stress test.   3.  Nuclear medicine stress test performed 05/2018 showing no evidence of ischemia or infarction with ejection fraction 39%.   4.  Previous echocardiogram 03/2018 showing mild concentric left ventricular hypertrophy with ejection fraction of 35% to 40%, moderate global hypokinesia of the left ventricle.  She is also noted to have moderate to severe mitral regurgitation and moderately severe tricuspid regurgitation, and moderate to moderately severe aortic regurgitation.   5.  Hypertension history.   6.  Non-ischemic cardiomyopathy.   7.  History of polymyalgia rheumatica.   8.  Restless leg syndrome.      PRINCIPAL PROCEDURES THIS ADMISSION:   1.  Telemetry monitoring.   2.  Chest CT scan showing incidental finding of 4.4 cm ascending aortic aneurysm and a small pulmonary nodule.  Recommend followup CT scan at 12 months.   3.  Chest x-ray.   4.  Head CT scan.   5.  Telemetry monitoring.      REASON FOR ADMISSION:  Please see dictated history and physical by Dr. Cohn.  In brief, Ms. Dominguez is an 86-year-old female with the above medical history who presented to the hospital with concerns about chest pressure.  The patient was found to be in rapid atrial fibrillation on presentation and was admitted to the Hospitalist Service.      [facility]  COURSE:   1.  Rapid atrial fibrillation:  Symptoms improved while hospitalized.  She was normally on Toprol-XL at home along with oral diltiazem.  I did increase her dose of Toprol with improvement of her heart rate control.  By the day of discharge, her heart rates were in the  beats per minute range.  She tolerated the increase in Toprol well.  Appears stable for discharge from the hospital today.  She is normally on Xarelto for anticoagulation.   2.  Incidental findings:  Pulmonary nodule and ascending aortic aneurysm noted on CT imaging.  Recommend followup CT scan with Dr. Urban in clinic if deemed appropriate.  Findings discussed with patient and family.      The patient is being discharged home today.      DISCHARGE MEDICATIONS:   1.  Toprol- mg every morning and 50 mg every evening.  Dose increase from previous, which was 100 mg daily.   2.  Acetaminophen.   3.  Ascorbic acid.   4.  Bumex 2 mg every morning and 1 mg every evening.   5.  Calcium with vitamin D.   6.  Diltiazem 120 mg twice a day.   7.  Patanol eyedrops.   8.  Multivitamin.   9.  Requip 2 mg at bedtime.   10.  Vitamin E.   11.  Xarelto 20 mg daily.      DISCHARGE INSTRUCTIONS:   1.  See Dr. Urban in 5-7 days for followup after her hospitalization.   2.  Consider followup chest CT scan in 12 months to follow up on lung nodules and aortic aneurysm as discussed this admission.   3.  Follow up with Dr. Bower of cardiologist as previously scheduled later this month.      I saw and examined the patient on day of discharge.      I spent greater than 30 minutes discharging the patient from the hospital on day of discharge.         DESIREE GALAVIZ MD             D: 2018   T: 2018   MT: LIAM      Name:     FELIZ HAMPTON   MRN:      [id number]        Account:        [id number]   :      [id number]           Admit Date:     2018                                  Discharge Date: 2018      Document:  [id number]       cc: Dez Urban MD

## 2018-06-06 NOTE — LETTER
[place] CARE COORDINATION  [address]  [facility] [address]    June 6, 2018    Ana Cristina Dominguez  [address]  [place] [facility] [address]      Dear Ana Cristina,    I am a clinic care coordinator who works with Dez Urban MD, MD at [facility]. I wanted to introduce myself and provide you with my contact information for you to be able to call me with any questions or concerns. I wanted to introduce myself and provide you with my contact information so that you can call me with questions or concerns about your health care. Below is a description of clinic care coordination and how I can further assist you.     The clinic care coordinator is a registered nurse and/or  who understand the health care system. The goal of clinic care coordination is to help you manage your health and improve access to the [facility] system in the most efficient manner. The registered nurse can assist you in meeting your health care goals by providing education, coordinating services, and strengthening the communication among your providers. The  can assist you with financial, behavioral, psychosocial, chemical dependency, counseling, and/or psychiatric resources.    Please feel free to contact me at [phone], with any questions or concerns. We at [facility] are focused on providing you with the highest-quality healthcare experience possible and that all starts with you.     Sincerely,     Zina Virk RN-BSN   Care Coordination  Phone:  [phone]  Email: [email or web address]    Enclosed: I have enclosed a copy of a 24 Hour Access Plan. This has helpful phone numbers for you to call when needed. Please keep this in an easy to access place to use as needed.

## 2018-06-06 NOTE — PROGRESS NOTES
Clinic Care Coordination Contact  Care Coordination Communication    Referral Source: IP Handoff    Clinical Data: Patient was hospitalized at [facility]  from 6/3/18 to 6/5/18 with diagnosis of AFIB RVR/CHF.     Home Care Contact:              Home Care Agency: [facility] - RN              Contact name () and phone number:  TAMERA not yet assigned [phone]              Care Coordination contacted home care: Yes              Anticipated start of care date: 6/7/18    Patient Contact:               Introduced self and role of care coordination.               Discharge instructions were reviewed with patient/caregiver.               Do you have any questions about your medications? No              Follow up appointment is scheduled for 6/12/18.  Follow up with Cards on 6/20/18 at 0945.              Home care has contacted patient: Not yet              Patient questions/concerns: None    Plan: RN/SW Care Coordinator will await notification from home care staff informing RN/SW Care Coordinator of patients discharge plans/needs. RN/SW Care Coordinator will review chart and outreach to home care every 3 weeks and as needed.      Zina Virk RN-BSN   Care Coordination  Phone:  [phone]  Email: [email or web address]

## 2018-06-06 NOTE — LETTER
Health Care Home - Access Care Plan    About Me  Patient Name:  Ana Cristina Hampton    YOB: 1931  Age:                             87 year old   [place] MRN:            [id number] Telephone Information:     Home Phone [phone]   Mobile [phone]       Address:    [address]  [facility] [address] Email address:  [email or web address]      Emergency Contact(s)  Name Relationship Lgl Grd Work Phone Home Phone Mobile Phone   1. DEBBIE HAMPTON Son   [phone]   2. LUIS HAMPTON Son   [phone]             Health Maintenance: Routine Health maintenance Reviewed: Due/Overdue     My Access Plan  Medical Emergency 911   Questions or concerns during clinic hours Primary Clinic Line, I will call the clinic directly: [facility] - [phone]   24 Hour Appointment Line [phone] or  [phone] [place] [phone]) (toll free)   24 Hour Nurse Line [phone] (toll free)   Questions or concerns outside clinic hours 24 Hour Appointment Line, I will call the after-hours on-call line:   [facility] [phone] or [phone]) (toll-free)   Preferred Urgent Care     Preferred Hospital [facility]  [phone]   Preferred Pharmacy [id number] - [place], [place] - [address]     Behavioral Health Crisis Line The National Suicide Prevention Lifeline at 1-755.452.1669 or 916     My Care Team Members  Patient Care Team       Relationship Specialty Notifications Start End    Dez Urban MD PCP - General Internal Medicine  6/23/17     Phone: [phone] Pager: [phone] Fax: [phone]        [address] [facility] [address]    [facility]   HOME HEALTH AGENCY ([facility]), ([place])  6/6/18     Phone: [phone]         Zina Virk RN Clinic Care Coordinator Primary Care - CC  6/6/18     Phone: [phone] Fax: [phone]               My  Medical and Care Information  Problem List   Patient Active Problem List   Diagnosis     Fracture, pelvis closed (H)     HYPERLIPIDEMIA LDL GOAL <160     Achilles bursitis or tendinitis     Senile osteoporosis     RLS (restless legs syndrome)     Abnormal blood sugar     Atrial fibrillation (H)     HTN (hypertension)     Diarrhea     Edema, unspecified type     Pain in both knees, unspecified chronicity     Anemia     Normochromic normocytic anemia     PMR (polymyalgia rheumatica) (H)     MGUS (monoclonal gammopathy of unknown significance)     Chronic diastolic congestive heart failure (H)     A-fib (H)      Current Medications and Allergies:  See printed Medication Report

## 2018-06-07 NOTE — TELEPHONE ENCOUNTER
[facility] called. Stated pt just had assessement and she does not qualify for home care. Pt is safe in the home and is not homebound.

## 2018-06-08 NOTE — PROGRESS NOTES
"Clinic Care Coordination Contact  Care Team Conversations    [facility] notified by care team pt will not be admitted to home care services.  Patient does not qualify as she is not home bound.    RN CC outreach to patient.  Patient states she is doing fine.  She lives alone and is independent with all ADLs. She manages own medications.  Patient has \"a lot of friends\" who visit almost daily.  Patient did request some information regarding transportation.  She explains that she doesn't have transportation issues now, but would like to plan ahead incase it does become an issue.  RN CC will mail information on transport services.  Will also send info on Life alert services.  Patient denied any other needs at this time.  Decline any further care coordination outreaches.      Zina Virk RN-BSN   Care Coordination  Phone:  [phone]  Email: [email or web address]  [facility]      "

## 2018-06-12 NOTE — PATIENT INSTRUCTIONS
There is a chance that your restless legs symptoms will improve with a LOWER dose of ropinirole.   To try this out, begin taking ONE of the 1 mg tablets at bedtime, for a week or two. If still noting difficulty, try taking ONE-HALF of the 1 mg tablets at bedtime.   Make note of whether symptoms are worse, better, or about the same when you make dose changes.     If you still have problems with the restless legs on lower doses of ropinirole (Requip), Dr Urban will try to discuss with a neurologist what they might recommend to try next.     Consider Mylicon for abdominal gas.     Talk with Dr Bower on 6/20 about the optimal medication regimen for your heart (now that we know about reduced pump function and leaking of aortic/mitral valves).     We don't need any more CT scans for at least a year.

## 2018-06-12 NOTE — NURSING NOTE
Vital signs:  Temp: 98  F (36.7  C) Temp src: Oral BP: 110/70 Pulse: 98   Resp: 16 SpO2: 93 %     Height: 5' (152.4 cm) Weight: 89 lb 12.8 oz (40.7 kg)  Estimated body mass index is 17.54 kg/(m^2) as calculated from the following:    Height as of this encounter: 5' (1.524 m).    Weight as of this encounter: 89 lb 12.8 oz (40.7 kg).        She is here for a hospital follow up.

## 2018-06-12 NOTE — PROGRESS NOTES
SUBJECTIVE:   Ana Cristina Dominguez is a 87 year old female who presents to clinic today for the following health issues:    Patient present with son, Marcio.     6577 --- 1208  41 minutes were spent with the patient face to face today, over 50% of which was devoted to health counseling and education regarding these issues.     Hospital Follow-up Visit:    Hospital/Nursing Home/IP Rehab Facility: [facility]  Date of Admission: 6/3/18  Date of Discharge: 6/5/18  Reason(s) for Admission: A. Fib            Problems taking medications regularly:  None       Medication changes since discharge: Added Metoprolol 350 mg daily.       Problems adhering to non-medication therapy:  None    Summary of hospitalization:  [facility] discharge summary reviewed  Diagnostic Tests/Treatments reviewed.  Follow up needed: CT for incidental finding of pulmonary nodule, if appropriate    Other Healthcare Providers Involved in Patient s Care:         Specialist appointment - Dr. Bower, cardiology   Update since discharge: improved.  Additional issues: restless legs     Post Discharge Medication Reconciliation: discharge medications reconciled, continue medications without change.  Plan of care communicated with patient and family     Coding guidelines for this visit:  Type of Medical   Decision Making Face-to-Face Visit       within 7 Days of discharge Face-to-Face Visit        within 14 days of discharge   Moderate Complexity [id number]   High Complexity [id number]        Ana Cristina Dominguez is an 87 y.o female who was recently hospitalized for atrial fibrillation from 6/3 - 6/5. Patient presented to ED with chest pressure. Admitted due to rapid atrial fibrillation. In hospital, symptoms improved. Increased toprol-XL dose, which was tolerated well. Other medications remain unchanged. Of note, an incidental finding of pulmonary nodule and ascending aortic aneurysm noted on CT. Follow CT scan advised by discharging  "provider.       Patient has not had any setbacks since hospital discharge. Reviewed previously cariology workup. Dr. Bower office visit on 3/20. Echo completed 3/21. Discussed Echo results in comparison to previous study--new study showing reduction in LV systolic performance, and worsening in severity of previous mitral and aortic valve regurgitation. Patient reports she is very happy with her heart and is not interested in surgery. Leg swelling has improved. Dyspnea is worse than a few months ago. She is able to complete normal activities if she moves slowly. Patient has an appointment next week with Dr. Bower. Reviewed CT scan 6/4/2018.      Restless legs  Patient reports the restless legs are biggest concern. Symptoms have significantly worsened -- starting earlier in the day. Describes symptoms as burning and convulsing feeling. She tries to get up and be active when symptoms are unbearable. Son notes patient is getting 2-3 hours of sleep. Patient is taking Requip for symptoms. Dose was recently increased from 1 mg to 2 mg daily. Current dose does not improve symptoms. She tried taking 3 mg daily, which did not agree with her. She was not able to function and felt like a zombie the next day. We discussed the phenomenon of \"augmentation\" of symptoms sometimes seen with increasing doses of dopamine agonists for restless leg syndrome.   Patient has not tried gabapentin or Mirapex for symptom management.    Bloating  Patient relates bloating and gas symptoms. She has never struggled with this symptom in the past. The bloating causes abdominal discomfort.    Cough  Patient notes a cough that started recently. Cough is productive of green phlegm. She is not interested in treating this at this time. No dyspnea at rest, no fever or chills. No recent URI symptoms.     Problem list and histories reviewed & adjusted, as indicated.  Additional history: as documented      Reviewed and updated as needed this visit by " clinical staff  Tobacco  Allergies  Meds  Med Hx  Surg Hx  Fam Hx  Soc Hx      Reviewed and updated as needed this visit by Provider         ROS:  POSITIVE for bloating, restless legs, dyspnea worse than a few months ago, productive cough     No chest discomfort, dizziness or palpitations. No diarrhea, abdominal pain or rectal bleeding.   No acute problems with vision or speech, lateralizing weakness or paresthesias.    ROS: as above or negative for Respiratory, CV, GI, endocrine, neuro systems.      This document serves as a record of the services and decisions personally performed and made by Dez Urban MD. It was created on his behalf by Petra Medellin, a trained medical scribe. The creation of this document is based on the provider's statements to the medical scribe.  Petra Medellin June 12, 2018 11:37 AM    .  OBJECTIVE:   /70 (BP Location: Right arm, Patient Position: Sitting, Cuff Size: Adult Regular)  Pulse 98  Temp 98  F (36.7  C) (Oral)  Resp 16  Ht 1.524 m (5')  Wt 40.7 kg (89 lb 12.8 oz)  SpO2 93%  BMI 17.54 kg/m2  Body mass index is 17.54 kg/(m^2).     GENERAL: healthy, alert and no distress  RESP: fine inspiratory crackles in extreme right posterior base  CV: regular rate and rhythm, normal S1 S2, no S3 or S4, no murmur, click or rub, no peripheral edema and peripheral pulses strong  MS: no gross musculoskeletal defects noted, no edema  SKIN: no suspicious lesions or rashes  NEURO: Normal strength and tone, mentation intact and speech normal  PSYCH: mentation appears normal, affect normal/bright    ASSESSMENT/PLAN:   1127 --- 1208  41 minutes were spent with the patient face to face today, over 50% of which was devoted to health counseling and education regarding these issues.     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 Lower extremity edema improved. Follow up with cardiologist, Dr. Bower, as planned. Encouraged patient to discuss optimal medication management.      (G25.81) Restless leg syndrome  Comment: Symptoms worsening. Increasing ropinirole dose provided no improvement. Recommended trying lower dose. See patient instructions.        Patient Instructions   There is a chance that your restless legs symptoms will improve with a LOWER dose of ropinirole.   To try this out, begin taking ONE of the 1 mg tablets at bedtime, for a week or two. If still noting difficulty, try taking ONE-HALF of the 1 mg tablets at bedtime.   Make note of whether symptoms are worse, better, or about the same when you make dose changes.     If you still have problems with the restless legs on lower doses of ropinirole (Requip), Dr Urban will try to discuss with a neurologist what they might recommend to try next.     Consider Mylicon for abdominal gas.     Talk with Dr Bower on 6/20 about the optimal medication regimen for your heart (now that we know about reduced pump function and leaking of aortic/mitral valves).     We don't need any more CT scans for at least a year.           The information in this document, created by the medical scribe for me, accurately reflects the services I personally performed and the decisions made by me. I have reviewed and approved this document for accuracy prior to leaving the patient care area.  June 12, 2018 11:27 AM    Dez Urban MD  [facility]

## 2018-06-12 NOTE — MR AVS SNAPSHOT
After Visit Summary   6/12/2018    Ana Cristina Dominguez    MRN: [id number]           Patient Information     Date Of Birth          6/6/1931        Visit Information        Provider Department      6/12/2018 11:00 AM Dez Urban MD [facility]        Today's Diagnoses     Atrial fibrillation with rapid ventricular response (H)    -  1    Chronic atrial fibrillation (H)        Acute on chronic combined systolic and diastolic heart failure (H)        Chronic diastolic congestive heart failure (H)        Restless leg syndrome          Care Instructions    There is a chance that your restless legs symptoms will improve with a LOWER dose of ropinirole.   To try this out, begin taking ONE of the 1 mg tablets at bedtime, for a week or two. If still noting difficulty, try taking ONE-HALF of the 1 mg tablets at bedtime.   Make note of whether symptoms are worse, better, or about the same when you make dose changes.     If you still have problems with the restless legs on lower doses of ropinirole (Requip), Dr Urban will try to discuss with a neurologist what they might recommend to try next.     Consider Mylicon for abdominal gas.     Talk with Dr Bower on 6/20 about the optimal medication regimen for your heart (now that we know about reduced pump function and leaking of aortic/mitral valves).     We don't need any more CT scans for at least a year.               Follow-ups after your visit        Your next 10 appointments already scheduled     Jun 20, 2018  9:45 AM CDT   Return Visit with Fabiano Bower MD   [facility] ([facility])    [address] [address]   [phone]              Who to contact     If you have questions or need follow up information about today's clinic visit or your schedule please contact [facility] directly at [phone].  Normal or non-critical  lab and imaging results will be communicated to you by MyChart, letter or phone within 4 business days after the clinic has received the results. If you do not hear from us within 7 days, please contact the clinic through [email or web address] or phone. If you have a critical or abnormal lab result, we will notify you by phone as soon as possible.  Submit refill requests through [email or web address] or call your pharmacy and they will forward the refill request to us. Please allow 3 business days for your refill to be completed.          Additional Information About Your Visit        [facility] Information     [email or web address] gives you secure access to your electronic health record. If you see a primary care provider, you can also send messages to your care team and make appointments. If you have questions, please call your primary care clinic.  If you do not have a primary care provider, please call [phone] and they will assist you.        Care EveryWhere ID     This is your Care EveryWhere ID. This could be used by other organizations to access your [place] medical records  [id number]        Your Vitals Were     Pulse Temperature Respirations Height Pulse Oximetry BMI (Body Mass Index)    98 98  F (36.7  C) (Oral) 16 5' (1.524 m) 93% 17.54 kg/m2       Blood Pressure from Last 3 Encounters:   06/12/18 110/70   06/05/18 117/71   05/21/18 112/72    Weight from Last 3 Encounters:   06/12/18 89 lb 12.8 oz (40.7 kg)   06/05/18 88 lb 9.6 oz (40.2 kg)   05/21/18 90 lb (40.8 kg)              Today, you had the following     No orders found for display       Primary Care Provider Office Phone # Fax #    Dez Urban [phone]       [address] [facility] [address]  [facility] [address]        Equal Access to Services     KRYSTLE ESPINOSA : Hadii shanelle Joe, waaxda leannqadaha, qaybta kaalmada nessa, rinku obrien. So [facility] [phone].    ATENCIÓN: Si habla español, tiene a dominguez disposición servicios  jennifer de asistencia lingüística. Fabian caldwell [phone].    We comply with applicable federal civil rights laws and [place] laws. We do not discriminate on the basis of race, color, national origin, age, disability, sex, sexual orientation, or gender identity.            Thank you!     Thank you for choosing [facility]  for your care. Our goal is always to provide you with excellent care. Hearing back from our patients is one way we can continue to improve our services. Please take a few minutes to complete the written survey that you may receive in the mail after your visit with us. Thank you!             Your Updated Medication List - Protect others around you: Learn how to safely use, store and throw away your medicines at www.disposemymeds.org.          This list is accurate as of 6/12/18 12:06 PM.  Always use your most recent med list.                   Brand Name Dispense Instructions for use Diagnosis    acetaminophen 325 MG tablet    TYLENOL     Take 325-650 mg by mouth every 4 hours as needed for mild pain        ascorbic acid 500 MG tablet    VITAMIN C     Take 500 mg by mouth daily        * bumetanide 1 MG tablet    BUMEX     Take 2 mg by mouth every morning        * bumetanide 1 MG tablet    BUMEX     Take 1 mg by mouth every evening        Calcium-Vitamin D 500-400 MG-UNIT Tabs      Take 2 tablets by mouth every morning        diltiazem 120 MG Cp12 12 hr SR capsule    CARDIZEM SR    180 capsule    TAKE ONE CAPSULE BY MOUTH TWICE A DAY    Chronic atrial fibrillation (H)       metoprolol succinate 50 MG 24 hr tablet    TOPROL-XL    90 tablet    Take 2 tablets (100 mg) every morning and 1 tablet (50 mg) every evening.    Essential hypertension, Chronic atrial fibrillation (H)       olopatadine 0.1 % ophthalmic solution    PATANOL     Place 1 drop into both eyes 2 times daily as needed for allergies During Spring        * PRESERVISION AREDS PO      Take 1 capsule by mouth 2 times daily         * MULTIVITAMIN PO      Take 1 tablet by mouth every morning        rOPINIRole 1 MG tablet    REQUIP     Take 2 mg by mouth At Bedtime        vitamin E 400 UNIT capsule      Take 400 Units by mouth daily        XARELTO 20 MG Tabs tablet   Generic drug:  rivaroxaban ANTICOAGULANT     30 tablet    TAKE 1 TABLET (20 MG) BY MOUTH DAILY (WITH DINNER)    Chronic atrial fibrillation (H)       * Notice:  This list has 4 medication(s) that are the same as other medications prescribed for you. Read the directions carefully, and ask your doctor or other care provider to review them with you.

## 2018-06-20 NOTE — MR AVS SNAPSHOT
After Visit Summary   6/20/2018    Ana Cristina Dominguez    MRN: [id number]           Patient Information     Date Of Birth          6/6/1931        Visit Information        Provider Department      6/20/2018 9:45 AM Fabiano Bower MD [facility]        Today's Diagnoses     Chronic systolic congestive heart failure (H)    -  1    Localized edema        Edema, unspecified type        Essential hypertension        Chronic atrial fibrillation (H)           Follow-ups after your visit        Additional Services     Follow-Up with [facility]                 Future tests that were ordered for you today     Open Future Orders        Priority Expected Expires Ordered    Follow-Up with [facility] Routine 6/27/2018 6/20/2019 6/20/2018    X-ray Chest 2 vws* Routine 6/20/2018 6/20/2019 6/20/2018    N terminal pro BNP outpatient Routine 6/20/2018 6/20/2019 6/20/2018    Holter Monitor 24 hour - Adult Routine 6/27/2018 6/20/2019 6/20/2018    Basic metabolic panel Routine 6/27/2018 6/20/2019 6/20/2018            Who to contact     If you have questions or need follow up information about today's clinic visit or your schedule please contact [facility] directly at [phone].  Normal or non-critical lab and imaging results will be communicated to you by [employer], letter or phone within 4 business days after the clinic has received the results. If you do not hear from us within 7 days, please contact the clinic through [employer] or phone. If you have a critical or abnormal lab result, we will notify you by phone as soon as possible.  Submit refill requests through [employer] or call your pharmacy and they will forward the refill request to us. Please allow 3 business days for your refill to be completed.          Additional Information About Your Visit        ecoVenthart Information     [employer] gives you secure access to your electronic  health record. If you see a primary care provider, you can also send messages to your care team and make appointments. If you have questions, please call your primary care clinic.  If you do not have a primary care provider, please call [phone] and they will assist you.        Care EveryWhere ID     This is your Care EveryWhere ID. This could be used by other organizations to access your [facility] medical records  [id number]        Your Vitals Were     Pulse Pulse Oximetry BMI (Body Mass Index)             104 98% 17.11 kg/m2          Blood Pressure from Last 3 Encounters:   06/20/18 122/66   06/12/18 110/70   06/05/18 117/71    Weight from Last 3 Encounters:   06/20/18 39.7 kg (87 lb 9.6 oz)   06/12/18 40.7 kg (89 lb 12.8 oz)   06/05/18 40.2 kg (88 lb 9.6 oz)              We Performed the Following     Follow-Up with Cardiologist          Today's Medication Changes          These changes are accurate as of 6/20/18 10:47 AM.  If you have any questions, ask your nurse or doctor.               Start taking these medicines.        Dose/Directions    lisinopril 2.5 MG tablet   Commonly known as:  PRINIVIL/Zestril   Used for:  Chronic systolic congestive heart failure (H)   Started by:  Fabiano Bower MD        Dose:  2.5 mg   Take 1 tablet (2.5 mg) by mouth daily   Quantity:  30 tablet   Refills:  3         These medicines have changed or have updated prescriptions.        Dose/Directions    metoprolol succinate 100 MG 24 hr tablet   Commonly known as:  TOPROL-XL   This may have changed:    - medication strength  - additional instructions   Used for:  Essential hypertension, Chronic atrial fibrillation (H)   Changed by:  Fabiano Bower MD        Take 2 tablets (100 mg) twice per day   Quantity:  60 tablet   Refills:  11         Stop taking these medicines if you haven't already. Please contact your care team if you have questions.     diltiazem 120 MG Cp12 12 hr SR capsule   Commonly known as:   CARDIZEM SR   Stopped by:  Fabiano Bower MD                Where to get your medicines      These medications were sent to [facility]/pharmacy #[id number] - [place], [place] - [address]  [address], [place] [address]     Phone:  [phone]     lisinopril 2.5 MG tablet    metoprolol succinate 100 MG 24 hr tablet                Primary Care Provider Office Phone # Fax #    Dez Urban [phone]       [address]  [facility] [address]        Equal Access to Services     [facility]: Hadii aad ku hadasho Soomaali, waaxda luqadaha, qaybta kaalmada adeegyada, waxay idiin hayaan adeeg zbigniew burciaga . So [facility] [phone].    ATENCIÓN: Si habla español, tiene a dominguez disposición servicios gratuitos de asistencia lingüística. [facility] [phone].    We comply with applicable federal civil rights laws and [place] laws. We do not discriminate on the basis of race, color, national origin, age, disability, sex, sexual orientation, or gender identity.            Thank you!     Thank you for choosing [facility]  for your care. Our goal is always to provide you with excellent care. Hearing back from our patients is one way we can continue to improve our services. Please take a few minutes to complete the written survey that you may receive in the mail after your visit with us. Thank you!             Your Updated Medication List - Protect others around you: Learn how to safely use, store and throw away your medicines at www.disposemymeds.org.          This list is accurate as of 6/20/18 10:47 AM.  Always use your most recent med list.                   Brand Name Dispense Instructions for use Diagnosis    acetaminophen 325 MG tablet    TYLENOL     Take 325-650 mg by mouth every 4 hours as needed for mild pain        ascorbic acid 500 MG tablet    VITAMIN C     Take 500 mg by mouth daily        * bumetanide 1 MG tablet    BUMEX      Take 2 mg by mouth every morning        * bumetanide 1 MG tablet    BUMEX     Take 1 mg by mouth every evening        Calcium-Vitamin D 500-400 MG-UNIT Tabs      Take 2 tablets by mouth every morning        lisinopril 2.5 MG tablet    PRINIVIL/Zestril    30 tablet    Take 1 tablet (2.5 mg) by mouth daily    Chronic systolic congestive heart failure (H)       metoprolol succinate 100 MG 24 hr tablet    TOPROL-XL    60 tablet    Take 2 tablets (100 mg) twice per day    Essential hypertension, Chronic atrial fibrillation (H)       olopatadine 0.1 % ophthalmic solution    PATANOL     Place 1 drop into both eyes 2 times daily as needed for allergies During Spring        * PRESERVISION AREDS PO      Take 1 capsule by mouth 2 times daily        * MULTIVITAMIN PO      Take 1 tablet by mouth every morning        rOPINIRole 1 MG tablet    REQUIP     Take 2 mg by mouth At Bedtime        vitamin E 400 UNIT capsule      Take 400 Units by mouth daily        XARELTO 20 MG Tabs tablet   Generic drug:  rivaroxaban ANTICOAGULANT     30 tablet    TAKE 1 TABLET (20 MG) BY MOUTH DAILY (WITH DINNER)    Chronic atrial fibrillation (H)       * Notice:  This list has 4 medication(s) that are the same as other medications prescribed for you. Read the directions carefully, and ask your doctor or other care provider to review them with you.

## 2018-06-20 NOTE — PROGRESS NOTES
HISTORY:    Ana Cristina Dominguez is an absolutely delightful 87-year-old female once again accompanied by her very attentive son.  She has a history of chronic atrial fibrillation on anticoagulation as well as hypertension and hyperlipidemia.  She had presented in early May with severe peripheral edema and a normal ejection fraction on echo done 3/2018.  Initially diuretic therapy was tried without success.  Her edema had been associated with relatively abrupt onset of severe  joint discomfort, and she saw a rheumatologist who diagnosed her with polymyalgia rheumatica.  She was treated with high-dose steroids with immediate relief of her joint discomfort and resolution of her peripheral edema.  Her diuretics were then discontinued and her peripheral edema did not recur.    At our last visit about a month and a half ago Ana Cristina complained of worsening weakness and fatigue with exhaustion with even minimal activity.  At that time she had difficulty walking a full block and could not walk up a flight of steps without stopping and resting at least once or twice.  She was continuing to sleep well at night and used 2 pillows for comfort but was not experiencing nighttime dyspnea.  Her main problem was sleeping is that she experienced restless  leg syndrome for which she was using Requip.  She did not think that was helping.  She was not experiencing joint discomfort but was having a significant recurrence of her peripheral edema, particularly in her left leg (previously bilaterally).  An echocardiogram had been repeated and had demonstrated an unexpected reduction in her ejection fraction to 35-40% with global hypokinesis, severe biatrial enlargement, moderate to severe mitral regurgitation, moderate to severe tricuspid regurgitation, and moderate to severe aortic insufficiency.    Because of the echocardiographic changes a nuclear stress test was done which was reviewed with her today.  It showed an ejection fraction of  "39% with no evidence of inducible ischemia.  DVT was excluded by lower extremity venous ultrasound, also discussed today.    Today Ana Cristina reports that since our last visit she has noted a very substantial further deterioration.  She feels that her fatigue and dyspnea are getting much worse, she is now having difficulty walking between the rooms of her house.  She has lost about 2 pounds since our last visit but states that she is having trouble eating because of lack of appetite.  She is experiencing a sense of pounding heart with any type of exertion.  She is also noting that she has frequent episodes of \"jerking, shivering, shaking, and feeling tense\" which occurs frequently day or night and has been keeping her awake at night.  She thinks she does not really sleep at all anymore.  She believes that in the morning she feels her worst, and then as the day progresses she begins to feel better with more energy and stamina.  She had noticed a persistent cough especially over the last week which is inductive of a moderate amount of green sputum with no associated fevers.  She has not had any problems with sore throat.    Ana Cristina denies nighttime dyspnea but at the same time she states that she sometimes sleeps in a chair because she \"breathes better\".  She has not been having any chest pain.  Her peripheral edema persists but is not changed, with left leg worse than right.    Chest x-ray is done today and demonstrates a small right and moderate left side pleural effusion.  This effusion is increased on the left and new on the right since the last chest x-ray done 2-1/2 weeks ago.  No evidence of CHF.  She underwent a CT of her chest on [place] 3 rule out pulmonary embolism when she presented to the emergency room with shortness of breath and chest pressure.  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  At that visit she had atrial fibrillation with rapid ventricular response.  On discharge she was sent home with an increased dose of Toprol-XL, previously 100 mg per day, now 100 mg in the morning and 50 in the afternoon.    Today's examination shows approximately 12 cm of JVD with the patient examined in sitting position, decreased breath sounds of both bases, and audible systolic and diastolic cardiac murmurs.  She has modest peripheral edema.  Her heart rate is very rapid at rest, irregular with atrial fibrillation.      ASSESSMENT/PLAN:    1.  Atrial fibrillation with rapid ventricular response.  This may be a contributing factor to the patient's recent drop in ejection fraction, but her heart rate has generally been well controlled on previous visits.  A Holter monitor will be arranged to evaluate the adequacy of her current medications.  I am increasing her Toprol-XL to 100 mg twice daily and stopping her diltiazem altogether because of her low ejection fraction.  We may need to consider further maneuvers to lower heart rate including possible AV node ablation with pacemaker placement or further medication adjustments.  She is chronically anticoagulated.  2.  Congestive heart failure.  Currently class IV symptoms.  Worsening fatigue and dyspnea with evidence of volume overload today, decreased breath sounds at both bases at time of my exam.  Subsequent chest x-ray has demonstrated bilateral pleural effusions.  She may need to have these drained.  BNP pending, consider addition of Lasix.  In the past she has not responded to diuretics.  We will have her enroll in our [facility] for careful follow-up and monitoring.  She will also stop her Requip since it did not seem to help and it sounds like her symptoms may have correlated with the increased dose of this medication.  3.  Cardiomyopathy, unknown etiology.  This may be secondary to atrial fib with RVR but, as commented on above,  previous vitals have all shown well-controlled heart rates.  This may also be a primary cardiomyopathy, consider MRI.  She has had a recent nuclear stress test showing no evidence of inducible ischemia.  4.  Multi-valvular disease with significant mitral regurgitation, tricuspid regurgitation, and aortic insufficiency.  She is not a candidate for surgical repair, medical therapy as outlined above.  5.  Restless leg.  The patient actually has full body jerks shivers and shakes which prevent her sleeping well.  I talked her about the possibility of having a sleep study done but at this point she is not interested.  She seems definitely to be sleep deprived which may be contributing to her generally feeling lousy.  6.  Polymyalgia rheumatica.  Followed by rheumatology.  Previously her edema was likely due to polymyalgia rheumatica with immediate relief after starting prednisone, but currently she is not having joint aches and rheumatologist does not think she has had a flareup.    Thank you for asking me to participate in your patient's care.  There a number of very complicated interacting issues going on with her, somewhat difficult to tell why she is feeling so lousy.  She expresses hopes that her life will end soon and this will be behind her.  I expressed the hopes that we could make her feel better.  She was hoping to fly to a large family reunion in [place] in about 2 weeks, but I told her this was unlikely to occur and that it would not be safe for her to fly with her symptoms as they are today.  She talk to her son about the possibility of driving instead, which I believe would be safe.  A full hour was spent face-to-face time today with the patient and her son, well over 50% counseling.    Orders Placed This Encounter   Procedures     X-ray Chest 2 vws*     Basic metabolic panel     N terminal pro BNP outpatient     [facility] with [facility]     Holter Monitor 24 hour - Adult     Orders Placed This Encounter    Medications     DISCONTD: metoprolol succinate (TOPROL-XL) 100 MG 24 hr tablet     Sig: Take 2 tablets (100 mg) twice per day     Dispense:  60 tablet     Refill:  11     lisinopril (PRINIVIL/ZESTRIL) 2.5 MG tablet     Sig: Take 1 tablet (2.5 mg) by mouth daily     Dispense:  30 tablet     Refill:  3     metoprolol succinate (TOPROL-XL) 100 MG 24 hr tablet     Sig: Take 1 tablet (100 mg) by mouth 2 times daily     Dispense:  60 tablet     Refill:  11     Medications Discontinued During This Encounter   Medication Reason     diltiazem (CARDIZEM SR) 120 MG CP12 12 hr SR capsule      metoprolol succinate (TOPROL-XL) 50 MG 24 hr tablet Reorder     metoprolol succinate (TOPROL-XL) 100 MG 24 hr tablet Reorder       10 year ASCVD risk: The ASCVD Risk score (Jamila HARRIS Jr, et al., 2013) failed to calculate for the following reasons:    The 2013 ASCVD risk score is only valid for ages 40 to 79    Encounter Diagnoses   Name Primary?     Localized edema      Edema, unspecified type      Essential hypertension      Chronic atrial fibrillation (H)      Chronic systolic congestive heart failure (H) Yes     PMR (polymyalgia rheumatica) (H)        CURRENT MEDICATIONS:  Current Outpatient Prescriptions   Medication Sig Dispense Refill     acetaminophen (TYLENOL) 325 MG tablet Take 325-650 mg by mouth every 4 hours as needed for mild pain       ascorbic acid (VITAMIN C) 500 MG tablet Take 500 mg by mouth daily       bumetanide (BUMEX) 1 MG tablet Take 2 mg by mouth every morning        bumetanide (BUMEX) 1 MG tablet Take 1 mg by mouth every evening       Calcium Carb-Cholecalciferol (CALCIUM-VITAMIN D) 500-400 MG-UNIT TABS Take 2 tablets by mouth every morning       lisinopril (PRINIVIL/ZESTRIL) 2.5 MG tablet Take 1 tablet (2.5 mg) by mouth daily 30 tablet 3     metoprolol succinate (TOPROL-XL) 100 MG 24 hr tablet Take 1 tablet (100 mg) by mouth 2 times daily 60 tablet 11     Multiple Vitamins-Minerals (MULTIVITAMIN PO) Take 1 tablet  by mouth every morning       Multiple Vitamins-Minerals (PRESERVISION AREDS PO) Take 1 capsule by mouth 2 times daily        rOPINIRole (REQUIP) 1 MG tablet Take 2 mg by mouth At Bedtime       vitamin E 400 UNIT capsule Take 400 Units by mouth daily       XARELTO 20 MG TABS tablet TAKE 1 TABLET (20 MG) BY MOUTH DAILY (WITH DINNER) 30 tablet 8     olopatadine (PATANOL) 0.1 % ophthalmic solution Place 1 drop into both eyes 2 times daily as needed for allergies During Spring       [DISCONTINUED] metoprolol succinate (TOPROL-XL) 100 MG 24 hr tablet Take 2 tablets (100 mg) twice per day 60 tablet 11     [DISCONTINUED] metoprolol succinate (TOPROL-XL) 50 MG 24 hr tablet Take 2 tablets (100 mg) every morning and 1 tablet (50 mg) every evening. 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 Father      Family History Negative Mother      Genitourinary Problems Sister      Renal failure     HEART DISEASE Sister      Rhythm issues       SOCIAL HISTORY:  Social History     Social History      Marital status:      Spouse name: N/A     Number of children: 3     Years of education: N/A     Occupational History      Retired     Social History Main Topics     Smoking status: Former Smoker     Quit date: 5/6/1973     Smokeless tobacco: Never Used     Alcohol use Yes      Comment: Socially     Drug use: No     Sexual activity: No     Other Topics Concern     Exercise Yes     Seat Belt Yes     Social History Narrative       Review of Systems:  Skin:  Positive for     Eyes:  Positive for glasses  ENT:  Positive for hearing loss  Respiratory:  Positive for dyspnea on exertion;cough;shortness of breath  Cardiovascular:    Positive for;edema;fatigue;lightheadedness;dizziness;syncope or near-syncope  Gastroenterology: Negative    Genitourinary:  Positive for urinary frequency  Musculoskeletal:  Positive for arthritis;joint swelling  Neurologic:  Positive for numbness or tingling of feet  Psychiatric:  Positive for sleep disturbances;anxiety  Heme/Lymph/Imm:  Positive for hay fever  Endocrine:  Negative      Physical Exam:  Vitals: /66 (BP Location: Right arm, Patient Position: Chair, Cuff Size: Adult Regular)  Pulse 104  Wt 39.7 kg (87 lb 9.6 oz)  SpO2 98%  BMI 17.11 kg/m2    Constitutional:  cooperative, alert and oriented, well developed, well nourished, in no acute distress chronically ill Patient is examined in the sitting position, unable to climb onto table.    Skin:  warm and dry to the touch        Head:  normocephalic        Eyes:  no xanthalasma        ENT:  no pallor or cyanosis        Neck:  carotid pulses are full and equal bilaterally JVP 10-12      Chest:  normal symmetry   Bibasilar crackles bilaterally.    Cardiac: normal S1 and S2;no S3 or S4 irregularly irregular rhythm     systolic murmur;holosystolic murmur;grade 1;throughout precordium   holodiastolic murmur;decrescendo;throughout precordium;grade 2      Abdomen:  abdomen soft;BS normoactive        Vascular: pulses full and  equal                                      Extremities and Back:           Neurological:  no gross motor deficits          Recent Lab Results:  LIPID RESULTS:  Lab Results   Component Value Date    CHOL 107 10/07/2016    HDL 22 (L) 10/07/2016    LDL 65 10/07/2016    TRIG 100 10/07/2016    CHOLHDLRATIO 3.3 08/29/2013       LIVER ENZYME RESULTS:  Lab Results   Component Value Date    AST 28 06/03/2018    ALT 34 06/03/2018       CBC RESULTS:  Lab Results   Component Value Date    WBC 7.5 06/20/2018    RBC 4.15 06/20/2018    HGB 11.9 06/20/2018    HCT 38.0 06/20/2018    MCV 92 06/20/2018    MCH 28.7 06/20/2018    MCHC 31.3 (L) 06/20/2018    RDW 16.6 (H) 06/20/2018     06/20/2018       BMP RESULTS:  Lab Results   Component Value Date     06/04/2018    POTASSIUM 3.7 06/04/2018    CHLORIDE 96 06/04/2018    CO2 30 06/04/2018    ANIONGAP 8 06/04/2018     (H) 06/04/2018    BUN 33 (H) 06/04/2018    CR 1.09 (H) 06/04/2018    GFRESTIMATED 47 (L) 06/04/2018    GFRESTBLACK 57 (L) 06/04/2018    KARTHIK 8.6 06/04/2018        A1C RESULTS:  Lab Results   Component Value Date    A1C 5.7 12/16/2015       INR RESULTS:  Lab Results   Component Value Date    INR 1.24 (H) 10/08/2016    INR 1.35 (H) 10/05/2016         Fabiano Bower MD, [facility]    CC  No referring provider defined for this encounter.

## 2018-06-20 NOTE — LETTER
6/20/2018    Dez Urban MD, MD  [address] [facility] [address]  [facility] [address]    RE: Ana Cristina Dominguez       Dear Colleague,    I had the pleasure of seeing Ana Cristina Dominguez in the [facility].    HISTORY:    Ana Cristina Dominguez is an absolutely delightful 87-year-old female once again accompanied by her very attentive son.  She has a history of chronic atrial fibrillation on anticoagulation as well as hypertension and hyperlipidemia.  She had presented in early May with severe peripheral edema and a normal ejection fraction on echo done 3/2018.  Initially diuretic therapy was tried without success.  Her edema had been associated with relatively abrupt onset of severe  joint discomfort, and she saw a rheumatologist who diagnosed her with polymyalgia rheumatica.  She was treated with high-dose steroids with immediate relief of her joint discomfort and resolution of her peripheral edema.  Her diuretics were then discontinued and her peripheral edema did not recur.    At our last visit about a month and a half ago Ana Cristina complained of worsening weakness and fatigue with exhaustion with even minimal activity.  At that time she had difficulty walking a full block and could not walk up a flight of steps without stopping and resting at least once or twice.  She was continuing to sleep well at night and used 2 pillows for comfort but was not experiencing nighttime dyspnea.  Her main problem was sleeping is that she experienced restless  leg syndrome for which she was using Requip.  She did not think that was helping.  She was not experiencing joint discomfort but was having a significant recurrence of her peripheral edema, particularly in her left leg (previously bilaterally).  An echocardiogram had been repeated and had demonstrated an unexpected reduction in her ejection fraction to 35-40% with global hypokinesis, severe biatrial enlargement, moderate to severe mitral  "regurgitation, moderate to severe tricuspid regurgitation, and moderate to severe aortic insufficiency.    Because of the echocardiographic changes a nuclear stress test was done which was reviewed with her today.  It showed an ejection fraction of 39% with no evidence of inducible ischemia.  DVT was excluded by lower extremity venous ultrasound, also discussed today.    Today Ana Cristina reports that since our last visit she has noted a very substantial further deterioration.  She feels that her fatigue and dyspnea are getting much worse, she is now having difficulty walking between the rooms of her house.  She has lost about 2 pounds since our last visit but states that she is having trouble eating because of lack of appetite.  She is experiencing a sense of pounding heart with any type of exertion.  She is also noting that she has frequent episodes of \"jerking, shivering, shaking, and feeling tense\" which occurs frequently day or night and has been keeping her awake at night.  She thinks she does not really sleep at all anymore.  She believes that in the morning she feels her worst, and then as the day progresses she begins to feel better with more energy and stamina.  She had noticed a persistent cough especially over the last week which is inductive of a moderate amount of green sputum with no associated fevers.  She has not had any problems with sore throat.    Ana Cristina denies nighttime dyspnea but at the same time she states that she sometimes sleeps in a chair because she \"breathes better\".  She has not been having any chest pain.  Her peripheral edema persists but is not changed, with left leg worse than right.    Chest x-ray is done today and demonstrates a small right and moderate left side pleural effusion.  This effusion is increased on the left and new on the right since the last chest x-ray done 2-1/2 weeks ago.  No evidence of CHF.  She underwent a CT of her chest on [place] 3 rule out pulmonary embolism " when she presented to the emergency room with shortness of breath and chest pressure.  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  At that visit she had atrial fibrillation with rapid ventricular response.  On discharge she was sent home with an increased dose of Toprol-XL, previously 100 mg per day, now 100 mg in the morning and 50 in the afternoon.    Today's examination shows approximately 12 cm of JVD with the patient examined in sitting position, decreased breath sounds of both bases, and audible systolic and diastolic cardiac murmurs.  She has modest peripheral edema.  Her heart rate is very rapid at rest, irregular with atrial fibrillation.      ASSESSMENT/PLAN:    1.  Atrial fibrillation with rapid ventricular response.  This may be a contributing factor to the patient's recent drop in ejection fraction, but her heart rate has generally been well controlled on previous visits.  A Holter monitor will be arranged to evaluate the adequacy of her current medications.  I am increasing her Toprol-XL to 100 mg twice daily and stopping her diltiazem altogether because of her low ejection fraction.  We may need to consider further maneuvers to lower heart rate including possible AV node ablation with pacemaker placement or further medication adjustments.  She is chronically anticoagulated.  2.  Congestive heart failure.  Currently class IV symptoms.  Worsening fatigue and dyspnea with evidence of volume overload today, decreased breath sounds at both bases at time of my exam.  Subsequent chest x-ray has demonstrated bilateral pleural effusions.  She may need to have these drained.  BNP pending, consider addition of Lasix.  In the past she has not responded to diuretics.  We will have her enroll in our [facility] clinic for careful follow-up and monitoring.  She will also stop her  Requip since it did not seem to help and it sounds like her symptoms may have correlated with the increased dose of this medication.  3.  Cardiomyopathy, unknown etiology.  This may be secondary to atrial fib with RVR but, as commented on above, previous vitals have all shown well-controlled heart rates.  This may also be a primary cardiomyopathy, consider MRI.  She has had a recent nuclear stress test showing no evidence of inducible ischemia.  4.  Multi-valvular disease with significant mitral regurgitation, tricuspid regurgitation, and aortic insufficiency.  She is not a candidate for surgical repair, medical therapy as outlined above.  5.  Restless leg.  The patient actually has full body jerks shivers and shakes which prevent her sleeping well.  I talked her about the possibility of having a sleep study done but at this point she is not interested.  She seems definitely to be sleep deprived which may be contributing to her generally feeling lousy.  6.  Polymyalgia rheumatica.  Followed by rheumatology.  Previously her edema was likely due to polymyalgia rheumatica with immediate relief after starting prednisone, but currently she is not having joint aches and rheumatologist does not think she has had a flareup.    Thank you for asking me to participate in your patient's care.  There a number of very complicated interacting issues going on with her, somewhat difficult to tell why she is feeling so lousy.  She expresses hopes that her life will end soon and this will be behind her.  I expressed the hopes that we could make her feel better.  She was hoping to fly to a large family reunion in [place] in about 2 weeks, but I told her this was unlikely to occur and that it would not be safe for her to fly with her symptoms as they are today.  She talk to her son about the possibility of driving instead, which I believe would be safe.  A full hour was spent face-to-face time today with the patient and her son, well  over 50% counseling.    Orders Placed This Encounter   Procedures     X-ray Chest 2 vws*     Basic metabolic panel     N terminal pro BNP outpatient     Follow-Up with [facility]     Holter Monitor 24 hour - Adult     Orders Placed This Encounter   Medications     DISCONTD: metoprolol succinate (TOPROL-XL) 100 MG 24 hr tablet     Sig: Take 2 tablets (100 mg) twice per day     Dispense:  60 tablet     Refill:  11     lisinopril (PRINIVIL/ZESTRIL) 2.5 MG tablet     Sig: Take 1 tablet (2.5 mg) by mouth daily     Dispense:  30 tablet     Refill:  3     metoprolol succinate (TOPROL-XL) 100 MG 24 hr tablet     Sig: Take 1 tablet (100 mg) by mouth 2 times daily     Dispense:  60 tablet     Refill:  11     Medications Discontinued During This Encounter   Medication Reason     diltiazem (CARDIZEM SR) 120 MG CP12 12 hr SR capsule      metoprolol succinate (TOPROL-XL) 50 MG 24 hr tablet Reorder     metoprolol succinate (TOPROL-XL) 100 MG 24 hr tablet Reorder       10 year ASCVD risk: The ASCVD Risk score (Jamila STEVEN Sanchez, et al., 2013) failed to calculate for the following reasons:    The 2013 ASCVD risk score is only valid for ages 40 to 79    Encounter Diagnoses   Name Primary?     Localized edema      Edema, unspecified type      Essential hypertension      Chronic atrial fibrillation (H)      Chronic systolic congestive heart failure (H) Yes     PMR (polymyalgia rheumatica) (H)        CURRENT MEDICATIONS:  Current Outpatient Prescriptions   Medication Sig Dispense Refill     acetaminophen (TYLENOL) 325 MG tablet Take 325-650 mg by mouth every 4 hours as needed for mild pain       ascorbic acid (VITAMIN C) 500 MG tablet Take 500 mg by mouth daily       bumetanide (BUMEX) 1 MG tablet Take 2 mg by mouth every morning        bumetanide (BUMEX) 1 MG tablet Take 1 mg by mouth every evening       Calcium Carb-Cholecalciferol (CALCIUM-VITAMIN D) 500-400 MG-UNIT TABS Take 2 tablets by mouth every morning       lisinopril  (PRINIVIL/ZESTRIL) 2.5 MG tablet Take 1 tablet (2.5 mg) by mouth daily 30 tablet 3     metoprolol succinate (TOPROL-XL) 100 MG 24 hr tablet Take 1 tablet (100 mg) by mouth 2 times daily 60 tablet 11     Multiple Vitamins-Minerals (MULTIVITAMIN PO) Take 1 tablet by mouth every morning       Multiple Vitamins-Minerals (PRESERVISION AREDS PO) Take 1 capsule by mouth 2 times daily        rOPINIRole (REQUIP) 1 MG tablet Take 2 mg by mouth At Bedtime       vitamin E 400 UNIT capsule Take 400 Units by mouth daily       XARELTO 20 MG TABS tablet TAKE 1 TABLET (20 MG) BY MOUTH DAILY (WITH DINNER) 30 tablet 8     olopatadine (PATANOL) 0.1 % ophthalmic solution Place 1 drop into both eyes 2 times daily as needed for allergies During Spring       [DISCONTINUED] metoprolol succinate (TOPROL-XL) 100 MG 24 hr tablet Take 2 tablets (100 mg) twice per day 60 tablet 11     [DISCONTINUED] metoprolol succinate (TOPROL-XL) 50 MG 24 hr tablet Take 2 tablets (100 mg) every morning and 1 tablet (50 mg) every evening. 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 Father      Family History Negative Mother      Genitourinary Problems Sister      Renal failure     HEART DISEASE Sister      Rhythm issues       SOCIAL HISTORY:  Social History     Social History     Marital status:      Spouse name: N/A     Number of children: 3     Years of education: N/A     Occupational History      Retired     Social History Main Topics     Smoking status: Former Smoker     Quit date: 5/6/1973     Smokeless tobacco: Never Used     Alcohol use Yes      Comment: Socially     Drug use: No     Sexual activity: No     Other Topics Concern     Exercise Yes     Seat Belt Yes     Social History Narrative       Review of Systems:  Skin:  Positive for     Eyes:  Positive for glasses  ENT:  Positive for hearing loss  Respiratory:  Positive for dyspnea on exertion;cough;shortness of breath  Cardiovascular:    Positive for;edema;fatigue;lightheadedness;dizziness;syncope or near-syncope  Gastroenterology: Negative    Genitourinary:  Positive for urinary frequency  Musculoskeletal:  Positive for arthritis;joint swelling  Neurologic:  Positive for numbness or tingling of feet  Psychiatric:  Positive for sleep disturbances;anxiety  Heme/Lymph/Imm:  Positive for hay fever  Endocrine:  Negative      Physical Exam:  Vitals: /66 (BP Location: Right arm, Patient Position: Chair, Cuff Size: Adult Regular)  Pulse 104  Wt 39.7 kg (87 lb 9.6 oz)  SpO2 98%  BMI 17.11 kg/m2    Constitutional:  cooperative, alert and oriented, well developed, well nourished, in no acute distress chronically ill Patient is examined in the sitting position, unable to climb onto table.    Skin:  warm and dry to the touch        Head:  normocephalic        Eyes:  no xanthalasma        ENT:  no pallor or cyanosis        Neck:  carotid pulses are full and equal bilaterally JVP 10-12      Chest:  normal symmetry   Bibasilar crackles bilaterally.    Cardiac:  normal S1 and S2;no S3 or S4 irregularly irregular rhythm     systolic murmur;holosystolic murmur;grade 1;throughout precordium   holodiastolic murmur;decrescendo;throughout precordium;grade 2      Abdomen:  abdomen soft;BS normoactive        Vascular: pulses full and equal                                      Extremities and Back:           Neurological:  no gross motor deficits          Recent Lab Results:  LIPID RESULTS:  Lab Results   Component Value Date    CHOL 107 10/07/2016    HDL 22 (L) 10/07/2016    LDL 65 10/07/2016    TRIG 100 10/07/2016    CHOLHDLRATIO 3.3 08/29/2013       LIVER ENZYME RESULTS:  Lab Results   Component Value Date    AST 28 06/03/2018    ALT 34 06/03/2018       CBC RESULTS:  Lab Results   Component Value Date    WBC 7.5 06/20/2018    RBC 4.15 06/20/2018    HGB 11.9 06/20/2018    HCT 38.0 06/20/2018    MCV 92 06/20/2018    MCH 28.7 06/20/2018    MCHC 31.3 (L) 06/20/2018    RDW 16.6 (H) 06/20/2018     06/20/2018       BMP RESULTS:  Lab Results   Component Value Date     06/04/2018    POTASSIUM 3.7 06/04/2018    CHLORIDE 96 06/04/2018    CO2 30 06/04/2018    ANIONGAP 8 06/04/2018     (H) 06/04/2018    BUN 33 (H) 06/04/2018    CR 1.09 (H) 06/04/2018    GFRESTIMATED 47 (L) 06/04/2018    GFRESTBLACK 57 (L) 06/04/2018    KARTHIK 8.6 06/04/2018        A1C RESULTS:  Lab Results   Component Value Date    A1C 5.7 12/16/2015       INR RESULTS:  Lab Results   Component Value Date    INR 1.24 (H) 10/08/2016    INR 1.35 (H) 10/05/2016       Thank you for allowing me to participate in the care of your patient.    Sincerely,     Fabiano Bower MD     [facility]

## 2018-06-20 NOTE — TELEPHONE ENCOUNTER
Received a call from [facility] to verify Toprol XL dose. Clarified dosing with Dr. Bower. Patient should be taking Toprol  mg BID, not 200 mg BID. Med list updated. Sent new script. Attempted to contact patient to disregard Toprol XL dosing on her AVS. I will  continue to try to contact patient. ~Aldair WEBER

## 2018-06-20 NOTE — TELEPHONE ENCOUNTER
Spoke to Shai, patient's son. Clarified Toprol XL dosing. Patient picked up her prescription and the bottle does state Toprol  mg BID. Shai had no questions. ~Aldair WEBER

## 2018-06-21 PROBLEM — I48.91 ATRIAL FIBRILLATION WITH RVR (H): Status: ACTIVE | Noted: 2018-01-01

## 2018-06-21 NOTE — ED NOTES
[facility]  ED Nurse Handoff Report    Ana Cristina Dominguez is a 87 year old female   ED Chief complaint: Shortness of Breath  . ED Diagnosis:   Final diagnoses:   Congestive heart failure, unspecified congestive heart failure chronicity, unspecified congestive heart failure type (H)   Pleural effusion   Chronic atrial fibrillation (H)     Allergies:   Allergies   Allergen Reactions     Amoxicillin Rash     Codeine GI Disturbance     Stomach pain     Melatonin Nausea     Eyesburned       Code Status: DNR / DNI  Activity level - Baseline/Home:  Independent. Activity Level - Current:   Independent. Lift room needed: No. Bariatric: No   Needed: No   Isolation: No. Infection: Not Applicable.     Vital Signs:   Vitals:    06/21/18 1030 06/21/18 1045 06/21/18 1100 06/21/18 1115   BP: 118/66 137/87 (!) 133/91 128/88   Pulse:       Resp:       Temp:       TempSrc:       SpO2:  97% 98% 98%   Weight:       Height:           Cardiac Rhythm:  afib with RVR,      Pain level:  0  Patient confused: No. Patient Falls Risk: No.   Elimination Status: has not voided yet   Patient Report - Initial Complaint: shortness of breath. Focused Assessment: bilateral leg edema, short of breath, Rapid afib with RVR   Tests Performed: labs. Abnormal Results:   Labs Ordered and Resulted from Time of ED Arrival Up to the Time of Departure from the ED   CBC WITH PLATELETS DIFFERENTIAL - Abnormal; Notable for the following:        Result Value    MCHC 31.3 (*)     RDW 16.5 (*)     All other components within normal limits   COMPREHENSIVE METABOLIC PANEL - Abnormal; Notable for the following:     Glucose 119 (*)     Urea Nitrogen 46 (*)     GFR Estimate 53 (*)     Albumin 2.9 (*)     Alkaline Phosphatase 176 (*)     All other components within normal limits   NT PROBNP INPATIENT - Abnormal; Notable for the following:     N-Terminal Pro BNP Inpatient 56046 (*)     All other components within normal limits   TROPONIN I    PERIPHERAL IV CATHETER   CARDIAC CONTINUOUS MONITORING   STRICT INTAKE AND OUTPUT     .   Treatments provided: meds  Family Comments: son at bedside.  Pt has stated many times that she doesn't want to stay, however she is calm and cooperative.   OBS brochure/video discussed/provided to patient:  No  ED Medications:   Medications   diltiazem (CARDIZEM) 125 mg in sodium chloride 0.9 % 125 mL infusion (5 mg/hr Intravenous New Bag 6/21/18 1144)   furosemide (LASIX) injection 40 mg (40 mg Intravenous Given 6/21/18 1129)     Drips infusing:  Yes  For the majority of the shift, the patient's behavior Green. Interventions performed were none.     Severe Sepsis OR Septic Shock Diagnosis Present: No      ED Nurse Name/Phone Number: Leatha Rodríguez,   12:09 PM    RECEIVING UNIT ED HANDOFF REVIEW    Above ED Nurse Handoff Report was reviewed: Yes  Reviewed by: Yeimy Atkins on June 21, 2018 at 12:40 PM

## 2018-06-21 NOTE — ED PROVIDER NOTES
History     Chief Complaint:  Shortness of breath    HPI   Ana Cristina Dominguez is a 87 year old female who presents with shortness of breath.  Patient presents with son who says that the patient has been having shortness of breath.  She was seen by her cardiologist yesterday and an xray was obtained which is shown below.  Patient says that the shortness of breath persisted prompting ED visit.    Dry cough. Palpitations.  No syncope.  Left leg edema.  Patient saw Dr Wheat yesterday and her medications were adjusted.  No fevers.  Not eating much.  Weight gain is present.  Patient has been admitted to [facility] in early June for similar symptoms.  Yesterday's xray is as noted below.  Patient has not taken her medications yet today.  Clinic note per Dr Bower as below.  Diltiazem was stopped and toprol was increased (class IV CHF with low EF).  Patient does have 1 pillow orthopnea.    Fabiano Bower MD   Cardiology   Expand All Collapse All    []Hide copied text  []Hover for attribution information  HISTORY:     Ana Cristina Dominguez is an absolutely delightful 87-year-old female once again accompanied by her very attentive son.  She has a history of chronic atrial fibrillation on anticoagulation as well as hypertension and hyperlipidemia.  She had presented in early May with severe peripheral edema and a normal ejection fraction on echo done 3/2018.  Initially diuretic therapy was tried without success.  Her edema had been associated with relatively abrupt onset of severe  joint discomfort, and she saw a rheumatologist who diagnosed her with polymyalgia rheumatica.  She was treated with high-dose steroids with immediate relief of her joint discomfort and resolution of her peripheral edema.  Her diuretics were then discontinued and her peripheral edema did not recur.     At our last visit about a month and a half ago Ana Cristina complained of worsening weakness and fatigue with exhaustion with even  "minimal activity.  At that time she had difficulty walking a full block and could not walk up a flight of steps without stopping and resting at least once or twice.  She was continuing to sleep well at night and used 2 pillows for comfort but was not experiencing nighttime dyspnea.  Her main problem was sleeping is that she experienced restless  leg syndrome for which she was using Requip.  She did not think that was helping.  She was not experiencing joint discomfort but was having a significant recurrence of her peripheral edema, particularly in her left leg (previously bilaterally).  An echocardiogram had been repeated and had demonstrated an unexpected reduction in her ejection fraction to 35-40% with global hypokinesis, severe biatrial enlargement, moderate to severe mitral regurgitation, moderate to severe tricuspid regurgitation, and moderate to severe aortic insufficiency.     Because of the echocardiographic changes a nuclear stress test was done which was reviewed with her today.  It showed an ejection fraction of 39% with no evidence of inducible ischemia.  DVT was excluded by lower extremity venous ultrasound, also discussed today.     Today Ana Cristina reports that since our last visit she has noted a very substantial further deterioration.  She feels that her fatigue and dyspnea are getting much worse, she is now having difficulty walking between the rooms of her house.  She has lost about 2 pounds since our last visit but states that she is having trouble eating because of lack of appetite.  She is experiencing a sense of pounding heart with any type of exertion.  She is also noting that she has frequent episodes of \"jerking, shivering, shaking, and feeling tense\" which occurs frequently day or night and has been keeping her awake at night.  She thinks she does not really sleep at all anymore.  She believes that in the morning she feels her worst, and then as the day progresses she begins to feel better " "with more energy and stamina.  She had noticed a persistent cough especially over the last week which is inductive of a moderate amount of green sputum with no associated fevers.  She has not had any problems with sore throat.     Ana Cristina denies nighttime dyspnea but at the same time she states that she sometimes sleeps in a chair because she \"breathes better\".  She has not been having any chest pain.  Her peripheral edema persists but is not changed, with left leg worse than right.     Chest x-ray is done today and demonstrates a small right and moderate left side pleural effusion.  This effusion is increased on the left and new on the right since the last chest x-ray done 2-1/2 weeks ago.  No evidence of CHF.  She underwent a CT of her chest on [place] 3 rule out pulmonary embolism when she presented to the emergency room with shortness of breath and chest pressure.  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  At that visit she had atrial fibrillation with rapid ventricular response.  On discharge she was sent home with an increased dose of Toprol-XL, previously 100 mg per day, now 100 mg in the morning and 50 in the afternoon.     Today's examination shows approximately 12 cm of JVD with the patient examined in sitting position, decreased breath sounds of both bases, and audible systolic and diastolic cardiac murmurs.  She has modest peripheral edema.  Her heart rate is very rapid at rest, irregular with atrial fibrillation.        ASSESSMENT/PLAN:     1.  Atrial fibrillation with rapid ventricular response.  This may be a contributing factor to the patient's recent drop in ejection fraction, but her heart rate has generally been well controlled on previous visits.  A Holter monitor will be arranged to evaluate the adequacy of her current medications.  I am increasing her " Toprol-XL to 100 mg twice daily and stopping her diltiazem altogether because of her low ejection fraction.  We may need to consider further maneuvers to lower heart rate including possible AV node ablation with pacemaker placement or further medication adjustments.  She is chronically anticoagulated.  2.  Congestive heart failure.  Currently class IV symptoms.  Worsening fatigue and dyspnea with evidence of volume overload today, decreased breath sounds at both bases at time of my exam.  Subsequent chest x-ray has demonstrated bilateral pleural effusions.  She may need to have these drained.  BNP pending, consider addition of Lasix.  In the past she has not responded to diuretics.  We will have her enroll in our [facility] clinic for careful follow-up and monitoring.  She will also stop her Requip since it did not seem to help and it sounds like her symptoms may have correlated with the increased dose of this medication.  3.  Cardiomyopathy, unknown etiology.  This may be secondary to atrial fib with RVR but, as commented on above, previous vitals have all shown well-controlled heart rates.  This may also be a primary cardiomyopathy, consider MRI.  She has had a recent nuclear stress test showing no evidence of inducible ischemia.  4.  Multi-valvular disease with significant mitral regurgitation, tricuspid regurgitation, and aortic insufficiency.  She is not a candidate for surgical repair, medical therapy as outlined above.  5.  Restless leg.  The patient actually has full body jerks shivers and shakes which prevent her sleeping well.  I talked her about the possibility of having a sleep study done but at this point she is not interested.  She seems definitely to be sleep deprived which may be contributing to her generally feeling lousy.  6.  Polymyalgia rheumatica.  Followed by rheumatology.  Previously her edema was likely due to polymyalgia rheumatica with immediate relief after starting prednisone, but currently she  is not having joint aches and rheumatologist does not think she has had a flareup.     Thank you for asking me to participate in your patient's care.  There a number of very complicated interacting issues going on with her, somewhat difficult to tell why she is feeling so lousy.  She expresses hopes that her life will end soon and this will be behind her.  I expressed the hopes that we could make her feel better.  She was hoping to fly to a large family reunion in [place] in about 2 weeks, but I told her this was unlikely to occur and that it would not be safe for her to fly with her symptoms as they are today.  She talk to her son about the possibility of driving instead, which I believe would be safe.  A full hour was spent face-to-face time today with the patient and her son, well over 50% counseling.            XR CHEST 2 VW  6/20/2018 11:12 AM     HISTORY:  Chronic systolic congestive heart failure.     COMPARISON:  6/4/2018.         IMPRESSION:  Small right and moderate left pleural effusions,  increased on the left and new on the right since the prior exam.  Cardiac silhouette is obscured. Upper lungs are clear other than  probable mild atelectasis at both lung bases. Coronary vasculature  within normal limits.      MD Jackie OSORIO Scott Peter, MD   Hospitalist      []Hide copied text  []Emily for attribution information  Admit Date:     06/03/2018   Discharge Date:     06/05/2018       DISCHARGE DIAGNOSES:   1.  Atrial fibrillation with rapid ventricular response.   2.  Incidental finding of an ascending aortic aneurysm and pulmonary nodule on CT imaging this admission.  Recommend followup CT scan in 12 months to reassess.   3.  Acute on chronic systolic heart failure exacerbation this admission secondary to rapid atrial fibrillation, resolved with diuretic and rate control.       PAST MEDICAL HISTORY:   1.  History of atrial fibrillation, maintain Xarelto for anticoagulation.   2.  History of  chronic systolic heart failure with ejection fraction near 40% on recent nuclear medicine stress test.   3.  Nuclear medicine stress test performed 05/2018 showing no evidence of ischemia or infarction with ejection fraction 39%.   4.  Previous echocardiogram 03/2018 showing mild concentric left ventricular hypertrophy with ejection fraction of 35% to 40%, moderate global hypokinesia of the left ventricle.  She is also noted to have moderate to severe mitral regurgitation and moderately severe tricuspid regurgitation, and moderate to moderately severe aortic regurgitation.   5.  Hypertension history.   6.  Non-ischemic cardiomyopathy.   7.  History of polymyalgia rheumatica.   8.  Restless leg syndrome.       PRINCIPAL PROCEDURES THIS ADMISSION:   1.  Telemetry monitoring.   2.  Chest CT scan showing incidental finding of 4.4 cm ascending aortic aneurysm and a small pulmonary nodule.  Recommend followup CT scan at 12 months.   3.  Chest x-ray.   4.  Head CT scan.   5.  Telemetry monitoring.           Allergies:  Amoxicillin  Codeine  Melatonin     Medications:    bumetanide (BUMEX) 1 MG tablet  diltiazem (CARDIZEM SR) 120 MG CP12 12 hr SR capsule  metoprolol succinate (TOPROL-XL) 50 MG 24 hr tablet  rOPINIRole (REQUIP) 1 MG tablet  XARELTO 20 MG TABS tablet  Patanol  Lisinopril      Past Medical History:    Chronic diastolic heart failure  Hypertension  Atrial Fibrillation   Hyperlipidemia  History of atrial fibrillation, maintain Xarelto for anticoagulation.   History of chronic systolic heart failure with ejection fraction near 40% on recent nuclear medicine stress test.    Nuclear medicine stress test performed 05/2018 showing no evidence of ischemia or infarction with ejection fraction 39%.   Previous echocardiogram 03/2018 showing mild concentric left ventricular hypertrophy with ejection fraction of 35% to 40%, moderate global hypokinesia of the left ventricle.  She is also noted to have moderate to severe  mitral regurgitation and moderately severe tricuspid regurgitation, and moderate to moderately severe aortic regurgitation.   Non-ischemic cardiomyopathy.   History of polymyalgia rheumatica.   Restless leg syndrome.      Past Surgical History:    APPENDECTOMY [[id number]]         EXCIS PRIMARY GANGLION WRIST [[id number]]        CATARACT IOL, RT/LT [[id number]]  1/19/2009 right              Family History:    CAD     Social History:  The patient was accompanied to the ED by her son.  Smoking Status: former  Smokeless Tobacco: never  Alcohol Use: socially  Marital Status:       Review of Systems   Respiratory: Positive for shortness of breath.    Pt c/o cough and SOB. Pt had a CXR yesterday that demonstrated increasing fluid on the lungs.      Physical Exam     Patient Vitals for the past 24 hrs:   BP Temp Temp src Pulse Heart Rate Resp SpO2 Height Weight   06/21/18 1115 128/88 - - - 138 - 98 % - -   06/21/18 1100 (!) 133/91 - - - 134 - 98 % - -   06/21/18 1045 137/87 - - - 135 - 97 % - -   06/21/18 1030 118/66 - - - - - - - -   06/21/18 1015 137/90 - - - - - 98 % - -   06/21/18 1002 (!) 137/100 98.4  F (36.9  C) Temporal 143 - 20 96 % - -   06/21/18 1001 - - - - - - - 1.524 m (5') 39.9 kg (88 lb)     Physical Exam  GEN: patient smiling, no distress  HEAD: atraumatic, normocephalic  EYES: pupils reactive (3plus to 2plus), extraocular muscles intact, conjunctivae normal  ENT: TMs flat and white bilaterally, oropharynx normal with no erythema or exudate, mucus membranes dry  NECK: no cervical LAD, no meningeal signs, trachea midline, 1.5cm of JVD  RESPIRATORY: mild tachypnea, breath sounds clear to auscultation (no rales, wheezes, rhonchi), speaks in full sentences  CVS: normal S1/S2, no murmurs/rubs/gallops, irregular rhythm  ABDOMEN: soft, nontender, no masses or organomegaly, no rebound, decreased bowel sounds  BACK: no costovertebral angle tenderness  EXTREMITIES: intact pulses x 4, full range of motion at joints,  trace 1plus to 2plus edema (worse on the left side vs right side)  MUSCULOSKELETAL: no deformities  SKIN: warm and dry  NEURO: GCS 15, cranial nerves intact.  Motor- moves all 4 extremities.  Sensation- intact.  Coordination- ambulatory.  Overall symmetrical exam  HEME: no bruising or petechiae/contusions  LYMPH: no lymphadenopathy      Emergency Department Course   ECG (10:13:18):  Rate 133 bpm. WI interval *. QRS duration 70. QT/QTc 326/485. P-R-T axes * -9 262. Atrial fibrillation with rapid ventricular response, septal infarct, age undetermined, inferior infarct, age undetermined, ST and T-wave abnormality, consider lateral ischemia, abnormal ECG interpreted at 1015 by Radha Barbour MD.    Laboratory:  CBC: WBC: 7.3, HGB: 11.9, PLT: 306  CMP: Glucose 119 H, BUN 26 H, GFR estimate 53 L, Albumin 2.9 L, Alkphos 176 H, o/w WNL (Creatinine: 0.99)    Nt probnp inpatient: 07937 H  1042 Troponin:  <0.015    Interventions:  1129 Lasix 40 mg IV  1144 Cardizem 5 mg/hr IV  [place]  Cardiac/Sp02 monitoring  Oxygen by nasal cannula at 2L/min    Emergency Department Course:  Nursing notes and vitals reviewed.  I performed an exam of the patient as documented above.     IV inserted. Medicine administered as documented above. Blood drawn. This was sent to the lab for further testing, results above.    Patient had an EKG, results above.     1130  I consulted with Dr. Wallace of the hospitalist services. They are in agreement to accept the patient for admission.    Findings and plan explained to the Patient who consents to admission. Discussed the patient with Dr. Wallace, who will admit the patient to a telemetry bed for further monitoring, evaluation, and treatment.    10:42 AM Dr Wheat called, Dr eng to call back    12:08 PM waiting on cards to call back    's after dilt gtt     1:36 PM Updated patient and son  /52    Discussed results with son.    Answered questions.    /56 (BP Location: Left  arm)  Pulse 101  Temp 97.2  F (36.2  C) (Oral)  Resp 22  Ht 1.524 m (5')  Wt 39.9 kg (88 lb)  SpO2 99%  BMI 17.19 kg/m2      Impression & Plan      Medical Decision Making:  This is a 87-year-old female who has been seen by Dr. Arauz and had her medications adjusted yesterday for worsening CHF.  She is brought in by her son for dyspnea at rest and increased shortness of breath and feels winded with exertion.  The patient denies any fevers or chills but does have a dry cough.  She was just admitted a couple weeks ago for rapid atrial fibrillation and CHF.  We did review the x-ray from yesterday (pleural effusions) and have sent labs plus proBNP.  Her proBNP is increasing compared with previous values.  IV placed and labs sent.  .  We also reviewed the results from outpatient yesterday.  She is placed on oxygen by nasal cannula and started on diltiazem drip to help control her rate as she had rapid atrial fibrillation with a heart rate in the 150s.  Creatinine is normal and so diuresis can be initiated (however BUN increased); patient is already on bumex.  Her urinalysis has not yet come back.  Her pro-BNP is 11,000 and the previous proBNP yesterday was 5006 does represent an almost doubling.  Troponin not show any signs of ischemia.  CBC is otherwise normal.  The patient has been admitted by Dr. Ruvalcaba and she will be admitted for diuresis.  Cardiology has been consulted.  No hypoxia is noted.  Diltiazem has adjusted her HR down to low 100's with less rapid ventricular response.    Diagnosis:    ICD-10-CM    1. Congestive heart failure, unspecified congestive heart failure chronicity, unspecified congestive heart failure type (H) I50.9 CBC with platelets differential     Comprehensive metabolic panel     Nt probnp inpatient     Troponin I   2. Pleural effusion J90    3. Chronic atrial fibrillation (H) I48.2        Disposition:  Admitted to telemetry     Jesse Lemonohfranck  6/21/2018   [facility]  EMERGENCY DEPARTMENT  I, Jesse Karimi, am serving as a scribe at 10:07 AM on 6/21/2018 to document services personally performed by Radha Barbour MD based on my observations and the provider's statements to me.        Radha Barbour MD  06/21/18 3470

## 2018-06-21 NOTE — LETTER
Transition Communication Hand-off for Care Transitions to Next Level of Care Provider    Hand-off for Care Transitions to Next Level of Care Provider  Name: Ana Cristina Dominguez  : 1931  MRN #: [id number]  Reason for Hospitalization:  Pleural effusion [J90]  Chronic atrial fibrillation (H) [I48.2]  Congestive heart failure, unspecified congestive heart failure chronicity, unspecified congestive heart failure type (H) [I50.9]  Admit Date/Time: 2018 10:04 AM  Discharge Date: 2018    Reason for Communication Hand-off Referral: Admission diagnoses: CHF, Afib RVR    Discharge Plan:  Discharged to: Home with homecare                   Patient agreeable to post-hospital support suggestions:  Yes    Patient is on new medications:   Yes    MTM follow up recommended: No    Tel-Assurance program:  Declined    Patient will receive  HOME CARE  at:  Discharge through [facility] for RN, OT, PT & SW.     Follow-up specialty is recommended: Yes.  Patient had Zio Patch placed at discharge. She is to wear for 30 days. Follow up with Cardiology in 2 weeks.    Follow-up plan:  Future Appointments  Date Time Provider Department Center   2018 8:30 AM Anamaria Hicks, [facility]     Any outstanding tests or procedures:  No. Recommend BMP in 1 week with results to Dr. Urban.       Referrals     Future Labs/Procedures    Home care nursing referral     Comments:    RN skilled nursing visit. RN to assess vital signs and weight, respiratory and cardiac status, patients ability to take and record daily blood pressure, temp and weight, pain level and activity tolerance and hydration, nutrition and bowel status.  Home health aide to bathing, cleaning and personal needs    Your provider has ordered home care nursing services. If you have not been contacted within 2 days of your discharge please call the inpatient department phone number at [phone] .    Home Care OT Referral for Hospital Discharge     Comments:    OT to  "eval and treat    Your provider has ordered home care - occupational therapy. If you have not been contacted within 2 days of your discharge please call the department phone number listed on the top of this document.    Home Care PT Referral for Hospital Discharge     Comments:    PT to eval and treat    Your provider has ordered home care - physical therapy. If you have not been contacted within 2 days of your discharge please call the department phone number listed on the top of this document.    Home Care Social Service Referral for Hospital Discharge     Comments:     to social needs.    Your provider has ordered home care - . If you have not been contacted within 2 days of your discharge please call the department phone number listed on the top of this document.          Key Recommendations:  Patient tries to follow a low sodium diet but states \"there is salt in everything\". She does not weigh herself since she does not have a scale.  provided her with a scale and carrying bag. She thinks CHF is a \"catch all\" they write on death certificates. She is undecided if she will be compliant with recommendations. Also, cardiology is recommending a pacemaker at [facility]. She is also undecided about that. Her sons are wanting her to proceed. Cardiology decided to try some medications first. If this is not successful, the pacemaker is recommended.      Communicated handoff via EPIC Comm Mgt to Dr. Dez Urban's CC at [facility].      Mandi Dooley RN, BSN, CTS  [facility]  [phone]    AVS/Discharge Summary is the source of truth; this is a helpful guide for improved communication of patient story          "

## 2018-06-21 NOTE — CONSULTS
Full cardiology consultation dictated.  Likely CM and CHF due to atrial fibrillation (some flutter?) with RVR.  If we cannot attain heart rate control with medications, AV node ablation and VVIR pacemaker would be a good option.  The patient may not allow a PPM.  Guille Barboza MD, FACC  June 21, 2018 4:45 PM  [id number]

## 2018-06-21 NOTE — TELEPHONE ENCOUNTER
Received a MyChart message from patient. Patient reported feeling very weak and that she can't really do much of anything today. Patient tried to make a coffee this morning and was not able to. Dr. Bower saw patient in clinic yesterday and ordered a CXR.     CXR   IMPRESSION:  Small right and moderate left pleural effusions,  increased on the left and new on the right since the prior exam.  Cardiac silhouette is obscured. Upper lungs are clear other than  probable mild atelectasis at both lung bases. Coronary vasculature  within normal limits.     Reviewed results and symptoms with Dr. Bower. Due to worsening symptoms and worsening pleural effusions. Recommend ER evaluation. Patient has acute CHF symptoms plus pleural effusions. Patient may need a paracentesis.  Attempted to call patient, unable to leave a message. Called and spoke to patient's son, Luis. Patient is currently being seen in the ER at [facility]. ~Aldair WEBER

## 2018-06-21 NOTE — PLAN OF CARE
Problem: Patient Care Overview  Goal: Plan of Care/Patient Progress Review  Outcome: No Change  Potasium replacement done, Diltiazem increased to 10 mg/h at 1430 due . Continue to monitor.

## 2018-06-21 NOTE — LETTER
"Transition Communication Hand-off for Care Transitions to Next Level of Care Provider    Name: Ana Cristina Dominguez  : 1931  MRN #: [id number]  Primary Care Provider: Dez Urban MD  Primary Care MD Name: Dr. Dez Urban  Primary Clinic: [address]  [facility] [address]  Primary Care Clinic Name: [facility]  Reason for Hospitalization:  Pleural effusion [J90]  Chronic atrial fibrillation (H) [I48.2]  Congestive heart failure, unspecified congestive heart failure chronicity, unspecified congestive heart failure type (H) [I50.9]  Admit Date/Time: 2018 10:04 AM  Discharge Date: ***  Payor Source: Payor: MEDICA / Plan: MEDICA PRIME SOLUTION / Product Type: Indemnity /     Key Recommendations:  Patient tries to follow a low sodium diet but states \"there is salt in everything\". She does not weigh herself since she does not have a scale.  provided her with a scale and carrying bag. She thinks CHF is a \"catch all\" they write on death certificates. She is undecided if she will be compliant with recommendations. Also[facility] She is also undecided about that. Her sons are wanting her to proceed but she is undecided.       Marian Dooley    AVS/Discharge Summary is the source of truth; this is a helpful guide for improved communication of patient story          "

## 2018-06-21 NOTE — CONSULTS
Consult Date:  06/21/2018      REASON FOR CONSULTATION:  I have been asked by Dr. Balwinder Wallace to see Ana Cristina Dominguez for evaluation of atrial fibrillation with rapid ventricular response and increasing shortness of breath.      HISTORY OF PRESENT ILLNESS:  Ana Cristina Dominguez is a delightful 87-year-old female with a history of paroxysmal atrial fibrillation maintained on anticoagulant therapy.  She also has a history of hypertension and hyperlipidemia.  She has never had a known myocardial infarction.  The patient was noted approximately 1 year ago to have atrial fibrillation with rapid ventricular response.  Subsequent examinations and an office visit showed predominantly normal sinus rhythm.  In May, the patient began experiencing worsening weakness and fatigue with exhaustion with minimal activity.  Up to that point, she had been able to walk a full block, but could not walk up a flight of steps without stopping or resting at least once or twice.  She was sleeping reasonably well on 2 pillows.  She does have restless legs syndrome and failed relief with Requip.      She developed peripheral edema several months ago (March) which improved with the use of steroids and was felt to be secondary to polymyalgia rheumatica.  An echocardiogram performed in March demonstrated atrial fibrillation with now a reduced ejection fraction of 35% -40%.  On previous echocardiograms such as 05/22/2017 the patient's ejection fraction was 55% -60% with moderate aortic and tricuspid regurgitation.  Her rhythm at that time was reported as normal sinus rhythm and there was no pericardial effusion.  RVSP was 29 mmHg plus right atrial pressure.  On her followup echocardiogram March 21st to evaluate her edema, her ejection fraction had fallen to 35% -40% and there was now moderate to moderately severe mitral regurgitation, moderately severe tricuspid regurgitation, moderate to moderately severe aortic regurgitation and mild aortic  root dilatation.  The ascending aorta was also mildly dilated at 4.4 cm.  The patient was in atrial fibrillation with rapid ventricular response.  The patient's peripheral edema improved, but her breathing and her strength has not improved.  A Lexiscan nuclear stress test performed on 05/14/2018 demonstrated no evidence of cardiac ischemia or infarction and left ventricular ejection fraction was 39% with mild global hypokinesis.  The resting EKG was atrial fibrillation.  She has gradually worsened with  significant shortness of breath and evidence in the clinic of ongoing atrial fibrillation with rapid ventricular response.  Dr. Bower saw her yesterday and increased her beta blocker.  A chest x-ray done yesterday showed a small, new, right pleural effusion and moderate left pleural effusion.  There was, however, no evidence of congestive heart failure.  She presented to the emergency room for shortness of breath and chest pressure on 06/03.  A CT of her chest ruled out pulmonary embolism.  Her Toprol-XL was originally 100 mg per day and then raised to 100 mg in the morning, 50 mg in the afternoon and yesterday increased to 100 mg twice a day.  She was found to have decreased breath sounds at both bases and what Dr. Bower thought were systolic and diastolic cardiac murmurs.  She had modest peripheral edema, but her heart rate was very rapid at rest and irregular.  The patient felt worse by this morning and after talking to her sons, elected to come to the emergency room.  Her EKG in the emergency room again demonstrated atrial fibrillation or possible atrial flutter with rapid ventricular response and a heart rate of 133 beats per minute.  There is poor R-wave progression across the anterior precordium and left axis deviation with possible inferior wall infarct.  There was nonspecific scooping of the ST waves in the lateral leads.  The patient specifically denies any chest discomfort.      ALLERGIES:  AMOXICILLIN  WITH RASH.  CODEINE WITH STOMACH PAIN AND GI DISTURBANCE.  MELATONIN WHERE  SHE HAS NAUSEA AND HER EYES BURNED.      MEDICATIONS ON ADMISSION:   1.  Requip 0.5 mg tablet nightly as needed.   2.  Tylenol 325 mg 2 tablets q.4h.  p.r.n.   3.  Vitamin C 500 mg by mouth daily.   4.  Bumex 1 mg 2 tablets in the morning and 1 tablet in the evening.   5.  Calcium plus vitamin D 500-400 mg unit tabs 2 tablets by mouth every morning.   6.  Lisinopril 2.5 mg daily.   7.  Metoprolol succinate 100 mg by mouth 2 times daily.   8.  Multivitamin daily.   9.  PreserVision 1 capsule by mouth 2 times daily.   10.  Vitamin E 400 unit capsule daily.   11.  Xarelto 20 mg with dinner.      PAST MEDICAL HISTORY:   1.  Polymyalgia rheumatica in remission.   2.  Restless leg syndrome.   3.  Moderately severe aortic insufficiency and tricuspid insufficiency.   4.  Benign essential hypertension and possible chronic diastolic heart failure.   5.  Previous basal cell carcinoma of the nose, excised in 2004.   6.  Senile osteoporosis.   7.  Status post appendectomy.   8.  Right cataract extraction 01/19/2009   9.  Excision of a primary ganglion cyst from her wrist.      No history of peptic ulcer disease, tuberculosis, kidney stones or disease, hyper or hypothyroidism.      SOCIAL HISTORY:  The patient quit smoking 45 years ago.  She drinks alcohol, but not to excess.  She does not use illicit drugs.  She is a .      FAMILY HISTORY:  Father with coronary artery disease.  No premature atherosclerosis in her family.      REVIEW OF SYSTEMS:  A 12-point review of system is performed with pertinent positives and negatives listed in the HPI.  All other review of systems are asked and are negative.  The patient's biggest complaint remains her restless leg syndrome, her increased fatigue and shortness of breath.      PHYSICAL EXAMINATION:   VITAL SIGNS:  Current blood pressure is 105/56, pulse is 100-125 and irregular,  respiratory rate 22, patient  is afebrile.  She remains on 10 mg per hour of diltiazem, 99% oxygen saturation 3 liters nasal cannula.   GENERAL:  The patient is a fragile, petite white female lying in bed with mild respiratory distress.   HEENT:  Normocephalic, atraumatic without xanthelasma.  No arcus senilis.  No oropharyngeal lesions.  Mucous membranes are moist.   NECK:  Supple without thyromegaly.  Carotid upstroke is brisk without bruits.   CHEST:  Demonstrates diminished breath sounds at the bases.  She has some diffuse rhonchi but no rales.   CARDIAC:  Irregularly irregular rhythm, tachycardia, normal S1 and S2.  No S3 gallop heard.  She has holosystolic murmur heard at left lower sternal border to the apex.  She has holodiastolic murmur heard in the right upper chest.  No other murmurs heard.  There is JVD to the angle of her jaw.  No HJR.  Pulses are intact without bruits.  No tenderness to palpation across the precordium.   ABDOMEN:  Soft, nontender without organomegaly.  Bowel sounds are present.  No bruits.  EXTREMITIES:  No cyanosis or clubbing.  Trace left pretibial and pedal edema.  No right lower extremity edema.   SKIN:  Warm, dry and intact.   NEUROLOGIC:  Alert and oriented x3.  Cranial nerves II through XII grossly intact.  She has periodic jerking of her legs while laying in bed.  PSYCHIATRIC:  Affect is normal.      EKG is as listed above.      LABORATORY DATA:  Sodium 142, potassium 3.8, chloride 103, carbon dioxide 32, BUN 46, creatinine 0.99, magnesium 2.7, albumin low at 2.9, total protein 6.8, alkaline phosphatase 176.  N-terminal proBNP 11,601.  Troponin I ES less than 0.015.  Glucose 119.  White count 7300, hemoglobin 11.9, platelet count 306,000.      ASSESSMENT:   1.  Ana Cristina Dominguez is a pleasant 87-year-old female who presents with fatigue, shortness of breath and worsening peripheral edema.  She has had paroxysmal atrial fibrillation and now has uncontrolled rapid ventricular response.  Recent nuclear stress  test did not indicate cardiac infiltration, ischemia or infarction.  I suspect this patient has diastolic heart failure from years of hypertension and has developed more chronic atrial fibrillation with rapid ventricular response.  She has developed a cardiomyopathy because of this and has significant mitral and tricuspid regurgitation as well as aortic insufficiency contributing to her heart failure.  She has a left greater than right pleural effusion, which may be adding to her shortness of breath.  It is not clear that thoracentesis is necessary as of yet.  The patient was given IV Lasix in the emergency room and remains on a diltiazem drip.  Her rate control is imperfect.  We will work to affect some diuresis with IV Lasix.  She does not appear to have significant peripheral edema at this time.  She shows some signs of right heart failure and pressure overload.  We will try to rate control with oral metoprolol succinate 100 mg b.i.d.  We will add a long-acting diltiazem to see if we can slow down her heart a little bit more.  If we are unsuccessful with rate control as I suspect she has some degree of atrial flutter, AV node ablation and permanent pacemaker may become necessary.  The patient has made it clear she is not sure she will allow a permanent pacemaker placement.   2.  The patient's blood pressure is intermittently elevated and we will continue to treat with lisinopril and Toprol-XL as able.   3.  Restless leg syndrome.  The patient notes that is difficult to sleep because of this.  She may benefit from a neurologic evaluation.  Certainly this can also occur due to her diuresis and sedentary lifestyle.   4.  The patient remains on Xarelto for her atrial fibrillation and thromboembolic prevention.      PLAN:   1.  Continue with Toprol- mg b.i.d.   2.  Continue with Xarelto.   3.  Will change the IV diltiazem to oral diltiazem and watch for any worsening heart failure.   4.  I will avoid the use of  digoxin for the time being given the recent studies indicating higher mortality rate with the introduction of this drug.   5.  We will consider AV node ablation and permanent pacemaker implantation if we cannot control her heart rate.   6.  Continue diuresis with IV Lasix.  We may consider IV Bumex or Diuril as well.      It is my pleasure to assist in the care of Feliz Hampton.  All her questions were answered to her satisfaction.  Her sons were in attendance during this visit.         EM DIAZ MD, [facility]             D: 2018   T: 2018   MT: TOM      Name:     FELIZ HAMPTON   MRN:      [id number]        Account:       [id number]   :      1931           Consult Date:  2018      Document: [id number]       cc: Dez Urban MD

## 2018-06-21 NOTE — H&P
[facility]    History and Physical  Hospitalist       Date of Admission:  6/21/2018    Assessment & Plan   Ana Cristina Dominguez is a 87 year old female who presents with Dyspnea.    She has a past medical history of chronic atrial fibrillation on Xarelto, hypertension, dyslipidemia, restless leg syndrome and a recent admission here for acute on chronic systolic heart failure apparently due to nonischemic cardiomyopathy.    She presents with worsening shortness of breath with exertion, worsening fatigue and bilateral pleural effusions on her x-ray at the cardiology clinic yesterday.    In the ED she appeared dyspneic but not in distress with a heart rate in the 120's - 140's. She improved quickly with nasal cannula. She was given IV lasix and started on a Diltiazem drip and admitted to medicine.    AFib with RVR  - On Toprol XL which was recently increased to 100mg BID  - Cardizem drip started in ED, wean off as able  - Keep K>4, Mg>2    Acute on Chronic Diastolic CHF with Pleural Effusions  - Admit to telemetry with Cardiology consultation  - Given dose of lasix IV in the ED  - Follow ins and outs, daily weights  - Wean oxygen off as tolerated  - Outpatient cardiologist mentioned possible thoracentesis for effusions, she would prefer medical management but if needed would undergo IR thora    Hypertension  - Continue Lisinopril    Restless Leg Syndrome  - Did not respond to increased dose of Requip  - Will check iron studies, she does not believe this has been done before  - Recommended that if she has ongoing issues (Sounds like significant sleep disturbance) may benefit from visit to Neurologist    # Pain Assessment:  Current Pain Score 6/5/2018   Patient currently in pain? denies   Pain score (0-10) -   Pain descriptors -   Ana Cristina odell pain level was assessed and she currently denies pain.       DVT Prophylaxis: Xarelto  Code Status: Full Code    Disposition: Expected discharge in 2-3 days    Balwinder GANDHI  Rodrigo HERNANDEZ MD    Primary Care Physician   Dez Urbna MD    Chief Complaint       History is obtained from the patient, ED physician and review of records.    History of Present Illness   Ana Cristina Dominguez is a 87 year old female who presents with Dyspnea.    She has a past medical history of chronic atrial fibrillation on Xarelto, hypertension, dyslipidemia, restless leg syndrome and a recent admission here for acute on chronic systolic heart failure apparently due to nonischemic cardiomyopathy.    She had a normal ejection fraction as recently as last year.  In the fall she developed lower extremity edema which apparently responded to treatment for polymyalgia rheumatica with prednisone and she did not require chronic diuretic therapy.  However in March of this year she developed CHF symptoms and again underwent echocardiogram, this time demonstrating an ejection fraction of 35-40% with global hypokinesia of the left ventricle, moderate to severe mitral regurgitation and moderate to severe aortic regurgitation.  She had a Lexiscan stress test which was negative for inducible ischemia and has been following with her primary cardiologist Dr. Bower.    She was admitted here earlier this month for decompensated heart failure and it was felt that she may have nonischemic cardiomyopathy from uncontrolled tachycardia.    She was actually seen in the cardiology office yesterday. She has had increasing dyspnea with exertion and fatigue with minimal activity.  She has also had worsening of her restless leg symptoms and now has significant jerking of her legs even when she is awake.    Past Medical History    I have reviewed this patient's medical history and updated it with pertinent information if needed.   Past Medical History:   Diagnosis Date     Aortic regurgitation     mod-severe by echo in 3/2018     Benign essential hypertension      Chronic diastolic heart failure (H)      Other and unspecified  hyperlipidemia      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     Polymyalgia rheumatica (H)      RLS (restless legs syndrome)      Senile osteoporosis     Reclast 11/16/09, 11/10, 11/11     Systolic heart failure (H)     by echo 3/2018, NM MPI negative for ischemia in 5/2108       Past Surgical History   I have reviewed this patient's surgical history and updated it with pertinent information if needed.  Past Surgical History:   Procedure Laterality Date     APPENDECTOMY       CATARACT IOL, RT/LT  1/19/09    right     HC EXCIS PRIMARY GANGLION WRIST         Prior to Admission Medications   Prior to Admission Medications   Prescriptions Last Dose Informant Patient Reported? Taking?   Calcium Carb-Cholecalciferol (CALCIUM-VITAMIN D) 500-400 MG-UNIT TABS  Self Yes No   Sig: Take 2 tablets by mouth every morning   Multiple Vitamins-Minerals (MULTIVITAMIN PO)  Self Yes No   Sig: Take 1 tablet by mouth every morning   Multiple Vitamins-Minerals (PRESERVISION AREDS PO)  Self Yes No   Sig: Take 1 capsule by mouth 2 times daily    XARELTO 20 MG TABS tablet  Self No No   Sig: TAKE 1 TABLET (20 MG) BY MOUTH DAILY (WITH DINNER)   acetaminophen (TYLENOL) 325 MG tablet  Self Yes No   Sig: Take 325-650 mg by mouth every 4 hours as needed for mild pain   ascorbic acid (VITAMIN C) 500 MG tablet  Self Yes No   Sig: Take 500 mg by mouth daily   bumetanide (BUMEX) 1 MG tablet  Self Yes No   Sig: Take 2 mg by mouth every morning    bumetanide (BUMEX) 1 MG tablet   Yes No   Sig: Take 1 mg by mouth every evening   lisinopril (PRINIVIL/ZESTRIL) 2.5 MG tablet   No No   Sig: Take 1 tablet (2.5 mg) by mouth daily   metoprolol succinate (TOPROL-XL) 100 MG 24 hr tablet   No No   Sig: Take 1 tablet (100 mg) by mouth 2 times daily   olopatadine (PATANOL) 0.1 % ophthalmic solution  Self Yes No   Sig: Place 1 drop into both eyes 2 times daily as needed for allergies During Spring    rOPINIRole (REQUIP) 1 MG tablet  Self Yes No   Sig: Take 2 mg by mouth At Bedtime   vitamin E 400 UNIT capsule  Self Yes No   Sig: Take 400 Units by mouth daily      Facility-Administered Medications: None     Allergies   Allergies   Allergen Reactions     Amoxicillin Rash     Codeine GI Disturbance     Stomach pain     Melatonin Nausea     Eyesburned       Social History   I have reviewed this patient's social history and updated it with pertinent information if needed. Ana Cristina Dominguez  reports that she quit smoking about 45 years ago. She has never used smokeless tobacco. She reports that she drinks alcohol. She reports that she does not use illicit drugs.    Family History   I have reviewed this patient's family history and updated it with pertinent information if needed.   Family History   Problem Relation Age of Onset     C.A.D. Father      Family History Negative Mother      Genitourinary Problems Sister      Renal failure     HEART DISEASE Sister      Rhythm issues       Review of Systems   The 10 point Review of Systems is negative other than noted in the HPI or here.     Physical Exam   Temp: 98.4  F (36.9  C) Temp src: Temporal BP: 133/82 Pulse: 143 Heart Rate: 129 Resp: 20 SpO2: 98 % O2 Device: Nasal cannula Oxygen Delivery: 2 LPM  Vital Signs with Ranges  Temp:  [98.4  F (36.9  C)] 98.4  F (36.9  C)  Pulse:  [143] 143  Heart Rate:  [125-138] 129  Resp:  [20] 20  BP: (118-140)/() 133/82  SpO2:  [96 %-98 %] 98 %  88 lbs 0 oz    Constitutional: NAD. Comfortable, well-developed   Eyes: Anicteric, no conjunctival injection  ENT: Atraumatic, membranes moist   Neck: Supple, trachea midline  Respiratory: No wheeze or crackles. Breathing comfortably on nasal cannula  Cardiovascular: S1S2, no edema  GI: Abdomen soft, non-tender  Skin: Warm, pink, dry  Neurologic: Alert and oriented. CN II - XII grossly intact. Left leg with jerking movements while awake and lying in bed  Psychiatric: Pleasant,  cooperative    Data   Data reviewed today:  I personally reviewed no images or EKG's today.    Recent Labs  Lab 06/21/18  1042 06/20/18  1108   WBC 7.3 7.5   HGB 11.9 11.9   MCV 92 92    335    141   POTASSIUM 3.8 3.7   CHLORIDE 103 101   CO2 32 32   BUN 46* 45*   CR 0.99 1.00   ANIONGAP 7 8   KARTHIK 9.0 9.5   * 109*   ALBUMIN 2.9*  --    PROTTOTAL 6.8  --    BILITOTAL 0.6  --    ALKPHOS 176*  --    ALT 34  --    AST 28  --    TROPI <0.015  --        Imaging:  No results found for this or any previous visit (from the past 24 hour(s)).

## 2018-06-21 NOTE — IP AVS SNAPSHOT
[place] [address] [facility]    [address]    [facility] [address]    Phone:  [phone]    Fax:  [phone]                                       After Visit Summary   6/21/2018    Ana Cristina Dominguez    MRN: [id number]           After Visit Summary Signature Page     I have received my discharge instructions, and my questions have been answered. I have discussed any challenges I see with this plan with the nurse or doctor.    ..........................................................................................................................................  Patient/Patient Representative Signature      ..........................................................................................................................................  Patient Representative Print Name and Relationship to Patient    ..................................................               ................................................  Date                                            Time    ..........................................................................................................................................  Reviewed by Signature/Title    ...................................................              ..............................................  Date                                                            Time

## 2018-06-21 NOTE — PLAN OF CARE
Problem: Cardiac: ACS (Acute Coronary Syndrome) (Adult)  Goal: Signs and Symptoms of Listed Potential Problems Will be Absent, Minimized or Managed (Cardiac: ACS)  Signs and symptoms of listed potential problems will be absent, minimized or managed by discharge/transition of care (reference Cardiac: ACS (Acute Coronary Syndrome) (Adult) CPG).   Outcome: Improving  Dx/story: Pt came in with son to ED with c/o SOB and fatigue. Admitted for atrial fibrilation w/ RVR. Pt was seen by her cardiologist yesterday.   Hx: Cardiomyopathy, HTN, HDL, and restless leg syndrome.   Labs: (K+/Mag protocol): Potassium 4.1 - replaced on AM. Recheck ordered for AM. Mag 2.7  Tele: A-fib w/ RVR   Assessment: Pt AOx4 w/ noted forgetfulness. [place]. Glasses. HR irregular. Pt on 3L of O2. Turned off 02 at HS & is maintaining 02 sats   >90 .  Edema to BLE.  dilt gtt changed to PO dilt & on Metoprolol .   Teaching: Spoke w/ pt and sons about diagnose and cardiac meds.   Plan: Possible discharge 2-3 days back to [facility], where she lives alone. Son(s) help care for her. Will order SS consult  D/t readmits & poss. Supportive services. Cards consulting - trying PO Cardiazem - may consider AV ablation or PPM if no   Improvement

## 2018-06-21 NOTE — PHARMACY-ADMISSION MEDICATION HISTORY
Admission medication history interview status for this patient is complete. See [facility] admission navigator for allergy information, prior to admission medications and immunization status.     Medication history interview source(s):Patient  Medication history resources (including written lists, pill bottles, clinic record):None  Primary pharmacy:[facility]    Changes made to PTA medication list:  Added: none  Deleted: patanol  Changed: none    Actions taken by pharmacist (provider contacted, etc):Called pharmacy for strengths     Additional medication history information:Pt's MD told her to stop diltiazem ER 12hr 120 mg BID yesterday so she stopped it yesterday.     Medication reconciliation/reorder completed by provider prior to medication history? Yes    Do you take OTC medications (eg tylenol, ibuprofen, fish oil, eye/ear drops, etc)? Y(Y/N)    For patients on insulin therapy: N (Y/N)    Time spent in this activity: 10 min    Prior to Admission medications    Medication Sig Last Dose Taking? Auth Provider   acetaminophen (TYLENOL) 325 MG tablet Take 325-650 mg by mouth every 4 hours as needed for mild pain 6/20/2018 at Unknown time Yes Unknown, Entered By History   ascorbic acid (VITAMIN C) 500 MG tablet Take 500 mg by mouth daily 6/20/2018 at Unknown time Yes Unknown, Entered By History   bumetanide (BUMEX) 1 MG tablet Take 2 mg by mouth every morning  6/20/2018 at Unknown time Yes Unknown, Entered By History   bumetanide (BUMEX) 1 MG tablet Take 1 mg by mouth every evening 6/20/2018 at Unknown time Yes Unknown, Entered By History   Calcium Carb-Cholecalciferol (CALCIUM-VITAMIN D) 500-400 MG-UNIT TABS Take 2 tablets by mouth every morning 6/20/2018 at Unknown time Yes Unknown, Entered By History   lisinopril (PRINIVIL/ZESTRIL) 2.5 MG tablet Take 1 tablet (2.5 mg) by mouth daily 6/20/2018 at Unknown time Yes Fabiano Bower MD   metoprolol succinate (TOPROL-XL) 100 MG 24 hr tablet Take 1 tablet (100 mg) by mouth 2  times daily 6/20/2018 at Unknown time Yes Fabiano Bower MD   Multiple Vitamins-Minerals (MULTIVITAMIN PO) Take 1 tablet by mouth every morning 6/20/2018 at Unknown time Yes Unknown, Entered By History   Multiple Vitamins-Minerals (PRESERVISION AREDS PO) Take 1 capsule by mouth 2 times daily  6/20/2018 at Unknown time Yes Reported, Patient   rOPINIRole (REQUIP) 0.5 MG tablet Take 0.5 mg by mouth nightly as needed Past Week at Unknown time Yes Unknown, Entered By History   vitamin E 400 UNIT capsule Take 400 Units by mouth daily 6/20/2018 at Unknown time Yes Unknown, Entered By History   XARELTO 20 MG TABS tablet TAKE 1 TABLET (20 MG) BY MOUTH DAILY (WITH DINNER) 6/20/2018 at Unknown time Yes Dez Urban MD

## 2018-06-21 NOTE — IP AVS SNAPSHOT
MRN:[id number]                      After Visit Summary   6/21/2018    Ana Cristina Dominguez    MRN: [id number]           Thank you!     Thank you for choosing [facility] for your care. Our goal is always to provide you with excellent care. Hearing back from our patients is one way we can continue to improve our services. Please take a few minutes to complete the written survey that you may receive in the mail after you visit. If you would like to speak to someone directly about your visit please contact Patient Relations at [phone]. Thank you!          Patient Information     Date Of Birth          6/6/1931        Designated Caregiver       Most Recent Value    Caregiver    Will someone help with your care after discharge? yes    Name of designated caregiver Luis    Phone number of caregiver [phone]    Caregiver address see facesheet      About your hospital stay     You were admitted on:  June 21, 2018 You last received care in the:  [facility]    You were discharged on:  June 26, 2018        Reason for your hospital stay       Afib with RVR, CHF                  Who to Call     For medical emergencies, please call 911.  For non-urgent questions about your medical care, please call your primary care provider or clinic, [phone]          Attending Provider     Provider Specialty    Radha Barbour MD Emergency Medicine    [facility], Balwinder Gibbons III, MD Internal Medicine       Primary Care Provider Office Phone # Fax #    Dez Urban [phone]      After Care Instructions     Activity       Your activity upon discharge: activity as tolerated            Diet       Follow this diet upon discharge: Orders Placed This Encounter      Combination Diet Regular Diet Adult; 2 gm NA Diet            Monitor and record       weight every day                  Follow-up Appointments     Follow-up and recommended labs and tests         Follow up with primary care provider, Dez Urban MD, within 7 days for hospital follow- up.  The following labs/tests are recommended: BMP 1 week result to PCP.  Follow up with cardiology 2 weeks.  Event monitor for 30 days.                  Additional Services     Home Care OT Referral for Hospital Discharge       OT to eval and treat    Your provider has ordered home care - occupational therapy. If you have not been contacted within 2 days of your discharge please call the department phone number listed on the top of this document.            Home Care PT Referral for Hospital Discharge       PT to eval and treat    Your provider has ordered home care - physical therapy. If you have not been contacted within 2 days of your discharge please call the department phone number listed on the top of this document.            Home Care Social Service Referral for Hospital Discharge        to social needs.    Your provider has ordered home care - . If you have not been contacted within 2 days of your discharge please call the department phone number listed on the top of this document.            Home care nursing referral       RN skilled nursing visit. RN to assess vital signs and weight, respiratory and cardiac status, patients ability to take and record daily blood pressure, temp and weight, pain level and activity tolerance and hydration, nutrition and bowel status.  Home health aide to bathing, cleaning and personal needs    Your provider has ordered home care nursing services. If you have not been contacted within 2 days of your discharge please call the inpatient department phone number at [phone] .                  Further instructions from your care team       You have an order for [facility] for RN, OT, PT & SW services. They will be contacting you to arrange the initial appointment in the next 24-48 hours. If you have not heard from them in that time, please  call them at [phone].     General Recommendations To Control Heart Failure When You Get Home       Heart Failure Instructions for Patients and Families: Please read and check off each of these important instructions as you do them when you get home.          Weight and Symptoms       ___ Put a scale in your bathroom.    ___ Post a weight chart or calendar next to your scale.    ___ Weigh yourself everyday as soon as you get up in the morning (before breakfast).  You should only be wearing your pajamas.  Write your weight on the chart/calendar.  ___ Bring your weight chart/calendar with you to all appointments.  ___ Call your doctor or nurse practitioner if you gain 2 pounds (in 1 day) or 5 pounds in (1 week) from your goal  good  weight.  Your good weight is also called your  dry  weight.  Your doctor or nurse will tell you what your good weight should be.    ___ Call your doctor or nurse practitioner if you have shortness of breath that gets worse over time, leg swelling or fatigue.       Medications and Diet       ___ Make sure to take your medication as prescribed.    ___ Bring a current list of your medication and all of your medicine bottles with you to all appointments.    ___ Limit fluids if you still have swelling or shortness of breath, or if your doctor tells you to do so.    ___ Eat less than 2000 mg of sodium (salt) every day. Read food labels, and do not add salt to meals. Remember, if you eat less salt you retain less fluid.  ___ Follow a heart healthy diet that is low in saturated fat.        Activity and Suggested Lifestyle Changes      ___ Stay active. Talk to your doctor about an exercise program that is safe for your heart.  ___ Stop smoking. Reduce alcohol use.      ___ Lose weight if you are overweight. Extra weight puts a lot of stress on the heart.                 Control for Leg Swelling     ___ Keep your legs elevated (raised) as needed for swelling. If swelling is uncomfortable or  elevation doesn t help, ask your doctor about using ACE wraps or support stockings.        What is the [facility]?  Cardiomyopathy  Optimization  Rehabilitation  Education    The [facility] is a heart failure specialty clinic within [facility].  It is an outpatient disease management program that is based on a phase-by-phase approach, which is tailored to each patient s individual needs.  The cardiologist, nurse practitioner, physician assistant and nurses provide an ongoing outpatient care and treatment plan that guides heart failure and cardiomyopathy patients from evaluation and education to stabilization. This team works with your current primary care doctor and cardiologist to help you:    - Avoid hospitalizations  - Slow the progression of the disease  - Improve length and quality of life  - Know who and when to call if heart failure symptoms appear  - Receive easy access to quality health care and advice  - Better understand your condition and treatment  - Decrease the tremendous cost burden of heart failure care  - Detect future heart problems before they become life threatening                                 Follow-up Appointments     ___ An appointment with the [facility] may already have been made for you (see section   Your next appointments already scheduled ).  If you have a question about your appointment, would like to speak with a [facility].O.R[facility] nurse, or would like to become a [facility] patient, please call one of the following locations:     - [facility]):                                             [phone]  - [facility]):                                            [phone]  - [facility]):                 [phone]      ___ Your [facility] Clinic Team will continue to educate you on your heart failure and may adjust medications based on your vital signs, lab work, and how  you are feeling.  Therefore, it is very important to bring the following to all [facility].O.R.E[facility] appointments:    - An accurate list of your medications  - Your medicine bottles  - Your weight chart/calendar                   Pending Results     Date and Time Order Name Status Description    6/26/2018 0950 Zio Patch Holter In process             Statement of Approval     Ordered          06/26/18 1134  I have reviewed and agree with all the recommendations and orders detailed in this document.  EFFECTIVE NOW     Approved and electronically signed by:  Indra Mccann MD             Admission Information     Date & Time Provider Department Dept. Phone    6/21/2018 Balwinder Wallace III, MD [place] [address] Medical Surgical [phone]      Your Vitals Were     Blood Pressure Pulse Temperature Respirations Height Weight    151/63 (BP Location: Right arm) 79 98.1  F (36.7  C) (Oral) 28 1.524 m (5') 39.3 kg (86 lb 9.6 oz)    Pulse Oximetry BMI (Body Mass Index)                93% 16.91 kg/m2          [employer] Information     [employer] gives you secure access to your electronic health record. If you see a primary care provider, you can also send messages to your care team and make appointments. If you have questions, please call your primary care clinic.  If you do not have a primary care provider, please call [phone] and they will assist you.        Care EveryWhere ID     This is your Care EveryWhere ID. This could be used by other organizations to access your [place] medical records  [id number]        Equal Access to Services     ABBE ESPINOSA AH: Hadii shanelle Joe, waaxda lupascaleadaha, qaybta kaalmada nessa, rinku obrien. So [facility] [phone].    ATENCIÓN: Si habla español, tiene a dominguez disposición servicios gratuitos de asistencia lingüística. Llame al [phone].    We comply with applicable federal civil rights laws and [place] laws. We do not discriminate on the  basis of race, color, national origin, age, disability, sex, sexual orientation, or gender identity.               Review of your medicines      START taking        Dose / Directions    digoxin 125 MCG tablet   Commonly known as:  LANOXIN        Dose:  125 mcg   Start taking on:  6/27/2018   Take 1 tablet (125 mcg) by mouth daily   Quantity:  30 tablet   Refills:  1       diltiazem 240 MG 24 hr capsule        Dose:  240 mg   Start taking on:  6/27/2018   Take 1 capsule (240 mg) by mouth daily   Quantity:  30 capsule   Refills:  1         CONTINUE these medicines which have NOT CHANGED        Dose / Directions    acetaminophen 325 MG tablet   Commonly known as:  TYLENOL        Dose:  325-650 mg   Take 325-650 mg by mouth every 4 hours as needed for mild pain   Refills:  0       ascorbic acid 500 MG tablet   Commonly known as:  VITAMIN C        Dose:  500 mg   Take 500 mg by mouth daily   Refills:  0       * bumetanide 1 MG tablet   Commonly known as:  BUMEX        Dose:  2 mg   Take 2 mg by mouth every morning   Refills:  0       * bumetanide 1 MG tablet   Commonly known as:  BUMEX        Dose:  1 mg   Take 1 mg by mouth every evening   Refills:  0       Calcium-Vitamin D 500-400 MG-UNIT Tabs        Dose:  2 tablet   Take 2 tablets by mouth every morning   Refills:  0       lisinopril 2.5 MG tablet   Commonly known as:  PRINIVIL/Zestril   Used for:  Chronic systolic congestive heart failure (H)        Dose:  2.5 mg   Take 1 tablet (2.5 mg) by mouth daily   Quantity:  30 tablet   Refills:  3       metoprolol succinate 100 MG 24 hr tablet   Commonly known as:  TOPROL-XL   Used for:  Essential hypertension, Chronic atrial fibrillation (H)        Dose:  100 mg   Take 1 tablet (100 mg) by mouth 2 times daily   Quantity:  60 tablet   Refills:  11       * PRESERVISION AREDS PO        Dose:  1 capsule   Take 1 capsule by mouth 2 times daily   Refills:  0       * MULTIVITAMIN PO        Dose:  1 tablet   Take 1 tablet by mouth  every morning   Refills:  0       REQUIP 0.5 MG tablet   Generic drug:  rOPINIRole        Dose:  0.5 mg   Take 0.5 mg by mouth nightly as needed   Refills:  0       vitamin E 400 UNIT capsule        Dose:  400 Units   Take 400 Units by mouth daily   Refills:  0       XARELTO 20 MG Tabs tablet   Used for:  Chronic atrial fibrillation (H)   Generic drug:  rivaroxaban ANTICOAGULANT        TAKE 1 TABLET (20 MG) BY MOUTH DAILY (WITH DINNER)   Quantity:  30 tablet   Refills:  8       * Notice:  This list has 4 medication(s) that are the same as other medications prescribed for you. Read the directions carefully, and ask your doctor or other care provider to review them with you.         Where to get your medicines      These medications were sent to [facility] [address] [facility]  [address] [facility] [address]     Phone:  [phone]     digoxin 125 MCG tablet    diltiazem 240 MG 24 hr capsule                Protect others around you: Learn how to safely use, store and throw away your medicines at www.disposemymeds.org.             Medication List: This is a list of all your medications and when to take them. Check marks below indicate your daily home schedule. Keep this list as a reference.      Medications           Morning Afternoon Evening Bedtime As Needed    acetaminophen 325 MG tablet   Commonly known as:  TYLENOL   Take 325-650 mg by mouth every 4 hours as needed for mild pain   Last time this was given:  650 mg on 6/26/2018 12:03 AM                                   ascorbic acid 500 MG tablet   Commonly known as:  VITAMIN C   Take 500 mg by mouth daily                                   * bumetanide 1 MG tablet   Commonly known as:  BUMEX   Take 2 mg by mouth every morning   Last time this was given:  2 mg on 6/26/2018  8:24 AM   Next Dose Due:  Tomorrow am 6/27                                   * bumetanide 1 MG tablet   Commonly known as:  BUMEX   Take 1 mg  by mouth every evening   Last time this was given:  2 mg on 6/26/2018  8:24 AM   Last time this was given:  This evening 6/27                                   Calcium-Vitamin D 500-400 MG-UNIT Tabs   Take 2 tablets by mouth every morning                                   digoxin 125 MCG tablet   Commonly known as:  LANOXIN   Take 1 tablet (125 mcg) by mouth daily   Start taking on:  6/27/2018   Last time this was given:  125 mcg on 6/26/2018  8:24 AM                                   diltiazem 240 MG 24 hr capsule   Take 1 capsule (240 mg) by mouth daily   Start taking on:  6/27/2018   Last time this was given:  240 mg on 6/26/2018  8:24 AM                                   lisinopril 2.5 MG tablet   Commonly known as:  PRINIVIL/Zestril   Take 1 tablet (2.5 mg) by mouth daily   Last time this was given:  2.5 mg on 6/26/2018  8:25 AM   Next Dose Due:  Tomorrow am 6/27                                   metoprolol succinate 100 MG 24 hr tablet   Commonly known as:  TOPROL-XL   Take 1 tablet (100 mg) by mouth 2 times daily   Last time this was given:  100 mg on 6/26/2018  8:24 AM   Next Dose Due:  This evening 6/26                                   * PRESERVISION AREDS PO   Take 1 capsule by mouth 2 times daily                                   * MULTIVITAMIN PO   Take 1 tablet by mouth every morning                                   REQUIP 0.5 MG tablet   Take 0.5 mg by mouth nightly as needed   Generic drug:  rOPINIRole                                   vitamin E 400 UNIT capsule   Take 400 Units by mouth daily                                   XARELTO 20 MG Tabs tablet   TAKE 1 TABLET (20 MG) BY MOUTH DAILY (WITH DINNER)   Last time this was given:  15 mg on 6/25/2018  4:53 PM   Generic drug:  rivaroxaban ANTICOAGULANT   Next Dose Due:  This evening 6/26                                   * Notice:  This list has 4 medication(s) that are the same as other medications prescribed for you. Read the directions carefully,  and ask your doctor or other care provider to review them with you.              More Information        Understanding Atrial Fibrillation    An arrhythmia is any problem with the speed or pattern of the heartbeat. Atrial fibrillation (AFib) is a common type of arrhythmia. It causes fast, chaotic electrical signals in the atria. This leads to poor functioning of the heart. It also affects how much blood your heart can pump out to the body.  Afib may occur once in a while and go away on its own. Or it may continue for longer periods and need treatment.  AFib can lead to serious problems, such as stroke. Your healthcare provider will need to monitor and manage it.  What happens during atrial fibrillation?   The heart has an electrical system that sends signals to control the heartbeat. As signals move through the heart, they tell the heart s upper chambers (atria) and lower chambers (ventricles) when to squeeze (contract) and relax. This lets blood move through the heart and out to the body and lungs.  With AFib, the atria receive abnormal signals. This causes them to contract in a fast and irregular way, and out of sync with the ventricles. When this happens, the atria also have a harder time moving blood into the ventricles. Blood may then pool in the atria, which increases the risk for blood clots and stroke. The ventricles also may contract too quickly and irregularly. As a result, they may not pump blood to the body and lungs as well as they should. This can weaken the heart muscle over time and cause heart failure.  What causes atrial fibrillation?  AFib is more common in older adults. It has many possible causes including:    Coronary artery disease    Heart valve disease    Heart attack    Heart surgery    High blood pressure    Thyroid disease    Diabetes    Lung disease    Sleep apnea    Heavy alcohol use  In some cases of AFib, doctors do not know the cause.  What are the symptoms of atrial  fibrillation?  AFib may or may not cause symptoms. If symptoms do occur, they may include:    A fast, pounding, irregular heartbeat    Shortness of breath    Tiredness    Dizziness or fainting    Chest pain  How is atrial fibrillation treated?  Treatments for AFib can include any of the options below.    Medicines. You may be prescribed:  ? Heart rate medicines to help slow down the heartbeat  ? Heart rhythm medicines to help the heart beat more regularly  ? Anti-clotting medicines to help reduce the risk for blood clots and stroke    Electrical cardioversion. Your healthcare provider uses special pads or paddles to send one or more brief electrical shocks to the heart. This can help reset the heartbeat to normal.    Ablation. Long, thin tubes called catheters are threaded through a blood vessel to the heart. There, the catheters send out hot or cold energy to the areas causing the abnormal signals. This energy destroys the problem tissue or cells. This improves the chances that your heart will stay in normal rhythm without using medicines. If your heart rate and rhythm can t be controlled, you may need ablation and a pacemaker. These will help control the heart rate and regularity of the heartbeat.    Surgery. During surgery, your healthcare provider may use different methods to create scar tissue in the areas of the heart causing the abnormal signals. The scar tissue disrupts the abnormal signals and may stop AFib from occurring.  What are the complications of atrial fibrillation?  These can include:    Blood clots    Stroke    Heart failure. This problem occurs when the heart muscle weakens so much that it can no longer pump blood well.  When should I call my healthcare provider?  Call your healthcare provider right away if you have any of these:    Symptoms that don t get better with treatment, or get worse    New symptoms   Date Last Reviewed: 5/1/2016 2000-2017 [employer]. [address] [facility] [facility] [address] [address], [place] [address]. All rights reserved. This information is not intended as a substitute for professional medical care. Always follow your healthcare professional's instructions.                Discharge Instructions for Atrial Fibrillation  You have been diagnosed with an abnormal heart rhythm called atrial fibrillation. With this condition, your heart s 2 upper chambers quiver rather than squeeze the blood out in a normal pattern. This leads to an irregular and sometimes rapid heartbeat. Some people will develop associated symptoms such as a flip-flopping heartbeat, chest pain, lightheadedness, or shortness of breath. Other people may have no symptoms at all. Atrial fibrillation is serious because it affects the heart s ability to fill with blood as it should. Blood clots may form. This increases the risk for stroke. Untreated atrial fibrillation can also lead to heart failure. Atrial fibrillation can be controlled. With treatment, most people with atrial fibrillation lead normal lives.  Treatment options  Recommended treatment for atrial fibrillation depends on your age, symptoms, how long you have had atrial fibrillation, and other factors. You will have a complete evaluation to find out if you have any abnormalities that caused your heart to go into atrial fibrillation. This might be blocked heart arteries or a thyroid problem. Your doctor will assess your particular case and discuss choices with you.  Treatment choices may include:    Treating an underlying disorder that puts you at risk for atrial fibrillation. For example, correcting an abnormal thyroid or electrolyte problem, or treating a blocked heart artery.    Restoring a normal heart rhythm with an electrical shock (cardioversion) or with an antiarrhythmic medicine (chemical cardioversion)    Using medicine to control your heart rate in atrial fibrillation.    Preventing the risk for blood clot and stroke using blood-thinning medicines. Your  doctor will tell you what he or she recommends. Choices may include aspirin, clopidogrel, warfarin, dabigatran, rivaroxaban, apixaban, and edoxaban.    Doing catheter ablation or a surgical maze procedure. These use different methods to destroy certain areas of heart tissue. This interrupts the electrical signals causing atrial fibrillation. One of these procedures may be a choice when medicines do not work, or as an alternative to long-term medicine.    Other treatment choices may be recommended for you by your doctor.  Managing risk factors for stroke and preventing heart failure are important parts of any treatment plan for atrial fibrillation.  Home care    Take your medicines exactly as directed. Don t skip doses.    Work with your doctor to find the right medicines and doses for you.    Learn to take your own pulse. Keep a record of your results. Ask your doctor which pulse rates mean that you need medical attention. Slowing your pulse is often the goal of treatment. Ask your doctor if it s OK for you to use an automatic machine to check your pulse at home. Sometimes these machines don t count the pulse correctly when you have atrial fibrillation.    Limit your intake of coffee, tea, cola, and other beverages with caffeine. Talk with your doctor about whether you should eliminate caffeine.    Avoid over-the-counter medicines that have caffeine in them.    Let your doctor know what medicines you take, including prescription and over-the-counter medicines, as well as any supplements. They interfere with some medicines given for atrial fibrillation.    Ask your doctor about whether you can drink alcohol. Some people need to avoid alcohol to better treat atrial fibrillation. If you are taking blood-thinner medicines, alcohol may interfere with them by increasing their effect.    Never take stimulants such as amphetamines or cocaine. These drugs can speed up your heart rate and trigger atrial  fibrillation.  Follow-up care  Follow up with your doctor, or as advised.     When should I call my healthcare provider  Call your healthcare provider right away if you have any of the following:    Weakness    Dizziness    Fainting    Fatigue    Shortness of breath    Chest pain with increased activity    A change in the usual regularity of your heartbeat, or an unusually fast heartbeat   Date Last Reviewed: 4/23/2016 2000-2017 [employer]. [address] [place] [address], [place] [address]. All rights reserved. This information is not intended as a substitute for professional medical care. Always follow your healthcare professional's instructions.                Patient Education    Diltiazem Hydrochloride Oral capsule, extended-release    Diltiazem Hydrochloride Oral tablet    Diltiazem Hydrochloride Oral tablet, extended-release    Diltiazem Hydrochloride Solution for injection    Diltiazem Maleate Oral tablet, extended-release  Diltiazem Hydrochloride Oral capsule, extended-release  What is this medicine?  DILTIAZEM (dil ALFRED a zem) is a calcium-channel blocker. It affects the amount of calcium found in your heart and muscle cells. This relaxes your blood vessels, which can reduce the amount of work the heart has to do. This medicine is used to treat high blood pressure and chest pain caused by angina.  This medicine may be used for other purposes; ask your health care provider or pharmacist if you have questions.  What should I tell my health care provider before I take this medicine?  They need to know if you have any of these conditions:    heart problems, low blood pressure, irregular heartbeat    liver disease    previous heart attack    an unusual or allergic reaction to diltiazem, other medicines, foods, dyes, or preservatives    pregnant or trying to get pregnant    breast-feeding  How should I use this medicine?  Take this medicine by mouth with a glass of water. Follow the directions on the  prescription label. Swallow whole, do not crush or chew. Ask your doctor or pharmacist if your should take this medicine with food. Take your doses at regular intervals. Do not take your medicine more often then directed. Do not stop taking except on the advice of your doctor or health care professional. Ask your doctor or health care professional how to gradually reduce the dose.  Talk to your pediatrician regarding the use of this medicine in children. Special care may be needed.  Overdosage: If you think you have taken too much of this medicine contact a poison control center or emergency room at once.  NOTE: This medicine is only for you. Do not share this medicine with others.  What if I miss a dose?  If you miss a dose, take it as soon as you can. If it is almost time for your next dose, take only that dose. Do not take double or extra doses.  What may interact with this medicine?  Do not take this medicine with any of the following medications:    cisapride    hawthorn    pimozide    ranolazine    red yeast rice  This medicine may also interact with the following medications:    buspirone    carbamazepine    cimetidine    cyclosporine    digoxin    local anesthetics or general anesthetics    lovastatin    medicines for anxiety or difficulty sleeping like midazolam and triazolam    medicines for high blood pressure or heart problems    quinidine    rifampin, rifabutin, or rifapentine  This list may not describe all possible interactions. Give your health care provider a list of all the medicines, herbs, non-prescription drugs, or dietary supplements you use. Also tell them if you smoke, drink alcohol, or use illegal drugs. Some items may interact with your medicine.  What should I watch for while using this medicine?  Check your blood pressure and pulse rate regularly. Ask your doctor or health care professional what your blood pressure and pulse rate should be and when you should contact him or her.  You may  feel dizzy or lightheaded. Do not drive, use machinery, or do anything that needs mental alertness until you know how this medicine affects you. To reduce the risk of dizzy or fainting spells, do not sit or stand up quickly, especially if you are an older patient. Alcohol can make you more dizzy or increase flushing and rapid heartbeats. Avoid alcoholic drinks.  What side effects may I notice from receiving this medicine?  Side effects that you should report to your doctor or health care professional as soon as possible:    allergic reactions like skin rash, itching or hives, swelling of the face, lips, or tongue    confusion, mental depression    feeling faint or lightheaded, falls    redness, blistering, peeling or loosening of the skin, including inside the mouth    slow, irregular heartbeat    swelling of the feet and ankles    unusual bleeding or bruising, pinpoint red spots on the skin  Side effects that usually do not require medical attention (report to your doctor or health care professional if they continue or are bothersome):    constipation or diarrhea    difficulty sleeping    facial flushing    headache    nausea, vomiting    sexual dysfunction    weak or tired  This list may not describe all possible side effects. Call your doctor for medical advice about side effects. You may report side effects to FDA at 7-715-FDA-5926.  Where should I keep my medicine?  Keep out of the reach of children.  Store at room temperature between 15 and 30 degrees C (59 and 86 degrees F). Protect from humidity. Throw away any unused medicine after the expiration date.  NOTE:This sheet is a summary. It may not cover all possible information. If you have questions about this medicine, talk to your doctor, pharmacist, or health care provider. Copyright  2016 [employer] Standard                Metoprolol extended-release tablets  Brand Names: toprol, Toprol XL  What is this medicine?  METOPROLOL (me TOE proe lole) is a beta-blocker.  Beta-blockers reduce the workload on the heart and help it to beat more regularly. This medicine is used to treat high blood pressure and to prevent chest pain. It is also used to after a heart attack and to prevent an additional heart attack from occurring.  How should I use this medicine?  Take this medicine by mouth with a glass of water. Follow the directions on the prescription label. Do not crush or chew. Take this medicine with or immediately after meals. Take your doses at regular intervals. Do not take more medicine than directed. Do not stop taking this medicine suddenly. This could lead to serious heart-related effects.  Talk to your pediatrician regarding the use of this medicine in children. While this drug may be prescribed for children as young as 6 years for selected conditions, precautions do apply.  What side effects may I notice from receiving this medicine?  Side effects that you should report to your doctor or health care professional as soon as possible:    allergic reactions like skin rash, itching or hives    cold or numb hands or feet    depression    difficulty breathing    faint    fever with sore throat    irregular heartbeat, chest pain    rapid weight gain    swollen legs or ankles  Side effects that usually do not require medical attention (report to your doctor or health care professional if they continue or are bothersome):    anxiety or nervousness    change in sex drive or performance    dry skin    headache    nightmares or trouble sleeping    short term memory loss    stomach upset or diarrhea    unusually tired  What may interact with this medicine?  This medicine may interact with the following medications:    certain medicines for blood pressure, heart disease, irregular heart beat    certain medicines for depression, like monoamine oxidase (MAO) inhibitors, fluoxetine, or paroxetine    clonidine    dobutamine    epinephrine    isoproterenol    reserpine  What if I miss a  dose?  If you miss a dose, take it as soon as you can. If it is almost time for your next dose, take only that dose. Do not take double or extra doses.  Where should I keep my medicine?  Keep out of the reach of children.  Store at room temperature between 15 and 30 degrees C (59 and 86 degrees F). Throw away any unused medicine after the expiration date.  What should I tell my health care provider before I take this medicine?  They need to know if you have any of these conditions:    diabetes    heart or vessel disease like slow heart rate, worsening heart failure, heart block, sick sinus syndrome or Raynaud's disease    kidney disease    liver disease    lung or breathing disease, like asthma or emphysema    pheochromocytoma    thyroid disease    an unusual or allergic reaction to metoprolol, other beta-blockers, medicines, foods, dyes, or preservatives    pregnant or trying to get pregnant    breast-feeding  What should I watch for while using this medicine?  Visit your doctor or health care professional for regular check ups. Contact your doctor right away if your symptoms worsen. Check your blood pressure and pulse rate regularly. Ask your health care professional what your blood pressure and pulse rate should be, and when you should contact them.  You may get drowsy or dizzy. Do not drive, use machinery, or do anything that needs mental alertness until you know how this medicine affects you. Do not sit or stand up quickly, especially if you are an older patient. This reduces the risk of dizzy or fainting spells. Contact your doctor if these symptoms continue. Alcohol may interfere with the effect of this medicine. Avoid alcoholic drinks.  NOTE:This sheet is a summary. It may not cover all possible information. If you have questions about this medicine, talk to your doctor, pharmacist, or health care provider. Copyright  2018 [employer]vier

## 2018-06-22 NOTE — PROGRESS NOTES
[facility]  Hospitalist Progress Note  Indra Mccann MD 06/22/2018    Reason for Stay (Diagnosis): Shortness of breath         Assessment and Plan:      Summary of Stay: Ana Cristina Dominguez is a 87 year old female with history of arterial fibrillation on Xarelto, hypertension, dyslipidemia, restless leg syndrome, systolic congestive heart failure, nonischemic cardiomyopathy, who presented with worsening shortness of breath on exertion worsening fatigue and bilateral pleural effusion on x-ray and admitted to the hospital with A. fib with RVR on 6/21/2018.    Problem List:   1.  AFib with RVR.  -Continue Toprol XL at 100mg BID  -Continue Cardizem 180 mg p.o. daily, titrate up as blood pressure allows for tachycardia.  -She was on Cardizem drip which was stopped  -Monitor electrolytes, keep K>4, Mg>2     2.  Acute on Chronic Diastolic CHF with Pleural Effusions  -Cardiology consulted.  -She had a dose of lasix IV in the ED  -Continue Bumex 2 mg p.o. Daily  -Daily weights, input and output  -Wean oxygen off as tolerated  -Outpatient cardiologist mentioned possible thoracentesis for effusions, she would prefer medical management but if needed would undergo IR thoracentesis.  -We will repeat x-ray tomorrow and check the pleural effusion size.     3.  Hypertension  - Continue Lisinopril     4.  Restless Leg Syndrome  -Increased her home dose  Requip from 0.5 to 1 mg, does not seem to help before but this as needed dose.    # Pain Assessment:  Current Pain Score 6/22/2018   Patient currently in pain? denies   Pain score (0-10) -   Pain location -   Pain descriptors -   Ana Cristina odell pain level was assessed and she currently denies pain.        DVT Prophylaxis: Xarelto  Code Status: Full Code  Discharge Dispo: Home  Estimated Disch Date / # of Days until Disch: 2 days, if continues to improve        Interval History (Subjective):      Patient seen and examined, assumed care today, feels better, no nausea  or vomiting.  Shortness of breath improved                  Physical Exam:      Last Vital Signs:  /56 (BP Location: Right arm)  Pulse 111  Temp 96.6  F (35.9  C) (Oral)  Resp 20  Ht 1.524 m (5')  Wt 39.9 kg (88 lb)  SpO2 97%  BMI 17.19 kg/m2    I/O last 3 completed shifts:  In: -   Out: 200 [Urine:200]  Wt Readings from Last 1 Encounters:   06/21/18 39.9 kg (88 lb)     Current Facility-Administered Medications   Medication     acetaminophen (TYLENOL) tablet 650 mg     [START ON 6/23/2018] bumetanide (BUMEX) tablet 2 mg     diltiazem 24 hr capsule 180 mg     lisinopril (PRINIVIL/Zestril) tablet 2.5 mg     magnesium sulfate 2 g in water intermittent infusion     magnesium sulfate 4 g in 100 mL sterile water (premade)     melatonin tablet 1 mg     metoprolol succinate (TOPROL-XL) 24 hr tablet 100 mg     naloxone (NARCAN) injection 0.1-0.4 mg     Patient is already receiving anticoagulation with heparin, enoxaparin (LOVENOX), warfarin (COUMADIN)  or other anticoagulant medication     polyethylene glycol (MIRALAX/GLYCOLAX) Packet 17 g     potassium chloride (KLOR-CON) Packet 20-40 mEq     potassium chloride 10 mEq in 100 mL intermittent infusion with 10 mg lidocaine     potassium chloride 10 mEq in 100 mL sterile water intermittent infusion (premix)     potassium chloride SA (K-DUR/KLOR-CON M) CR tablet 20-40 mEq     rivaroxaban ANTICOAGULANT (XARELTO) tablet 15 mg     rOPINIRole (REQUIP) tablet 0.5 mg       Constitutional: Awake, alert, cooperative, no apparent distress   Respiratory:  Good air entry anteriorly, decreased breath sound basal more on the right side. no crackles or wheezing   Cardiovascular: Regular rate and rhythm, normal S1 and S2, and no + systolic and diastolic murmur noted   Abdomen: Normal bowel sounds, soft, non-distended, non-tender   Skin: No rashes, no cyanosis, dry to touch   Neuro: Alert and oriented x3, no weakness, numbness, memory loss   Extremities: No edema, normal range of  motion   Other(s):HEENT Pink, un icteric, moist mucosa.       All other systems: Negative          Medications:      All current medications were reviewed with changes reflected in problem list.         Data:      All new lab and imaging data was reviewed.   Labs:    Recent Labs  Lab 06/22/18  0719 06/21/18  1042 06/20/18  1108    142 141   POTASSIUM 4.3 3.8 3.7   CHLORIDE 103 103 101   CO2 31 32 32   ANIONGAP 7 7 8   * 119* 109*   BUN 51* 46* 45*   CR 1.08* 0.99 1.00   GFRESTIMATED 48* 53* 52*   GFRESTBLACK 58* 64 63   KARTHIK 8.9 9.0 9.5       Recent Labs  Lab 06/22/18  0719 06/21/18  1042 06/20/18  1108   WBC 7.6 7.3 7.5   HGB 11.9 11.9 11.9   HCT 38.5 38.0 38.0   MCV 91 92 92    306 335      Imaging:   Results for orders placed or performed in visit on 06/20/18   X-ray Chest 2 vws*    Narrative    XR CHEST 2 VW  6/20/2018 11:12 AM    HISTORY:  Chronic systolic congestive heart failure.    COMPARISON:  6/4/2018.      Impression    IMPRESSION:  Small right and moderate left pleural effusions,  increased on the left and new on the right since the prior exam.  Cardiac silhouette is obscured. Upper lungs are clear other than  probable mild atelectasis at both lung bases. Coronary vasculature  within normal limits.     ADELSO COTTER MD

## 2018-06-22 NOTE — PLAN OF CARE
Problem: Cardiac: ACS (Acute Coronary Syndrome) (Adult)  Goal: Signs and Symptoms of Listed Potential Problems Will be Absent, Minimized or Managed (Cardiac: ACS)  Signs and symptoms of listed potential problems will be absent, minimized or managed by discharge/transition of care (reference Cardiac: ACS (Acute Coronary Syndrome) (Adult) CPG).   Outcome: No Change  VSS, other than HR continues to be elevated.  Left on 0.5-1L NC to help with increased HR.  Pt desats to high 80 percents with activity or on RA.  Denies pain.  Hx RLS.  Up in chair for meals.  SBA; at times declines walker.   cc plus.  Fair oral intake.  Minimal FINA.  Afib 100-120's this shift.  See eMar for med adjustments.  Continue to monitor.  Pt very [place] even with hearing aids.  Possible surgical intervention on Monday if symptoms do not improve.  Sons supportive in cares.  SW posted for possible discharge needs.  Pt lives alone.    Heart Failure Care Pathway  GOALS TO BE MET BEFORE DISCHARGE:    1. Decrease congestion and/or edema with diuretic therapy to achieve near      optimal volume status.            Dyspnea improved:  No, please explain: SOB with activity            Edema improved:     Yes        Net I/O and Weights since admission:          05/23 2300 - 06/22 2259  In: 200 [P.O.:200]  Out: 870 [Urine:870]  Net: -670            Vitals:    06/21/18 1001   Weight: 39.9 kg (88 lb)       2.  O2 sats > 92% on RA or at prior home O2 therapy level.          Current oxygenation status:       SpO2: 97 %         O2 Device: Nasal cannula,  Oxygen Delivery: 1/2 LPM         Able to wean O2 this shift to keep sats > 92%:  No, please explain: SOB and desat with activity       Does patient use Home O2? No    3.  Tolerates ambulation and mobility near baseline: No, please explain: SOB with activity        How many times did the patient ambulate with nursing staff this shift? Just to BR and back to bed or chair.  She was up for all her meals.  Please  review the Heart Failure Care Pathway for additional HF goal outcomes.    Nancy Hung RN  6/22/2018

## 2018-06-22 NOTE — PROGRESS NOTES
[facility]  Cardiology Progress Note    Outpatient cardiologist: Dr. Bower    Date of Service (when I saw the patient): 06/22/2018    Summary:  Ana Cristina Dominguez is a 87 year old female with history of paroxysmal atrial fibrillation maintained on anticoagulant therapy, hypertension, hyperlipidemia, AS/TR/MR (moderate AS and TR on echo 5/2017; moderately severe MR and TR and moderate to severe AS on echo 3/2018), aortic dilatation (mildly dilated 4.4 cm on echo 3/2018), RLS, PMR, who was admitted on 6/21/2018 with continued SOB.     In March she developed peripheral edema which improved with the use of steroids and was felt to be secondary to PMR.    Echo 3/21/18 showed atrial fibrillation with a reduced ejection fraction of 35%-40% (was 55-60% on echo 5/22/17), moderate to moderately severe mitral regurgitation, moderately severe tricuspid regurgitation, moderate to moderately severe aortic regurgitation and mild aortic root dilatation.  The ascending aorta was also mildly dilated at 4.4 cm.  The patient was in atrial fibrillation with rapid ventricular response.       In May, she began experiencing worsening weakness and fatigue with exhaustion with minimal activity.  A Lexiscan nuclear stress test 5/14/2018 demonstrated no evidence of cardiac ischemia or infarction and left ventricular ejection fraction was 39% with mild global hypokinesis.  US LE uled out DVT.     ED on 6/3/18 for SOB.  Chest CT negative for PE. She was in afib with RVR.  Toprol XL increase to 100 mg in AM and 50 mg in PM.     She saw Dr. Bower 6/20/18 for continued worsening SOB.  CXR showed a small, new, right pleural effusion and moderate left pleural effusion.  There was no e/o CHF, however.  She was in atrial fibrillation with rapid ventricular response.  Toprol XL was increased to 100 mg BID. Plans were for a Holter to assess rate control.     She felt worse 6/21/18, so presented to the ER.  EKG in the emergency room  again demonstrated atrial fibrillation or possible atrial flutter with rapid ventricular response and a heart rate of 133 beats per minute.  Poor R-wave progression across the anterior precordium and left axis deviation with possible inferior wall infarct.  There was nonspecific scooping of the ST waves in the lateral leads.       Interval History   Tele: Afib, rates still 100s-120s mostly     Slept better last night.  Her breathing is better today.  Does not notice palpitations. No chest pain.        Assessment & Plan    Cardiomyopathy  - Sxs since March 2018  - Echo 3/218 shows decline in LVEF to 35-40% from 55-60% on 5/2017 echo  - Nuc 5/2018 negative for ischemia  - Suspect due to afib with RVR  - CXR 6/20/18:  Small right and moderate left pleural effusions, increased on the left and new on the right since the prior exam.  Pulm vasculature within normal limits.   - NT pro BNP 11,601  - Currently on IV Lasix 40 mg BID.  Weight not done yet today.  BUN and creat are mildly elevated but no sig change since admission.  Breathing is better.  Trial switching to PO Lasix 40 mg daily while still with faster rates.  Likely won't need much diuretic/or any at all once she has adequate rate control.   - Currently on Toprol  mg BID, lisinopril 2.5 mg    PAF with RVR  - PTA was on Toprol XL, increasing doses recently  - Dilt gtt was started.  As of this AM, on PO dilt 180 mg and Toprol  mg BID with rates still 100-120s at rest. She feels relatively ok with some diuresis. But we cannot allow her to remain in RVR.    - Avoiding dig for now given the recent studies indicating higher mortality rate with the introduction of this drug also given her age/size.   -  BP limits further titration of dilt  - Likely will need AV node ablation and permanent pacemaker. She is more open to this now after discussion with her family  - She is on Xarelto 15 mg.     MR  TR  AS  - Worsening in setting of CHF  - Diuresing  - Will need  repeat echo once euvolemic    Restless leg syndrome  - Requip stopped per Dr. Bower  - She may benefit from a neurologic evaluation      Carlton Carballo PA-C      Patient Active Problem List   Diagnosis     Fracture, pelvis closed (H)     HYPERLIPIDEMIA LDL GOAL <160     Achilles bursitis or tendinitis     Senile osteoporosis     RLS (restless legs syndrome)     Abnormal blood sugar     Atrial fibrillation (H)     HTN (hypertension)     Diarrhea     Edema, unspecified type     Pain in both knees, unspecified chronicity     Anemia     Normochromic normocytic anemia     PMR (polymyalgia rheumatica) (H)     MGUS (monoclonal gammopathy of unknown significance)     Chronic diastolic congestive heart failure (H)     A-fib (H)     Atrial fibrillation with RVR (H)       Physical Exam   Temp: 97.4  F (36.3  C) Temp src: Oral BP: 109/53 Pulse: 111 Heart Rate: 110 Resp: 20 SpO2: 94 % O2 Device: Nasal cannula Oxygen Delivery: 1/2 LPM  Vitals:    06/21/18 1001   Weight: 39.9 kg (88 lb)     Vital Signs with Ranges  Temp:  [97.2  F (36.2  C)-98.6  F (37  C)] 97.4  F (36.3  C)  Pulse:  [101-111] 111  Heart Rate:  [] 110  Resp:  [16-24] 20  BP: (103-140)/() 109/53  SpO2:  [92 %-99 %] 94 %  I/O last 3 completed shifts:  In: -   Out: 200 [Urine:200]    Constitutional: NAD.   Respiratory: Decreased at bases bilaterally  Cardiovascular: tachy, irr irr, distant heart tones, soft systolic murmur LUSB, LLSB, apex.  No edema  GI: soft, BS+  Skin: warm, no rashes  Musculoskeletal: Moving all extremities  Neurologic: Alert, oriented x 3  Neuropsychiatric: Normal affect       Data     Recent Labs  Lab 06/22/18  0719 06/21/18  1042 06/20/18  1108   WBC 7.6 7.3 7.5   HGB 11.9 11.9 11.9   MCV 91 92 92    306 335    142 141   POTASSIUM 4.3 3.8 3.7   CHLORIDE 103 103 101   CO2 31 32 32   BUN 51* 46* 45*   CR 1.08* 0.99 1.00   ANIONGAP 7 7 8   KARTHIK 8.9 9.0 9.5   * 119* 109*   ALBUMIN  --  2.9*  --    PROTTOTAL  --   6.8  --    BILITOTAL  --  0.6  --    ALKPHOS  --  176*  --    ALT  --  34  --    AST  --  28  --    TROPI  --  <0.015  --      No results found for this or any previous visit (from the past 24 hour(s)).    Medications     - MEDICATION INSTRUCTIONS -         diltiazem  180 mg Oral Daily     furosemide  40 mg Intravenous Q12H     lisinopril  2.5 mg Oral Daily     metoprolol succinate  100 mg Oral BID     rivaroxaban ANTICOAGULANT  15 mg Oral Daily with supper

## 2018-06-22 NOTE — PROGRESS NOTES
Discharge Planner   Discharge Plans in progress: Yes  Barriers to discharge plan: Possible pacemaker placement at [facility].   Follow up plan: TBD       Entered by: Marian Dooley 06/22/2018 3:29 PM

## 2018-06-22 NOTE — CONSULTS
"Care Transition Initial Assessment - RN    Reason For Consult: care coordination/care conference, discharge planning   Met with: Patient.  DATA   Active Problems:    Atrial fibrillation with RVR (H)     CHF Action Plan discussed with patient at bedside.   Cognitive Status: awake, alert and oriented.  Primary Care Clinic Name: [facility]  Primary Care MD Name: Dr. Dez Urban  Contact information and PCP information verified: Yes  Lives With: alone  Living Arrangements: condominium     Description of Support System: Supportive, Involved   Who is your support system?: Children       Insurance concerns: No Insurance issues identified  ASSESSMENT  Patient currently receives the following services:  None. Her sons are involved and assist as needed.         Identified issues/concerns regarding health management: Patient tries to follow a low sodium diet but states \"there is salt in everything\". She does not weigh herself since she does not have a scale. CM provided her with a scale and carrying bag. She thinks CHF is a \"catch all\" they write on death certificates. She is undecided if she will be compliant with recommendations. Also, cardiology is recommending a pacemaker at [facility]. She is also undecided about that. Her sons are wanting her to proceed but she is undecided.     PLAN  Financial costs for the patient were not discussed.  Patient given options and choices for discharge.  Patient/family is agreeable to the plan?  TBD once patient decides.   Patient anticipates discharging back to home.     Other Resources: Other (see comment) (CHF Action Plan)  Patient anticipates needs for home equipment: No  Plan/Disposition: Home   Appointments: Patient refused to have CM schedule since she has not decided what her next course of action will be.     CM will continue to follow patient until discharge for any additional needs.     Mandi Dooley, RN, BSN, CTS  [facility]  [phone]              "

## 2018-06-22 NOTE — PLAN OF CARE
Problem: Patient Care Overview  Goal: Plan of Care/Patient Progress Review  Outcome: No Change  VSS. Afib w/RVR on tele. Xaralto for afib. Off diltiazem gtt and put on po diltiazem and metoprolol. Cardiology consult. Up with 1 assist and walker. Restless legs during night. Tylenol PO x1. Slept in chair most of night. Social Service consult re:increasing acuity of care at home. Plan 2-3 days per cardiology and possible outpatient ablation or PPM.

## 2018-06-23 NOTE — PROGRESS NOTES
[facility]  Hospitalist Progress Note  Indra Mccann MD 06/23/2018    Reason for Stay (Diagnosis): Shortness of breath.         Assessment and Plan:      Summary of Stay: Ana Cristina Dominguez is a 87 year old female with history of arterial fibrillation on Xarelto, hypertension, dyslipidemia, restless leg syndrome, systolic congestive heart failure, nonischemic cardiomyopathy, who presented with worsening shortness of breath on exertion worsening fatigue and bilateral pleural effusion on x-ray and admitted to the hospital with A. fib with RVR on 6/21/2018.    Problem List:   1.  AFib with RVR, heart rate better controlled.  -Continue Toprol XL at 100mg BID  -Continue Cardizem 180 mg p.o. daily, titrate up as blood pressure allows for tachycardia.  -She was on Cardizem drip on admission, which was stopped  -Monitor electrolytes, keep K>4, Mg>2.  -Cardiology following, there is a discussion regarding ablation with permanent pacemaker placement.     2.  Acute on Chronic Diastolic CHF with Pleural Effusions  -Cardiology consulted.  -She had a dose of IV lasix in the ED.  -Currently she is Bumex 2 mg p.o. Daily  -Daily weights, input and output.  -Wean oxygen off as tolerated  -Outpatient cardiologist mentioned possible thoracentesis for effusions, she would prefer medical management but if needed would undergo IR thoracentesis.  -We will repeat chest x-ray tomorrow.     3.  Hypertension  - Continue Lisinopril.     4.  Restless Leg Syndrome  -Increased her home dose  Requip from 0.5 to 1 mg, does not seem to help before but this as needed dose.    # Pain Assessment:  Current Pain Score 6/23/2018   Patient currently in pain? denies   Pain score (0-10) -   Pain location -   Pain descriptors -   Ana Cristina odell pain level was assessed and she currently denies pain.      DVT Prophylaxis: Xarelto    Code Status: Full Code    Discharge Dispo: Home    Estimated Disch Date / # of Days until Disch: 2 days, if  continues to improve versus transferring to [facility] for ablation and PPM        Interval History (Subjective):        Patient seen and examined, no new overnight issues, feels about the same, no nausea or vomiting.  Shortness of breath slightly better.                  Physical Exam:      Last Vital Signs:  /58 (BP Location: Right arm)  Pulse 97  Temp 96.3  F (35.7  C) (Oral)  Resp 18  Ht 1.524 m (5')  Wt 39.8 kg (87 lb 12.8 oz)  SpO2 96%  BMI 17.15 kg/m2    I/O last 3 completed shifts:  In: 320 [P.O.:320]  Out: 870 [Urine:870]  Wt Readings from Last 1 Encounters:   06/23/18 39.8 kg (87 lb 12.8 oz)     Current Facility-Administered Medications   Medication     acetaminophen (TYLENOL) tablet 650 mg     bumetanide (BUMEX) tablet 2 mg     digoxin (LANOXIN) injection 125 mcg     [START ON 6/24/2018] digoxin (LANOXIN) tablet 125 mcg     [START ON 6/24/2018] diltiazem (CARDIZEM CD/CARTIA XT) 24 hr capsule 240 mg     diltiazem (CARDIZEM SR) 12 hr capsule 60 mg     lisinopril (PRINIVIL/Zestril) tablet 2.5 mg     magnesium sulfate 2 g in water intermittent infusion     magnesium sulfate 4 g in 100 mL sterile water (premade)     melatonin tablet 1 mg     metoprolol succinate (TOPROL-XL) 24 hr tablet 100 mg     naloxone (NARCAN) injection 0.1-0.4 mg     Patient is already receiving anticoagulation with heparin, enoxaparin (LOVENOX), warfarin (COUMADIN)  or other anticoagulant medication     polyethylene glycol (MIRALAX/GLYCOLAX) Packet 17 g     potassium chloride (KLOR-CON) Packet 20-40 mEq     potassium chloride 10 mEq in 100 mL intermittent infusion with 10 mg lidocaine     potassium chloride 10 mEq in 100 mL sterile water intermittent infusion (premix)     potassium chloride SA (K-DUR/KLOR-CON M) CR tablet 20-40 mEq     rivaroxaban ANTICOAGULANT (XARELTO) tablet 15 mg     rOPINIRole (REQUIP) tablet 1 mg       Constitutional: Awake, alert, cooperative, no apparent distress   Respiratory: Good air  entry anteriorly, decreased breath sound basal more on the right side. no crackles or wheezing   Cardiovascular: Regular rate and rhythm, normal S1 and S2, and no + systolic and diastolic murmur noted   Abdomen: Normal bowel sounds, soft, non-distended, non-tender   Skin: No rashes, no cyanosis, dry to touch   Neuro: Alert and oriented x3, no weakness, numbness, memory loss   Extremities: No edema, normal range of motion   Other(s):HEENT Pink, un icteric, moist mucosa.       All other systems: Negative          Medications:      All current medications were reviewed with changes reflected in problem list.         Data:      All new lab and imaging data was reviewed.   Labs:    Recent Labs  Lab 06/23/18  0708 06/22/18  0719 06/21/18  1042    141 142   POTASSIUM 3.7 4.3 3.8   CHLORIDE 103 103 103   CO2 32 31 32   ANIONGAP 6 7 7   * 110* 119*   BUN 54* 51* 46*   CR 0.99 1.08* 0.99   GFRESTIMATED 53* 48* 53*   GFRESTBLACK 64 58* 64   KARTHIK 8.3* 8.9 9.0       Recent Labs  Lab 06/22/18  0719 06/21/18  1042 06/20/18  1108   WBC 7.6 7.3 7.5   HGB 11.9 11.9 11.9   HCT 38.5 38.0 38.0   MCV 91 92 92    306 335      Imaging:   Results for orders placed or performed in visit on 06/20/18   X-ray Chest 2 vws*    Narrative    XR CHEST 2 VW  6/20/2018 11:12 AM    HISTORY:  Chronic systolic congestive heart failure.    COMPARISON:  6/4/2018.      Impression    IMPRESSION:  Small right and moderate left pleural effusions,  increased on the left and new on the right since the prior exam.  Cardiac silhouette is obscured. Upper lungs are clear other than  probable mild atelectasis at both lung bases. Coronary vasculature  within normal limits.     ADELSO COTTER MD

## 2018-06-23 NOTE — PROGRESS NOTES
[facility]    Cardiology Progress Note    ASSESSMENT:  87-year-old female seen for atrial fibrillation and tachycardia.  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 fib over the past several months.  Heart rates have been stubborn to treat with beta-blocker and diltiazem.  There is some room to uptitrate her medications.  If heart rate cannot be controlled, AV node ablation with pacemaker could be considered.    RECOMMENDATIONS:  1. Atrial fibrillation RVR  - cont Toprol 100mg bid  - increase diltiazem to 240mg qday, will give extra 60mg today  - start digoxin with 125mcg IV x2 today, then 125mcg PO qday tomorrow  - cont Xarelto    2. Acute on chronic CHF with cardiomyopathy, likely tachycardia-induced  - rate control as above  - cont PO Bumex    3. Pleural effusion  - CXR today, possible thoracentesis if increasing    Jalen Hairston MD  Cardiology - [facility]  Pager:  [phone]  Text Page    SUBJECTIVE: Feels a little more tired and short of breath today.  Heart rate remains in the low 100s.    MEDICATIONS:  Current Facility-Administered Medications   Medication     acetaminophen (TYLENOL) tablet 650 mg     bumetanide (BUMEX) tablet 2 mg     diltiazem 24 hr capsule 180 mg     lisinopril (PRINIVIL/Zestril) tablet 2.5 mg     magnesium sulfate 2 g in water intermittent infusion     magnesium sulfate 4 g in 100 mL sterile water (premade)     melatonin tablet 1 mg     metoprolol succinate (TOPROL-XL) 24 hr tablet 100 mg     naloxone (NARCAN) injection 0.1-0.4 mg     Patient is already receiving anticoagulation with heparin, enoxaparin (LOVENOX), warfarin (COUMADIN)  or other anticoagulant medication     polyethylene glycol (MIRALAX/GLYCOLAX) Packet 17 g     potassium chloride (KLOR-CON) Packet 20-40 mEq     potassium chloride 10 mEq in 100 mL intermittent infusion with 10 mg lidocaine     potassium chloride 10 mEq in 100 mL sterile water intermittent infusion (premix)     potassium  chloride SA (K-DUR/KLOR-CON M) CR tablet 20-40 mEq     rivaroxaban ANTICOAGULANT (XARELTO) tablet 15 mg     rOPINIRole (REQUIP) tablet 1 mg       ALLERGIES:  Allergies   Allergen Reactions     Amoxicillin Rash     Codeine GI Disturbance     Stomach pain     Melatonin Nausea     Eyesburned       REVIEW OF SYSTEMS:  General: no fatigue, weight loss  Cardiac: per HPI  Respiratory: per HPI  GI: no nausea, emesis, melena  Musculoskeletal: no weakness  Neurologic: no aphasia, paraesthesias  Neuropsychiatric: no depression    PHYSICAL EXAM:  Temp: 96.3  F (35.7  C) Temp src: Oral BP: 113/58 Pulse: 97 Heart Rate: 100 Resp: 18 SpO2: 96 % O2 Device: None (Room air) Oxygen Delivery: 1/2 LPM  Vital Signs with Ranges  Temp:  [96.3  F (35.7  C)-97.8  F (36.6  C)] 96.3  F (35.7  C)  Pulse:  [97] 97  Heart Rate:  [] 100  Resp:  [18] 18  BP: (101-113)/(46-64) 113/58  SpO2:  [94 %-96 %] 96 %  87 lbs 12.8 oz    Constitutional: awake, alert, no distress  Eyes: PERRL, sclera nonicteric  ENT: trachea midline  Respiratory: Decreased at the bases bilaterally  Cardiovascular: Tachycardic, regular, 3/6 holosystolic murmur at the apex, trace to 1+ edema of the lower shins  GI: nondistended, nontender, bowel sounds present  Lymph/Hematologic: no lymphadenopathy  Skin: dry, no rash  Musculoskeletal: good muscle tone, strength 5/5 in upper and lower extremities  Neurologic: no focal deficits  Neuropsychiatric: appropriate affact    Intake/Output Summary (Last 24 hours) at 18 1210  Last data filed at 18 1007   Gross per 24 hour   Intake              120 ml   Output              620 ml   Net             -500 ml     DATA:  Labs: K 3.7, Cr 1.0, BUN 54  Lab Results   Component Value Date    TROPI <0.015 2018    TROPI <0.015 2018    TROPI <0.015 2018       EK-21: afib, rate 133    Tele: afib, rate low 100's

## 2018-06-23 NOTE — PLAN OF CARE
Problem: Patient Care Overview  Goal: Plan of Care/Patient Progress Review  Outcome: No Change  A&O but forgetful at times, SBA with walker, HR 's, BP soft 104/52, 0.5 L NC 95%, PIV R upper forearm SL, walked in halls tolerated well, denies SOB or CP, Son visiting at bedside, Tele Afib with SVR (HR 99), continue plan of care.

## 2018-06-23 NOTE — PLAN OF CARE
Problem: Patient Care Overview  Goal: Plan of Care/Patient Progress Review  Outcome: No Change  Stand by assist with walker. 2 gram sodium diet. Tele A Fib RVR with inverted T waves. On 0.5L of oxygen NC. Ambulated in hallway without oxygen, sats 94%. Repeat chest x-ray scheduled for today. Cardiology and social work following. Pt was unable to get much sleep, thinking about possible pacemaker placement. Will continue to monitor.

## 2018-06-23 NOTE — PLAN OF CARE
Problem: Cardiac: ACS (Acute Coronary Syndrome) (Adult)  Goal: Signs and Symptoms of Listed Potential Problems Will be Absent, Minimized or Managed (Cardiac: ACS)  Signs and symptoms of listed potential problems will be absent, minimized or managed by discharge/transition of care (reference Cardiac: ACS (Acute Coronary Syndrome) (Adult) CPG).   Outcome: No change     VS: 91/50, recheck, 113/58  Cardio: Afib (), 125 mcg digoxin given x1  Neuro: A&O x4, forgetful; CMS intact  Resp: Dyspneic on exertion, pt reported feeling SOB at rest, on 0.5L O2 for comfort, RR WNL  GI/: Voiding w/o difficulty, BS +, LBM 6/23/18, passing flatus   Skin: Bruising   Activity: A1 w/ walker and gait belt   Diet: 2 gram NA  IVs/lines: SL   Plan: Possible [facility] in 2 days if continues to [facility], continue plan of care

## 2018-06-24 NOTE — PROGRESS NOTES
[facility]  Hospitalist Progress Note  Indra Mccann MD 06/24/2018    Reason for Stay (Diagnosis): Shortness of breath.         Assessment and Plan:      Summary of Stay: Ana Cristina Dominguez is a 87 year old female with history of arterial fibrillation on Xarelto, hypertension, dyslipidemia, restless leg syndrome, systolic congestive heart failure, nonischemic cardiomyopathy, who presented with worsening shortness of breath on exertion worsening fatigue and bilateral pleural effusion on x-ray and admitted to the hospital with A. fib with RVR on 6/21/2018.    Problem List:   1.  AFib with RVR, heart rate better controlled.  -Continue Toprol XL at 100mg BID  -Continue Cardizem 180 mg p.o. daily, titrate up as blood pressure allows for tachycardia.  Continue digoxin  -She was on Cardizem drip on admission, which was stopped and transitioned to oral dose  -Monitor electrolytes, keep K>4, Mg>2.  -Cardiology following, there is a discussion regarding ablation with permanent pacemaker placement if the rate is not well controlled.  -Heart rate is better controlled today     2.  Acute on Chronic Diastolic CHF with Pleural Effusions  -Cardiology consulted.  -She had a dose of IV lasix in the ED.  -Currently on Bumex 2 mg p.o. Daily  -Daily weights, input and output.  -Continue lisinopril 2.5 mg p.o. daily  -Wean oxygen off as tolerated  -Chest x-ray repeated yesterday showed small bibasilar effusion, no significant change from before.     3.  Hypertension  - Continue Lisinopril.     4.  Restless Leg Syndrome  -Increased her home dose  Requip from 0.5 to 1 mg, does not seem to help before but this as needed dose.    # Pain Assessment:  Current Pain Score 6/24/2018   Patient currently in pain? yes   Pain score (0-10) 2   Pain location Leg   Pain descriptors Other (comment)   Ana Cristina odell pain level was assessed and she currently denies pain.      DVT Prophylaxis: Xarelto    Code Status: Full  Code    Discharge Dispo: Home    Estimated Disch Date / # of Days until Disch: 1-2 days, if continues to improve and heart rate is controlled.  Otherwise cardiology is considering possible transfer to [facility] for ablation and PPM.  Heart rate is better controlled today.  Patient did not make her mind yet regarding the procedure.        Interval History (Subjective):        Patient seen and examined, no new overnight issues, feels weaker, and more short of breath today. No nausea or vomiting.                   Physical Exam:      Last Vital Signs:  /45 (BP Location: Right arm)  Pulse 97  Temp 98.1  F (36.7  C) (Oral)  Resp 18  Ht 1.524 m (5')  Wt 40.2 kg (88 lb 9.6 oz)  SpO2 98%  BMI 17.3 kg/m2    I/O last 3 completed shifts:  In: 120 [P.O.:120]  Out: 700 [Urine:700]  Wt Readings from Last 1 Encounters:   06/24/18 40.2 kg (88 lb 9.6 oz)     Current Facility-Administered Medications   Medication     acetaminophen (TYLENOL) tablet 650 mg     bumetanide (BUMEX) tablet 2 mg     digoxin (LANOXIN) tablet 125 mcg     diltiazem (CARDIZEM CD/CARTIA XT) 24 hr capsule 240 mg     diltiazem (CARDIZEM SR) 12 hr capsule 60 mg     lisinopril (PRINIVIL/Zestril) tablet 2.5 mg     magnesium sulfate 2 g in water intermittent infusion     magnesium sulfate 4 g in 100 mL sterile water (premade)     melatonin tablet 1 mg     metoprolol succinate (TOPROL-XL) 24 hr tablet 100 mg     naloxone (NARCAN) injection 0.1-0.4 mg     Patient is already receiving anticoagulation with heparin, enoxaparin (LOVENOX), warfarin (COUMADIN)  or other anticoagulant medication     polyethylene glycol (MIRALAX/GLYCOLAX) Packet 17 g     potassium chloride (KLOR-CON) Packet 20-40 mEq     potassium chloride 10 mEq in 100 mL intermittent infusion with 10 mg lidocaine     potassium chloride 10 mEq in 100 mL sterile water intermittent infusion (premix)     potassium chloride SA (K-DUR/KLOR-CON M) CR tablet 20-40 mEq     rivaroxaban  ANTICOAGULANT (XARELTO) tablet 15 mg     rOPINIRole (REQUIP) tablet 1 mg       Constitutional: Awake, alert, cooperative, no apparent distress   Respiratory: Good air entry anteriorly, decreased breath sound basal more on the right side. no crackles or wheezing   Cardiovascular: Regular rate and rhythm, normal S1 and S2, and no + systolic and diastolic murmur noted   Abdomen: Normal bowel sounds, soft, non-distended, non-tender   Skin: No rashes, no cyanosis, dry to touch   Neuro: Alert and oriented x3, no weakness, numbness, memory loss   Extremities: No edema, normal range of motion   Other(s):HEENT Pink, un icteric, moist mucosa.       All other systems: Negative          Medications:      All current medications were reviewed with changes reflected in problem list.         Data:      All new lab and imaging data was reviewed.   Labs:    Recent Labs  Lab 06/24/18  0705 06/23/18  0708 06/22/18  0719 06/21/18  1042   NA  --  141 141 142   POTASSIUM 4.1 3.7 4.3 3.8   CHLORIDE  --  103 103 103   CO2  --  32 31 32   ANIONGAP  --  6 7 7   GLC  --  126* 110* 119*   BUN  --  54* 51* 46*   CR  --  0.99 1.08* 0.99   GFRESTIMATED  --  53* 48* 53*   GFRESTBLACK  --  64 58* 64   KARTHIK  --  8.3* 8.9 9.0       Recent Labs  Lab 06/22/18  0719 06/21/18  1042 06/20/18  1108   WBC 7.6 7.3 7.5   HGB 11.9 11.9 11.9   HCT 38.5 38.0 38.0   MCV 91 92 92    306 335      Imaging:   Results for orders placed or performed in visit on 06/20/18   X-ray Chest 2 vws*    Narrative    XR CHEST 2 VW  6/20/2018 11:12 AM    HISTORY:  Chronic systolic congestive heart failure.    COMPARISON:  6/4/2018.      Impression    IMPRESSION:  Small right and moderate left pleural effusions,  increased on the left and new on the right since the prior exam.  Cardiac silhouette is obscured. Upper lungs are clear other than  probable mild atelectasis at both lung bases. Coronary vasculature  within normal limits.     ADELSO COTTER MD

## 2018-06-24 NOTE — PLAN OF CARE
Problem: Patient Care Overview  Goal: Plan of Care/Patient Progress Review  Outcome: No Change  Pt up in chair most of day and walks with walker to bathroom and in halls with sba to independent. Frustrated this morning as she feels like she isn't getting any rest and someone woke her up while she dozed off in her chair. Excellent appetite. Plan to monitor HR and may need pacemaker at [facility] tomorrow or if rate controlled, may go home with heart monitor and follow up with cards. Will monitor. Currently in Afib with rates in 90's. Voiding and stooling. SOB with activity and with anxiety and occasionally requesting 1L NC o2.

## 2018-06-24 NOTE — PROGRESS NOTES
[facility]    Cardiology Progress Note    ASSESSMENT:  87-year-old female seen for atrial fibrillation and tachycardia.  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 fib over the past several months.  Heart rates have been stubborn to treat with beta-blocker and diltiazem.  HR now mostly <100 with digoxin.  Symptoms improved.    RECOMMENDATIONS:  1. Atrial fibrillation RVR  - cont Toprol 100mg bid  - cont diltiazem to 240mg qday  - continue digoxin 125mcg qday  - cont Xarelto    2. Acute on chronic CHF with cardiomyopathy, likely tachycardia-induced  - rate control as above  - cont PO Bumex    3. Pleural effusion  - Small on chest x-ray June 23, likely no need for thoracentesis at this point    If HR <100 tomorrow, OK per cardiology to discharge home.  D/c with 3 day Zio patch.  Will arrange 2-3 week f/u in clinic.  If HR unable to be controlled with meds, AVN ablation with pacemaker may be needed in the future.    Jalen Hairston MD  Cardiology - [facility]  Pager:  [phone]  Text Page    SUBJECTIVE: Feels better today.  Walked in the room with no lightheadedness or dizziness.    MEDICATIONS:  Current Facility-Administered Medications   Medication     acetaminophen (TYLENOL) tablet 650 mg     bumetanide (BUMEX) tablet 2 mg     digoxin (LANOXIN) tablet 125 mcg     diltiazem (CARDIZEM CD/CARTIA XT) 24 hr capsule 240 mg     diltiazem (CARDIZEM SR) 12 hr capsule 60 mg     lisinopril (PRINIVIL/Zestril) tablet 2.5 mg     magnesium sulfate 2 g in water intermittent infusion     magnesium sulfate 4 g in 100 mL sterile water (premade)     melatonin tablet 1 mg     metoprolol succinate (TOPROL-XL) 24 hr tablet 100 mg     naloxone (NARCAN) injection 0.1-0.4 mg     Patient is already receiving anticoagulation with heparin, enoxaparin (LOVENOX), warfarin (COUMADIN)  or other anticoagulant medication     polyethylene glycol (MIRALAX/GLYCOLAX) Packet 17 g     potassium chloride (KLOR-CON)  Packet 20-40 mEq     potassium chloride 10 mEq in 100 mL intermittent infusion with 10 mg lidocaine     potassium chloride 10 mEq in 100 mL sterile water intermittent infusion (premix)     potassium chloride SA (K-DUR/KLOR-CON M) CR tablet 20-40 mEq     rivaroxaban ANTICOAGULANT (XARELTO) tablet 15 mg     rOPINIRole (REQUIP) tablet 1 mg       ALLERGIES:  Allergies   Allergen Reactions     Amoxicillin Rash     Codeine GI Disturbance     Stomach pain     Melatonin Nausea     Eyesburned       REVIEW OF SYSTEMS:  General: no fatigue, weight loss  Cardiac: per HPI  Respiratory: per HPI  GI: no nausea, emesis, melena  Musculoskeletal: no weakness  Neurologic: no aphasia, paraesthesias  Neuropsychiatric: no depression    PHYSICAL EXAM:  Temp: 98.2  F (36.8  C) Temp src: Oral BP: 118/59   Heart Rate: 83 Resp: 24 SpO2: 98 % O2 Device: Nasal cannula Oxygen Delivery: 1 LPM  Vital Signs with Ranges  Temp:  [97  F (36.1  C)-98.2  F (36.8  C)] 98.2  F (36.8  C)  Heart Rate:  [] 83  Resp:  [18-24] 24  BP: ()/(50-59) 118/59  SpO2:  [94 %-98 %] 98 %  88 lbs 9.6 oz    Constitutional: awake, alert, no distress  Eyes: PERRL, sclera nonicteric  ENT: trachea midline  Respiratory: Decreased at the bases bilaterally  Cardiovascular: regular rate, irregular rhythm, 3/6 holosystolic murmur at the apex, trace to 1+ edema of the lower shins  GI: nondistended, nontender, bowel sounds present  Lymph/Hematologic: no lymphadenopathy  Skin: dry, no rash  Musculoskeletal: good muscle tone, strength 5/5 in upper and lower extremities  Neurologic: no focal deficits  Neuropsychiatric: appropriate affact    Intake/Output Summary (Last 24 hours) at 18 1210  Last data filed at 18 1007   Gross per 24 hour   Intake              120 ml   Output              620 ml   Net             -500 ml     DATA:  Labs: K 4.1    EK-21: afib, rate 133    Tele: afib, rate low 100's    Chest x-ray, : Small bibasilar pleural effusions, no  change from June 20

## 2018-06-24 NOTE — PLAN OF CARE
Problem: Patient Care Overview  Goal: Plan of Care/Patient Progress Review  Outcome: Therapy, progress towards functional goals is fair  Heart Failure Care Pathway  GOALS TO BE MET BEFORE DISCHARGE:    1. Decrease congestion and/or edema with diuretic therapy to achieve near      optimal volume status.            Dyspnea improved:  No, please explain: Pt still reports SOB after activity and with anxiety            Edema improved:     Yes        Net I/O and Weights since admission:          05/24 2300 - 06/23 2259  In: 440 [P.O.:440]  Out: 1370 [Urine:1370]  Net: -930            Vitals:    06/21/18 1001 06/23/18 0519   Weight: 39.9 kg (88 lb) 39.8 kg (87 lb 12.8 oz)       2.  O2 sats > 92% on RA or at prior home O2 therapy level.          Current oxygenation status:       SpO2: 97 %         O2 Device: Nasal cannula,  Oxygen Delivery: 1 LPM (for comfort. pt feeling anxious and requesting for bed)         Able to wean O2 this shift to keep sats > 92%:  no, pt prefers to use O2 for sleep here for anxiety, currently on 1L.        Does patient use Home O2? No    3.  Tolerates ambulation and mobility near baseline: Yes        How many times did the patient ambulate with nursing staff this shift? 3    Please review the Heart Failure Care Pathway for additional HF goal outcomes.    Rosibel Childs RN  6/23/2018     VSS, HR 84-90's after digoxin, SBA walker, had shower tonight, 96% RA, pt requested O2 at bedtime due to anxiety about her upcoming decisions with a pacemaker versus no pacemaker. Pt currently on 1L satting 97%. Tele SR, new PIV placed left forearm SL, Tylenol given for pain. Continue plan of care.

## 2018-06-25 NOTE — PLAN OF CARE
Heart Failure Care Pathway  GOALS TO BE MET BEFORE DISCHARGE:    1. Decrease congestion and/or edema with diuretic therapy to achieve near      optimal volume status.            Dyspnea improved:  No, please explain: SOB with activity, sats remain 95%            Edema improved:     Yes        Net I/O and Weights since admission:          05/25 2300 - 06/24 2259  In: 800 [P.O.:800]  Out: 1770 [Urine:1770]  Net: -970            Vitals:    06/21/18 1001 06/23/18 0519 06/24/18 0556   Weight: 39.9 kg (88 lb) 39.8 kg (87 lb 12.8 oz) 40.2 kg (88 lb 9.6 oz)       2.  O2 sats > 92% on RA or at prior home O2 therapy level.          Current oxygenation status:       SpO2: 97 %         O2 Device: Nasal cannula,  Oxygen Delivery: 1 LPM         Able to wean O2 this shift to keep sats > 92%:  Yes       Does patient use Home O2? No    3.  Tolerates ambulation and mobility near baseline: Yes        How many times did the patient ambulate with nursing staff this shift? 2    Please review the Heart Failure Care Pathway for additional HF goal outcomes.    Rosibel Childs RN  6/24/2018     Problem: Patient Care Overview  Goal: Plan of Care/Patient Progress Review  Outcome: Improving  BP soft, K and Mag ok for protocol, Ind in room, walking in halls with family, 2 gr NA, cardiology following, Tele SR, PIV SL, strict I & O, If HR remains stable, possible ziopatch placement versus pacemaker per cardiology and hospitalist. Pt on 1L O2 NC for comfort. Reports SOB at times with activity. Continue plan of care.

## 2018-06-25 NOTE — PLAN OF CARE
Problem: Patient Care Overview  Goal: Plan of Care/Patient Progress Review  Outcome: No Change  Heart Failure Care Pathway  GOALS TO BE MET BEFORE DISCHARGE:      1. Decrease congestion and/or edema with diuretic therapy to achieve near      optimal volume status.            Dyspnea improved:  Yes            Edema improved:     Yes        Net I/O and Weights since admission:          05/26 1500 - 06/25 1459  In: 1480 [P.O.:1480]  Out: 1970 [Urine:1970]  Net: -490            Vitals:    06/21/18 1001 06/23/18 0519 06/24/18 0556 06/25/18 0551   Weight: 39.9 kg (88 lb) 39.8 kg (87 lb 12.8 oz) 40.2 kg (88 lb 9.6 oz) 39.3 kg (86 lb 9.6 oz)       2.  O2 sats > 92% on RA or at prior home O2 therapy level.          Current oxygenation status:       SpO2: 97 %         O2 Device: Nasal cannula,  Oxygen Delivery: 1 LPM         Able to wean O2 this shift to keep sats > 92%:  Yes       Does patient use Home O2? No    3.  Tolerates ambulation and mobility near baseline: Yes        How many times did the patient ambulate with nursing staff this shift? 2    Please review the Heart Failure Care Pathway for additional HF goal outcomes.    Jessica Quezada RN  6/25/2018     Alert and oriented x 4  - forgetful at times. Nasal cannula at 1L at the beginning of shift for comfort - weaned off sats 95% on RA. LS diminished. Up - SBA with a walker. Denies pain. Diet - 2g Na. Tele - afib, CVR, inverted T waves. Possible dc in 1-2 days once HR is improved.

## 2018-06-25 NOTE — PROGRESS NOTES
[facility]  Hospitalist Progress Note  Indra Mccann MD 06/25/2018    Reason for Stay (Diagnosis): Shortness of breath.         Assessment and Plan:      Summary of Stay: Ana Cristina Dominguez is a 87 year old female with history of arterial fibrillation on Xarelto, hypertension, dyslipidemia, restless leg syndrome, systolic congestive heart failure, nonischemic cardiomyopathy, who presented with worsening shortness of breath on exertion worsening fatigue and bilateral pleural effusion on x-ray and admitted to the hospital with A. fib with RVR on 6/21/2018.    Problem List:   1.  AFib with RVR, heart rate better controlled.  -Continue Toprol XL at 100mg BID  -Continue Cardizem 180 mg p.o. daily, titrate up as blood pressure allows for tachycardia.  Continue digoxin 125 mcg  -She was on Cardizem drip on admission, which was stopped and transitioned to oral dose  -Monitor electrolytes, keep K>4, Mg>2.  -Cardiology evaluated the patient recommended to discharge her if his rate remains controlled input appreciated.  -Heart rate is better controlled today.     2.  Acute on Chronic Diastolic CHF with Pleural Effusions  -Cardiology consulted.  -She had a dose of IV lasix in the ED.  -Currently on Bumex 2 mg p.o. Daily  -Daily weights, input and output.  -Continue lisinopril 2.5 mg p.o. daily  -Wean oxygen off as tolerated  -Chest x-ray repeated yesterday showed small bibasilar effusion, no significant change from before.     3.  Hypertension  - Continue Lisinopril.     4.  Restless Leg Syndrome  -Increased her home dose  Requip from 0.5 to 1 mg, does not seem to help before but this as needed dose.    # Pain Assessment:  Current Pain Score 6/25/2018   Patient currently in pain? yes   Pain score (0-10) 2   Pain location Leg   Pain descriptors Other (comment)   Ana Cristina odell pain level was assessed and she currently denies pain.      DVT Prophylaxis: Xarelto    Code Status: Full Code    Discharge Dispo:  Home    Estimated Disch Date / # of Days until Disch: If heart rate remains controlled in 1-2 days, otherwise cardiology also discussed with patient and family regarding possible ablation and permanent pacemaker.  Heart rate is better controlled today.    Patient is very weak, and sleepy today.  PT/ OT will evaluate the patient and we will get also social service consult for discharge needs.          Interval History (Subjective):        Patient seen and examined, more sleepy, weak.  Denied any pain, shortness of breath is at baseline. No nausea or vomiting.                   Physical Exam:      Last Vital Signs:  /61 (BP Location: Right arm)  Pulse 79  Temp 97.4  F (36.3  C) (Oral)  Resp 28  Ht 1.524 m (5')  Wt 39.3 kg (86 lb 9.6 oz)  SpO2 95%  BMI 16.91 kg/m2    I/O last 3 completed shifts:  In: 680 [P.O.:680]  Out: 200 [Urine:200]  Wt Readings from Last 1 Encounters:   06/25/18 39.3 kg (86 lb 9.6 oz)     Current Facility-Administered Medications   Medication     acetaminophen (TYLENOL) tablet 650 mg     bumetanide (BUMEX) tablet 2 mg     digoxin (LANOXIN) tablet 125 mcg     diltiazem (CARDIZEM CD/CARTIA XT) 24 hr capsule 240 mg     diltiazem (CARDIZEM SR) 12 hr capsule 60 mg     lisinopril (PRINIVIL/Zestril) tablet 2.5 mg     magnesium sulfate 2 g in water intermittent infusion     magnesium sulfate 4 g in 100 mL sterile water (premade)     melatonin tablet 1 mg     metoprolol succinate (TOPROL-XL) 24 hr tablet 100 mg     naloxone (NARCAN) injection 0.1-0.4 mg     Patient is already receiving anticoagulation with heparin, enoxaparin (LOVENOX), warfarin (COUMADIN)  or other anticoagulant medication     polyethylene glycol (MIRALAX/GLYCOLAX) Packet 17 g     potassium chloride (KLOR-CON) Packet 20-40 mEq     potassium chloride 10 mEq in 100 mL intermittent infusion with 10 mg lidocaine     potassium chloride 10 mEq in 100 mL sterile water intermittent infusion (premix)     potassium chloride SA  (K-DUR/KLOR-CON M) CR tablet 20-40 mEq     rivaroxaban ANTICOAGULANT (XARELTO) tablet 15 mg     rOPINIRole (REQUIP) tablet 1 mg       Constitutional: Awake, alert, cooperative, no apparent distress   Respiratory: Good air entry anteriorly, decreased breath sound basal more on the right side. no crackles or wheezing   Cardiovascular: Regular rate and rhythm, normal S1 and S2, and no + systolic and diastolic murmur noted   Abdomen: Normal bowel sounds, soft, non-distended, non-tender   Skin: No rashes, no cyanosis, dry to touch   Neuro: Alert and oriented x3, no weakness, numbness, memory loss   Extremities: No edema, normal range of motion   Other(s):HEENT Pink, un icteric, moist mucosa.       All other systems: Negative          Medications:      All current medications were reviewed with changes reflected in problem list.         Data:      All new lab and imaging data was reviewed.   Labs:    Recent Labs  Lab 06/24/18  0705 06/23/18  0708 06/22/18  0719 06/21/18  1042   NA  --  141 141 142   POTASSIUM 4.1 3.7 4.3 3.8   CHLORIDE  --  103 103 103   CO2  --  32 31 32   ANIONGAP  --  6 7 7   GLC  --  126* 110* 119*   BUN  --  54* 51* 46*   CR  --  0.99 1.08* 0.99   GFRESTIMATED  --  53* 48* 53*   GFRESTBLACK  --  64 58* 64   KARTHIK  --  8.3* 8.9 9.0       Recent Labs  Lab 06/22/18  0719 06/21/18  1042 06/20/18  1108   WBC 7.6 7.3 7.5   HGB 11.9 11.9 11.9   HCT 38.5 38.0 38.0   MCV 91 92 92    306 335      Imaging:   Results for orders placed or performed in visit on 06/20/18   X-ray Chest 2 vws*    Narrative    XR CHEST 2 VW  6/20/2018 11:12 AM    HISTORY:  Chronic systolic congestive heart failure.    COMPARISON:  6/4/2018.      Impression    IMPRESSION:  Small right and moderate left pleural effusions,  increased on the left and new on the right since the prior exam.  Cardiac silhouette is obscured. Upper lungs are clear other than  probable mild atelectasis at both lung bases. Coronary vasculature  within  normal limits.     ADELSO COTTER MD

## 2018-06-25 NOTE — PROGRESS NOTES
[facility]    Cardiology Progress Note    ASSESSMENT:  87-year-old female seen for atrial fibrillation and tachycardia.  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 fib over the past several months.  Heart rates have been stubborn to treat with beta-blocker and diltiazem.  HR now 80-90's.    RECOMMENDATIONS:  1. Atrial fibrillation RVR  - cont Toprol 100mg bid  - cont diltiazem to 240mg qday  - continue digoxin 125mcg qday  - cont Xarelto    2. Acute on chronic CHF with cardiomyopathy, likely tachycardia-induced  - rate control as above  - cont PO Bumex    3. Pleural effusion  - Small on chest x-ray June 23, likely no need for thoracentesis at this point    OK to d/c per cardiology with 3 day Zio.  Will arrange 2 weeks f/u with cardiology.    Jalen Hairston MD  Cardiology - [facility]  Pager:  [phone]  Text Page    SUBJECTIVE: Tired today.  Feels sleep deprived.      MEDICATIONS:  Current Facility-Administered Medications   Medication     acetaminophen (TYLENOL) tablet 650 mg     bumetanide (BUMEX) tablet 2 mg     digoxin (LANOXIN) tablet 125 mcg     diltiazem (CARDIZEM CD/CARTIA XT) 24 hr capsule 240 mg     diltiazem (CARDIZEM SR) 12 hr capsule 60 mg     lisinopril (PRINIVIL/Zestril) tablet 2.5 mg     magnesium sulfate 2 g in water intermittent infusion     magnesium sulfate 4 g in 100 mL sterile water (premade)     melatonin tablet 1 mg     metoprolol succinate (TOPROL-XL) 24 hr tablet 100 mg     naloxone (NARCAN) injection 0.1-0.4 mg     Patient is already receiving anticoagulation with heparin, enoxaparin (LOVENOX), warfarin (COUMADIN)  or other anticoagulant medication     polyethylene glycol (MIRALAX/GLYCOLAX) Packet 17 g     potassium chloride (KLOR-CON) Packet 20-40 mEq     potassium chloride 10 mEq in 100 mL intermittent infusion with 10 mg lidocaine     potassium chloride 10 mEq in 100 mL sterile water intermittent infusion (premix)     potassium chloride SA  (K-DUR/KLOR-CON M) CR tablet 20-40 mEq     rivaroxaban ANTICOAGULANT (XARELTO) tablet 15 mg     rOPINIRole (REQUIP) tablet 1 mg       ALLERGIES:  Allergies   Allergen Reactions     Amoxicillin Rash     Codeine GI Disturbance     Stomach pain     Melatonin Nausea     Eyesburned       REVIEW OF SYSTEMS:  General: no fatigue, weight loss  Cardiac: per HPI  Respiratory: per HPI  GI: no nausea, emesis, melena  Musculoskeletal: no weakness  Neurologic: no aphasia, paraesthesias  Neuropsychiatric: no depression    PHYSICAL EXAM:  Temp: 97.4  F (36.3  C) Temp src: Oral BP: 120/61 Pulse: 79 Heart Rate: 100 Resp: 28 SpO2: 97 % O2 Device: Nasal cannula Oxygen Delivery: 1 LPM  Vital Signs with Ranges  Temp:  [95.7  F (35.4  C)-97.5  F (36.4  C)] 97.4  F (36.3  C)  Pulse:  [79] 79  Heart Rate:  [] 100  Resp:  [18-28] 28  BP: (106-142)/(43-61) 120/61  SpO2:  [96 %-98 %] 97 %  86 lbs 9.6 oz    Constitutional: awake, alert, no distress  Eyes: PERRL, sclera nonicteric  ENT: trachea midline  Respiratory: Decreased at the bases bilaterally  Cardiovascular: regular rate, irregular rhythm, 3/6 holosystolic murmur at the apex, trace to 1+ edema of the lower shins  GI: nondistended, nontender, bowel sounds present  Lymph/Hematologic: no lymphadenopathy  Skin: dry, no rash  Musculoskeletal: good muscle tone, strength 5/5 in upper and lower extremities  Neurologic: no focal deficits  Neuropsychiatric: appropriate affact    Intake/Output Summary (Last 24 hours) at 18 1210  Last data filed at 18 1007   Gross per 24 hour   Intake              120 ml   Output              620 ml   Net             -500 ml     DATA:  Labs: K 4.1    EK-21: afib, rate 133    Tele: afib, rate 80-90's    Chest x-ray, : Small bibasilar pleural effusions, no change from

## 2018-06-25 NOTE — PLAN OF CARE
Problem: Patient Care Overview  Goal: Plan of Care/Patient Progress Review  Outcome: No Change  Stand by assist with walker, 2 gram sodium diet, tele=A fib CVR, ST depression with inverted T waves, Tylenol given at bedtime for 2/10 restless leg pain, oxygen sats 98% on 1L O2, cardiology following, will continue to monitor.

## 2018-06-26 NOTE — PROGRESS NOTES
Hand-off for Care Transitions to Next Level of Care Provider  Name: Ana Cristina Dominguez  : 1931  MRN #: [id number]  Reason for Hospitalization:  Pleural effusion [J90]  Chronic atrial fibrillation (H) [I48.2]  Congestive heart failure, unspecified congestive heart failure chronicity, unspecified congestive heart failure type (H) [I50.9]  Admit Date/Time: 2018 10:04 AM  Discharge Date: 2018    Reason for Communication Hand-off Referral: Admission diagnoses: CHF, Afib RVR    Discharge Plan:  Discharged to: Home with homecare                   Patient agreeable to post-hospital support suggestions:  Yes    Patient is on new medications:   Yes    MTM follow up recommended: No    Tel-Assurance program:  Declined    Patient will receive  HOME CARE  at:  Discharge through [facility] for RN, OT, PT & SW.     Follow-up specialty is recommended: Yes.  Patient had Zio Patch placed at discharge. She is to wear for 30 days. Follow up with Cardiology in 2 weeks.    Follow-up plan:  Future Appointments  Date Time Provider Department Center   2018 8:30 AM Anamaria Hicks, [facility]     Any outstanding tests or procedures:  No. Recommend BMP in 1 week with results to Dr. Urban.       Referrals     Future Labs/Procedures    Home care nursing referral     Comments:    RN skilled nursing visit. RN to assess vital signs and weight, respiratory and cardiac status, patients ability to take and record daily blood pressure, temp and weight, pain level and activity tolerance and hydration, nutrition and bowel status.  Home health aide to bathing, cleaning and personal needs    Your provider has ordered home care nursing services. If you have not been contacted within 2 days of your discharge please call the inpatient department phone number at [phone] .    Home Care OT Referral for Hospital Discharge     Comments:    OT to eval and treat    Your provider has ordered home care - occupational therapy. If you have  "not been contacted within 2 days of your discharge please call the department phone number listed on the top of this document.    Home Care PT Referral for Hospital Discharge     Comments:    PT to eval and treat    Your provider has ordered home care - physical therapy. If you have not been contacted within 2 days of your discharge please call the department phone number listed on the top of this document.    Home Care Social Service Referral for Hospital Discharge     Comments:     to social needs.    Your provider has ordered home care - . If you have not been contacted within 2 days of your discharge please call the department phone number listed on the top of this document.          Key Recommendations:  Patient tries to follow a low sodium diet but states \"there is salt in everything\". She does not weigh herself since she does not have a scale.  provided her with a scale and carrying bag. She thinks CHF is a \"catch all\" they write on death certificates. She is undecided if she will be compliant with recommendations. Also, cardiology is recommending a pacemaker at [facility]. She is also undecided about that. Her sons are wanting her to proceed. Cardiology decided to try some medications first. If this is not successful, the pacemaker is recommended.      Communicated handoff via EPIC Comm Mgt to Dr. Dez Urban's CC at [facility].      Mandi Dooley RN, BSN, CTS  [facility]  [phone]    "

## 2018-06-26 NOTE — PLAN OF CARE
Problem: Patient Care Overview  Goal: Plan of Care/Patient Progress Review  Outcome: Improving  Orientation: Alert and oriented x4, but forgetful, bed alarm on for safety.  VSS. 95% on 1L.   Tele: Afib CVR w/inverted Ts. HR 86.   LS: Diminished, denies SOB/NELSON.  GI: BS audible/active x4, tolerating PO. Passing gas. No BM this shift. Denies N/V.   : Adequate urine output. Yes  Skin: Bruises, otherwise intact  Activity: SBA, pt declines using walker. Pt slept comfortably throughout shift.   Pain: 0/10.   Plan: Tele, monitor HR, cardiac meds, wean O2, possible d/c today. Continue with current cares.

## 2018-06-26 NOTE — PLAN OF CARE
Problem: Patient Care Overview  Goal: Plan of Care/Patient Progress Review  Outcome: Improving  Heart Failure Care Pathway  GOALS TO BE MET BEFORE DISCHARGE:    1. Decrease congestion and/or edema with diuretic therapy to achieve near      optimal volume status.            Dyspnea improved:  Yes            Edema improved:     Yes        Net I/O and Weights since admission:          05/26 2300 - 06/25 2259  In: 1720 [P.O.:1720]  Out: 1970 [Urine:1970]  Net: -250            Vitals:    06/21/18 1001 06/23/18 0519 06/24/18 0556 06/25/18 0551   Weight: 39.9 kg (88 lb) 39.8 kg (87 lb 12.8 oz) 40.2 kg (88 lb 9.6 oz) 39.3 kg (86 lb 9.6 oz)       2.  O2 sats > 92% on RA or at prior home O2 therapy level.          Current oxygenation status:       SpO2: 94 %         O2 Device: None (Room air),  Oxygen Delivery: 1 LPM         Able to wean O2 this shift to keep sats > 92%:  Yes       Does patient use Home O2? No, but pt would like to inquire about O2 at night at home.     3.  Tolerates ambulation and mobility near baseline: Yes        How many times did the patient ambulate with nursing staff this shift? 3    Please review the Heart Failure Care Pathway for additional HF goal outcomes.    Rosibel Childs RN  6/25/2018     VSS, HR 90's this shift, SBA, ambulated in halls, cardiology following, updated son Ziyad with plan of care, pt would like to d/c tomorrow morning instead of this shift, PIV SL L forearm, open sore/scratch on right ear lobe, scabbing over, pt states she scratched herself, continue plan of care.

## 2018-06-26 NOTE — PLAN OF CARE
Problem: Patient Care Overview  Goal: Plan of Care/Patient Progress Review  Outcome: Adequate for Discharge Date Met: 06/26/18  Patient discharged home via private car, transported via wheelchair with support staff.  Patient and sons verbalized and received copy of discharge instructions.  Patient received medications filled by discharge Pharmacy.  Personal belongings gathered and sent with patient.  VSS. IV removed prior to discharge.

## 2018-06-26 NOTE — PLAN OF CARE
Problem: Patient Care Overview  Goal: Plan of Care/Patient Progress Review  Outcome: Completed Date Met: 06/26/18  OT: Received order for OT eval.  Attempted to see, pt dressed with family present going over DC instructions with nursing.  Going home with [facility].  Will DC from IP OT at this time.

## 2018-06-26 NOTE — DISCHARGE SUMMARY
Admit Date:     06/21/2018   Discharge Date:     06/26/2018      DATE OF ADMISSION:  06/21/2018        DATE OF DISCHARGE:  06/26/2018        PRIMARY CARE PHYSICIAN:  Dez Urban MD      DISCHARGE DIAGNOSES:   1.  Atrial fibrillation with rapid ventricular response, acute on chronic congestive heart failure, likely diastolic, and also possibly systolic due to rate-related cardiomyopathy.   2.  Hypertension.   3.  Small bilateral pleural effusion.   4.  Restless legs syndrome.      DISPOSITION:  Home with home health care.      DISCHARGE MEDICATIONS:  Reviewed and reconciled as in electronic medical record. New medication given was Digoxin 125 mcg p.o. daily and diltiazem 240 mg daily.      Review of your medicines      CONTINUE these medicines which may have CHANGED, or have new prescriptions. If we are uncertain of the size of tablets/capsules you have at home, strength may be listed as something that might have changed.       Dose / Directions    bumetanide 1 MG tablet   Commonly known as:  BUMEX   This may have changed:  Another medication with the same name was removed. Continue taking this medication, and follow the directions you see here.        Dose:  2 mg   Take 2 mg by mouth every morning   Refills:  0         CONTINUE these medicines which have NOT CHANGED       Dose / Directions    acetaminophen 325 MG tablet   Commonly known as:  TYLENOL        Dose:  325-650 mg   Take 325-650 mg by mouth every 4 hours as needed for mild pain   Refills:  0       ascorbic acid 500 MG tablet   Commonly known as:  VITAMIN C        Dose:  500 mg   Take 500 mg by mouth daily   Refills:  0       Calcium-Vitamin D 500-400 MG-UNIT Tabs        Dose:  2 tablet   Take 2 tablets by mouth every morning   Refills:  0       lisinopril 2.5 MG tablet   Commonly known as:  PRINIVIL/Zestril   Used for:  Chronic systolic congestive heart failure (H)        Dose:  2.5 mg   Take 1 tablet (2.5 mg) by mouth daily   Quantity:  30 tablet    Refills:  3       metoprolol succinate 100 MG 24 hr tablet   Commonly known as:  TOPROL-XL   Used for:  Essential hypertension, Chronic atrial fibrillation (H)        Dose:  100 mg   Take 1 tablet (100 mg) by mouth 2 times daily   Quantity:  60 tablet   Refills:  11       * PRESERVISION AREDS PO        Dose:  1 capsule   Take 1 capsule by mouth 2 times daily   Refills:  0       * MULTIVITAMIN PO        Dose:  1 tablet   Take 1 tablet by mouth every morning   Refills:  0       REQUIP 0.5 MG tablet   Generic drug:  rOPINIRole        Dose:  0.5 mg   Take 0.5 mg by mouth nightly as needed PT STATES MED ON HOLD   Refills:  0       vitamin E 400 UNIT capsule        Dose:  400 Units   Take 400 Units by mouth daily   Refills:  0       * Notice:  This list has 2 medication(s) that are the same as other medications prescribed for you. Read the directions carefully, and ask your doctor or other care provider to review them with you.      STOP taking          XARELTO 20 MG Tabs tablet   Generic drug:  rivaroxaban ANTICOAGULANT               DISCHARGE CONDITION:  Stable.      PHYSICAL EXAMINATION:   DISCHARGE VITAL SIGNS:  Blood pressure 150/60, pulse rate 70, temperature 98, oxygen saturation 94% on room air.   GENERAL:  The patient is awake, alert, oriented, in mild respiratory distress.   HEENT:  Pink, nonicteric.  Extraocular muscle movement intact.   NECK:  Supple, no JVD, no thyromegaly.   CHEST:  Good air entry bilaterally.  No wheezing, crackles or rales.   CARDIOVASCULAR:  S1 and S2 were heard, no gallop or murmur.   ABDOMEN:  Soft, nontender, nondistended, positive bowel sounds.   EXTREMITIES:  Trace edema, pedal.  Normal range of motion.      CONSULTATIONS:  This admission is obtained from Cardiology.      DISCHARGE DIET:  Regular.      DISCHARGE ACTIVITY:  As tolerated.      DISCHARGE FOLLOWUP:  With primary care provider in 1 week.  The patient will have follow up with Cardiology in 2 weeks.  The patient is  discharged on Zio Patch.  The patient will have home health care nurse, PT, OT and Social Service and nurse aide.      BRIEF HISTORY AND HOSPITAL COURSE:  Ana Cristina Dominguez is an 87-year-old female with history of atrial fibrillation on Xarelto, hypertension, dyslipidemia, restless legs syndrome, systolic and diastolic congestive heart failure and a nonischemic cardiomyopathy who presented with worsening shortness of breath on exertion and fatigue and bilateral pleural effusion on x-ray and admitted to the hospital with a-fib with RVR on 06/21/2018.      The patient was admitted for a-fib with RVR.  She was on Toprol-XL and also on Cardizem, initially on Cardizem drip, later changed to pills.  Despite that, her rate was not controlled and there was a concern regarding this as this could result in more dilated cardiomyopathy and decompensation.  At one point, Cardiology recommended possible transfer to [facility] for ablation and permanent pacemaker placement.  At the time, the patient was a little reluctant and it was also over the weekend and her medications were adjusted.  Digoxin was added.  Her rate was controlled and the plan was canceled per Cardiology and patient was discharged to home on additional Cardizem and also digoxin in addition to her beta blocker.  The patient has underlying acute on chronic diastolic and systolic congestive heart failure with pleural effusion.  Initially she was on IV Lasix and that was transitioned to Bumex 2 mg p.o. daily while she was here.  At home she was on 2 mg in the morning and 1 mg in the afternoon, and she will be back on her home dose.  I advised the patient to have her weight checked daily and have her blood work, electrolytes also done regularly.  I discussed with the patient and 2 of her sons at bedside.  All their questions and concerns addressed and patient was discharged in relatively stable condition.      Total time spent coordinating her discharge  face-to-face was over 40 minutes.         CUAUHTEMOC KELLY MD             D: 2018   T: 2018   MT: TOM      Name:     FELIZ HAMPTON   MRN:      [id number]        Account:        [id number]   :      1931           Admit Date:     2018                                  Discharge Date: 2018      Document: [id number]       cc: Dez Urban MD

## 2018-06-26 NOTE — DISCHARGE INSTRUCTIONS
You have an order for [facility] for RN, OT, PT & SW services. They will be contacting you to arrange the initial appointment in the next 24-48 hours. If you have not heard from them in that time, please call them at [phone].

## 2018-06-27 NOTE — PROGRESS NOTES
Clinic Care Coordination Contact  Care Coordination Communication    Referral Source: IP Handoff    Clinical Data: Patient was hospitalized at [facility]  from 18 to 18 and treated for the followin.  Atrial fibrillation with rapid ventricular response, acute on chronic congestive heart failure, likely diastolic, and also possibly systolic due to rate-related cardiomyopathy.   2.  Hypertension.   3.  Small bilateral pleural effusion.   4.  Restless legs syndrome.      Patient discharged home with home care services, RN/PT/OT/SW    Home Care Contact:              Home Care Agency: [facility]               Contact name () and phone number: Zamzam Carter [phone]                Care Coordination contacted home care: via e-mail to give contact info                Anticipated start of care date: TBD    Patient Contact:               Introduced self and role of care coordination.               Discharge instructions were reviewed with patient/caregiver.                             Follow up appointment is scheduled for  at 1320              Home care has contacted patient: No                Patient questions/concerns: Son, luis stated patient had called EMS this morning because she was feeling shortness of breath and wanted some oxygen.  Patient would like some o2 at home to use as needed.  Explained to Luis, patient will need to be evaluated for this and qualify so insurance will pay for it.  Luis will discuss this with pcp at f/u.    Per CTS handoff, patient has difficulty complying with recommendations for CHF.  A scale was given to her in hospital to monitor her weight, but patient returned before DC.  Cardiology is recommending pacemaker, but patient is undecided about this.  Currently has Zio patch on.    Plan: RN/SW Care Coordinator will await notification from home care staff informing RN/SW Care Coordinator of patients discharge plans/needs. RN/SW Care  Coordinator will review chart and outreach to home care every 4 weeks and as needed.      Zina Virk RN-BSN   Care Coordination  Phone:  [phone]  Email: [email or web address]

## 2018-06-27 NOTE — TELEPHONE ENCOUNTER
Pt admitted for atrial fibrillation and tachycardia.  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 fib over the past several months. Taking Xarelto and started on Digoxin and Diltiazem. Called patient to discuss any post hospital d/c questions she may have, review medication changes, and confirm f/u appts, but no answer. Pt does not have VM set up, so will try again later. Pt was discharged with a 3 day Zio Patch monitor as ordered. Needs to schedule f/u harper't with our CORE clinic. Pt was discharged to her home with f/u by [facility] RN, PT and OT referrals. JOSHUA Patricio RN.

## 2018-06-27 NOTE — TELEPHONE ENCOUNTER
IP F/U    Date: 6/26/18  Diagnosis: Atrial Fibrillation With Rvr (H), Congestive Heart Failure, Unspecified Congestive Heart Failure Chronicity, Unspecified C  Is patient active in care coordination? No  Was patient in TCU? No

## 2018-06-28 NOTE — PROGRESS NOTES
SUBJECTIVE:   (1522---155)    Ana Cristina Dominguez is a 87 year old female who presents to clinic today for the following health issues.   32 minutes were spent with the patient and her son Luis face to face today, over 50% of which was devoted to health counseling and education regarding these issues.       Hospital Follow-up Visit:    Hospital/Nursing Home/IP Rehab Facility: [facility]  Date of Admission: 6/21/18  Date of Discharge: 6/26/18  Reason(s) for Admission: shortness of breath, high heart rate, and weak.            Problems taking medications regularly:  Yes       Medication changes since discharge: None       Problems adhering to non-medication therapy:  None    Summary of hospitalization:  [facility] discharge summary reviewed  Diagnostic Tests/Treatments reviewed.  Follow up needed: Patient requests home O2, discussed that this was not indicated nor likely to be covered by insurance.  Other Healthcare Providers Involved in Patient s Care:         cardiology, possibly CORE clinic  Update since discharge: fluctuating course.     Post Discharge Medication Reconciliation: discharge medications reconciled and changed, per note/orders (see AVS).  Plan of care communicated with patient and son Luis     Coding guidelines for this visit:  Type of Medical   Decision Making Face-to-Face Visit       within 7 Days of discharge Face-to-Face Visit        within 14 days of discharge   Moderate Complexity [id number]   High Complexity [id number]            Reviewed recent hospitalization.  She has now been hospitalized twice, both times with apparent acute on chronic diastolic and systolic congestive heart failure, again both times apparently related to atrial fibrillation with rapid ventricular rate.  There was some discussion by cardiology for possible pacemaker placement and or AV dixie ablation.  However, after discussion it was opted for adjusting medications to achieve rate control of  "atrial fibrillation.    The patient slept very poorly last night, describing having vivid dreams and nightmares that were troublesome for her.  She was frustrated with all of her medications, and was not sure whether 1 of her older or newer medications could have contributed to the nightmares.  On this basis she reports that she did not take any of her medications this morning.    We discussed her frustration with her number of medications and uncertain adverse effects.  Requip has been discontinued already as it was of uncertain benefit and possible adverse side effects.  Dr. Bower  had discontinued Requip and initiated metoprolol XL at 100 mg twice daily along with lisinopril 2.5 mg once daily at his office consult with her on June 20.    Her medications were again adjusted during this recent hospitalization between June 21 - June 26.  At that time diltiazem 240 mg once daily was added as well as digoxin 0.125 mg daily.      She reports having some nausea without vomiting.  She still reports being severely fatigued and dyspneic with minimal activity and at times has felt \"near fainting\".  She has had occasional cough with sputum production.  No obvious chest tightness or pressure and no syncope reported.  Her swelling is much improved.    She is feeling quite overwhelmed with her medical situation, seems very weak and drowsy during our discussion. Her son Luis helps to facilitate conversation.     We discussed that although several problems exist with her heart, including 1) poor pump function, 2) valvular disease, and 3) rate control of her atrial fibrillation, the predominant issue likely contributing to repeated hospitalizations and feeling poorly appears to be the latter of these three problems.     We discussed in great detail the options of continuing her current medications and seeing how she felt, while also awaiting results of a Zio patch that had been placed prior to discharge.  After some " discussion, she is now leaning toward more definitive treatment of her atrial fibrillation and ventricular rate with AV node ablation and pacemaker placement.  I discussed with her that I would be happy to try to reach Dr. Bower tomorrow and see if he agrees with pursuing this option.     Problem list and histories reviewed & adjusted, as indicated.  Additional history: as documented    Reviewed and updated as needed this visit by clinical staff  Tobacco  Allergies  Meds  Med Hx  Surg Hx  Fam Hx  Soc Hx      Reviewed and updated as needed this visit by Provider         Past medical, family and social histories as well as medications reviewed and updated as needed.    REVIEW OF SYSTEMS: The following systems have been completely reviewed and are negative except as noted above:   Constitutional, HEENT, respiratory, cardiovascular, gastrointestinal, musculoskeletal, hematologic,  psychiatric, and neurologic systems.      OBJECTIVE:                                                    /68 (BP Location: Right arm, Patient Position: Chair, Cuff Size: Adult Small)  Pulse 98  Temp 97.9  F (36.6  C) (Oral)  Resp 30  Ht 5' (1.524 m)  Wt 80 lb 9.6 oz (36.6 kg)  SpO2 95%  Breastfeeding? No  BMI 15.74 kg/m2  Body mass index is 15.74 kg/(m^2).     GENERAL: drowsy, frail, frustrated/emotionally overwhelmed elderly woman.   RESP: lungs clear to auscultation - no rales, no rhonchi, no wheezes  CV: upper normal heart rate, irregular rhythm, normal S1 S2, no S3 or S4. Systolic murmur, no click or rub -  MS: extremities- no gross deformities noted, no edema       ASSESSMENT/PLAN:                                                    1. Atrial fibrillation with rapid ventricular response (H)  difficulty with rate control appears to be the predominant symptom causing her dyspnea and fatigue. Unclear whether one or more of these newer medications for rate control contribute to other symptoms such as nightmares. Certainly  "digoxin could be causing nausea.     See above discussion.   I agreed to try to reach Dr Bower to discuss with him whether it would be prudent to proceed with AV dixie ablation and/or pacemaker placement.     2. Chronic combined systolic and diastolic CHF, NYHA class 4 (H)  Patient still noting significant dyspnea and fatigue with minimal activity. Unclear to what extent uncontrolled ventricular rate is contributing to symptoms, particularly with activity.     3. Valvular heart disease  Unfortunately patient is not a candidate (nor is she interested) in surgical repair.       Patient Instructions   Take only the second, evening dose of any twice daily medications tonight.   Don't try to make up for missed meds from this morning, EXCEPT to take the Diltiazem dose that you missed this morning.     Don't take Xarelto tonight (in case we move ahead with the pacemaker option).     Take your usual meds tomorrow morning. If you want to stop the digoxin (can cause nausea), that would be okay.     Dr Urban will try to reach Dr Bower tomorrow (or else his partner, if he is not in).   Call our office around noon tomorrow, and we can touch base about whether to go ahead with the \"Pacemaker plus ablation\" option to slow the heart, rather than continuing with medications alone to slow the heart.      Dez Urban MD,   [facility]    "

## 2018-06-28 NOTE — TELEPHONE ENCOUNTER
Writer called pt's son, Luis, and both he and pt are currently at f/u OV with PMD, Dr. Urban. States pt continues to experience some NELSON. No chest pain or lightheadedness. No medications. Pt is wearing a Zio Patch and informed son that pt will need to schedule CORE appt as ordered. Instructed to call with any further questions or concerns. Verbalized understanding. JOSHUA Patricio RN.

## 2018-06-28 NOTE — PATIENT INSTRUCTIONS
"Take only the second, evening dose of any twice daily medications tonight.   Don't try to make up for missed meds from this morning, EXCEPT to take the Diltiazem dose that you missed this morning.     Don't take Xarelto tonight (in case we move ahead with the pacemaker option).     Take your usual meds tomorrow morning. If you want to stop the digoxin (can cause nausea), that would be okay.     Dr Urban will try to reach Dr Bower tomorrow (or else his partner, if he is not in).   Call our office around noon tomorrow, and we can touch base about whether to go ahead with the \"Pacemaker plus ablation\" option to slow the heart, rather than continuing with medications alone to slow the heart.     "

## 2018-06-28 NOTE — MR AVS SNAPSHOT
"              After Visit Summary   6/28/2018    Ana Cristina Dominguez    MRN: [id number]           Patient Information     Date Of Birth          6/6/1931        Visit Information        Provider Department      6/28/2018 3:00 PM Dez Urban MD [facility]        Today's Diagnoses     Atrial fibrillation with rapid ventricular response (H)    -  1    Chronic combined systolic and diastolic CHF, NYHA class 4 (H)        Valvular heart disease          Care Instructions    Take only the second, evening dose of any twice daily medications tonight.   Don't try to make up for missed meds from this morning, EXCEPT to take the Diltiazem dose that you missed this morning.     Don't take Xarelto tonight (in case we move ahead with the pacemaker option).     Take your usual meds tomorrow morning. If you want to stop the digoxin (can cause nausea), that would be okay.     Dr Urban will try to reach Dr Bower tomorrow (or else his partner, if he is not in).   Call our office around noon tomorrow, and we can touch base about whether to go ahead with the \"Pacemaker plus ablation\" option to slow the heart, rather than continuing with medications alone to slow the heart.             Follow-ups after your visit        Who to contact     If you have questions or need follow up information about today's clinic visit or your schedule please contact [facility] directly at [phone].  Normal or non-critical lab and imaging results will be communicated to you by MyChart, letter or phone within 4 business days after the clinic has received the results. If you do not hear from us within 7 days, please contact the clinic through MyChart or phone. If you have a critical or abnormal lab result, we will notify you by phone as soon as possible.  Submit refill requests through [employer] or call your pharmacy and they will forward the refill request to us. Please allow 3 business days for your refill to be " completed.          Additional Information About Your Visit        [email or web address] Information     [employer] gives you secure access to your electronic health record. If you see a primary care provider, you can also send messages to your care team and make appointments. If you have questions, please call your primary care clinic.  If you do not have a primary care provider, please call [phone] and they will assist you.        Care EveryWhere ID     This is your Care EveryWhere ID. This could be used by other organizations to access your [place] medical records  [id number]        Your Vitals Were     Pulse Temperature Respirations Height Pulse Oximetry Breastfeeding?    98 97.9  F (36.6  C) (Oral) 30 5' (1.524 m) 95% No    BMI (Body Mass Index)                   15.74 kg/m2            Blood Pressure from Last 3 Encounters:   06/28/18 120/68   06/26/18 151/63   06/20/18 122/66    Weight from Last 3 Encounters:   06/28/18 80 lb 9.6 oz (36.6 kg)   06/25/18 86 lb 9.6 oz (39.3 kg)   06/20/18 87 lb 9.6 oz (39.7 kg)              Today, you had the following     No orders found for display         Today's Medication Changes          These changes are accurate as of 6/28/18  3:54 PM.  If you have any questions, ask your nurse or doctor.               Stop taking these medicines if you haven't already. Please contact your care team if you have questions.     XARELTO 20 MG Tabs tablet   Generic drug:  rivaroxaban ANTICOAGULANT   Stopped by:  Dez Urban MD                    Primary Care Provider Office Phone # Fax #    Dez Urban [phone]       [address] [facility] [address]  [facility] [address]        Equal Access to Services     [facility]: Hadii aad ku hadasho Sovirginieali, waaxda luqadaha, qaybta kaalmada ramónyada, rinku burciaga . So [facility] [phone].    ATENCIÓN: Si habla español, tiene a dominguez disposición servicios gratuitos de asistencia lingüística. Llame al  [phone].    We comply with applicable federal civil rights laws and [place] laws. We do not discriminate on the basis of race, color, national origin, age, disability, sex, sexual orientation, or gender identity.            Thank you!     Thank you for choosing [facility]  for your care. Our goal is always to provide you with excellent care. Hearing back from our patients is one way we can continue to improve our services. Please take a few minutes to complete the written survey that you may receive in the mail after your visit with us. Thank you!             Your Updated Medication List - Protect others around you: Learn how to safely use, store and throw away your medicines at www.disposemymeds.org.          This list is accurate as of 6/28/18  3:54 PM.  Always use your most recent med list.                   Brand Name Dispense Instructions for use Diagnosis    acetaminophen 325 MG tablet    TYLENOL     Take 325-650 mg by mouth every 4 hours as needed for mild pain        ascorbic acid 500 MG tablet    VITAMIN C     Take 500 mg by mouth daily        * bumetanide 1 MG tablet    BUMEX     Take 2 mg by mouth every morning        * bumetanide 1 MG tablet    BUMEX     Take 1 mg by mouth every evening        Calcium-Vitamin D 500-400 MG-UNIT Tabs      Take 2 tablets by mouth every morning        digoxin 125 MCG tablet    LANOXIN    30 tablet    Take 1 tablet (125 mcg) by mouth daily    Atrial fibrillation with RVR (H)       diltiazem 240 MG 24 hr capsule     30 capsule    Take 1 capsule (240 mg) by mouth daily    Atrial fibrillation with RVR (H)       lisinopril 2.5 MG tablet    PRINIVIL/Zestril    30 tablet    Take 1 tablet (2.5 mg) by mouth daily    Chronic systolic congestive heart failure (H)       metoprolol succinate 100 MG 24 hr tablet    TOPROL-XL    60 tablet    Take 1 tablet (100 mg) by mouth 2 times daily    Essential hypertension, Chronic atrial fibrillation (H)       * PRESERVISION  AREDS PO      Take 1 capsule by mouth 2 times daily        * MULTIVITAMIN PO      Take 1 tablet by mouth every morning        REQUIP 0.5 MG tablet   Generic drug:  rOPINIRole      Take 0.5 mg by mouth nightly as needed        vitamin E 400 UNIT capsule      Take 400 Units by mouth daily        * Notice:  This list has 4 medication(s) that are the same as other medications prescribed for you. Read the directions carefully, and ask your doctor or other care provider to review them with you.

## 2018-06-29 NOTE — TELEPHONE ENCOUNTER
"[phone] called requesting order-        SNV-  1x a 1wk  3x a week x1wk  2x a week x2wks  1x a week x4wks  3 prn      PT/OT/SW eval and treat     HHA-  1x a week x1wk  2 x a week x7wks    Gave verbal ok    Also family wondering if Wilmar talked to Dr Bower, or partner yet? See below       Per 6/28 dict-Dr Urban will try to reach Dr Bower tomorrow (or else his partner, if he is not in).   Call our office around noon tomorrow, and we can touch base about whether to go ahead with the \"Pacemaker plus ablation\" option to slow the heart, rather than continuing with medications alone to slow the heart.          "

## 2018-06-29 NOTE — TELEPHONE ENCOUNTER
"Luis, Consent to Communicate in EPIC for Luis, left detailed message as indictated in Consent to Communicate with provider message:  \"She should continue to hold the Xarelto, in case a procedure is done early next week. \"      "

## 2018-06-29 NOTE — TELEPHONE ENCOUNTER
Orders okay.     Left message for quentin Dominguez at mid-day today.   Dr Sathish hunter, was able to discuss with Dr Nolberto Hairston. He was to have the EP cardiology department (Dr Haq) contact quentin Smith to arrange appointment for early next week.   She should continue to hold the Xarelto, in case a procedure is done early next week.   Please advise quentin Smith (see contact information in demographics.

## 2018-07-03 NOTE — TELEPHONE ENCOUNTER
Received a call from Lius. Luis is frustrated due to the lack of communication. Luis stated Dr. Urban spoke to one of our cardiologist last week. Dr. Urban instructed patient to hold her Xarelto. Patient has been off Xarelto since last Friday. Luis thought someone from our office would be calling them to set up his mom's PPM surgery. In reviewing patient's chart, the discussion between Dr. Urban and our cardiologist was not documented. I reviewed Dr. Gibbons's recommendations from 06-24-18 (when patient was in the hospital), if patient's HR is not controlled with meds, AVN ablation with pacemaker may be needed in the future. Luis asked if patient will get a PPM when patient sees EP. I explained to Luis that it is up to the EP provider, if patient's heart rate is control with medications she may not need a PPM. Luis stated he did not want to bring patient to see EP if they are not going to do the procedure. I recommended patient see [facility] first, so they can evaluate patient. [facility] will assess patient, and if PPM is recommended, they will refer patient to EP. Also recommended patient to resume her Xarelto.  Aldair WEBER

## 2018-07-03 NOTE — PROGRESS NOTES
Dear Dr. Dez Urban  [facility] process is that all ordered disciplines will be involved in the development of the plan of care.  The following disciplines were unable to see Ana Cristina Dominguez; MRN [id number] within the 5 day evaluation window.  There will be a delay in the evalation visit by  PT      We will notify you when the evaluation is completed.  The patient has been notified.      Sincerely [facility]  Yany Hearn  [phone]

## 2018-07-03 NOTE — TELEPHONE ENCOUNTER
Please see detailed Telephone note dated 6/29/2018. Dr Urban spoke with Dr Nolberto Hairston, who had agreed to help facilitate EP consultation.

## 2018-07-03 NOTE — TELEPHONE ENCOUNTER
Received a call from patient's son, Luis. Patient saw Dr. Urban and has decided to proceed with pacemaker implantation.     Patient was recently hospitalized from 06-21-18 to 06-26-18 for Afib RVR. Patient was seen by cardiology. Recommended AV node ablation and permanent pacemaker placement. Patient was reluctant, so medications were adjusted and added, and her heart rate was controlled. The plan was canceled and patient was discharged home. Patient was recommended to f/u with [facility] in 2 weeks due to patient's underlying acute on chronic diastolic/systolic congestive heart failure with pleural effusion.     I advised Luis to schedule an appt with [facility] as recommended and if patient wants to discuss pacemaker further, patient can request an EP appt. Luis had no other questions.  Aldair WEBER

## 2018-07-05 NOTE — TELEPHONE ENCOUNTER
Patient sent [employer] message stating she has not been able to sleep and her home care nurse recommended she contact our office to request a light sedative to help. Patient asks if Dr. Urban would recommend trying anything for this.     Patient also states she has not heard from cardiology yet regarding her pacemaker

## 2018-07-05 NOTE — TELEPHONE ENCOUNTER
Left message for son Luis, advised him of two scheduled cardiology appts--7/6 and 7/9. Will try to call him back again later today.    Spoke with Ms Dominguez on phone. She is okay with waiting on adding any new medications for sleep. Also advised her of two scheduled cardiology appts as above.  She expresses concern that she may be unable to attend the 7/6 appt.     Urged her that the 7/9 appt will be absolutely necessary to attend before any EP/pacemaker procedure can be performed.   Also advised her that I would try again later today to speak with her son Luis.     Thank you to all involved with cardiology!

## 2018-07-05 NOTE — TELEPHONE ENCOUNTER
Talked to pt about being off her Xarelto and that she needs to go back on it starting today at suppertime.  Did repeat this a couple times to pt, who stated that she will start at suppertime today.  Pt will be seen on Friday by Dr Huntley to decide if AVN ablation and PPM is still needed. Mark

## 2018-07-05 NOTE — PROGRESS NOTES
Called pt's son, Luis. Asked if he could bring pt in earlier for labs. Pt will be her at 1015 for labs and 1050 for OV appt w/Carlton Carballo PA-C. Orders placed per staff recommendation.   Leatha Otoole RN 2:59 PM 07/05/18

## 2018-07-05 NOTE — TELEPHONE ENCOUNTER
Spoke to Luis. Luis was advised to keep patient's appts as scheduled. Patient is still holding her Xarelto. On 07-03-18, I had recommended that patient resume her Xarelto since we were not sure if or when patient would have her PPM procedure. Patient has been off her Xarelto since last Friday. Messaged Dr. Hairston to review if patient should continue with holding her Xarelto or if patient should resume her Xarelto. Aldair WEBER

## 2018-07-05 NOTE — TELEPHONE ENCOUNTER
It looks like everything is in motion for f/u.  She should see EP on 7-9 as scheduled.  This will likely just be a clinic appointment and if EP thinks pacemaker is appropriate, it will be scheduled for the near future.  She needs to keep this appointment, as they need to explain the pacemaker and AV node ablation (risk/benefits) in detail before just doing it.  If they don't make this appointment, it will delay things further.    Jalen Hairston MD  Cardiology - [facility]  Pager: [phone]  Text Page  July 5, 2018

## 2018-07-06 NOTE — PROGRESS NOTES
CARDIOLOGY CLINIC PROGRESS NOTE    DOS: 2018    Ana Cristina Dominguez  : 1931, 87 year old  MRN: [id number]      History:   I had the pleasure of seeing Ana Cristina Dominguez today for enrollment in the [facility], as well as follow up of a recent hospital admission. She was accompanied by her son, Luis.     Ana Cristina is an 87 year old female with history of paroxysmal atrial fibrillation maintained on anticoagulant therapy, hypertension, hyperlipidemia, AS/TR/MR (moderate AS and TR on echo 2017; moderate to moderate-severe MR and AI, and moderately severe TR on echo 3/2018), aortic dilatation (mildly dilated 4.4 cm on echo 3/2018), RLS, PMR.       In March she developed peripheral edema which improved with the use of steroids and was felt to be secondary to PMR.  Echo 3/21/18 showed atrial fibrillation with a reduced ejection fraction of 35%-40% (was 55-60% on echo 17), moderate to moderately severe mitral regurgitation, moderately severe tricuspid regurgitation, moderate to moderately severe aortic regurgitation and mild aortic root dilatation.  The ascending aorta was also mildly dilated at 4.4 cm.  The patient was in atrial fibrillation with rapid ventricular response.       In May, she began experiencing worsening weakness and fatigue with exhaustion with minimal activity.  A Lexiscan nuclear stress test 2018 demonstrated no evidence of cardiac ischemia or infarction and left ventricular ejection fraction was 39% with mild global hypokinesis.  US LE ruled out DVT.      ED on 6/3/18 for SOB.  Chest CT negative for PE. She was in afib with RVR.  Toprol XL increase to 100 mg in AM and 50 mg in PM.      She saw Dr. Bower 18 for continued worsening SOB.  CXR showed a small, new, right pleural effusion and moderate left pleural effusion.  There was no e/o CHF, however.  She was in atrial fibrillation with rapid ventricular response.  Toprol XL was increased to 100 mg BID. Plans were for a  Holter to assess rate control. Lisinopril was started. Diltiazem DCed.  Requip DCed.      She felt worse 6/21/18, so presented to the ER.  EKG in the emergency room again demonstrated atrial fibrillation or possible atrial flutter with rapid ventricular response and a heart rate of 133 beats per minute.  Poor R-wave progression across the anterior precordium and left axis deviation with possible inferior wall infarct.  There was nonspecific scooping of the ST waves in the lateral leads.   We had a bit of difficulty getting rate control.  She continued on Toprol  mg BID, dilt was added back and titrated, and ultimately, digoxin was added.  We did get better rate control, so she was discharged with a Zio Patch monitor with plans to see EP re AVN ablation and PPM.  She was a bit hesitant to proceed with a procedure.     Interval History:   After discharge, she had significant nausea. Her digoxin was stopped 6/28/18 and this did help. Despite her nausea improving some, she still has been losing weight. She only weighs 79 lbs.   She is on Bumex 2 mg in AM and 1 mg in PM and BMP today shows a bump in BUN and creat.   She has fatigue with exertion. I am not sure if this is because her rates increase, or because of the high dose meds she is on.   The swelling is gone now.   She has been offered a sleep study, but she declines.     ROS:  Skin:  Positive for     Eyes:  Positive for glasses  ENT:  Positive for hearing loss  Respiratory:  Positive for dyspnea on exertion;cough;shortness of breath  Cardiovascular:    Positive for;edema;fatigue;lightheadedness;dizziness;syncope or near-syncope;exercise intolerance  Gastroenterology: Positive for poor appetite (felt nauseas w/dig)  Genitourinary:  Positive for urinary frequency  Musculoskeletal:  Positive for arthritis;joint swelling  Neurologic:  Positive for numbness or tingling of feet  Psychiatric:  Positive for sleep disturbances;anxiety  Heme/Lymph/Imm:  Positive for  hay fever  Endocrine:  Negative      PAST MEDICAL HISTORY:  Past Medical History:   Diagnosis Date     Aortic regurgitation     mod-severe by echo in 3/2018     Benign essential hypertension      Chronic diastolic heart failure (H)      Other and unspecified hyperlipidemia      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     Polymyalgia rheumatica (H)      RLS (restless legs syndrome)      Senile osteoporosis     Reclast 11/16/09, 11/10, 11/11     Systolic heart failure (H)     by echo 3/2018, NM MPI negative for ischemia in 5/2108       PAST SURGICAL HISTORY:  Past Surgical History:   Procedure Laterality Date     APPENDECTOMY       CATARACT IOL, RT/LT  1/19/09    right     HC EXCIS PRIMARY GANGLION WRIST         SOCIAL HISTORY:  Social History     Social History     Marital status:      Spouse name: N/A     Number of children: 3     Years of education: N/A     Occupational History      Retired     Social History Main Topics     Smoking status: Former Smoker     Quit date: 5/6/1973     Smokeless tobacco: Never Used     Alcohol use Yes      Comment: Socially     Drug use: No     Sexual activity: No     Other Topics Concern     Exercise Yes     Seat Belt Yes     Social History Narrative       FAMILY HISTORY:  Family History   Problem Relation Age of Onset     C.A.D. Father      Family History Negative Mother      Genitourinary Problems Sister      Renal failure     HEART DISEASE Sister      Rhythm issues       MEDS:   Current Outpatient Prescriptions on File Prior to Visit:  acetaminophen (TYLENOL) 325 MG tablet Take 325-650 mg by mouth every 4 hours as needed for mild pain   ascorbic acid (VITAMIN C) 500 MG tablet Take 500 mg by mouth daily   bumetanide (BUMEX) 1 MG tablet Take 2 mg by mouth every morning    Calcium Carb-Cholecalciferol (CALCIUM-VITAMIN D) 500-400 MG-UNIT TABS Take 2 tablets by mouth every morning   diltiazem 240 MG 24 hr capsule  Take 1 capsule (240 mg) by mouth daily   lisinopril (PRINIVIL/ZESTRIL) 2.5 MG tablet Take 1 tablet (2.5 mg) by mouth daily   metoprolol succinate (TOPROL-XL) 100 MG 24 hr tablet Take 1 tablet (100 mg) by mouth 2 times daily   Multiple Vitamins-Minerals (MULTIVITAMIN PO) Take 1 tablet by mouth every morning   Multiple Vitamins-Minerals (PRESERVISION AREDS PO) Take 1 capsule by mouth 2 times daily    rOPINIRole (REQUIP) 0.5 MG tablet Take 0.5 mg by mouth nightly as needed   vitamin E 400 UNIT capsule Take 400 Units by mouth daily   digoxin (LANOXIN) 125 MCG tablet Take 1 tablet (125 mcg) by mouth daily (Patient not taking: Reported on 7/6/2018)     No current facility-administered medications on file prior to visit.     ALLERGIES:   Allergies   Allergen Reactions     Amoxicillin Rash     Codeine GI Disturbance     Stomach pain     Melatonin Nausea     Eyesburned       PHYSICAL EXAM:  Vitals: /52 (BP Location: Right arm, Patient Position: Chair, Cuff Size: Child)  Pulse 62  Ht 1.524 m (5')  Wt 35.9 kg (79 lb 3.2 oz)  SpO2 96%  BMI 15.47 kg/m2  Constitutional:  cooperative, alert and oriented, well developed, well nourished, in no acute distress chronically ill;cachectic Patient is examined in the sitting position, unable to climb onto table.    Skin:  warm and dry to the touch        Head:  normocephalic        Eyes:  no xanthalasma;pupils equal and round;conjunctivae and lids unremarkable;sclera white        ENT:  no pallor or cyanosis        Neck:      mild JVD    Respiratory:  clear to auscultation;normal symmetry        Cardiac: normal S1 and S2;no S3 or S4 irregularly irregular rhythm     systolic murmur;holosystolic murmur;throughout precordium;grade 2   holodiastolic murmur;decrescendo;throughout precordium;grade 2      GI:  abdomen soft;BS normoactive   thin    Vascular: pulses full and equal                                      Extremities and Musculoskeletal:  no edema   weak, walks with  walker    Neurological:  affect appropriate          LABS/DATA:  I reviewed the following:  Echo 3/21/18:  Interpretation Summary     Left pericardial effusion.  The rhythm was atrial fibrillation.  There is mild concentric left ventricular hypertrophy.  The visual ejection fraction is estimated at 35-40%.  There is moderate global hypokinesia of the left ventricle.  There is severe biatrial enlargement.  There is moderate to mod-severe (2-3+) mitral regurgitation.  There is moderately severe (3+) tricuspid regurgitation.  The aortic valve is trileaflet with aortic valve sclerosis.  There is moderate to mod-severe (2-3+) aortic regurgitation.  Mild aortic root dilatation.  The ascending aorta is Mildly dilated.  Compared to the prior study dated 5/22/17, Aorta,Atria are larger, MR,TR,AI  are worse, Now in Afib, there are changes as noted. EF is worse. Pleural  effusion now present.        Component      Latest Ref Rng & Units 6/20/2018 6/21/2018 6/22/2018 6/26/2018   Sodium      133 - 144 mmol/L 141 142 141 142   Potassium      3.4 - 5.3 mmol/L 3.7 3.8 4.3 3.8   Chloride      94 - 109 mmol/L 101 103 103 104   Carbon Dioxide      20 - 32 mmol/L 32 32 31 33 (H)   Anion Gap      3 - 14 mmol/L 8 7 7 5   Glucose      70 - 99 mg/dL 109 (H) 119 (H) 110 (H) 97   Urea Nitrogen      7 - 30 mg/dL 45 (H) 46 (H) 51 (H) 30   Creatinine      0.52 - 1.04 mg/dL 1.00 0.99 1.08 (H) 0.81   GFR Estimate      >60 mL/min/1.7m2 52 (L) 53 (L) 48 (L) 67   GFR Estimate If Black      >60 mL/min/1.7m2 63 64 58 (L) 81   Calcium      8.5 - 10.1 mg/dL 9.5 9.0 8.9 8.6   Bilirubin Total      0.2 - 1.3 mg/dL  0.6     Albumin      3.4 - 5.0 g/dL  2.9 (L)     Protein Total      6.8 - 8.8 g/dL  6.8     Alkaline Phosphatase      40 - 150 U/L  176 (H)     ALT      0 - 50 U/L  34     AST      0 - 45 U/L  28     N-Terminal Pro Bnp      0 - 450 pg/mL 5597 (H)      N-Terminal Pro BNP Inpatient      0 - 1800 pg/mL  72372 (H)       Component      Latest Ref Rng  & Units 7/6/2018   Sodium      133 - 144 mmol/L 142   Potassium      3.4 - 5.3 mmol/L 3.8   Chloride      94 - 109 mmol/L 99   Carbon Dioxide      20 - 32 mmol/L 38 (H)   Anion Gap      3 - 14 mmol/L 5   Glucose      70 - 99 mg/dL 102 (H)   Urea Nitrogen      7 - 30 mg/dL 44 (H)   Creatinine      0.52 - 1.04 mg/dL 1.12 (H)   GFR Estimate      >60 mL/min/1.7m2 46 (L)   GFR Estimate If Black      >60 mL/min/1.7m2 56 (L)   Calcium      8.5 - 10.1 mg/dL 9.3   Bilirubin Total      0.2 - 1.3 mg/dL    Albumin      3.4 - 5.0 g/dL    Protein Total      6.8 - 8.8 g/dL    Alkaline Phosphatase      40 - 150 U/L    ALT      0 - 50 U/L    AST      0 - 45 U/L    N-Terminal Pro Bnp      0 - 450 pg/mL 3343 (H)   N-Terminal Pro BNP Inpatient      0 - 1800 pg/mL          Zio Patch in process still.  To be reviewed 7/9/18 by Dr. Huntley if completed.         ASSESSMENT/PLAN:  Chronic Afib, recently with RVR  Her rate is well controlled today on exam - in the 60 and 70s.  She is clearly irregularly irregular on exam.  Therefore I did not do an ECG (as it is very difficult for her to get onto the exam table).   This is on high doses of Toprol  mg BID, dilt  mg.  She is off the digoxin due to significant nausea. She will continue her current rate controlling meds.   Despite rate control, she has significant fatigue with any exertion - this may be that her heart rate jumps with exertion, or could be due to her high doses of BB/CCB.  AVN ablation and PPM would treat both rate and allow us to decrease medications.  I think she should proceed with EP evaluation on Monday re AVN ablation and PPM.   She has been on Xarelto 20 mg.  She is so small and frail and has a borderline GFR - I would consider decreasing to 15 mg. I will defer this to Dr. Huntley on Monday.     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    - Continue BB and ACEi.  After AVN ablation and PPM (if she proceeds), would stop dilt, and decrease BB.   - She has no evidence of significant CHF on exam today.  Her weight is trending down and her BUN/Creat are trending up. Decrease Bumex from 2mg in AM/1 mg in PM to just 2 mg once a day.  BMP on Monday when she sees Dr. Huntley.   - Plan will be to repeat echo after definitive rate control.     MR  TR  AI  - All noted to be increased (moderate to moderate-severe MR and AI, and moderately severe TR) on echo 3/2018 in setting of decrease in LVEF and CHF  - treating medically  - Will need repeat echo once euvolemic     Restless leg syndrome  - Requip stopped per Dr. Bower  - She may benefit from a neurologic evaluation      Follow up:  Dr. Huntley Monday, 7/9/18  If has AVN ablation and PPM, will see EP NP/PA 7-10 days after  Can see me in clinic back in about 4 -5 weeks.  If she does not proceed with the AVN ablation and PPM, I would like to see her back sooner to make sure she is stable      40 minutes was spent with the patient and > 50% was counseling and coordinating care as described above       Carlton Carballo PA-C

## 2018-07-06 NOTE — LETTER
2018    Dez Urban MD, MD  [address] [facility] [address]  [facility] [address]    RE: Ana Cristina Dominguez       Dear Colleague,    I had the pleasure of seeing Ana Cristina Dominguez in the [facility].    CARDIOLOGY CLINIC PROGRESS NOTE    DOS: 2018    Ana Cristina Dominguez  : 1931, 87 year old  MRN: [id number]      History:   I had the pleasure of seeing Ana Cristina Dominguez today for enrollment in the [facility], as well as follow up of a recent hospital admission. She was accompanied by her son, Luis.     Ana Cristina is an 87 year old female with history of paroxysmal atrial fibrillation maintained on anticoagulant therapy, hypertension, hyperlipidemia, AS/TR/MR (moderate AS and TR on echo 2017; moderate to moderate-severe MR and AI, and moderately severe TR on echo 3/2018), aortic dilatation (mildly dilated 4.4 cm on echo 3/2018), RLS, PMR.       In March she developed peripheral edema which improved with the use of steroids and was felt to be secondary to PMR.  Echo 3/21/18 showed atrial fibrillation with a reduced ejection fraction of 35%-40% (was 55-60% on echo 17), moderate to moderately severe mitral regurgitation, moderately severe tricuspid regurgitation, moderate to moderately severe aortic regurgitation and mild aortic root dilatation.  The ascending aorta was also mildly dilated at 4.4 cm.  The patient was in atrial fibrillation with rapid ventricular response.       In May, she began experiencing worsening weakness and fatigue with exhaustion with minimal activity.  A Lexiscan nuclear stress test 2018 demonstrated no evidence of cardiac ischemia or infarction and left ventricular ejection fraction was 39% with mild global hypokinesis.  US LE ruled out DVT.      ED on 6/3/18 for SOB.  Chest CT negative for PE. She was in afib with RVR.  Toprol XL increase to 100 mg in AM and 50 mg in PM.      She saw Dr. Bower 18 for continued worsening SOB.   CXR showed a small, new, right pleural effusion and moderate left pleural effusion.  There was no e/o CHF, however.  She was in atrial fibrillation with rapid ventricular response.  Toprol XL was increased to 100 mg BID. Plans were for a Holter to assess rate control. Lisinopril was started. Diltiazem DCed.  Requip DCed.      She felt worse 6/21/18, so presented to the ER.  EKG in the emergency room again demonstrated atrial fibrillation or possible atrial flutter with rapid ventricular response and a heart rate of 133 beats per minute.  Poor R-wave progression across the anterior precordium and left axis deviation with possible inferior wall infarct.  There was nonspecific scooping of the ST waves in the lateral leads.    We had a bit of difficulty getting rate control.  She continued on Toprol  mg BID, dilt was added back and titrated, and ultimately, digoxin was added.  We did get better rate control, so she was discharged with a Zio Patch monitor with plans to see EP re AVN ablation and PPM.  She was a bit hesitant to proceed with a procedure.     Interval History:   After discharge, she had significant nausea. Her digoxin was stopped 6/28/18 and this did help. Despite her nausea improving some, she still has been losing weight. She only weighs 79 lbs.   She is on Bumex 2 mg in AM and 1 mg in PM and BMP today shows a bump in BUN and creat.   She has fatigue with exertion. I am not sure if this is because her rates increase, or because of the high dose meds she is on.   The swelling is gone now.   She has been offered a sleep study, but she declines.     ROS:  Skin:  Positive for     Eyes:  Positive for glasses  ENT:  Positive for hearing loss  Respiratory:  Positive for dyspnea on exertion;cough;shortness of breath  Cardiovascular:    Positive for;edema;fatigue;lightheadedness;dizziness;syncope or near-syncope;exercise intolerance  Gastroenterology: Positive for poor appetite (felt nauseas  w/dig)  Genitourinary:  Positive for urinary frequency  Musculoskeletal:  Positive for arthritis;joint swelling  Neurologic:  Positive for numbness or tingling of feet  Psychiatric:  Positive for sleep disturbances;anxiety  Heme/Lymph/Imm:  Positive for hay fever  Endocrine:  Negative      PAST MEDICAL HISTORY:  Past Medical History:   Diagnosis Date     Aortic regurgitation     mod-severe by echo in 3/2018     Benign essential hypertension      Chronic diastolic heart failure (H)      Other and unspecified hyperlipidemia      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     Polymyalgia rheumatica (H)      RLS (restless legs syndrome)      Senile osteoporosis     Reclast 11/16/09, 11/10, 11/11     Systolic heart failure (H)     by echo 3/2018, NM MPI negative for ischemia in 5/2108       PAST SURGICAL HISTORY:  Past Surgical History:   Procedure Laterality Date     APPENDECTOMY       CATARACT IOL, RT/LT  1/19/09    right     HC EXCIS PRIMARY GANGLION WRIST         SOCIAL HISTORY:  Social History     Social History     Marital status:      Spouse name: N/A     Number of children: 3     Years of education: N/A     Occupational History      Retired     Social History Main Topics     Smoking status: Former Smoker     Quit date: 5/6/1973     Smokeless tobacco: Never Used     Alcohol use Yes      Comment: Socially     Drug use: No     Sexual activity: No     Other Topics Concern     Exercise Yes     Seat Belt Yes     Social History Narrative       FAMILY HISTORY:  Family History   Problem Relation Age of Onset     C.A.D. Father      Family History Negative Mother      Genitourinary Problems Sister      Renal failure     HEART DISEASE Sister      Rhythm issues       MEDS:   Current Outpatient Prescriptions on File Prior to Visit:  acetaminophen (TYLENOL) 325 MG tablet Take 325-650 mg by mouth every 4 hours as needed for mild pain   ascorbic acid (VITAMIN  C) 500 MG tablet Take 500 mg by mouth daily   bumetanide (BUMEX) 1 MG tablet Take 2 mg by mouth every morning    Calcium Carb-Cholecalciferol (CALCIUM-VITAMIN D) 500-400 MG-UNIT TABS Take 2 tablets by mouth every morning   diltiazem 240 MG 24 hr capsule Take 1 capsule (240 mg) by mouth daily   lisinopril (PRINIVIL/ZESTRIL) 2.5 MG tablet Take 1 tablet (2.5 mg) by mouth daily   metoprolol succinate (TOPROL-XL) 100 MG 24 hr tablet Take 1 tablet (100 mg) by mouth 2 times daily   Multiple Vitamins-Minerals (MULTIVITAMIN PO) Take 1 tablet by mouth every morning   Multiple Vitamins-Minerals (PRESERVISION AREDS PO) Take 1 capsule by mouth 2 times daily    rOPINIRole (REQUIP) 0.5 MG tablet Take 0.5 mg by mouth nightly as needed   vitamin E 400 UNIT capsule Take 400 Units by mouth daily   digoxin (LANOXIN) 125 MCG tablet Take 1 tablet (125 mcg) by mouth daily (Patient not taking: Reported on 7/6/2018)     No current facility-administered medications on file prior to visit.     ALLERGIES:   Allergies   Allergen Reactions     Amoxicillin Rash     Codeine GI Disturbance     Stomach pain     Melatonin Nausea     Eyesburned       PHYSICAL EXAM:  Vitals: /52 (BP Location: Right arm, Patient Position: Chair, Cuff Size: Child)  Pulse 62  Ht 1.524 m (5')  Wt 35.9 kg (79 lb 3.2 oz)  SpO2 96%  BMI 15.47 kg/m2  Constitutional:  cooperative, alert and oriented, well developed, well nourished, in no acute distress chronically ill;cachectic Patient is examined in the sitting position, unable to climb onto table.    Skin:  warm and dry to the touch        Head:  normocephalic        Eyes:  no xanthalasma;pupils equal and round;conjunctivae and lids unremarkable;sclera white        ENT:  no pallor or cyanosis        Neck:      mild JVD    Respiratory:  clear to auscultation;normal symmetry        Cardiac: normal S1 and S2;no S3 or S4 irregularly irregular rhythm     systolic murmur;holosystolic murmur;throughout precordium;grade 2    holodiastolic murmur;decrescendo;throughout precordium;grade 2      GI:  abdomen soft;BS normoactive   thin    Vascular: pulses full and equal                                      Extremities and Musculoskeletal:  no edema   weak, walks with walker    Neurological:  affect appropriate          LABS/DATA:  I reviewed the following:  Echo 3/21/18:  Interpretation Summary     Left pericardial effusion.  The rhythm was atrial fibrillation.  There is mild concentric left ventricular hypertrophy.  The visual ejection fraction is estimated at 35-40%.  There is moderate global hypokinesia of the left ventricle.  There is severe biatrial enlargement.  There is moderate to mod-severe (2-3+) mitral regurgitation.  There is moderately severe (3+) tricuspid regurgitation.  The aortic valve is trileaflet with aortic valve sclerosis.  There is moderate to mod-severe (2-3+) aortic regurgitation.  Mild aortic root dilatation.  The ascending aorta is Mildly dilated.  Compared to the prior study dated 5/22/17, Aorta,Atria are larger, MR,TR,AI  are worse, Now in Afib, there are changes as noted. EF is worse. Pleural  effusion now present.        Component      Latest Ref Rng & Units 6/20/2018 6/21/2018 6/22/2018 6/26/2018   Sodium      133 - 144 mmol/L 141 142 141 142   Potassium      3.4 - 5.3 mmol/L 3.7 3.8 4.3 3.8   Chloride      94 - 109 mmol/L 101 103 103 104   Carbon Dioxide      20 - 32 mmol/L 32 32 31 33 (H)   Anion Gap      3 - 14 mmol/L 8 7 7 5   Glucose      70 - 99 mg/dL 109 (H) 119 (H) 110 (H) 97   Urea Nitrogen      7 - 30 mg/dL 45 (H) 46 (H) 51 (H) 30   Creatinine      0.52 - 1.04 mg/dL 1.00 0.99 1.08 (H) 0.81   GFR Estimate      >60 mL/min/1.7m2 52 (L) 53 (L) 48 (L) 67   GFR Estimate If Black      >60 mL/min/1.7m2 63 64 58 (L) 81   Calcium      8.5 - 10.1 mg/dL 9.5 9.0 8.9 8.6   Bilirubin Total      0.2 - 1.3 mg/dL  0.6     Albumin      3.4 - 5.0 g/dL  2.9 (L)     Protein Total      6.8 - 8.8 g/dL  6.8     Alkaline  Phosphatase      40 - 150 U/L  176 (H)     ALT      0 - 50 U/L  34     AST      0 - 45 U/L  28     N-Terminal Pro Bnp      0 - 450 pg/mL 5597 (H)      N-Terminal Pro BNP Inpatient      0 - 1800 pg/mL  93624 (H)       Component      Latest Ref Rng & Units 7/6/2018   Sodium      133 - 144 mmol/L 142   Potassium      3.4 - 5.3 mmol/L 3.8   Chloride      94 - 109 mmol/L 99   Carbon Dioxide      20 - 32 mmol/L 38 (H)   Anion Gap      3 - 14 mmol/L 5   Glucose      70 - 99 mg/dL 102 (H)   Urea Nitrogen      7 - 30 mg/dL 44 (H)   Creatinine      0.52 - 1.04 mg/dL 1.12 (H)   GFR Estimate      >60 mL/min/1.7m2 46 (L)   GFR Estimate If Black      >60 mL/min/1.7m2 56 (L)   Calcium      8.5 - 10.1 mg/dL 9.3   Bilirubin Total      0.2 - 1.3 mg/dL    Albumin      3.4 - 5.0 g/dL    Protein Total      6.8 - 8.8 g/dL    Alkaline Phosphatase      40 - 150 U/L    ALT      0 - 50 U/L    AST      0 - 45 U/L    N-Terminal Pro Bnp      0 - 450 pg/mL 3343 (H)   N-Terminal Pro BNP Inpatient      0 - 1800 pg/mL          Zio Patch in process still.  To be reviewed 7/9/18 by Dr. Huntley if completed.         ASSESSMENT/PLAN:  Chronic Afib, recently with RVR  Her rate is well controlled today on exam - in the 60 and 70s.  She is clearly irregularly irregular on exam.  Therefore I did not do an ECG (as it is very difficult for her to get onto the exam table).   This is on high doses of Toprol  mg BID, dilt  mg.  She is off the digoxin due to significant nausea. She will continue her current rate controlling meds.   Despite rate control, she has significant fatigue with any exertion - this may be that her heart rate jumps with exertion, or could be due to her high doses of BB/CCB.  AVN ablation and PPM would treat both rate and allow us to decrease medications.  I think she should proceed with EP evaluation on Monday re AVN ablation and PPM.   She has been on Xarelto 20 mg.  She is so small and frail and has a borderline GFR - I would  consider decreasing to 15 mg. I will defer this to Dr. Huntley on Monday.     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    - Continue BB and ACEi.  After AVN ablation and PPM (if she proceeds), would stop dilt, and decrease BB.   - She has no evidence of significant CHF on exam today.  Her weight is trending down and her BUN/Creat are trending up. Decrease Bumex from 2mg in AM/1 mg in PM to just 2 mg once a day.  BMP on Monday when she sees Dr. Huntley.   - Plan will be to repeat echo after definitive rate control.     MR  TR  AI  - All noted to be increased (moderate to moderate-severe MR and AI, and moderately severe TR) on echo 3/2018 in setting of decrease in LVEF and CHF  - treating medically  - Will need repeat echo once euvolemic     Restless leg syndrome  - Requip stopped per Dr. Bower  - She may benefit from a neurologic evaluation      Follow up:  Dr. Huntley Monday, 7/9/18  If has AVN ablation and PPM, will see EP NP/PA 7-10 days after  Can see me in clinic back in about 4 -5 weeks.  If she does not proceed with the AVN ablation and PPM, I would like to see her back sooner to make sure she is stable      40 minutes was spent with the patient and > 50% was counseling and coordinating care as described above       Thank you for allowing me to participate in the care of your patient.    Sincerely,     SAHIL Cagle-AKSHAT     [facility]

## 2018-07-06 NOTE — MR AVS SNAPSHOT
After Visit Summary   7/6/2018    Ana Cristina Dominguez    MRN: [id number]           Patient Information     Date Of Birth          6/6/1931        Visit Information        Provider Department      7/6/2018 10:50 AM Carlton Carballo PA-C [facility]        Today's Diagnoses     Chronic atrial fibrillation (H)        Chronic systolic congestive heart failure (H)          Care Instructions    Call the C.O.R.E[facility] nurse for any questions or concerns:  [phone]    1.  Medication changes from today:  - Decrease the Bumex to 2 mg daily.  So stop the PM dose.   - Continue to Xarelto.       2. Follow up plan:    - See Dr. Huntley on Monday, 7/9/18 with a repeat lab.   - If you go ahead with the procedure, you will be seeing his NP or PA after.    - I have made a follow up for the [facility] in 4 weeks.  If you do not have the procedure, I would like to see you sooner.       3.  Weigh yourself daily and write it down.    4.  Call [facility] nurse if your weight is up more than 2 pounds in one day or 5 pounds in one week.    5.  Call [facility] nurse if you feel more short of breath, have more abdominal bloating, or leg swelling.    6.  Continue low sodium diet (less than 2000 mg daily). If you eat less salt, you will retain less fluid.    7.  Alcohol can weaken your heart further. You should avoid alcohol or limit its use to special times, such as a holiday or birthday.     8.  Do NOT take Aleve (naproxen) or Advil (ibuprofen) without talking to your doctor first.     9.  My hope is that by stopping the digoxin your nausea gets better and you can eat more.  In addition, decreasing Bumex will likely cause your weight to go up a couple pounds.        10.  Lab Results:     Component      Latest Ref Rng & Units 7/6/2018   Sodium      133 - 144 mmol/L 142   Potassium      3.4 - 5.3 mmol/L 3.8   Chloride      94 - 109 mmol/L 99   Carbon Dioxide      20 - 32 mmol/L 38 (H)   Anion Gap       3 - 14 mmol/L 5   Glucose      70 - 99 mg/dL 102 (H)   Urea Nitrogen      7 - 30 mg/dL 44 (H)   Creatinine      0.52 - 1.04 mg/dL 1.12 (H)   GFR Estimate      >60 mL/min/1.7m2 46 (L)   GFR Estimate If Black      >60 mL/min/1.7m2 56 (L)   Calcium      8.5 - 10.1 mg/dL 9.3         [facility]: Cardiomyopathy, Optimization, Rehabilitation, Education  The [facility] is a heart failure specialty clinic within the [facility] where you will work with your cardiologist, nurse practitioners, physician assistants and registered nurses who specialize in heart failure care. They are dedicated to helping patients with heart failure to carefully adjust medications, receive education, and learn who and when to call if symptoms develop. They specialize in helping you better understand your condition, slow the progression of your disease, improve the length and quality of your life, help you detect future heart problems before they become life threatening, and avoid hospitalizations.            Follow-ups after your visit        Additional Services     Follow-Up with [facility] - WU visit                 Your next 10 appointments already scheduled     Jul 09, 2018 10:10 AM CDT   LAB with HEARD LAB   [facility] [place] ([facility])    [address] [place], [place] [id number]  [facility] [id number] [phone]           Please do not eat 10-12 hours before your appointment if you are coming in fasting for labs on lipids, cholesterol, or glucose (sugar). This does not apply to pregnant women. Water, hot tea and black coffee (with nothing added) are okay. Do not drink other fluids, diet soda or chew gum.            Jul 09, 2018 10:45 AM CDT   EP NEW with Jason Huntley MD   [facility] ([facility])    [address] [place], [place] [address]  [facility] [id number] [phone] OPT 2              Future tests that were ordered  for you today     Open Future Orders        Priority Expected Expires Ordered    Follow-Up with [facility] - WU visit Routine 8/3/2018 7/6/2019 7/6/2018    Basic metabolic panel Routine 8/3/2018 7/6/2019 7/6/2018    Basic metabolic panel Routine 7/9/2018 7/6/2019 7/6/2018            Who to contact     If you have questions or need follow up information about today's clinic visit or your schedule please contact [facility] directly at [phone].  Normal or non-critical lab and imaging results will be communicated to you by Corensichart, letter or phone within 4 business days after the clinic has received the results. If you do not hear from us within 7 days, please contact the clinic through [employer] or phone. If you have a critical or abnormal lab result, we will notify you by phone as soon as possible.  Submit refill requests through [employer] or call your pharmacy and they will forward the refill request to us. Please allow 3 business days for your refill to be completed.          Additional Information About Your Visit        CorensicharInvodo Information     [employer] gives you secure access to your electronic health record. If you see a primary care provider, you can also send messages to your care team and make appointments. If you have questions, please call your primary care clinic.  If you do not have a primary care provider, please call [phone] and they will assist you.        Care EveryWhere ID     This is your Care EveryWhere ID. This could be used by other organizations to access your [facility] medical records  [id number]        Your Vitals Were     Pulse Height Pulse Oximetry BMI (Body Mass Index)          62 1.524 m (5') 96% 15.47 kg/m2         Blood Pressure from Last 3 Encounters:   07/06/18 108/52   06/28/18 120/68   06/26/18 151/63    Weight from Last 3 Encounters:   07/06/18 35.9 kg (79 lb 3.2 oz)   06/28/18 36.6 kg (80 lb 9.6 oz)   06/25/18 39.3 kg (86 lb 9.6  oz)              We Performed the Following     Follow-Up with [facility]          Today's Medication Changes          These changes are accurate as of 7/6/18 11:48 AM.  If you have any questions, ask your nurse or doctor.               These medicines have changed or have updated prescriptions.        Dose/Directions    bumetanide 1 MG tablet   Commonly known as:  BUMEX   This may have changed:  Another medication with the same name was removed. Continue taking this medication, and follow the directions you see here.   Changed by:  Carlton Carballo PA-C        Dose:  2 mg   Take 2 mg by mouth every morning   Refills:  0                Primary Care Provider Office Phone # Fax #    Dez Urban [phone]       [address] [place] [id number]        Equal Access to Services     [facility] FRANCISCA : Hadii shanelle hamilton hadasho Sovirginieali, waaxda luqadaha, qaybta kaalmada adeegyada, waxay gladys obrien. So [facility] [phone].    ATENCIÓN: Si habla español, tiene a dominguez disposición servicios gratuitos de asistencia lingüística. [facility] [phone].    We comply with applicable federal civil rights laws and [place] laws. We do not discriminate on the basis of race, color, national origin, age, disability, sex, sexual orientation, or gender identity.            Thank you!     Thank you for choosing [facility]  for your care. Our goal is always to provide you with excellent care. Hearing back from our patients is one way we can continue to improve our services. Please take a few minutes to complete the written survey that you may receive in the mail after your visit with us. Thank you!             Your Updated Medication List - Protect others around you: Learn how to safely use, store and throw away your medicines at www.disposemymeds.org.          This list is accurate as of 7/6/18 11:48 AM.  Always use your most recent med list.                    Brand Name Dispense Instructions for use Diagnosis    acetaminophen 325 MG tablet    TYLENOL     Take 325-650 mg by mouth every 4 hours as needed for mild pain        ascorbic acid 500 MG tablet    VITAMIN C     Take 500 mg by mouth daily        bumetanide 1 MG tablet    BUMEX     Take 2 mg by mouth every morning        Calcium-Vitamin D 500-400 MG-UNIT Tabs      Take 2 tablets by mouth every morning        digoxin 125 MCG tablet    LANOXIN    30 tablet    Take 1 tablet (125 mcg) by mouth daily    Atrial fibrillation with RVR (H)       diltiazem 240 MG 24 hr capsule     30 capsule    Take 1 capsule (240 mg) by mouth daily    Atrial fibrillation with RVR (H)       lisinopril 2.5 MG tablet    PRINIVIL/Zestril    30 tablet    Take 1 tablet (2.5 mg) by mouth daily    Chronic systolic congestive heart failure (H)       metoprolol succinate 100 MG 24 hr tablet    TOPROL-XL    60 tablet    Take 1 tablet (100 mg) by mouth 2 times daily    Essential hypertension, Chronic atrial fibrillation (H)       * PRESERVISION AREDS PO      Take 1 capsule by mouth 2 times daily        * MULTIVITAMIN PO      Take 1 tablet by mouth every morning        REQUIP 0.5 MG tablet   Generic drug:  rOPINIRole      Take 0.5 mg by mouth nightly as needed        rivaroxaban ANTICOAGULANT 20 MG Tabs tablet    XARELTO     Take 1 tablet (20 mg) by mouth daily (with dinner)    Chronic atrial fibrillation (H)       vitamin E 400 UNIT capsule      Take 400 Units by mouth daily        * Notice:  This list has 2 medication(s) that are the same as other medications prescribed for you. Read the directions carefully, and ask your doctor or other care provider to review them with you.

## 2018-07-06 NOTE — LETTER
2018    Dez Urban MD, MD  [address] [facility] [address]  [facility] [address]    RE: Ana Cristina Dominguez       Dear Colleague,    I had the pleasure of seeing Ana Cristina Dominguez in the [facility].    CARDIOLOGY CLINIC PROGRESS NOTE    DOS: 2018    Ana Cristina Dominguez  : 1931, 87 year old  MRN: [id number]      History:   I had the pleasure of seeing Ana Cristina Dominguez today for enrollment in the [facility], as well as follow up of a recent hospital admission. She was accompanied by her son, Luis.     Ana Cristina is an 87 year old female with history of paroxysmal atrial fibrillation maintained on anticoagulant therapy, hypertension, hyperlipidemia, AS/TR/MR (moderate AS and TR on echo 2017; moderate to moderate-severe MR and AI, and moderately severe TR on echo 3/2018), aortic dilatation (mildly dilated 4.4 cm on echo 3/2018), RLS, PMR.       In March she developed peripheral edema which improved with the use of steroids and was felt to be secondary to PMR.  Echo 3/21/18 showed atrial fibrillation with a reduced ejection fraction of 35%-40% (was 55-60% on echo 17), moderate to moderately severe mitral regurgitation, moderately severe tricuspid regurgitation, moderate to moderately severe aortic regurgitation and mild aortic root dilatation.  The ascending aorta was also mildly dilated at 4.4 cm.  The patient was in atrial fibrillation with rapid ventricular response.       In May, she began experiencing worsening weakness and fatigue with exhaustion with minimal activity.  A Lexiscan nuclear stress test 2018 demonstrated no evidence of cardiac ischemia or infarction and left ventricular ejection fraction was 39% with mild global hypokinesis.  US LE ruled out DVT.      ED on 6/3/18 for SOB.  Chest CT negative for PE. She was in afib with RVR.  Toprol XL increase to 100 mg in AM and 50 mg in PM.      She saw Dr. Bower 18 for continued worsening SOB.   CXR showed a small, new, right pleural effusion and moderate left pleural effusion.  There was no e/o CHF, however.  She was in atrial fibrillation with rapid ventricular response.  Toprol XL was increased to 100 mg BID. Plans were for a Holter to assess rate control. Lisinopril was started. Diltiazem DCed.  Requip DCed.      She felt worse 6/21/18, so presented to the ER.  EKG in the emergency room again demonstrated atrial fibrillation or possible atrial flutter with rapid ventricular response and a heart rate of 133 beats per minute.  Poor R-wave progression across the anterior precordium and left axis deviation with possible inferior wall infarct.  There was nonspecific scooping of the ST waves in the lateral leads.    We had a bit of difficulty getting rate control.  She continued on Toprol  mg BID, dilt was added back and titrated, and ultimately, digoxin was added.  We did get better rate control, so she was discharged with a Zio Patch monitor with plans to see EP re AVN ablation and PPM.  She was a bit hesitant to proceed with a procedure.     Interval History:   After discharge, she had significant nausea. Her digoxin was stopped 6/28/18 and this did help. Despite her nausea improving some, she still has been losing weight. She only weighs 79 lbs.   She is on Bumex 2 mg in AM and 1 mg in PM and BMP today shows a bump in BUN and creat.   She has fatigue with exertion. I am not sure if this is because her rates increase, or because of the high dose meds she is on.   The swelling is gone now.   She has been offered a sleep study, but she declines.     ROS:  Skin:  Positive for     Eyes:  Positive for glasses  ENT:  Positive for hearing loss  Respiratory:  Positive for dyspnea on exertion;cough;shortness of breath  Cardiovascular:    Positive for;edema;fatigue;lightheadedness;dizziness;syncope or near-syncope;exercise intolerance  Gastroenterology: Positive for poor appetite (felt nauseas  w/dig)  Genitourinary:  Positive for urinary frequency  Musculoskeletal:  Positive for arthritis;joint swelling  Neurologic:  Positive for numbness or tingling of feet  Psychiatric:  Positive for sleep disturbances;anxiety  Heme/Lymph/Imm:  Positive for hay fever  Endocrine:  Negative      PAST MEDICAL HISTORY:  Past Medical History:   Diagnosis Date     Aortic regurgitation     mod-severe by echo in 3/2018     Benign essential hypertension      Chronic diastolic heart failure (H)      Other and unspecified hyperlipidemia      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     Polymyalgia rheumatica (H)      RLS (restless legs syndrome)      Senile osteoporosis     Reclast 11/16/09, 11/10, 11/11     Systolic heart failure (H)     by echo 3/2018, NM MPI negative for ischemia in 5/2108       PAST SURGICAL HISTORY:  Past Surgical History:   Procedure Laterality Date     APPENDECTOMY       CATARACT IOL, RT/LT  1/19/09    right     HC EXCIS PRIMARY GANGLION WRIST         SOCIAL HISTORY:  Social History     Social History     Marital status:      Spouse name: N/A     Number of children: 3     Years of education: N/A     Occupational History      Retired     Social History Main Topics     Smoking status: Former Smoker     Quit date: 5/6/1973     Smokeless tobacco: Never Used     Alcohol use Yes      Comment: Socially     Drug use: No     Sexual activity: No     Other Topics Concern     Exercise Yes     Seat Belt Yes     Social History Narrative       FAMILY HISTORY:  Family History   Problem Relation Age of Onset     C.A.D. Father      Family History Negative Mother      Genitourinary Problems Sister      Renal failure     HEART DISEASE Sister      Rhythm issues       MEDS:   Current Outpatient Prescriptions on File Prior to Visit:  acetaminophen (TYLENOL) 325 MG tablet Take 325-650 mg by mouth every 4 hours as needed for mild pain   ascorbic acid (VITAMIN  C) 500 MG tablet Take 500 mg by mouth daily   bumetanide (BUMEX) 1 MG tablet Take 2 mg by mouth every morning    Calcium Carb-Cholecalciferol (CALCIUM-VITAMIN D) 500-400 MG-UNIT TABS Take 2 tablets by mouth every morning   diltiazem 240 MG 24 hr capsule Take 1 capsule (240 mg) by mouth daily   lisinopril (PRINIVIL/ZESTRIL) 2.5 MG tablet Take 1 tablet (2.5 mg) by mouth daily   metoprolol succinate (TOPROL-XL) 100 MG 24 hr tablet Take 1 tablet (100 mg) by mouth 2 times daily   Multiple Vitamins-Minerals (MULTIVITAMIN PO) Take 1 tablet by mouth every morning   Multiple Vitamins-Minerals (PRESERVISION AREDS PO) Take 1 capsule by mouth 2 times daily    rOPINIRole (REQUIP) 0.5 MG tablet Take 0.5 mg by mouth nightly as needed   vitamin E 400 UNIT capsule Take 400 Units by mouth daily   digoxin (LANOXIN) 125 MCG tablet Take 1 tablet (125 mcg) by mouth daily (Patient not taking: Reported on 7/6/2018)     No current facility-administered medications on file prior to visit.     ALLERGIES:   Allergies   Allergen Reactions     Amoxicillin Rash     Codeine GI Disturbance     Stomach pain     Melatonin Nausea     Eyesburned       PHYSICAL EXAM:  Vitals: /52 (BP Location: Right arm, Patient Position: Chair, Cuff Size: Child)  Pulse 62  Ht 1.524 m (5')  Wt 35.9 kg (79 lb 3.2 oz)  SpO2 96%  BMI 15.47 kg/m2  Constitutional:  cooperative, alert and oriented, well developed, well nourished, in no acute distress chronically ill;cachectic Patient is examined in the sitting position, unable to climb onto table.    Skin:  warm and dry to the touch        Head:  normocephalic        Eyes:  no xanthalasma;pupils equal and round;conjunctivae and lids unremarkable;sclera white        ENT:  no pallor or cyanosis        Neck:      mild JVD    Respiratory:  clear to auscultation;normal symmetry        Cardiac: normal S1 and S2;no S3 or S4 irregularly irregular rhythm     systolic murmur;holosystolic murmur;throughout precordium;grade 2    holodiastolic murmur;decrescendo;throughout precordium;grade 2      GI:  abdomen soft;BS normoactive   thin    Vascular: pulses full and equal                                      Extremities and Musculoskeletal:  no edema   weak, walks with walker    Neurological:  affect appropriate          LABS/DATA:  I reviewed the following:  Echo 3/21/18:  Interpretation Summary     Left pericardial effusion.  The rhythm was atrial fibrillation.  There is mild concentric left ventricular hypertrophy.  The visual ejection fraction is estimated at 35-40%.  There is moderate global hypokinesia of the left ventricle.  There is severe biatrial enlargement.  There is moderate to mod-severe (2-3+) mitral regurgitation.  There is moderately severe (3+) tricuspid regurgitation.  The aortic valve is trileaflet with aortic valve sclerosis.  There is moderate to mod-severe (2-3+) aortic regurgitation.  Mild aortic root dilatation.  The ascending aorta is Mildly dilated.  Compared to the prior study dated 5/22/17, Aorta,Atria are larger, MR,TR,AI  are worse, Now in Afib, there are changes as noted. EF is worse. Pleural  effusion now present.        Component      Latest Ref Rng & Units 6/20/2018 6/21/2018 6/22/2018 6/26/2018   Sodium      133 - 144 mmol/L 141 142 141 142   Potassium      3.4 - 5.3 mmol/L 3.7 3.8 4.3 3.8   Chloride      94 - 109 mmol/L 101 103 103 104   Carbon Dioxide      20 - 32 mmol/L 32 32 31 33 (H)   Anion Gap      3 - 14 mmol/L 8 7 7 5   Glucose      70 - 99 mg/dL 109 (H) 119 (H) 110 (H) 97   Urea Nitrogen      7 - 30 mg/dL 45 (H) 46 (H) 51 (H) 30   Creatinine      0.52 - 1.04 mg/dL 1.00 0.99 1.08 (H) 0.81   GFR Estimate      >60 mL/min/1.7m2 52 (L) 53 (L) 48 (L) 67   GFR Estimate If Black      >60 mL/min/1.7m2 63 64 58 (L) 81   Calcium      8.5 - 10.1 mg/dL 9.5 9.0 8.9 8.6   Bilirubin Total      0.2 - 1.3 mg/dL  0.6     Albumin      3.4 - 5.0 g/dL  2.9 (L)     Protein Total      6.8 - 8.8 g/dL  6.8     Alkaline  Phosphatase      40 - 150 U/L  176 (H)     ALT      0 - 50 U/L  34     AST      0 - 45 U/L  28     N-Terminal Pro Bnp      0 - 450 pg/mL 5597 (H)      N-Terminal Pro BNP Inpatient      0 - 1800 pg/mL  21609 (H)       Component      Latest Ref Rng & Units 7/6/2018   Sodium      133 - 144 mmol/L 142   Potassium      3.4 - 5.3 mmol/L 3.8   Chloride      94 - 109 mmol/L 99   Carbon Dioxide      20 - 32 mmol/L 38 (H)   Anion Gap      3 - 14 mmol/L 5   Glucose      70 - 99 mg/dL 102 (H)   Urea Nitrogen      7 - 30 mg/dL 44 (H)   Creatinine      0.52 - 1.04 mg/dL 1.12 (H)   GFR Estimate      >60 mL/min/1.7m2 46 (L)   GFR Estimate If Black      >60 mL/min/1.7m2 56 (L)   Calcium      8.5 - 10.1 mg/dL 9.3   Bilirubin Total      0.2 - 1.3 mg/dL    Albumin      3.4 - 5.0 g/dL    Protein Total      6.8 - 8.8 g/dL    Alkaline Phosphatase      40 - 150 U/L    ALT      0 - 50 U/L    AST      0 - 45 U/L    N-Terminal Pro Bnp      0 - 450 pg/mL 3343 (H)   N-Terminal Pro BNP Inpatient      0 - 1800 pg/mL          Zio Patch in process still.  To be reviewed 7/9/18 by Dr. Huntley if completed.         ASSESSMENT/PLAN:  Chronic Afib, recently with RVR  Her rate is well controlled today on exam - in the 60 and 70s.  She is clearly irregularly irregular on exam.  Therefore I did not do an ECG (as it is very difficult for her to get onto the exam table).   This is on high doses of Toprol  mg BID, dilt  mg.  She is off the digoxin due to significant nausea. She will continue her current rate controlling meds.   Despite rate control, she has significant fatigue with any exertion - this may be that her heart rate jumps with exertion, or could be due to her high doses of BB/CCB.  AVN ablation and PPM would treat both rate and allow us to decrease medications.  I think she should proceed with EP evaluation on Monday re AVN ablation and PPM.   She has been on Xarelto 20 mg.  She is so small and frail and has a borderline GFR - I would  consider decreasing to 15 mg. I will defer this to Dr. Huntley on Monday.     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    - Continue BB and ACEi.  After AVN ablation and PPM (if she proceeds), would stop dilt, and decrease BB.   - She has no evidence of significant CHF on exam today.  Her weight is trending down and her BUN/Creat are trending up. Decrease Bumex from 2mg in AM/1 mg in PM to just 2 mg once a day.  BMP on Monday when she sees Dr. Huntley.   - Plan will be to repeat echo after definitive rate control.     MR  TR  AI  - All noted to be increased (moderate to moderate-severe MR and AI, and moderately severe TR) on echo 3/2018 in setting of decrease in LVEF and CHF  - treating medically  - Will need repeat echo once euvolemic     Restless leg syndrome  - Requip stopped per Dr. Bower  - She may benefit from a neurologic evaluation      Follow up:  Dr. Huntley Monday, 7/9/18  If has AVN ablation and PPM, will see EP NP/PA 7-10 days after  Can see me in clinic back in about 4 -5 weeks.  If she does not proceed with the AVN ablation and PPM, I would like to see her back sooner to make sure she is stable      40 minutes was spent with the patient and > 50% was counseling and coordinating care as described above       Carlton Carballo PA-C    Thank you for allowing me to participate in the care of your patient.      Sincerely,     Carlton Carballo PA-C     [facility]    cc:   Fabiano Bower MD  [address] [place] [id number]

## 2018-07-06 NOTE — PATIENT INSTRUCTIONS
Call the C.O.R.E[facility] nurse for any questions or concerns:  [phone]    1.  Medication changes from today:  - Decrease the Bumex to 2 mg daily.  So stop the PM dose.   - Continue to Xarelto.       2. Follow up plan:    - See Dr. Huntley on Monday, 7/9/18 with a repeat lab.   - If you go ahead with the procedure, you will be seeing his NP or PA after.    - I have made a follow up for the [facility] in 4 weeks.  If you do not have the procedure, I would like to see you sooner.       3.  Weigh yourself daily and write it down.    4.  Call [facility] nurse if your weight is up more than 2 pounds in one day or 5 pounds in one week.    5.  Call [facility] nurse if you feel more short of breath, have more abdominal bloating, or leg swelling.    6.  Continue low sodium diet (less than 2000 mg daily). If you eat less salt, you will retain less fluid.    7.  Alcohol can weaken your heart further. You should avoid alcohol or limit its use to special times, such as a holiday or birthday.     8.  Do NOT take Aleve (naproxen) or Advil (ibuprofen) without talking to your doctor first.     9.  My hope is that by stopping the digoxin your nausea gets better and you can eat more.  In addition, decreasing Bumex will likely cause your weight to go up a couple pounds.        10.  Lab Results:     Component      Latest Ref Rng & Units 7/6/2018   Sodium      133 - 144 mmol/L 142   Potassium      3.4 - 5.3 mmol/L 3.8   Chloride      94 - 109 mmol/L 99   Carbon Dioxide      20 - 32 mmol/L 38 (H)   Anion Gap      3 - 14 mmol/L 5   Glucose      70 - 99 mg/dL 102 (H)   Urea Nitrogen      7 - 30 mg/dL 44 (H)   Creatinine      0.52 - 1.04 mg/dL 1.12 (H)   GFR Estimate      >60 mL/min/1.7m2 46 (L)   GFR Estimate If Black      >60 mL/min/1.7m2 56 (L)   Calcium      8.5 - 10.1 mg/dL 9.3         [facility]: Cardiomyopathy, Optimization, Rehabilitation, Education  The [facility] is a heart failure specialty clinic within the [facility]  Clinic where you will work with your cardiologist, nurse practitioners, physician assistants and registered nurses who specialize in heart failure care. They are dedicated to helping patients with heart failure to carefully adjust medications, receive education, and learn who and when to call if symptoms develop. They specialize in helping you better understand your condition, slow the progression of your disease, improve the length and quality of your life, help you detect future heart problems before they become life threatening, and avoid hospitalizations.

## 2018-07-09 NOTE — LETTER
7/9/2018      Dez Urban MD, MD  [address] [facility] [address]  [facility] [address]      RE: Ana Cristina Renstephanie       Dear Colleague,    I had the pleasure of seeing Ana Cristina Dominguez in the [facility].    Service Date: 07/09/2018      REASON FOR VISIT:  Evaluation of permanent atrial fibrillation.      HISTORY OF PRESENT ILLNESS:  Ms. Dominguez is a delightful 87-year-old lady with a history of hypertension, hyperlipidemia, polymyalgia rheumatic valve disease (moderate to severe MR, AI and TR) and permanent atrial fibrillation who is here for consideration for AV node ablation and pacemaker implantation.      The patient has a long history of atrial fibrillation, has been on rate control strategy.  Most recently she has been having issues regarding rate control.  She has been admitted in the hospital several times in the last month or so (05/18, 06/03, 06/21).  Most of the times, she was admitted with AFib with RVR.  She continues to be very limited, having dyspnea on minimal exertion.  An x-ray done on 06/21 showed bilateral pleural effusion.  Heart rate is mildly elevated here today (102 beats per minute at rest) despite high dose of medications (digoxin, diltiazem 240 daily, metoprolol 100 mg b.i.d.).      She continues to complain of dyspnea on exertion and feeling exhausted.  Denies any other symptoms such as chest pain, lightheadedness, near-syncope or syncopal episode.      In addition, echocardiogram obtained in 03/2018 showed EF around 35%-40% with moderate to severe MR, TR and AI.  Stress test obtained in 05/2018 ruled out ischemia or presence of infarct.  EKG obtained 06/21 shows atrial fibrillation with heart rate at 133 beats per minute.      ASSESSMENT AND PLAN:   1.  Symptomatic atrial fibrillation with difficult-to-control heart rates despite 3 AV dixie agents and signs of tachycardia-mediated cardiomyopathy.  We discussed options including continuing medical  therapy versus AV node ablation and pacemaker implantation.  I explained the procedure in detail.  She understands there is a 1%-2% risk of complications associated with the procedure.  At this time, she would like to pursue it.  She will go home to discuss the dates with family and let us know when she would like to pursue it.      Additionally, she is currently taking Xarelto.  She will need to stop Xarelto at least 2 days prior to the procedure.  Since patient has significant valve disease and has moderate LV dysfunction, I favor implantation of a biventricular device.      2.  Hypertension.  Blood pressure is well controlled.      3.  Heart failure.  Seems to be mildly fluid overloaded on physical exam.  Partially related to uncontrolled heart rates.  She is currently taking Bumex 2 mg daily, digoxin, diltiazem, lisinopril and metoprolol.  We will continue current medical therapy for now.         JM SWARTZ MD             D: 2018   T: 2018   MT: CALDERON      Name:     FELIZ HAMPTON   MRN:      [id number]        Account:      [id number]   :      1931           Service Date: 2018      Document: [id number]         Outpatient Encounter Prescriptions as of 2018   Medication Sig Dispense Refill     acetaminophen (TYLENOL) 325 MG tablet Take 325-650 mg by mouth every 4 hours as needed for mild pain       ascorbic acid (VITAMIN C) 500 MG tablet Take 500 mg by mouth daily       bumetanide (BUMEX) 1 MG tablet Take 2 mg by mouth every morning        Calcium Carb-Cholecalciferol (CALCIUM-VITAMIN D) 500-400 MG-UNIT TABS Take 2 tablets by mouth every morning       digoxin (LANOXIN) 125 MCG tablet Take 1 tablet (125 mcg) by mouth daily 30 tablet 1     diltiazem 240 MG 24 hr capsule Take 1 capsule (240 mg) by mouth daily 30 capsule 1     lisinopril (PRINIVIL/ZESTRIL) 2.5 MG tablet Take 1 tablet (2.5 mg) by mouth daily 30 tablet 3     metoprolol succinate (TOPROL-XL) 100 MG 24 hr  tablet Take 1 tablet (100 mg) by mouth 2 times daily 60 tablet 11     Multiple Vitamins-Minerals (MULTIVITAMIN PO) Take 1 tablet by mouth every morning       Multiple Vitamins-Minerals (PRESERVISION AREDS PO) Take 1 capsule by mouth 2 times daily        rivaroxaban ANTICOAGULANT (XARELTO) 20 MG TABS tablet Take 1 tablet (20 mg) by mouth daily (with dinner)       vitamin E 400 UNIT capsule Take 400 Units by mouth daily       rOPINIRole (REQUIP) 0.5 MG tablet Take 0.5 mg by mouth nightly as needed PT STATES MED ON HOLD       No facility-administered encounter medications on file as of 7/9/2018.        Again, thank you for allowing me to participate in the care of your patient.      Sincerely,    Jason Huntley MD     [facility]

## 2018-07-09 NOTE — LETTER
7/9/2018    Dez Urban MD, MD  [address]  [facility] [address]    RE: Ana Cristina A Angelica       Dear Colleague,    I had the pleasure of seeing Ana Cristina Renstephanie in the [facility].    [id number]    Electrophysiology/ Clinic Note         H&P and Plan:           Jason Huntley MD    Physical Exam:  Vitals: /72  Pulse 80  Ht 1.524 m (5')  Wt 36 kg (79 lb 4.8 oz)  BMI 15.49 kg/m2    Constitutional:  AAO x3.  Pt is in NAD.  HEAD: normocephalic.  SKIN: Skin normal color, texture and turgor with no lesions or eruptions.  Eyes: PERRL, EOMI.  ENT:  Supple, normal JVP. No lymphadenopathy or thyroid enlargement.  Chest:  Decrease breath sound in base B/L.  Cardiac:  IIR, variable sound of S1 and Normal S2.  Systolic murmur in LLSB II/VI.  Abdomen:  Normal BS.  Soft, non-tender and non-distended.  No rebound or guarding.    Extremities:  Pedious pulses palpable B/L.  Trace LE edema noticed.   Neurological: Strength and sensation grossly symmetric and intact throughout.       CURRENT MEDICATIONS:  Current Outpatient Prescriptions   Medication Sig Dispense Refill     acetaminophen (TYLENOL) 325 MG tablet Take 325-650 mg by mouth every 4 hours as needed for mild pain       ascorbic acid (VITAMIN C) 500 MG tablet Take 500 mg by mouth daily       bumetanide (BUMEX) 1 MG tablet Take 2 mg by mouth every morning        Calcium Carb-Cholecalciferol (CALCIUM-VITAMIN D) 500-400 MG-UNIT TABS Take 2 tablets by mouth every morning       digoxin (LANOXIN) 125 MCG tablet Take 1 tablet (125 mcg) by mouth daily 30 tablet 1     diltiazem 240 MG 24 hr capsule Take 1 capsule (240 mg) by mouth daily 30 capsule 1     lisinopril (PRINIVIL/ZESTRIL) 2.5 MG tablet Take 1 tablet (2.5 mg) by mouth daily 30 tablet 3     metoprolol succinate (TOPROL-XL) 100 MG 24 hr tablet Take 1 tablet (100 mg) by mouth 2 times daily 60 tablet 11     Multiple Vitamins-Minerals (MULTIVITAMIN PO) Take 1 tablet by  mouth every morning       Multiple Vitamins-Minerals (PRESERVISION AREDS PO) Take 1 capsule by mouth 2 times daily        rivaroxaban ANTICOAGULANT (XARELTO) 20 MG TABS tablet Take 1 tablet (20 mg) by mouth daily (with dinner)       vitamin E 400 UNIT capsule Take 400 Units by mouth daily       rOPINIRole (REQUIP) 0.5 MG tablet Take 0.5 mg by mouth nightly as needed PT STATES MED ON HOLD         ALLERGIES     Allergies   Allergen Reactions     Amoxicillin Rash     Codeine GI Disturbance     Stomach pain     Melatonin Nausea     Eyesburned       PAST MEDICAL HISTORY:  Past Medical History:   Diagnosis Date     Aortic regurgitation     mod-severe by echo in 3/2018     Benign essential hypertension      Chronic diastolic heart failure (H)      Other and unspecified hyperlipidemia      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 Father      Family History Negative Mother      Genitourinary Problems Sister      Renal failure     HEART DISEASE Sister      Rhythm issues       SOCIAL HISTORY:  Social History     Social History     Marital status:      Spouse name: N/A     Number of children: 3     Years of education: N/A     Occupational History      Retired     Social History Main Topics     Smoking status: Former Smoker     Packs/day: 0.50     Types: Cigarettes     Quit date: 5/6/1973     Smokeless tobacco: Never Used     Alcohol use Yes      Comment: Socially     Drug use: No     Sexual  activity: No     Other Topics Concern     Exercise Yes     Seat Belt Yes     Social History Narrative       Review of Systems:  Skin:  Positive for     Eyes:  Positive for glasses  ENT:  Positive for hearing loss  Respiratory:  Positive for dyspnea on exertion;cough;shortness of breath  Cardiovascular:  Negative for;lightheadedness;dizziness;edema;syncope or near-syncope Positive for;fatigue;exercise intolerance  Gastroenterology: Positive for poor appetite (felt nauseas w/dig)  Genitourinary:  Positive for urinary frequency  Musculoskeletal:  Positive for arthritis;joint swelling  Neurologic:  Positive for numbness or tingling of feet  Psychiatric:  Positive for sleep disturbances;anxiety  Heme/Lymph/Imm:  Positive for hay fever  Endocrine:  Negative        Recent Lab Results:  LIPID RESULTS:  Lab Results   Component Value Date    CHOL 107 10/07/2016    HDL 22 (L) 10/07/2016    LDL 65 10/07/2016    TRIG 100 10/07/2016    CHOLHDLRATIO 3.3 08/29/2013       LIVER ENZYME RESULTS:  Lab Results   Component Value Date    AST 28 06/21/2018    ALT 34 06/21/2018       CBC RESULTS:  Lab Results   Component Value Date    WBC 7.6 06/22/2018    RBC 4.24 06/22/2018    HGB 11.9 06/22/2018    HCT 38.5 06/22/2018    MCV 91 06/22/2018    MCH 28.1 06/22/2018    MCHC 30.9 (L) 06/22/2018    RDW 16.6 (H) 06/22/2018     06/22/2018       BMP RESULTS:  Lab Results   Component Value Date     07/09/2018    POTASSIUM 4.0 07/09/2018    CHLORIDE 95 (L) 07/09/2018    CO2 40 (H) 07/09/2018    ANIONGAP 10 07/09/2018    GLC 92 07/09/2018    BUN 31 (H) 07/09/2018    CR 0.94 07/09/2018    GFRESTIMATED 56 (L) 07/09/2018    GFRESTBLACK 68 07/09/2018    KARTHIK 9.4 07/09/2018        A1C RESULTS:  Lab Results   Component Value Date    A1C 5.7 12/16/2015       INR RESULTS:  Lab Results   Component Value Date    INR 1.24 (H) 10/08/2016    INR 1.35 (H) 10/05/2016         ECHOCARDIOGRAM  Recent Results (from the past 8760 hour(s))   Echocardiogram  Complete    Narrative    [id number]  [id number]  [id number]  [id number]^YAYO^LATISHA^TAYLOR           [facility]  Echocardiography Laboratory  [address] [place] [address]        Name: FELIZ HAMPTON  MRN: [id number]  : 1931  Study Date: 2018 02:54 PM  Age: 86 yrs  Gender: Female  Patient Location: [facility]  Reason For Study: Acute congestive heart failure, unspecified congestive heart  f  Ordering Physician: LATISHA ZEE  Referring Physician: LATISHA ZEE  Performed By: Reno Davies RDCS     BSA: 1.4 m2  Height: 60 in  Weight: 100 lb  HR: 105  BP: 129/75 mmHg  _____________________________________________________________________________  __        Procedure  Complete Echo Adult.  _____________________________________________________________________________  __        Interpretation Summary     Left pericardial effusion.  The rhythm was atrial fibrillation.  There is mild concentric left ventricular hypertrophy.  The visual ejection fraction is estimated at 35-40%.  There is moderate global hypokinesia of the left ventricle.  There is severe biatrial enlargement.  There is moderate to mod-severe (2-3+) mitral regurgitation.  There is moderately severe (3+) tricuspid regurgitation.  The aortic valve is trileaflet with aortic valve sclerosis.  There is moderate to mod-severe (2-3+) aortic regurgitation.  Mild aortic root dilatation.  The ascending aorta is Mildly dilated.  Compared to the prior study dated 17, Aorta,Atria are larger, MR,TR,AI  are worse, Now in Afib, there are changes as noted. EF is worse. Pleural  effusion now present.  _____________________________________________________________________________  __        Left Ventricle  The left ventricle is normal in size. There is mild concentric left  ventricular hypertrophy. The visual ejection fraction is estimated at 35-40%.  Left ventricular diastolic function is indeterminate. There is moderate global  hypokinesia  of the left ventricle.     Right Ventricle  The right ventricle is normal in structure, function and size. The right  ventricular systolic function is normal.     Atria  There is severe biatrial enlargement. There is no color Doppler evidence of an  atrial shunt.     Mitral Valve  There is moderate mitral annular calcification. There is moderate to mod-  severe (2-3+) mitral regurgitation.        Tricuspid Valve  The tricuspid valve is normal in structure and function. There is moderately  severe (3+) tricuspid regurgitation. The right ventricular systolic pressure  is elevated at 29.6 mmHg.     Aortic Valve  The aortic valve is trileaflet with aortic valve sclerosis. There is moderate  to mod-severe (2-3+) aortic regurgitation.     Pulmonic Valve  The pulmonic valve is not well seen, but is grossly normal. There is mild (1+)  pulmonic valvular regurgitation.     Vessels  Mild aortic root dilatation. The ascending aorta is Mildly dilated. The IVC is  normal in size and reactivity with respiration, suggesting normal central  venous pressure.     Pericardium  The pericardium appears normal. Left pericardial effusion.        Rhythm  The rhythm was atrial fibrillation.  _____________________________________________________________________________  __  MMode/2D Measurements & Calculations  IVSd: 1.2 cm     LVIDd: 4.4 cm  LVIDs: 3.6 cm  LVPWd: 1.3 cm  FS: 18.8 %  LV mass(C)d: 204.2 grams  LV mass(C)dI: 146.8 grams/m2  Ao root diam: 3.9 cm  LA dimension: 4.4 cm  asc Aorta Diam: 4.4 cm  LA/Ao: 1.1  LA Volume (BP): 79.0 ml  LA Volume Index (BP): 56.8 ml/m2  RWT: 0.58           Doppler Measurements & Calculations  MV E max carline: 125.0 cm/sec  MV dec time: 0.18 sec  AI P1/2t: 312.7 msec  TR max carline: 272.1 cm/sec  TR max P.6 mmHg           _____________________________________________________________________________  __           Report approved by: Georgiana Shukla 2018 04:58 PM            Orders Placed This  Encounter   Procedures     ICD/Pacemaker/Loop Recorder Procedure     EP Ablation Procedures     No orders of the defined types were placed in this encounter.    There are no discontinued medications.      Encounter Diagnosis   Name Primary?     Atrial fibrillation with RVR (H) Yes         CC  Fabiano Bower MD  [address] [place] [id number]                Thank you for allowing me to participate in the care of your patient.      Sincerely,     Jason Huntley MD     [facility]    cc:   Fabiano Bower MD  [address] [place] [id number]

## 2018-07-09 NOTE — MR AVS SNAPSHOT
After Visit Summary   7/9/2018    Ana Cristina Dominguez    MRN: [id number]           Patient Information     Date Of Birth          6/6/1931        Visit Information        Provider Department      7/9/2018 10:45 AM Jason Huntley MD [facility]        Today's Diagnoses     Atrial fibrillation with RVR (H)    -  1       Follow-ups after your visit        Future tests that were ordered for you today     Open Future Orders        Priority Expected Expires Ordered    EP Ablation Procedures Routine 7/16/2018 7/9/2019 7/9/2018    ICD/Pacemaker/Loop Recorder Procedure Routine 7/16/2018 7/9/2019 7/9/2018            Who to contact     If you have questions or need follow up information about today's clinic visit or your schedule please contact [facility]   [facility] directly at [phone].  Normal or non-critical lab and imaging results will be communicated to you by Solazymehart, letter or phone within 4 business days after the clinic has received the results. If you do not hear from us within 7 days, please contact the clinic through Solazymehart or phone. If you have a critical or abnormal lab result, we will notify you by phone as soon as possible.  Submit refill requests through ZENT or call your pharmacy and they will forward the refill request to us. Please allow 3 business days for your refill to be completed.          Additional Information About Your Visit        MyChart Information     ZENT gives you secure access to your electronic health record. If you see a primary care provider, you can also send messages to your care team and make appointments. If you have questions, please call your primary care clinic.  If you do not have a primary care provider, please call [phone] and they will assist you.        Care EveryWhere ID     This is your Care EveryWhere ID. This could be used by other organizations to access your [place]  medical records  [id number]        Your Vitals Were     Pulse Height BMI (Body Mass Index)             80 1.524 m (5') 15.49 kg/m2          Blood Pressure from Last 3 Encounters:   07/09/18 120/72   07/06/18 108/52   06/28/18 120/68    Weight from Last 3 Encounters:   07/09/18 36 kg (79 lb 4.8 oz)   07/06/18 35.9 kg (79 lb 3.2 oz)   06/28/18 36.6 kg (80 lb 9.6 oz)               Primary Care Provider Office Phone # Fax #    Dez Urban [phone]       [address] [facility] [address]  [facility] [address]        Equal Access to Services     ABBE ESPINOSA : Hadii shanelle booo Soshawn, waaxda luqadaha, qaybta kaalmada adeegyada, rinku burciaga . So [facility] [phone].    ATENCIÓN: Si habla español, tiene a dominguez disposición servicios gratuitos de asistencia lingüística. [facility] [phone].    We comply with applicable federal civil rights laws and [place] laws. We do not discriminate on the basis of race, color, national origin, age, disability, sex, sexual orientation, or gender identity.            Thank you!     Thank you for choosing [facility]  for your care. Our goal is always to provide you with excellent care. Hearing back from our patients is one way we can continue to improve our services. Please take a few minutes to complete the written survey that you may receive in the mail after your visit with us. Thank you!             Your Updated Medication List - Protect others around you: Learn how to safely use, store and throw away your medicines at www.disposemymeds.org.          This list is accurate as of 7/9/18 11:26 AM.  Always use your most recent med list.                   Brand Name Dispense Instructions for use Diagnosis    acetaminophen 325 MG tablet    TYLENOL     Take 325-650 mg by mouth every 4 hours as needed for mild pain        ascorbic acid 500 MG tablet    VITAMIN C     Take 500 mg by mouth daily         bumetanide 1 MG tablet    BUMEX     Take 2 mg by mouth every morning        Calcium-Vitamin D 500-400 MG-UNIT Tabs      Take 2 tablets by mouth every morning        digoxin 125 MCG tablet    LANOXIN    30 tablet    Take 1 tablet (125 mcg) by mouth daily    Atrial fibrillation with RVR (H)       diltiazem 240 MG 24 hr capsule     30 capsule    Take 1 capsule (240 mg) by mouth daily    Atrial fibrillation with RVR (H)       lisinopril 2.5 MG tablet    PRINIVIL/Zestril    30 tablet    Take 1 tablet (2.5 mg) by mouth daily    Chronic systolic congestive heart failure (H)       metoprolol succinate 100 MG 24 hr tablet    TOPROL-XL    60 tablet    Take 1 tablet (100 mg) by mouth 2 times daily    Essential hypertension, Chronic atrial fibrillation (H)       * PRESERVISION AREDS PO      Take 1 capsule by mouth 2 times daily        * MULTIVITAMIN PO      Take 1 tablet by mouth every morning        REQUIP 0.5 MG tablet   Generic drug:  rOPINIRole      Take 0.5 mg by mouth nightly as needed PT STATES MED ON HOLD        rivaroxaban ANTICOAGULANT 20 MG Tabs tablet    XARELTO     Take 1 tablet (20 mg) by mouth daily (with dinner)    Chronic atrial fibrillation (H)       vitamin E 400 UNIT capsule      Take 400 Units by mouth daily        * Notice:  This list has 2 medication(s) that are the same as other medications prescribed for you. Read the directions carefully, and ask your doctor or other care provider to review them with you.

## 2018-07-09 NOTE — PROGRESS NOTES
[id number]    Electrophysiology/ Clinic Note         H&P and Plan:           Jason Huntley MD    Physical Exam:  Vitals: /72  Pulse 80  Ht 1.524 m (5')  Wt 36 kg (79 lb 4.8 oz)  BMI 15.49 kg/m2    Constitutional:  AAO x3.  Pt is in NAD.  HEAD: normocephalic.  SKIN: Skin normal color, texture and turgor with no lesions or eruptions.  Eyes: PERRL, EOMI.  ENT:  Supple, normal JVP. No lymphadenopathy or thyroid enlargement.  Chest:  Decrease breath sound in base B/L.  Cardiac:  IIR, variable sound of S1 and Normal S2.  Systolic murmur in LLSB II/VI.  Abdomen:  Normal BS.  Soft, non-tender and non-distended.  No rebound or guarding.    Extremities:  Pedious pulses palpable B/L.  Trace LE edema noticed.   Neurological: Strength and sensation grossly symmetric and intact throughout.       CURRENT MEDICATIONS:  Current Outpatient Prescriptions   Medication Sig Dispense Refill     acetaminophen (TYLENOL) 325 MG tablet Take 325-650 mg by mouth every 4 hours as needed for mild pain       ascorbic acid (VITAMIN C) 500 MG tablet Take 500 mg by mouth daily       bumetanide (BUMEX) 1 MG tablet Take 2 mg by mouth every morning        Calcium Carb-Cholecalciferol (CALCIUM-VITAMIN D) 500-400 MG-UNIT TABS Take 2 tablets by mouth every morning       digoxin (LANOXIN) 125 MCG tablet Take 1 tablet (125 mcg) by mouth daily 30 tablet 1     diltiazem 240 MG 24 hr capsule Take 1 capsule (240 mg) by mouth daily 30 capsule 1     lisinopril (PRINIVIL/ZESTRIL) 2.5 MG tablet Take 1 tablet (2.5 mg) by mouth daily 30 tablet 3     metoprolol succinate (TOPROL-XL) 100 MG 24 hr tablet Take 1 tablet (100 mg) by mouth 2 times daily 60 tablet 11     Multiple Vitamins-Minerals (MULTIVITAMIN PO) Take 1 tablet by mouth every morning       Multiple Vitamins-Minerals (PRESERVISION AREDS PO) Take 1 capsule by mouth 2 times daily        rivaroxaban ANTICOAGULANT (XARELTO) 20 MG TABS tablet Take 1 tablet (20 mg) by mouth daily (with dinner)       vitamin  E 400 UNIT capsule Take 400 Units by mouth daily       rOPINIRole (REQUIP) 0.5 MG tablet Take 0.5 mg by mouth nightly as needed PT STATES MED ON HOLD         ALLERGIES     Allergies   Allergen Reactions     Amoxicillin Rash     Codeine GI Disturbance     Stomach pain     Melatonin Nausea     Eyesburned       PAST MEDICAL HISTORY:  Past Medical History:   Diagnosis Date     Aortic regurgitation     mod-severe by echo in 3/2018     Benign essential hypertension      Chronic diastolic heart failure (H)      Other and unspecified hyperlipidemia      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 Father      Family History Negative Mother      Genitourinary Problems Sister      Renal failure     HEART DISEASE Sister      Rhythm issues       SOCIAL HISTORY:  Social History     Social History     Marital status:      Spouse name: N/A     Number of children: 3     Years of education: N/A     Occupational History      Retired     Social History Main Topics     Smoking status: Former Smoker     Packs/day: 0.50     Types: Cigarettes     Quit date: 5/6/1973     Smokeless tobacco: Never Used     Alcohol use Yes      Comment: Socially     Drug use: No     Sexual activity: No     Other Topics Concern     Exercise Yes     Seat Belt Yes     Social History Narrative       Review of Systems:  Skin:  Positive for     Eyes:  Positive for glasses  ENT:  Positive for hearing loss  Respiratory:  Positive for  dyspnea on exertion;cough;shortness of breath  Cardiovascular:  Negative for;lightheadedness;dizziness;edema;syncope or near-syncope Positive for;fatigue;exercise intolerance  Gastroenterology: Positive for poor appetite (felt nauseas w/dig)  Genitourinary:  Positive for urinary frequency  Musculoskeletal:  Positive for arthritis;joint swelling  Neurologic:  Positive for numbness or tingling of feet  Psychiatric:  Positive for sleep disturbances;anxiety  Heme/Lymph/Imm:  Positive for hay fever  Endocrine:  Negative        Recent Lab Results:  LIPID RESULTS:  Lab Results   Component Value Date    CHOL 107 10/07/2016    HDL 22 (L) 10/07/2016    LDL 65 10/07/2016    TRIG 100 10/07/2016    CHOLHDLRATIO 3.3 2013       LIVER ENZYME RESULTS:  Lab Results   Component Value Date    AST 28 2018    ALT 34 2018       CBC RESULTS:  Lab Results   Component Value Date    WBC 7.6 2018    RBC 4.24 2018    HGB 11.9 2018    HCT 38.5 2018    MCV 91 2018    MCH 28.1 2018    MCHC 30.9 (L) 2018    RDW 16.6 (H) 2018     2018       BMP RESULTS:  Lab Results   Component Value Date     2018    POTASSIUM 4.0 2018    CHLORIDE 95 (L) 2018    CO2 40 (H) 2018    ANIONGAP 10 2018    GLC 92 2018    BUN 31 (H) 2018    CR 0.94 2018    GFRESTIMATED 56 (L) 2018    GFRESTBLACK 68 2018    KARTHIK 9.4 2018        A1C RESULTS:  Lab Results   Component Value Date    A1C 5.7 2015       INR RESULTS:  Lab Results   Component Value Date    INR 1.24 (H) 10/08/2016    INR 1.35 (H) 10/05/2016         ECHOCARDIOGRAM  Recent Results (from the past 8760 hour(s))   Echocardiogram Complete    Narrative    [id number]  [id number]  [id number]  [id number]^YAYO^LATISHA^TAYLOR           [facility]  Echocardiography Laboratory  [address] [place] [address]        Name: FELIZ HAMPTON  MRN: [id number]  :  06/06/1931  Study Date: 03/21/2018 02:54 PM  Age: 86 yrs  Gender: Female  [place] [facility] [place]  Reason For Study: Acute congestive heart failure, unspecified congestive heart  f  Ordering Physician: LATISHA ZEE  Referring Physician: LATISHA ZEE  Performed By: Reno Davies RDCS     BSA: 1.4 m2  Height: 60 in  Weight: 100 lb  HR: 105  BP: 129/75 mmHg  _____________________________________________________________________________  __        Procedure  Complete Echo Adult.  _____________________________________________________________________________  __        Interpretation Summary     Left pericardial effusion.  The rhythm was atrial fibrillation.  There is mild concentric left ventricular hypertrophy.  The visual ejection fraction is estimated at 35-40%.  There is moderate global hypokinesia of the left ventricle.  There is severe biatrial enlargement.  There is moderate to mod-severe (2-3+) mitral regurgitation.  There is moderately severe (3+) tricuspid regurgitation.  The aortic valve is trileaflet with aortic valve sclerosis.  There is moderate to mod-severe (2-3+) aortic regurgitation.  Mild aortic root dilatation.  The ascending aorta is Mildly dilated.  Compared to the prior study dated 5/22/17, Aorta,Atria are larger, MR,TR,AI  are worse, Now in Afib, there are changes as noted. EF is worse. Pleural  effusion now present.  _____________________________________________________________________________  __        Left Ventricle  The left ventricle is normal in size. There is mild concentric left  ventricular hypertrophy. The visual ejection fraction is estimated at 35-40%.  Left ventricular diastolic function is indeterminate. There is moderate global  hypokinesia of the left ventricle.     Right Ventricle  The right ventricle is normal in structure, function and size. The right  ventricular systolic function is normal.     Atria  There is severe biatrial enlargement. There is no color Doppler evidence  of an  atrial shunt.     Mitral Valve  There is moderate mitral annular calcification. There is moderate to mod-  severe (2-3+) mitral regurgitation.        Tricuspid Valve  The tricuspid valve is normal in structure and function. There is moderately  severe (3+) tricuspid regurgitation. The right ventricular systolic pressure  is elevated at 29.6 mmHg.     Aortic Valve  The aortic valve is trileaflet with aortic valve sclerosis. There is moderate  to mod-severe (2-3+) aortic regurgitation.     Pulmonic Valve  The pulmonic valve is not well seen, but is grossly normal. There is mild (1+)  pulmonic valvular regurgitation.     Vessels  Mild aortic root dilatation. The ascending aorta is Mildly dilated. The IVC is  normal in size and reactivity with respiration, suggesting normal central  venous pressure.     Pericardium  The pericardium appears normal. Left pericardial effusion.        Rhythm  The rhythm was atrial fibrillation.  _____________________________________________________________________________  __  MMode/2D Measurements & Calculations  IVSd: 1.2 cm     LVIDd: 4.4 cm  LVIDs: 3.6 cm  LVPWd: 1.3 cm  FS: 18.8 %  LV mass(C)d: 204.2 grams  LV mass(C)dI: 146.8 grams/m2  Ao root diam: 3.9 cm  LA dimension: 4.4 cm  asc Aorta Diam: 4.4 cm  LA/Ao: 1.1  LA Volume (BP): 79.0 ml  LA Volume Index (BP): 56.8 ml/m2  RWT: 0.58           Doppler Measurements & Calculations  MV E max carline: 125.0 cm/sec  MV dec time: 0.18 sec  AI P1/2t: 312.7 msec  TR max carline: 272.1 cm/sec  TR max P.6 mmHg           _____________________________________________________________________________  __           Report approved by: Georgiana Shukla 2018 04:58 PM            Orders Placed This Encounter   Procedures     ICD/Pacemaker/Loop Recorder Procedure     EP Ablation Procedures     No orders of the defined types were placed in this encounter.    There are no discontinued medications.      Encounter Diagnosis   Name Primary?      Atrial fibrillation with RVR (H) Yes         CC  Fabiano Bower MD  [address] [place] [address]

## 2018-07-09 NOTE — PROGRESS NOTES
Service Date: 07/09/2018      REASON FOR VISIT:  Evaluation of permanent atrial fibrillation.      HISTORY OF PRESENT ILLNESS:  Ms. Dominguez is a delightful 87-year-old lady with a history of hypertension, hyperlipidemia, polymyalgia rheumatic valve disease (moderate to severe MR, AI and TR) and permanent atrial fibrillation who is here for consideration for AV node ablation and pacemaker implantation.      The patient has a long history of atrial fibrillation, has been on rate control strategy.  Most recently she has been having issues regarding rate control.  She was admitted in the hospital several times in the last month or so (05/18, 06/03, 06/21) for AFib with RVR.  She continues to be very limited, having dyspnea on minimal exertion.  An x-ray done on 06/21 showed bilateral pleural effusion.  Heart rate is mildly elevated here today (102 beats per minute at rest) despite high dose of medications (digoxin, diltiazem 240 daily, metoprolol 100 mg b.i.d.).      She continues to complain of dyspnea on exertion and feeling exhausted.  Denies any other symptoms such as chest pain, lightheadedness, near-syncope or syncopal episode.      An echocardiogram obtained in 03/2018 showed EF around 35%-40% with moderate to severe MR, TR and AI.  Stress test obtained in 05/2018 ruled out ischemia or presence of infarct.  EKG obtained 06/21 shows atrial fibrillation with heart rate at 133 beats per minute.      ASSESSMENT AND PLAN:   1.  Symptomatic atrial fibrillation with difficult-to-control heart rates despite 3 AV dixie agents and signs of tachycardia-mediated cardiomyopathy.      We discussed options including continuing medical therapy versus AV node ablation and pacemaker implantation.  I explained the procedure in detail.  She understands there is a 1%-2% risk of complications associated with the procedure.  At this time, she would like to pursue it but she would like to discuss with family first prior to make a  decision.     She is currently taking Xarelto. If she decides to move forward with procedure she will need to stop Xarelto at least 2 days prior to the procedure.  Since patient has significant valve disease and has moderate LV dysfunction, I favor implantation of a biventricular device.      2.  Hypertension.  Blood pressure is well controlled.      3.  Heart failure.  Seems to be mildly fluid overloaded on physical exam.  Partially related to uncontrolled heart rates.  She is currently taking Bumex 2 mg daily, digoxin, diltiazem, lisinopril and metoprolol.  We will continue current medical therapy for now.         JM SWARTZ MD             D: 2018   T: 2018   MT: CALDERON      Name:     FELIZ HAMPTON   MRN:      [id number]        Account:      [id number]   :      1931           Service Date: 2018      Document: [id number]

## 2018-07-10 NOTE — TELEPHONE ENCOUNTER
"Call received from scheduling to see how long patient should hold her Xarelto pre-procedure for device implant. Chart reviewed, Dr. Huntley's OV note from yesterday says: \"She will need to stop Xarelto at least 2 days prior to the procedure.\"    Patient's ChadsVasc score is 5 for age>75, female, CHF history, and HTN. Per our clinic protocol for Low Risk Patient (ChadsVasc </=5) patients should hold NOAC x 3 days. Scheduling updated and will call pt to schedule.   "

## 2018-07-11 NOTE — PROGRESS NOTES
Called patient with pre-procedure instructions for device implant:     Anticoagulation: Xarelto - hold 3 days    Oral diabetes meds: N/A  Insulin: N/A  Diuretic: N/A  Contrast allergy: N/A  Pt informed to be NPO at midnight, may have clear liquid breakfast before 8am    Pt has post-procedure transportation and 24 hours monitoring set up.   Pt aware of no driving for 24 hours post procedure due to sedation.     Pt aware of arrival time (11) and location. Pt verbalized understanding of instructions.      Spoke to Luis saavedra - verbalized understanding. SK

## 2018-07-11 NOTE — PROGRESS NOTES
[facility] now requests orders and shares plan of care/discharge summaries for some patients through [email or web address].  Please REPLY TO THIS MESSAGE OR ROUTE BACK TO THE AUTHOR in order to give authorization for orders when needed.  This is considered a verbal order, you will still receive a faxed copy of orders for signature.  Thank you for your assistance in improving collaboration for our patients.    ORDER  PT eval ordered  and needing additional orders for PT eval and treat for endurance, gait, strengthening.

## 2018-07-16 NOTE — PROGRESS NOTES
1600  Patient returned to [facility], s/p AV node Ablation and Bi-V PM placement (no A-lead).  Left chest dressing is WDL/CDI with pressure dressing in place over PM dressing.  Patient taking po clear liquids.  Kate Buckley and Ziyad are at bedside.  Supper was ordered.  Bal from [employer] here with home monitoring equipment.  Patient received PM booklet and temporary ID, which her son put into patient's wallet.  Report to Joaquin WEBER.

## 2018-07-16 NOTE — PROGRESS NOTES
Admitted to [facility] with sons. IV established, labs sent. Plan of care reviewed with patient and sons. Consent obtained by Dr. Huntley. Patient will plan on staying overnight. Reports last xaralto taken 7/11. NPO, ready for procedure.

## 2018-07-16 NOTE — TELEPHONE ENCOUNTER
Fax received from [facility] for review and signature.  Put in Dr. Urban's yellow folder.

## 2018-07-16 NOTE — IP AVS SNAPSHOT
MRN:[id number]                      After Visit Summary   7/16/2018    Ana Cristina Dominguez    MRN: [id number]           Thank you!     Thank you for choosing [facility] for your care. Our goal is always to provide you with excellent care. Hearing back from our patients is one way we can continue to improve our services. Please take a few minutes to complete the written survey that you may receive in the mail after you visit with us. Thank you!        Patient Information     Date Of Birth          6/6/1931        About your hospital stay     You were admitted on:  July 16, 2018 You last received care in the:  [facility] Observation Unit    You were discharged on:  July 17, 2018        Reason for your hospital stay       AVN ablation with implantation of BiV PM                  Who to Call     For medical emergencies, please call 911.  For non-urgent questions about your medical care, please call your primary care provider or clinic, [phone]          Attending Provider     Provider Specialty    Jason Huntley MD Cardiology       Primary Care Provider Office Phone # Fax #    Dez Urban [phone]      After Care Instructions     Activity       Your activity upon discharge: please review AVS            Diet       Follow this diet upon discharge: heart healthy diet with no more than 2 grams total sodium per day                  Follow-up Appointments     Follow-up and recommended labs and tests        Follow-up in 1 week for device check as scheduled by device clinic                  Your next 10 appointments already scheduled     Jul 25, 2018  1:00 PM CDT   Pacemaker Check with ORQUIDEA AQUINO   [facility] ([facility])    [address] [place] [id number] [phone] OPT 2            Jul 25, 2018  1:50 PM CDT   [facility] EP RETURN with LUCINDA Colbert CNP   [facility]    [place] ([facility])    [address]  [place] [address] [phone] OPT 2              Further instructions from your care team       Please stop taking Digoxin and Diltiazem     Discharge instructions:     Avoid raising left arm above the level of the shoulder for 3 weeks.  Do not shower until outer occlusive dressing has been removed.  Remove outer dressing in 3 - 4 days. Thursday 7/19/18.  Leave steri-strips in place, these will be removed at the 1 week check.   Call Device Clinic with increased swelling, drainage, or fever > 101 degrees.   Follow-up with the Device Clinic as scheduled. 7/25/18 at 1 PM in [place] for a device check and with Makenzie POWER after.        Discharge Instructions for Pacemaker Implantation  You have had a procedure to insert a pacemaker. Once inside your body, this small electronic device helps keep your heart from beating too slowly. A pacemaker can t fix existing heart problems. But it can help you feel better and have more energy. As you recover, follow all of the instructions you are given, including those below.  Activity    Follow the instructions you are given about limiting your activity.    Do not raise your arm on the incision side above shoulder level for 3 weeks. This gives the device lead wires time to attach securely inside your heart.    Ask your doctor when you can expect to return to work.    You can still exercise. It s good for your body and your heart. Talk with your doctor about an exercise plan.  Other Precautions    Follow your doctor's directions carefully for wound care. You may remove the outer dressing in 3 - 4 days. Leave the steri-strips in place; these will be removed at your 1 week follow-up. Never put any creams, lotions, or products like peroxide on an incision unless your doctor tells you to.     Before you receive any treatment, tell all health care providers (including your dentist) that you have a pacemaker.    You will be  given an ID card that contains information about your pacemaker. Always carry this card with you. You can show this card if your pacemaker sets off a metal detector. You should also show it to avoid screening with a hand-held security wand.    Keep your cell phone away from your pacemaker. Don t carry the phone in your shirt pocket, even when it s turned off.    Avoid strong magnets. Examples are those used in MRIs or in hand-held security wands.    Avoid strong electrical fields. Examples are those made by radio transmitting towers,  ham  radios, and heavy-duty electrical equipment.    Avoid leaning over the open bullock of a running car. A running engine creates an electrical field. Most household and yard appliances will not cause any problems. If you use any large power tools, such as an industrial , talk with your doctor.   Follow-Up    Follow up in the device clinic as scheduled.    Make regular follow-up appointments with your doctor. He or she will check the pacemaker to make sure it s working properly.  When to Call Your Doctor  Call your doctor immediately if you have any of the following:    Dizziness    Chest pain    Fainting spells    Twitching chest muscles    Rapid pulse or pounding heartbeat    Shortness of breath    Pain around your pacemaker    Fever above 101 F (38 C) or other signs of infection (redness, swelling, drainage, or warmth at the incision site)    Hiccups that won t stop     4816-4906 [employer]. [address] [place], [place] [address]. All rights reserved. This information is not intended as a substitute for professional medical care. Always follow your healthcare professional's instructions.              Pending Results     Date and Time Order Name Status Description    7/16/2018 1609 X-ray Chest 2 vws* In process             Statement of Approval     Ordered          07/17/18 0934  I have reviewed and agree with all the recommendations and orders detailed in  this document.  EFFECTIVE NOW     Approved and electronically signed by:  Makenzie Paredes APRN CNP             Admission Information     Date & Time Provider Department Dept. Phone    7/16/2018 Jason Huntley MD [facility] Observation Unit [phone]      Your Vitals Were     Blood Pressure Pulse Temperature Respirations Height Weight    132/59 84 97.5  F (36.4  C) (Oral) 18 1.524 m (5') 36.4 kg (80 lb 4.8 oz)    Pulse Oximetry BMI (Body Mass Index)                95% 15.68 kg/m2          MyChart Information     [facility] gives you secure access to your electronic health record. If you see a primary care provider, you can also send messages to your care team and make appointments. If you have questions, please call your primary care clinic.  If you do not have a primary care provider, please call [phone] and they will assist you.        Care EveryWhere ID     This is your Care EveryWhere ID. This could be used by other organizations to access your [facility] medical records  [id number]        Equal Access to Services     [facility] HospitalASH[facility] : Hadii shanelle loyd Soshawn, waaxda luqadaha, qaybta kaalmada adeselena, rinku burciaga . So [facility] [phone].    ATENCIÓN: Si habla español, tiene a dominguez disposición servicios gratuitos de asistencia lingüística. Llame al [phone].    We comply with applicable federal civil rights laws and [place] laws. We do not discriminate on the basis of race, color, national origin, age, disability, sex, sexual orientation, or gender identity.               Review of your medicines      CONTINUE these medicines which have NOT CHANGED        Dose / Directions    acetaminophen 325 MG tablet   Commonly known as:  TYLENOL        Dose:  325-650 mg   Take 325-650 mg by mouth every 4 hours as needed for mild pain   Refills:  0       ascorbic acid 500 MG tablet   Commonly known as:  VITAMIN C        Dose:  500 mg   Take 500 mg by mouth daily    Refills:  0       bumetanide 1 MG tablet   Commonly known as:  BUMEX        Dose:  2 mg   Take 2 mg by mouth every morning   Refills:  0       Calcium-Vitamin D 500-400 MG-UNIT Tabs        Dose:  2 tablet   Take 2 tablets by mouth every morning   Refills:  0       lisinopril 2.5 MG tablet   Commonly known as:  PRINIVIL/Zestril   Used for:  Chronic systolic congestive heart failure (H)        Dose:  2.5 mg   Take 1 tablet (2.5 mg) by mouth daily   Quantity:  30 tablet   Refills:  3       metoprolol succinate 100 MG 24 hr tablet   Commonly known as:  TOPROL-XL   Used for:  Essential hypertension, Chronic atrial fibrillation (H)        Dose:  100 mg   Take 1 tablet (100 mg) by mouth 2 times daily   Quantity:  60 tablet   Refills:  11       * PRESERVISION AREDS PO        Dose:  1 capsule   Take 1 capsule by mouth 2 times daily   Refills:  0       * MULTIVITAMIN PO        Dose:  1 tablet   Take 1 tablet by mouth every morning   Refills:  0       REQUIP 0.5 MG tablet   Generic drug:  rOPINIRole        Dose:  0.5 mg   Take 0.5 mg by mouth nightly as needed PT STATES MED ON HOLD   Refills:  0       rivaroxaban ANTICOAGULANT 20 MG Tabs tablet   Commonly known as:  XARELTO   Used for:  Chronic atrial fibrillation (H)        Dose:  20 mg   Take 1 tablet (20 mg) by mouth daily (with dinner)   Refills:  0       vitamin E 400 UNIT capsule        Dose:  400 Units   Take 400 Units by mouth daily   Refills:  0       * Notice:  This list has 2 medication(s) that are the same as other medications prescribed for you. Read the directions carefully, and ask your doctor or other care provider to review them with you.      STOP taking     digoxin 125 MCG tablet   Commonly known as:  LANOXIN           diltiazem 240 MG 24 hr capsule                    Protect others around you: Learn how to safely use, store and throw away your medicines at www.disposemymeds.org.             Medication List: This is a list of all your medications and when  to take them. Check marks below indicate your daily home schedule. Keep this list as a reference.      Medications           Morning Afternoon Evening Bedtime As Needed    acetaminophen 325 MG tablet   Commonly known as:  TYLENOL   Take 325-650 mg by mouth every 4 hours as needed for mild pain   Last time this was given:  650 mg on 7/16/2018 10:03 PM   Next Dose Due:  Available to start taking at anytime today, if needed.                                    ascorbic acid 500 MG tablet   Commonly known as:  VITAMIN C   Take 500 mg by mouth daily   Next Dose Due:  Tomorrow (7/18/18)                                   bumetanide 1 MG tablet   Commonly known as:  BUMEX   Take 2 mg by mouth every morning   Last time this was given:  2 mg on 7/17/2018  9:45 AM   Next Dose Due:  Tomorrow (7/18/18)                                   Calcium-Vitamin D 500-400 MG-UNIT Tabs   Take 2 tablets by mouth every morning   Next Dose Due:  Tomorrow (7/18/18)                                   lisinopril 2.5 MG tablet   Commonly known as:  PRINIVIL/Zestril   Take 1 tablet (2.5 mg) by mouth daily   Last time this was given:  2.5 mg on 7/17/2018  9:45 AM   Next Dose Due:  Tomorrow (7/18/18)                                   metoprolol succinate 100 MG 24 hr tablet   Commonly known as:  TOPROL-XL   Take 1 tablet (100 mg) by mouth 2 times daily   Last time this was given:  100 mg on 7/17/2018  9:45 AM   Next Dose Due:  Today at 4 pm (7/17/18)                                      * PRESERVISION AREDS PO   Take 1 capsule by mouth 2 times daily   Next Dose Due:  This evening (7/17/18)                                      * MULTIVITAMIN PO   Take 1 tablet by mouth every morning   Next Dose Due:  Tomorrow (7/18/18)                                   REQUIP 0.5 MG tablet   Take 0.5 mg by mouth nightly as needed PT STATES MED ON HOLD   Generic drug:  rOPINIRole   Next Dose Due:  As needed at night                                    rivaroxaban  ANTICOAGULANT 20 MG Tabs tablet   Commonly known as:  XARELTO   Take 1 tablet (20 mg) by mouth daily (with dinner)   Next Dose Due:  This evening (7/17/18) with dinner                                    vitamin E 400 UNIT capsule   Take 400 Units by mouth daily   Next Dose Due:  Tomorrow (7/18/18)                                   * Notice:  This list has 2 medication(s) that are the same as other medications prescribed for you. Read the directions carefully, and ask your doctor or other care provider to review them with you.              More Information        Discharge Instructions for Catheter Ablation  You have had a procedure called catheter ablation. It was used to treat an abnormal heartbeat (arrhythmia). This procedure destroyed (ablated) the cells in your heart that were causing your heart rhythm problem. During the procedure, the healthcare provider put a thin, flexible wire (catheter) into a blood vessel in your upper thigh. The provider then threaded it up to your heart.  Home care  Here are recommendations for care at home:     You won't be able to drive yourself home because you had medicine to relax you (sedation) You will need to make arrangements for a ride. Your healthcare provider may tell you not to drive for 24 hours after the procedure.    You should be able to go back to your normal daily activities in the next 1 to 2 days. These include walking, climbing stairs, and doing household chores.    Don't do any heavy physical activity for several days after the procedure. This will allow your body to heal.    Ask your healthcare provider when you can return to work.    Take your temperature and check your incision for signs of infection every day for a week. Signs of infection include redness, swelling, drainage, or warmth at the incision site. It is normal to have a small bruise or lump where the catheter was inserted.    Take your medicines exactly as directed. Don t skip doses. You may need to  make some changes in your medicines because of the ablation procedure. Be sure to go over your medicine instructions with your healthcare provider before you are discharged.    Learn to take your own pulse. Keep a record of your results. Ask your healthcare provider which readings mean that you need medical attention.    Don't lift heavy objects for a period of time after your ablation. Ask your healthcare provider for specific advice.  Follow-up care  Make a follow-up appointment as directed by your healthcare provider. Your provider will check how your incision site is healing. In many cases, one ablation is enough to treat an arrhythmia. But sometimes the problem comes back or another is found. If this happens, you may need a second procedure.  When to call your healthcare provider  Call your healthcare provider right away if you have any of the following:    Redness, pain, swelling, bleeding, or drainage from your incision    Chest pain, shortness of breath, or dizziness    Temperature of 100.4 F (38.0 C) or higher, or as directed by your healthcare provider     Sudden coldness, pain, or numbness in the leg or arm with the insertion site    Nausea or vomiting  Note: Ask your healthcare provider what to expect about your heartbeat. Sometimes the irregularity goes away right after the procedure. Other times it may take longer to go away.   Date Last Reviewed: 10/1/2016    1499-5106 [employer]. [address] [place] [address], [place] [address]. All rights reserved. This information is not intended as a substitute for professional medical care. Always follow your healthcare professional's instructions.

## 2018-07-16 NOTE — PROGRESS NOTES
Patient is status post Biv pacemaker and AV node ablation.  Procedure details are below:    Indication: Afib with RVR  Findings: Successful Biv and AV node ablation  Estimated blood loss was minimal (< 40 cc)    No immediate complications are apparent.      Jason Huntley MD

## 2018-07-16 NOTE — PROGRESS NOTES
Tele shows AV paced.  Right groin dressing CDI, CMS intact, pedal and post-tib pulses normal +2. Upper left chest pressure dressing CDI -   PT is alert and oriented X4. Tolerating regular diet.  Due to void.  Transferred to observation unit for continuation of compassionate care.  Metoprolol given, lisinopril not yet verified by pharm. Off bedrest at 1915.  Accompanied by Ziyad - son.

## 2018-07-16 NOTE — IP AVS SNAPSHOT
[facility] Observation Unit    [address] [place] [id number]    Phone:  [phone]                                       After Visit Summary   7/16/2018    Ana Cristina Dominguez    MRN: [id number]           After Visit Summary Signature Page     I have received my discharge instructions, and my questions have been answered. I have discussed any challenges I see with this plan with the nurse or doctor.    ..........................................................................................................................................  Patient/Patient Representative Signature      ..........................................................................................................................................  Patient Representative Print Name and Relationship to Patient    ..................................................               ................................................  Date                                            Time    ..........................................................................................................................................  Reviewed by Signature/Title    ...................................................              ..............................................  Date                                                            Time

## 2018-07-17 NOTE — DISCHARGE SUMMARY
[facility]  EP Cardiology Progress Note  Date of Service: 07/17/2018  Primary Cardiologist: Dr. Simons    Assessment & Plan    Patient is a delightful 87 year old female with a past medical history of atrial fibrillation, hypertension, hyperlipidemia, and polymyalgia rheumatic valve disease. Patient had been undergoing rate control but continued to have difficulty requiring multiple hospitalizations for A-fib w/ RVR despite being on high doses of rate controlling medications (Digoxin, diltiazem 240 mg daily, and metoprolol 100 mg BID). Activity was very limited due to dyspnea with minimal exertion and fatigue. In addition, echocardiogram from 3/2018 showed showed an EF of 35-40% along with moderate to severe MR, TR, and AI. Therefore, decision was made to undergo AVN ablation and implant a biventricular PM.         Interval History    Assessment/Plan:  1.  Atrial fibrillation s/p AV node ablation and implant of a [employer] biventricular ppm  Chest xray reveals no pneumothorax  Device check revealed normal device function  Access site was CDI, no hematoma, no ecchymosis, no draniage  Discontinue digoxin and diltiatizem  Continue anticoagulation of Xarelto 20 mg daily for a CHADS VASC 4 (female, age++, HF, HTN)  Follow up in device clinic as schedule  EKG from 07/17/18 reveals ventricular paced.    Okay to discharge after device RN   Follow up with EP harper in 1 week for BP check and medication check.        Makenzie Paredes, LUCINDA CNP  [facility]  Text Page  (M-F 7:30 am - 4:00 pm)      Physical Exam   Temp: 97.5  F (36.4  C) Temp src: Oral BP: 132/54 Pulse: 84 Heart Rate: 60 Resp: 18 SpO2: 95 % O2 Device: None (Room air)    Vitals:    07/16/18 1144   Weight: 37.1 kg (81 lb 11.2 oz)       GEN:  In general, this is a thin frail elderly female in no acute distress  Patient ambulatory.  HEENT:  Pupils normal size, equal, and nround. Sclerae nonicteric. Clear oropharynx. Mucous membranes moist,  no  cyanosis.  NECK: Supple, no asymmetry, masses, or scars appreciated. Trachea midline.   C/V:  Regular rate and rhythm, positive murmur    RESP: Respirations are unlabored. No use of accessory muscles. Clear to auscultation bilaterally without wheezing, rales, or rhonchi.  GI: Abdomen soft, nontender, nondistended.   EXTREM: no LE edema. No cyanosis or clubbing.  MS: Large joints without obvious swelling or erythema.   VASC: Radial and dorsalis pedis are normal in volumes and symmetric bilaterally.   NEURO: Alert and oriented, cooperative.No obvious focal deficits.   PSYCH: Normal affect.  SKIN: Warm and dry. upper right chest incision covered with clean, dry and intact dressing.   right access site healing and open to air  No hematoma, bruit, drainage, ecchymosis  Medications       bumetanide  2 mg Oral QAM     lisinopril  2.5 mg Oral Daily     metoprolol succinate  100 mg Oral BID     sodium chloride (PF)  3 mL Intracatheter Q8H       Data   Most Recent 3 CBC's:  Recent Labs   Lab Test  07/16/18   1200  06/22/18   0719  06/21/18   1042   WBC  6.0  7.6  7.3   HGB  12.6  11.9  11.9   MCV  89  91  92   PLT  231  294  306     Most Recent 3 BMP's:  Recent Labs   Lab Test  07/16/18   1200  07/09/18   1018  07/06/18   1013   NA  139  141  142   POTASSIUM  4.4  4.0  3.8   CHLORIDE  103  95*  99   CO2  32  40*  38*   BUN  28  31*  44*   CR  0.82  0.94  1.12*   ANIONGAP  4  10  5   KARTHIK  9.6  9.4  9.3   GLC  97  92  102*

## 2018-07-17 NOTE — DISCHARGE INSTRUCTIONS
Please stop taking Digoxin and Diltiazem     Discharge instructions:     Avoid raising left arm above the level of the shoulder for 3 weeks.  Do not shower until outer occlusive dressing has been removed.  Remove outer dressing in 3 - 4 days. Thursday 7/19/18.  Leave steri-strips in place, these will be removed at the 1 week check.   Call Device Clinic with increased swelling, drainage, or fever > 101 degrees.   Follow-up with the Device Clinic as scheduled. 7/25/18 at 1 PM in [place] for a device check and with Makenzie POWER after.        Discharge Instructions for Pacemaker Implantation  You have had a procedure to insert a pacemaker. Once inside your body, this small electronic device helps keep your heart from beating too slowly. A pacemaker can t fix existing heart problems. But it can help you feel better and have more energy. As you recover, follow all of the instructions you are given, including those below.  Activity    Follow the instructions you are given about limiting your activity.    Do not raise your arm on the incision side above shoulder level for 3 weeks. This gives the device lead wires time to attach securely inside your heart.    Ask your doctor when you can expect to return to work.    You can still exercise. It s good for your body and your heart. Talk with your doctor about an exercise plan.  Other Precautions    Follow your doctor's directions carefully for wound care. You may remove the outer dressing in 3 - 4 days. Leave the steri-strips in place; these will be removed at your 1 week follow-up. Never put any creams, lotions, or products like peroxide on an incision unless your doctor tells you to.     Before you receive any treatment, tell all health care providers (including your dentist) that you have a pacemaker.    You will be given an ID card that contains information about your pacemaker. Always carry this card with you. You can show this card if your pacemaker sets off a metal detector.  You should also show it to avoid screening with a hand-held security wand.    Keep your cell phone away from your pacemaker. Don t carry the phone in your shirt pocket, even when it s turned off.    Avoid strong magnets. Examples are those used in MRIs or in hand-held security wands.    Avoid strong electrical fields. Examples are those made by radio transmitting towers,  ham  radios, and heavy-duty electrical equipment.    Avoid leaning over the open bullock of a running car. A running engine creates an electrical field. Most household and yard appliances will not cause any problems. If you use any large power tools, such as an industrial , talk with your doctor.   Follow-Up    Follow up in the device clinic as scheduled.    Make regular follow-up appointments with your doctor. He or she will check the pacemaker to make sure it s working properly.  When to Call Your Doctor  Call your doctor immediately if you have any of the following:    Dizziness    Chest pain    Fainting spells    Twitching chest muscles    Rapid pulse or pounding heartbeat    Shortness of breath    Pain around your pacemaker    Fever above 101 F (38 C) or other signs of infection (redness, swelling, drainage, or warmth at the incision site)    Hiccups that won t stop     [id number] [employer]. [address] [place] [address], [place] [address]. All rights reserved. This information is not intended as a substitute for professional medical care. Always follow your healthcare professional's instructions.

## 2018-07-17 NOTE — PLAN OF CARE
Problem: Patient Care Overview  Goal: Plan of Care/Patient Progress Review  Outcome: Improving  Orientation: A/O x 4  Abnl vital signs/oxygen therapy: RA, VSS, CMS intact.  Mobility/ambulation/fall risk: Assist of 1 w/ walker/GB  Pain/treatment: Tylenol PRN  Diet/tolerating: Regular  Voiding: Bedside Commode   Abnl lab results: NA  Drain/devices: NA  Wounds: Left Chest Dressing CDI, and Right Groin Dressing CDI  Tests/procedures for next shift: NA  Anticipated DC: Tomorrow   Other info/events during shift: Dangled and Ambulated.

## 2018-07-17 NOTE — PROGRESS NOTES
Device Discharge  Dressing:  Clean, dry with mild swelling. Pressure dressing removed. Dressing reinforced.   Chest XR reviewed:  Yes  Pneumo present?:  No  Lead Measurements per Device Rep:  WNL. 100% BVP     Education Provided:  Avoid raising left arm above the level of the shoulder for 3 weeks.  Do not shower until outer occlusive dressing has been removed.  Remove outer dressing in 3 - 4 days. Thursday 7/19/18.  Leave steri-strips in place, these will be removed at the 1 week check.   Call Device Clinic with increased swelling, drainage, or fever > 101 degrees.   Follow-up with the Device Clinic as scheduled. 7/25/18 at 1 PM in [place] for a device check and with Makenzie POWER after.

## 2018-07-17 NOTE — PLAN OF CARE
Problem: Patient Care Overview  Goal: Plan of Care/Patient Progress Review  Outcome: Adequate for Discharge Date Met: 07/17/18  Pt A&Ox4; forgetful at times. VSS; no c/o chest pain or sob. PPM site and R groin WNL. CMS intact. Pacemaker discharge instructions including arm restrictions reviewed with pt; questions answered. Pt verbalizes understanding of all instructions. A copy of AVS, pacemaker booklet/phone given to pt. Belongings returned to pt. Pt discharged from floor via w/c accompanied by NA. Pt son providing transportation to home.

## 2018-07-17 NOTE — PLAN OF CARE
Problem: Patient Care Overview  Goal: Plan of Care/Patient Progress Review  Outcome: Improving  Orientation: A/O x 4  Abnl vital signs/oxygen therapy: RA, VSS, CMS intact.  Mobility/ambulation/fall risk: Assist of 1 w/ walker/GB  Pain/treatment: Tylenol PRN  Diet/tolerating: Regular  Voiding: Bedside Commode   Abnl lab results: NA  Drain/devices: NA  Wounds: Left Chest Dressing CDI, and Right Groin Dressing CDI  Tests/procedures for next shift: NA  Anticipated DC: Today   Other info/events during shift: Dangled and Ambulated. Complaints of RLS throughout the night. Ambulated several times in [place] w/ assistance.     Tele: 100% AV Paced

## 2018-07-24 NOTE — TELEPHONE ENCOUNTER
Call received from [facility] physical therapy. States when they opened patient to home care on  they had orders for a SW eval. States patient originally declined a SW eval but has since changed her mind and has requested it. Order is now . Requesting new order for SW eval. Verbal order given.

## 2018-07-25 NOTE — MR AVS SNAPSHOT
After Visit Summary   7/25/2018    Ana Cristina Dominguez    MRN: [id number]           Patient Information     Date Of Birth          6/6/1931        Visit Information        Provider Department      7/25/2018 1:00 PM HEARD DCRN [facility]        Today's Diagnoses     Ischemic cardiomyopathy    -  1    SSS (sick sinus syndrome) (H)        Cardiac pacemaker in situ           Follow-ups after your visit        Your next 10 appointments already scheduled     Jul 25, 2018  1:50 PM CDT   [facility] EP RETURN with LUCINDA Colbert CNP   [facility] ([facility])    [address]  [facility] [id number] [phone] [address]            Sep 07, 2018 10:00 AM CDT   Pacemaker Check with HEARD DCR2   [facility] ([facility])    [address]  [facility] [id number] [phone] OPT [address]              Who to contact     If you have questions or need follow up information about today's clinic visit or your schedule please contact [facility] directly at [phone].  Normal or non-critical lab and imaging results will be communicated to you by [employer], letter or phone within 4 business days after the clinic has received the results. If you do not hear from us within 7 days, please contact the clinic through [employer] or phone. If you have a critical or abnormal lab result, we will notify you by phone as soon as possible.  Submit refill requests through [employer] or call your pharmacy and they will forward the refill request to us. Please allow 3 business days for your refill to be completed.          Additional Information About Your Visit        MyChart Information     [employer] gives you secure access to your electronic health record. If you see a primary care provider, you can also send messages to your care team and make  appointments. If you have questions, please call your primary care clinic.  If you do not have a primary care provider, please call [phone] and they will assist you.        Care EveryWhere ID     This is your Care EveryWhere ID. This could be used by other organizations to access your [place] medical records  [id number]         Blood Pressure from Last 3 Encounters:   07/17/18 132/59   07/09/18 120/72   07/06/18 108/52    Weight from Last 3 Encounters:   07/17/18 36.4 kg (80 lb 4.8 oz)   07/09/18 36 kg (79 lb 4.8 oz)   07/06/18 35.9 kg (79 lb 3.2 oz)              We Performed the Following     PM DEVICE PROGRAMMING EVAL, MULTI LEAD PACER ([id number])        Primary Care Provider Office Phone # Fax #    Dez Urban [phone]       [address] [address]        Equal Access to Services     [facility]: Hadii aad ku hadasho Soomaali, waaxda luqadaha, qaybta kaalmada adeegyada, waxay idiin hayaan adeeg mandaarabrannon la'hank . So [facility] [phone].    ATENCIÓN: Si habla español, tiene a dominguez disposición servicios gratuitos de asistencia lingüística. Llame al [phone].    We comply with applicable federal civil rights laws and [place] laws. We do not discriminate on the basis of race, color, national origin, age, disability, sex, sexual orientation, or gender identity.            Thank you!     Thank you for choosing [facility]  for your care. Our goal is always to provide you with excellent care. Hearing back from our patients is one way we can continue to improve our services. Please take a few minutes to complete the written survey that you may receive in the mail after your visit with us. Thank you!             Your Updated Medication List - Protect others around you: Learn how to safely use, store and throw away your medicines at www.disposemymeds.org.          This list is accurate as of 7/25/18  1:45 PM.  Always use your most  recent med list.                   Brand Name Dispense Instructions for use Diagnosis    acetaminophen 325 MG tablet    TYLENOL     Take 325-650 mg by mouth every 4 hours as needed for mild pain        ascorbic acid 500 MG tablet    VITAMIN C     Take 500 mg by mouth daily        bumetanide 1 MG tablet    BUMEX     Take 2 mg by mouth every morning        Calcium-Vitamin D 500-400 MG-UNIT Tabs      Take 2 tablets by mouth every morning        lisinopril 2.5 MG tablet    PRINIVIL/Zestril    30 tablet    Take 1 tablet (2.5 mg) by mouth daily    Chronic systolic congestive heart failure (H)       metoprolol succinate 100 MG 24 hr tablet    TOPROL-XL    60 tablet    Take 1 tablet (100 mg) by mouth 2 times daily    Essential hypertension, Chronic atrial fibrillation (H)       * PRESERVISION AREDS PO      Take 1 capsule by mouth 2 times daily        * MULTIVITAMIN PO      Take 1 tablet by mouth every morning        REQUIP 0.5 MG tablet   Generic drug:  rOPINIRole      Take 0.5 mg by mouth nightly as needed PT STATES MED ON HOLD        rivaroxaban ANTICOAGULANT 20 MG Tabs tablet    XARELTO     Take 1 tablet (20 mg) by mouth daily (with dinner)    Chronic atrial fibrillation (H)       vitamin E 400 UNIT capsule      Take 400 Units by mouth daily        * Notice:  This list has 2 medication(s) that are the same as other medications prescribed for you. Read the directions carefully, and ask your doctor or other care provider to review them with you.

## 2018-07-25 NOTE — LETTER
7/25/2018    Dez Urban MD, MD  [address] [facility] [address]  [facility] [address]    RE: Ana Cristina Dominguez       Dear Colleague,    I had the pleasure of seeing Ana Cristina Dominguez in the [facility].    HPI:  Ana Cristina Dominguez is a 87 year old female who is a patient of Dr. Ratliff who presents today for medicaiton adjustment after undergoing an AV node ablation and [employer] biventricular  ppm.   Past medical history includes atrial fibrillation, hypertension, hyperlipidemia, cardiomyopathy, and polymyalgia rheumatic valve disease.    Patient had been undergoing rate control but continued to have difficulty requiring multiple hospitalizations for A-fib w/ RVR despite being on high doses of rate controlling medications (Digoxin, diltiazem 240 mg daily, and metoprolol 100 mg BID). Her activity was very limited due to dyspnea with minimal exertion and fatigue. In addition, echocardiogram from 3/2018 showed showed an EF of 35-40% along with moderate to severe MR, TR, and AI. Therefore, decision was made to undergo AV node ablation and implant a biventricular PPM.   Subsequently her digoxin and diltiazem were discontinued.      Her device check today reveals  100% biv-paced      Today Ana Cristina Dominguez presents with her son Marcio.   She is feeling great.  We discussed increasing her caloric intake as her BMI is 15.  She lives alone and makes her own meals.   Discussed decreasing her medication needs.  Will decrease her metoprolol XL to 50 mg BID from 100 mg BID.  She is going to decrease her bumex to 1 mg daily and see how she feels and evaluate her LE edema.    Denies chest pain or pressure, headaches, dizziness, syncope, angina, dyspnea at rest or with exertion, dry cough, palpitations, orthopnea, PND, abdominal pain, abdominal edema, pedal edema, or claudication.  Denies easy bruising or bleeding, hematuria, hematochezia, and epistaxis. Denies signs/symptoms of stroke  such as visual disturbance, difficulty speaking, facial drooping, confusion, problems with gait, or any new numbness or weakness.      ASSESSMENT AND PLAN    (I10) Essential hypertension  (primary encounter diagnosis)  Controlled  Decrease her metoprolol XL to 50 mg BID  She will decrease her bumex  to 1 mg daily and see how she feels and evaluate her LE edema.    Will will meet again in 2 months to decrease her bumex and possibly her metoprolol again    (I48.2) Chronic atrial fibrillation (H) s/p AV node ablation and [employer] biventricular ppm  Continue xarelto 20 mg daily for CHADS VASC 5 (age++, female, HTN, HF)  Decreasing metoprolol XL to 50 mg twice daily    Cardiomyopathy  Repeat ECHO when she sees Dr. Bower in 6/2019  Continue metoprolol XL 50 mg BID  Continue lisinopril 2.5 mg daily     Patient expresses understanding and agreement with the plan.     I appreciate the chance to help with Ana Cristina Dominguez Please let me know if you have any questions or concerns.    Makenzie Paredes APRN, CNP    This note was completed in part using Dragon voice recognition software. Although reviewed after completion, some word and grammatical errors may occur.    Orders Placed This Encounter   Procedures     Basic metabolic panel     Follow-Up with Cardiac Advanced Practice Provider     Orders Placed This Encounter   Medications     metoprolol succinate (TOPROL-XL) 50 MG 24 hr tablet     Sig: Take 1 tablet (50 mg) by mouth 2 times daily     Dispense:  180 tablet     Refill:  3     Pt will call for refills     Medications Discontinued During This Encounter   Medication Reason     metoprolol succinate (TOPROL-XL) 100 MG 24 hr tablet Reorder         Encounter Diagnoses   Name Primary?     Essential hypertension Yes     Chronic atrial fibrillation (H)      Cardiomyopathy, unspecified type (H)        CURRENT MEDICATIONS:  Current Outpatient Prescriptions   Medication Sig Dispense Refill     acetaminophen (TYLENOL)  325 MG tablet Take 325-650 mg by mouth every 4 hours as needed for mild pain       ascorbic acid (VITAMIN C) 500 MG tablet Take 500 mg by mouth daily       bumetanide (BUMEX) 1 MG tablet Take 2 mg by mouth every morning        Calcium Carb-Cholecalciferol (CALCIUM-VITAMIN D) 500-400 MG-UNIT TABS Take 2 tablets by mouth every morning       lisinopril (PRINIVIL/ZESTRIL) 2.5 MG tablet Take 1 tablet (2.5 mg) by mouth daily 30 tablet 3     metoprolol succinate (TOPROL-XL) 50 MG 24 hr tablet Take 1 tablet (50 mg) by mouth 2 times daily 180 tablet 3     Multiple Vitamins-Minerals (MULTIVITAMIN PO) Take 1 tablet by mouth every morning       Multiple Vitamins-Minerals (PRESERVISION AREDS PO) Take 1 capsule by mouth 2 times daily        rivaroxaban ANTICOAGULANT (XARELTO) 20 MG TABS tablet Take 1 tablet (20 mg) by mouth daily (with dinner)       rOPINIRole (REQUIP) 0.5 MG tablet Take 0.5 mg by mouth nightly as needed PT STATES MED ON HOLD       vitamin E 400 UNIT capsule Take 400 Units by mouth daily       [DISCONTINUED] metoprolol succinate (TOPROL-XL) 100 MG 24 hr tablet Take 1 tablet (100 mg) by mouth 2 times daily 60 tablet 11       ALLERGIES     Allergies   Allergen Reactions     Amoxicillin Rash     Codeine GI Disturbance     Stomach pain     Melatonin Nausea     Eyesburned       PAST MEDICAL HISTORY:  Past Medical History:   Diagnosis Date     Aortic regurgitation     mod-severe by echo in 3/2018     Benign essential hypertension      Chronic diastolic heart failure (H)      Other and unspecified hyperlipidemia      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 Father      Family History Negative Mother      Genitourinary Problems Sister      Renal failure     HEART DISEASE Sister      Rhythm issues       SOCIAL HISTORY:  Social History     Social History     Marital status:      Spouse name: N/A     Number of children: 3     Years of education: N/A     Occupational History      Retired     Social History Main Topics     Smoking status: Former Smoker     Packs/day: 0.50     Types: Cigarettes     Quit date: 5/6/1973     Smokeless tobacco: Never Used     Alcohol use Yes      Comment: Socially     Drug use: No     Sexual activity: No     Other Topics Concern     Exercise Yes     Seat Belt Yes     Social History Narrative       Review of Systems:  Skin:  Positive for     Eyes:  Positive for glasses  ENT:  Positive for hearing loss  Respiratory:  Positive for dyspnea on exertion;cough;shortness of breath  Cardiovascular:  Negative for;lightheadedness;dizziness;edema;syncope or near-syncope Positive for;fatigue;exercise intolerance  Gastroenterology: Positive for poor appetite (felt nauseas w/dig)  Genitourinary:  Positive for urinary frequency  Musculoskeletal:  Positive for arthritis;joint swelling  Neurologic:  Positive for numbness or tingling of feet  Psychiatric:  Positive for sleep disturbances;anxiety  Heme/Lymph/Imm:  Positive for hay fever  Endocrine:  Negative      Physical Exam:  Vitals: /74  Pulse 71  Ht 1.524 m (5')  Wt 36.7 kg (81 lb)  BMI 15.82 kg/m2    Constitutional:    thin;frail uses a walker    Skin:  warm and dry to the touch   ppm incision healing    Head:  normocephalic        Eyes:  pupils equal and round        ENT:           Neck:  JVP normal        Chest:  clear to auscultation        Cardiac:           holosystolic murmur        Abdomen:  abdomen soft         Vascular:       right radial artery;1+             left radial artery;1+                  Extremities and Back:  no edema        Neurological:  no gross motor deficits          Recent Lab Results:  LIPID RESULTS:  Lab Results   Component Value Date    CHOL 107 10/07/2016    HDL 22 (L) 10/07/2016    LDL 65 10/07/2016    TRIG 100 10/07/2016    CHOLHDLRATIO 3.3 08/29/2013       LIVER ENZYME RESULTS:  Lab Results   Component Value Date    AST 28 06/21/2018    ALT 34 06/21/2018       CBC RESULTS:  Lab Results   Component Value Date    WBC 6.0 07/16/2018    RBC 4.36 07/16/2018    HGB 12.6 07/16/2018    HCT 38.8 07/16/2018    MCV 89 07/16/2018    MCH 28.9 07/16/2018    MCHC 32.5 07/16/2018    RDW 17.2 (H) 07/16/2018     07/16/2018       BMP RESULTS:  Lab Results   Component Value Date     07/16/2018    POTASSIUM 4.4 07/16/2018    CHLORIDE 103 07/16/2018    CO2 32 07/16/2018    ANIONGAP 4 07/16/2018    GLC 97 07/16/2018    BUN 28 07/16/2018    CR 0.82 07/16/2018    GFRESTIMATED 66 07/16/2018    GFRESTBLACK 79 07/16/2018    KARTHIK 9.6 07/16/2018              CC  No referring provider defined for this encounter.                  Thank you for allowing me to participate in the care of your patient.      Sincerely,     LUCINDA [facility]    cc:   No referring provider defined for this encounter.

## 2018-07-25 NOTE — PATIENT INSTRUCTIONS
Please reduce your metoprolol XL 50 mg twice daily (1/2 tablet).   When you need a new prescription the new pills will be in 50 mg tablets.     As always, thank you for trusting us with your health care needs!    If you have any questions regarding your visit please contact your care team:   Cardiology  Telephone Number    Afib RNs  Angela Velazquez, and Gena [phone]     Call for Electrophysiology procedure scheduling concerns  Анна Chema [phone]   Device Clinic (Pacemakers, ICDs, Loop Recorders)   RN's :    Shannan Perez, MARQUISE Nelson, Rosie Hanna During business hours:   [phone]

## 2018-07-25 NOTE — PROGRESS NOTES
[employer] Valtitude CRT-D 7-10 day Post Pacemaker Implant with AVNA  AP N/A % : 100 % BIV-paced  Mode: VVIR 80 (lowered to 70 today)        Underlying Rhythm: Permanent AF with CHB achieved with AVNA- Junctional escape present at 40 BPM  Heart Rate: Stabe with minimal variability at the 80 LRL setting  Sensing: WNL    Pacing Threshold: WNL (LV vector changed)    Impedance: WNL  Battery Status: FUll  Incision/Complications: Steri strips removed. Incision C/D/I no edema- pt very small (81 pounds) so device protrudes prominently  Atrial Arrhythmia: Permanent AF- On Xarelto  Ventricular Arrhythmia: 0  Setting Change: Lowered Rate from Post AVNA 80 BPM to 70 BPM. Changed LV vector due to higher threshold at implant setting.     Care Plan: Seeing Makenzie TARIQ Today. Return in 6 weeks for turn downs. TIA Dasilva

## 2018-07-25 NOTE — MR AVS SNAPSHOT
After Visit Summary   7/25/2018    Ana Cristina Dominguez    MRN: [id number]           Patient Information     Date Of Birth          6/6/1931        Visit Information        Provider Department      7/25/2018 1:50 PM Makenzie Paredes APRN [facility]        Today's Diagnoses     Essential hypertension        Chronic atrial fibrillation (H)          Care Instructions    Please reduce your metoprolol XL 50 mg twice daily (1/2 tablet).   When you need a new prescription the new pills will be in 50 mg tablets.     As always, thank you for trusting us with your health care needs!    If you have any questions regarding your visit please contact your care team:   Cardiology  Telephone Number    Afib RNs  Angela Velazquez, and Gena [phone]     Call for Electrophysiology procedure scheduling concerns  Анна Bear [phone]   Device Clinic (Pacemakers, ICDs, Loop Recorders)   RN's :    Shannan Perez Lynda, MJ, Rosie Hanna During business hours:   [phone]                 Follow-ups after your visit        Your next 10 appointments already scheduled     Sep 07, 2018 10:00 AM CDT   Pacemaker Check with HEARD DCR2   [facility] ([facility])    [address] [place], [place] [id number]  [facility] [address] [phone] OPT 2              Who to contact     If you have questions or need follow up information about today's clinic visit or your schedule please contact [facility] directly at [phone].  Normal or non-critical lab and imaging results will be communicated to you by MyChart, letter or phone within 4 business days after the clinic has received the results. If you do not hear from us within 7 days, please contact the clinic through MyChart or phone. If you have a critical or abnormal lab result, we will notify you by phone as soon as possible.  Submit refill requests  through [employer] or call your pharmacy and they will forward the refill request to us. Please allow 3 business days for your refill to be completed.          Additional Information About Your Visit        Contact Solutionshart Information     [employer] gives you secure access to your electronic health record. If you see a primary care provider, you can also send messages to your care team and make appointments. If you have questions, please call your primary care clinic.  If you do not have a primary care provider, please call [phone] and they will assist you.        Care EveryWhere ID     This is your Care EveryWhere ID. This could be used by other organizations to access your [facility] medical records  [id number]        Your Vitals Were     Pulse Height BMI (Body Mass Index)             71 1.524 m (5') 15.82 kg/m2          Blood Pressure from Last 3 Encounters:   07/25/18 126/74   07/17/18 132/59   07/09/18 120/72    Weight from Last 3 Encounters:   07/25/18 36.7 kg (81 lb)   07/17/18 36.4 kg (80 lb 4.8 oz)   07/09/18 36 kg (79 lb 4.8 oz)              Today, you had the following     No orders found for display         Today's Medication Changes          These changes are accurate as of 7/25/18  2:10 PM.  If you have any questions, ask your nurse or doctor.               These medicines have changed or have updated prescriptions.        Dose/Directions    metoprolol succinate 50 MG 24 hr tablet   Commonly known as:  TOPROL-XL   This may have changed:    - medication strength  - how much to take   Used for:  Essential hypertension, Chronic atrial fibrillation (H)   Changed by:  Makenzie Paredes APRN CNP        Dose:  50 mg   Take 1 tablet (50 mg) by mouth 2 times daily   Quantity:  180 tablet   Refills:  3            Where to get your medicines      These medications were sent to [facility]/pharmacy #[id number] [place], [place] - [address], [place] [address]     Phone:  [phone]      metoprolol succinate 50 MG 24 hr tablet                Primary Care Provider Office Phone # Fax #    Dez Urban [phone]       [address] [facility] [address]  [facility] [address]        Equal Access to Services     ABBE ESPINOSA : Hadmathew shanelle ku emao Sovirginieali, waaxda luqadaha, qaybta kaalmada adenikkieda, rinku bethean ramón coy laInduhank obrien. So [facility] [phone].    ATENCIÓN: Si habla español, tiene a dominguez disposición servicios gratuitos de asistencia lingüística. Llame al [phone].    We comply with applicable federal civil rights laws and [place] laws. We do not discriminate on the basis of race, color, national origin, age, disability, sex, sexual orientation, or gender identity.            Thank you!     Thank you for choosing [facility]  for your care. Our goal is always to provide you with excellent care. Hearing back from our patients is one way we can continue to improve our services. Please take a few minutes to complete the written survey that you may receive in the mail after your visit with us. Thank you!             Your Updated Medication List - Protect others around you: Learn how to safely use, store and throw away your medicines at www.disposemymeds.org.          This list is accurate as of 7/25/18  2:10 PM.  Always use your most recent med list.                   Brand Name Dispense Instructions for use Diagnosis    acetaminophen 325 MG tablet    TYLENOL     Take 325-650 mg by mouth every 4 hours as needed for mild pain        ascorbic acid 500 MG tablet    VITAMIN C     Take 500 mg by mouth daily        bumetanide 1 MG tablet    BUMEX     Take 2 mg by mouth every morning        Calcium-Vitamin D 500-400 MG-UNIT Tabs      Take 2 tablets by mouth every morning        lisinopril 2.5 MG tablet    PRINIVIL/Zestril    30 tablet    Take 1 tablet (2.5 mg) by mouth daily    Chronic systolic congestive heart failure (H)       metoprolol  succinate 50 MG 24 hr tablet    TOPROL-XL    180 tablet    Take 1 tablet (50 mg) by mouth 2 times daily    Essential hypertension, Chronic atrial fibrillation (H)       * PRESERVISION AREDS PO      Take 1 capsule by mouth 2 times daily        * MULTIVITAMIN PO      Take 1 tablet by mouth every morning        REQUIP 0.5 MG tablet   Generic drug:  rOPINIRole      Take 0.5 mg by mouth nightly as needed PT STATES MED ON HOLD        rivaroxaban ANTICOAGULANT 20 MG Tabs tablet    XARELTO     Take 1 tablet (20 mg) by mouth daily (with dinner)    Chronic atrial fibrillation (H)       vitamin E 400 UNIT capsule      Take 400 Units by mouth daily        * Notice:  This list has 2 medication(s) that are the same as other medications prescribed for you. Read the directions carefully, and ask your doctor or other care provider to review them with you.

## 2018-07-25 NOTE — PROGRESS NOTES
HPI:  Ana Cristina Dominguez is a 87 year old female who is a patient of Dr. Ratliff who presents today for medicaiton adjustment after undergoing an AV node ablation and [employer] biventricular  ppm.   Past medical history includes atrial fibrillation, hypertension, hyperlipidemia, cardiomyopathy, and polymyalgia rheumatic valve disease.    Patient had been undergoing rate control but continued to have difficulty requiring multiple hospitalizations for A-fib w/ RVR despite being on high doses of rate controlling medications (Digoxin, diltiazem 240 mg daily, and metoprolol 100 mg BID). Her activity was very limited due to dyspnea with minimal exertion and fatigue. In addition, echocardiogram from 3/2018 showed showed an EF of 35-40% along with moderate to severe MR, TR, and AI. Therefore, decision was made to undergo AV node ablation and implant a biventricular PPM.   Subsequently her digoxin and diltiazem were discontinued.      Her device check today reveals  100% biv-paced      Today Ana Cristina Dominguez presents with her son Marcio.   She is feeling great.  We discussed increasing her caloric intake as her BMI is 15.  She lives alone and makes her own meals.   Discussed decreasing her medication needs.  Will decrease her metoprolol XL to 50 mg BID from 100 mg BID.  She is going to decrease her bumex to 1 mg daily and see how she feels and evaluate her LE edema.    Denies chest pain or pressure, headaches, dizziness, syncope, angina, dyspnea at rest or with exertion, dry cough, palpitations, orthopnea, PND, abdominal pain, abdominal edema, pedal edema, or claudication.  Denies easy bruising or bleeding, hematuria, hematochezia, and epistaxis. Denies signs/symptoms of stroke such as visual disturbance, difficulty speaking, facial drooping, confusion, problems with gait, or any new numbness or weakness.      ASSESSMENT AND PLAN    (I10) Essential hypertension  (primary encounter diagnosis)  Controlled  Decrease  her metoprolol XL to 50 mg BID  She will decrease her bumex  to 1 mg daily and see how she feels and evaluate her LE edema.    Will will meet again in 2 months to decrease her bumex and possibly her metoprolol again    (I48.2) Chronic atrial fibrillation (H) s/p AV node ablation and [employer] biventricular ppm  Continue xarelto 20 mg daily for CHADS VASC 5 (age++, female, HTN, HF)  Decreasing metoprolol XL to 50 mg twice daily    Cardiomyopathy  Repeat ECHO when she sees Dr. Bower in 6/2019  Continue metoprolol XL 50 mg BID  Continue lisinopril 2.5 mg daily     Patient expresses understanding and agreement with the plan.     I appreciate the chance to help with Ana Cristina Dominguez Please let me know if you have any questions or concerns.    Makenzie Paredes, APRN, CNP    This note was completed in part using Dragon voice recognition software. Although reviewed after completion, some word and grammatical errors may occur.    Orders Placed This Encounter   Procedures     Basic metabolic panel     Follow-Up with Cardiac Advanced Practice Provider     Orders Placed This Encounter   Medications     metoprolol succinate (TOPROL-XL) 50 MG 24 hr tablet     Sig: Take 1 tablet (50 mg) by mouth 2 times daily     Dispense:  180 tablet     Refill:  3     Pt will call for refills     Medications Discontinued During This Encounter   Medication Reason     metoprolol succinate (TOPROL-XL) 100 MG 24 hr tablet Reorder         Encounter Diagnoses   Name Primary?     Essential hypertension Yes     Chronic atrial fibrillation (H)      Cardiomyopathy, unspecified type (H)        CURRENT MEDICATIONS:  Current Outpatient Prescriptions   Medication Sig Dispense Refill     acetaminophen (TYLENOL) 325 MG tablet Take 325-650 mg by mouth every 4 hours as needed for mild pain       ascorbic acid (VITAMIN C) 500 MG tablet Take 500 mg by mouth daily       bumetanide (BUMEX) 1 MG tablet Take 2 mg by mouth every morning        Calcium  Carb-Cholecalciferol (CALCIUM-VITAMIN D) 500-400 MG-UNIT TABS Take 2 tablets by mouth every morning       lisinopril (PRINIVIL/ZESTRIL) 2.5 MG tablet Take 1 tablet (2.5 mg) by mouth daily 30 tablet 3     metoprolol succinate (TOPROL-XL) 50 MG 24 hr tablet Take 1 tablet (50 mg) by mouth 2 times daily 180 tablet 3     Multiple Vitamins-Minerals (MULTIVITAMIN PO) Take 1 tablet by mouth every morning       Multiple Vitamins-Minerals (PRESERVISION AREDS PO) Take 1 capsule by mouth 2 times daily        rivaroxaban ANTICOAGULANT (XARELTO) 20 MG TABS tablet Take 1 tablet (20 mg) by mouth daily (with dinner)       rOPINIRole (REQUIP) 0.5 MG tablet Take 0.5 mg by mouth nightly as needed PT STATES MED ON HOLD       vitamin E 400 UNIT capsule Take 400 Units by mouth daily       [DISCONTINUED] metoprolol succinate (TOPROL-XL) 100 MG 24 hr tablet Take 1 tablet (100 mg) by mouth 2 times daily 60 tablet 11       ALLERGIES     Allergies   Allergen Reactions     Amoxicillin Rash     Codeine GI Disturbance     Stomach pain     Melatonin Nausea     Eyesburned       PAST MEDICAL HISTORY:  Past Medical History:   Diagnosis Date     Aortic regurgitation     mod-severe by echo in 3/2018     Benign essential hypertension      Chronic diastolic heart failure (H)      Other and unspecified hyperlipidemia      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 Father      Family History Negative Mother      Genitourinary  Problems Sister      Renal failure     HEART DISEASE Sister      Rhythm issues       SOCIAL HISTORY:  Social History     Social History     Marital status:      Spouse name: N/A     Number of children: 3     Years of education: N/A     Occupational History      Retired     Social History Main Topics     Smoking status: Former Smoker     Packs/day: 0.50     Types: Cigarettes     Quit date: 5/6/1973     Smokeless tobacco: Never Used     Alcohol use Yes      Comment: Socially     Drug use: No     Sexual activity: No     Other Topics Concern     Exercise Yes     Seat Belt Yes     Social History Narrative       Review of Systems:  Skin:  Positive for     Eyes:  Positive for glasses  ENT:  Positive for hearing loss  Respiratory:  Positive for dyspnea on exertion;cough;shortness of breath  Cardiovascular:  Negative for;lightheadedness;dizziness;edema;syncope or near-syncope Positive for;fatigue;exercise intolerance  Gastroenterology: Positive for poor appetite (felt nauseas w/dig)  Genitourinary:  Positive for urinary frequency  Musculoskeletal:  Positive for arthritis;joint swelling  Neurologic:  Positive for numbness or tingling of feet  Psychiatric:  Positive for sleep disturbances;anxiety  Heme/Lymph/Imm:  Positive for hay fever  Endocrine:  Negative      Physical Exam:  Vitals: /74  Pulse 71  Ht 1.524 m (5')  Wt 36.7 kg (81 lb)  BMI 15.82 kg/m2    Constitutional:    thin;frail uses a walker    Skin:  warm and dry to the touch   ppm incision healing    Head:  normocephalic        Eyes:  pupils equal and round        ENT:           Neck:  JVP normal        Chest:  clear to auscultation        Cardiac:           holosystolic murmur        Abdomen:  abdomen soft        Vascular:       right radial artery;1+             left radial artery;1+                  Extremities and Back:  no edema        Neurological:  no gross motor deficits          Recent Lab Results:  LIPID RESULTS:  Lab Results   Component  Value Date    CHOL 107 10/07/2016    HDL 22 (L) 10/07/2016    LDL 65 10/07/2016    TRIG 100 10/07/2016    CHOLHDLRATIO 3.3 08/29/2013       LIVER ENZYME RESULTS:  Lab Results   Component Value Date    AST 28 06/21/2018    ALT 34 06/21/2018       CBC RESULTS:  Lab Results   Component Value Date    WBC 6.0 07/16/2018    RBC 4.36 07/16/2018    HGB 12.6 07/16/2018    HCT 38.8 07/16/2018    MCV 89 07/16/2018    MCH 28.9 07/16/2018    MCHC 32.5 07/16/2018    RDW 17.2 (H) 07/16/2018     07/16/2018       BMP RESULTS:  Lab Results   Component Value Date     07/16/2018    POTASSIUM 4.4 07/16/2018    CHLORIDE 103 07/16/2018    CO2 32 07/16/2018    ANIONGAP 4 07/16/2018    GLC 97 07/16/2018    BUN 28 07/16/2018    CR 0.82 07/16/2018    GFRESTIMATED 66 07/16/2018    GFRESTBLACK 79 07/16/2018    KARTHIK 9.6 07/16/2018              CC  No referring provider defined for this encounter.

## 2018-08-01 NOTE — PROGRESS NOTES
Met with pt today and she stated that her Metoprolol was changed at last office visit to a decreased dose but pt states that she did not like the way it made her feel the next day therefore she continued taking 100mg BID. Per prescription label it looks like it was changed to 100mg every day. Pts BP was on the low side today at 100/60. SN educated why the cut in BP medication was possibly made so her BP does not drop lower. Pt verbalized understanding and stated she will go back to the dose of 100mg daily. Also educated to increase fluid as she drinks about 3 glasses of water a day. Pt verbalized understanding. She initially wanted to end home care today but did agree for SN to make one or two more visits to follow up on BPs . Thanks

## 2018-08-10 NOTE — PROGRESS NOTES
Clinic Care Coordination Contact  Care Team Conversations    RNCC notified by home care CM patient has been discharged from home care services.  Spoke with patient's son, Juan over phone.  Per Will, patient is doing very well.  She is getting stronger every day.  Not needing walker all the time, but she does use when she feels she needs to.  Will checks on patient pretty frequently and helps he get to appointments.     Patient currently lives alone in a Condo.  Will stated they have had more conversations about moving to Assisted Living and have toured a couple.  RNCC offered to provide additional resources, and Will happily accepted and request resources emailed to him at [email or web address].    Will denies any current care coordination needs.  RNCC provided contact info and encouraged to call with addional resources request or any other questions or concerns.    Zina Virk RN-BSN   Care Coordination  Phone:  [phone]  Email: [email or web address]

## 2018-09-01 NOTE — ED PROVIDER NOTES
History     Chief Complaint:  Generalized Weakness    HPI   Ana Cristina Dominguez is a 87 year old female, with a history of a-fib, CHF, s/p pacemaker, and on Xarelto, who presents with her son to the ED for evaluation of generalized weakness. The patient reports she has been feeling generally weak and fatigued for the past week. She has been having intermittent shortness of breath with exertion for the past few weeks. She also has a productive cough with green phlegm. The patient notes she woke up today with rib pain. She had a normal bowel movement this morning. The patient denies any loss of consciousness, fever, headache, rhinorrhea, sore throat, new leg swelling, dysuria, hematuria, frequency, urgency, or bowel changes. She denies history of COPD or asthma. Of note, the patient reports she resides by herself in a condo.     Allergies:  Amoxicillin: rash   Codeine: abdominal pain & GI disturbance   Melatonin: eye burning & nausea      Medications:    Tylenol  Vitamin C  Bumex  Calcium-Vitamin D  Lisinopril   Metoprolol    Multivitamin-minerals  Xarelto   Requip  Vitamin E    Past Medical History:    A-fib w/ RVR  Anemia  Polymyalgia rheumatica  MGUS  Diastolic CHF  Edema  HTN  Senile osteoporosis  RLS  Achilles bursitis/tendonitis  Pelvic closed fracture  Aortic regurgitation  HLD  Basal cell carcinoma     Past Surgical History:    Appendectomy   Pacemaker implant   Right cataract IOL placement  Wrist ganglion excision     Family History:    CAD  Renal failure  Rhythm issues     Social History:  Smoking status: Former smoker, Quit date 5/6/1973  Alcohol use: Yes  Presents to ED with son    Marital Status:   [5]     Review of Systems   Constitutional: Positive for fatigue. Negative for fever.   HENT: Negative for rhinorrhea and sore throat.    Respiratory: Positive for cough and shortness of breath.    Genitourinary: Negative for dysuria, frequency, hematuria and urgency.   Neurological: Positive for  weakness. Negative for syncope and headaches.   All other systems reviewed and are negative.    Physical Exam     Patient Vitals for the past 24 hrs:   BP Temp Temp src Pulse Heart Rate Resp SpO2 Weight   09/01/18 2100 160/76 - - - 70 - - -   09/01/18 2050 - - - - - - 95 % -   09/01/18 1945 142/71 - - - 70 - 93 % -   09/01/18 1930 149/63 - - - 70 23 95 % -   09/01/18 1915 146/64 - - - 70 25 93 % -   09/01/18 1900 159/71 - - - 70 21 95 % -   09/01/18 1845 153/60 - - - 70 (!) 56 94 % -   09/01/18 1835 174/69 98.6  F (37  C) Oral 70 - 14 95 % 36.3 kg (80 lb)     Physical Exam  Constitutional: Alert, attentive, GCS 15. Frail, elderly woman, sitting in bed.   HENT:    Nose: Nose normal.    Mouth/Throat: Oropharynx is clear, mucous membranes are moist   Eyes: EOM are normal, anicteric, conjugate gaze  CV: regular rate and rhythm; no murmurs  Chest: Effort normal and breath sounds clear without wheezing or rales, symmetric bilaterally   GI:  non tender. No distension. No guarding or rebound.    MSK: No tenderness to palpation of BLE. 1+ bilateral lower extremity edema.   Neurological: Alert, attentive, moving all extremities equally.   Skin: Skin is warm and dry.    Emergency Department Course     ECG (18:33:34):  Rate 70 bpm. NC interval *. QRS duration 166. QT/QTc 522/563. P-R-T axes * 134 -1. Ventricular-paced rhythm. Abnormal ECG. No significant change compared to EKG dated 7/12/2018. Interpreted at 1842 by Jarrett Miller MD.    Imaging:  Radiographic findings were communicated with the patient and family who voiced understanding of the findings.    XR Chest 2 Views  IMPRESSION: No new airspace disease. Trace left pleural fluid versus  costophrenic angle scarring. Stable cardiomegaly. Stable left chest  pacemaker. As read by Radiology.     Laboratory:  UA: Protein albumin >499, RBC 15(H), Mucous present, o/w Negative     Troponin I (1857): <0.015  Nt Pro BNP: 3762(H)    Albumin: 2.9(L)  Alkaline phosphatase:  139  ALT: 25  AST: 29  Bilirubin total: 0.9  Protein total: 7.0    CBC:  HGB 11.1(L), o/w WNL (WBC 9.0, )  BMP: Glucose 111(H), BUN 31(H), GFR estimate 58(L), o/w WNL (Creatinine 0.91)     Interventions:  : Bumex 1mg IV    Emergency Department Course:  Past medical records, nursing notes, and vitals reviewed.  : I performed an exam of the patient and obtained history, as documented above.    IV inserted and blood drawn.    The patient was sent for a chest x-ray while in the emergency department, findings above.    : I rechecked the patient. Explained findings to patient and son.    Findings and plan explained to the Patient and son. Patient discharged home with instructions regarding supportive care, medications, and reasons to return. The importance of close follow-up was reviewed.     Impression & Plan      Medical Decision Makin-year-old female with past medical history significant for systolic heart failure, diastolic heart failure, regurgitation and polymyalgia rheumatica presenting for evaluation of malaise with no overt signs or symptoms to suggest a clear etiology.  Her vital signs were within normal limits other than elevated blood pressure 170 over 70s.  She was afebrile.  Given her main complaint of fatigue without localizing factors a broad lab workup was performed including UA, CBC, BMP, EKG, troponin.  EKG unchanged, nonischemic.  Troponin negative.  Hemoglobin at baseline around 11-12.  UA without evidence of infection.  Chest x-ray clear.  BNP mildly elevated at 3700 down from most recent check of 14,000.  I did give her an extra dose of 1 mg Bumex, she does take 2 mg at home.  Patient was unable to eat a sandwich meal in the emergency department and reported feeling improved and desired to go home.  She is on Xarelto, but had no headache, nausea, blurry vision.  CT head deferred.  No report of trauma.  I did express her that I do not have a clear cause of her fatigue but she  is comfortable and her son is comfortable taking her home from following up with her primary care doctor first thing Tuesday morning.  Return precautions were reviewed including fever, dizziness, lightheadedness, shortness of breath, chest pain or worsening symptoms.      Diagnosis:    ICD-10-CM   1. Fatigue, unspecified type R53.83   2. Acute on chronic systolic congestive heart failure (H) I50.23     Disposition: Patient discharged to home with son      Jarrett HARE MD Angela   [employer]  1:11 PM 09/02/18     Nancy Salcido  9/1/2018   [facility] EMERGENCY DEPARTMENT    Scribe Disclosure:  I, Nancy Salcido, am serving as a scribe at 6:56 PM on 9/1/2018 to document services personally performed by Jarrett Miller MD based on my observations and the provider's statements to me.          Jarrett Miller MD  09/02/18 0825

## 2018-09-01 NOTE — ED AVS SNAPSHOT
[facility] Emergency Department    [address]    [facility] [address]    Phone:  [phone]    Fax:  [phone]                                       Ana Cristina Dominguez   MRN: [id number]    Department:  [facility] Emergency Department   Date of Visit:  9/1/2018           Patient Information     Date Of Birth          6/6/1931        Your diagnoses for this visit were:     Fatigue, unspecified type     Acute on chronic systolic congestive heart failure (H)        You were seen by Jarrett Miller MD.      Follow-up Information     Follow up with Dez Urban MD In 3 days.    Specialty:  Internal Medicine    Why:  Follow up fatigue    Contact information:    [address]  [facility] [address]  [phone]          Follow up with [facility] Emergency Department.    Specialty:  EMERGENCY MEDICINE    Why:  As needed, If symptoms worsen    Contact information:    [address]  [facility] [id number] [phone]        Discharge Instructions       You should return to the emergency department immediately if you develop any new or worsening symptoms: Including chest pain, shortness of breath, dizziness or lightheadedness, headache or vision changes or you develop a fever.  He should make an appointment with your primary doctor for first thing Tuesday morning for recheck.  She continue to take your diuretics as prescribed.  This is your Bumex medication.    Your next 10 appointments already scheduled     Sep 07, 2018 10:00 AM CDT   Pacemaker Check with HEARD DCR2   [facility]   [place] ([facility])    [address] [place] [address] [address]  [facility] [address] [phone] OPT 2            Oct 24, 2018 11:30 AM CDT   LAB with RU LAB   [facility] [place] ([facility])    [address]  [facility] [id number] [phone]           Please do  not eat 10-12 hours before your appointment if you are coming in fasting for labs on lipids, cholesterol, or glucose (sugar). This does not apply to pregnant women. Water, hot tea and black coffee (with nothing added) are okay. Do not drink other fluids, diet soda or chew gum.            Oct 24, 2018 12:30 PM CDT   [facility] EP RETURN with LUCINDA Colbert CNP   [facility] ([facility])    [address]  [facility] [address] [phone]              24 Hour Appointment Hotline       To make an appointment at any [facility], call [phone] ([phone]). If you don't have a family doctor or clinic, we will help you find one. [facility] are conveniently located to serve the needs of you and your family.             Review of your medicines      Our records show that you are taking the medicines listed below. If these are incorrect, please call your family doctor or clinic.        Dose / Directions Last dose taken    acetaminophen 325 MG tablet   Commonly known as:  TYLENOL   Dose:  325-650 mg        Take 325-650 mg by mouth every 4 hours as needed for mild pain   Refills:  0        ascorbic acid 500 MG tablet   Commonly known as:  VITAMIN C   Dose:  500 mg        Take 500 mg by mouth daily   Refills:  0        bumetanide 1 MG tablet   Commonly known as:  BUMEX   Dose:  2 mg        Take 2 mg by mouth every morning   Refills:  0        Calcium-Vitamin D 500-400 MG-UNIT Tabs   Dose:  2 tablet        Take 2 tablets by mouth every morning   Refills:  0        lisinopril 2.5 MG tablet   Commonly known as:  PRINIVIL/Zestril   Dose:  2.5 mg   Quantity:  30 tablet        Take 1 tablet (2.5 mg) by mouth daily   Refills:  3        metoprolol succinate 50 MG 24 hr tablet   Commonly known as:  TOPROL-XL   Dose:  50 mg   Quantity:  180 tablet        Take 1 tablet (50 mg) by mouth 2 times daily   Refills:  3        * PRESERVISION AREDS  PO   Dose:  1 capsule        Take 1 capsule by mouth 2 times daily   Refills:  0        * MULTIVITAMIN PO   Dose:  1 tablet        Take 1 tablet by mouth every morning   Refills:  0        REQUIP 0.5 MG tablet   Dose:  0.5 mg   Generic drug:  rOPINIRole        Take 0.5 mg by mouth nightly as needed PT STATES MED ON HOLD   Refills:  0        rivaroxaban ANTICOAGULANT 20 MG Tabs tablet   Commonly known as:  XARELTO   Dose:  20 mg        Take 1 tablet (20 mg) by mouth daily (with dinner)   Refills:  0        vitamin E 400 UNIT capsule   Dose:  400 Units        Take 400 Units by mouth daily   Refills:  0        * Notice:  This list has 2 medication(s) that are the same as other medications prescribed for you. Read the directions carefully, and ask your doctor or other care provider to review them with you.            Procedures and tests performed during your visit     ALT    AST    Albumin level    Alkaline phosphatase    Basic metabolic panel (BMP)    Bilirubin  total    CBC + differential    EKG 12 lead    Nt probnp inpatient    Protein total    Troponin I    UA with Microscopic    XR Chest 2 Views      Orders Needing Specimen Collection     None      Pending Results     Date and Time Order Name Status Description    9/1/2018 1906 XR Chest 2 Views Preliminary     9/1/2018 1838 EKG 12 lead Preliminary             Pending Culture Results     No orders found from 8/30/2018 to 9/2/2018.            Pending Results Instructions     If you had any lab results that were not finalized at the time of your Discharge, you can call the ED Lab Result RN at [phone]. You will be contacted by this team for any positive Lab results or changes in treatment. The nurses are available 7 days a week from 10A to 6:30P.  You can leave a message 24 hours per day and they will return your call.        Test Results From Your Hospital Stay        9/1/2018  7:03 PM      Component Results     Component Value Ref Range & Units Status    WBC  9.0 4.0 - 11.0 10e9/L Final    RBC Count 3.82 3.8 - 5.2 10e12/L Final    Hemoglobin 11.1 (L) 11.7 - 15.7 g/dL Final    Hematocrit 35.2 35.0 - 47.0 % Final    MCV 92 78 - 100 fl Final    MCH 29.1 26.5 - 33.0 pg Final    MCHC 31.5 31.5 - 36.5 g/dL Final    RDW 17.5 (H) 10.0 - 15.0 % Final    Platelet Count 247 150 - 450 10e9/L Final    Diff Method Automated Method  Final    % Neutrophils 74.2 % Final    % Lymphocytes 13.4 % Final    % Monocytes 11.8 % Final    % Eosinophils 0.1 % Final    % Basophils 0.1 % Final    % Immature Granulocytes 0.4 % Final    Nucleated RBCs 0 0 /100 Final    Absolute Neutrophil 6.7 1.6 - 8.3 10e9/L Final    Absolute Lymphocytes 1.2 0.8 - 5.3 10e9/L Final    Absolute Monocytes 1.1 0.0 - 1.3 10e9/L Final    Absolute Eosinophils 0.0 0.0 - 0.7 10e9/L Final    Absolute Basophils 0.0 0.0 - 0.2 10e9/L Final    Abs Immature Granulocytes 0.0 0 - 0.4 10e9/L Final    Absolute Nucleated RBC 0.0  Final         9/1/2018  7:24 PM      Component Results     Component Value Ref Range & Units Status    Sodium 138 133 - 144 mmol/L Final    Potassium 3.6 3.4 - 5.3 mmol/L Final    Chloride 99 94 - 109 mmol/L Final    Carbon Dioxide 31 20 - 32 mmol/L Final    Anion Gap 8 3 - 14 mmol/L Final    Glucose 111 (H) 70 - 99 mg/dL Final    Urea Nitrogen 31 (H) 7 - 30 mg/dL Final    Creatinine 0.91 0.52 - 1.04 mg/dL Final    GFR Estimate 58 (L) >60 mL/min/1.7m2 Final    Non  GFR Calc    GFR Estimate If Black 71 >60 mL/min/1.7m2 Final    African American GFR Calc    Calcium 8.5 8.5 - 10.1 mg/dL Final         9/1/2018  8:32 PM      Component Results     Component Value Ref Range & Units Status    Color Urine Yellow  Final    Appearance Urine Slightly Cloudy  Final    Glucose Urine Negative NEG^Negative mg/dL Final    Bilirubin Urine Negative NEG^Negative Final    Ketones Urine Negative NEG^Negative mg/dL Final    Specific Gravity Urine 1.020 1.003 - 1.035 Final    Blood Urine Negative NEG^Negative Final     pH Urine 6.0 5.0 - 7.0 pH Final    Protein Albumin Urine >499 (A) NEG^Negative mg/dL Final    NO FAT SEEN    Urobilinogen mg/dL 0.0 0.0 - 2.0 mg/dL Final    Nitrite Urine Negative NEG^Negative Final    Leukocyte Esterase Urine Negative NEG^Negative Final    Source Midstream Urine  Final    WBC Urine 1 0 - 5 /HPF Final    RBC Urine 15 (H) 0 - 2 /HPF Final    Squamous Epithelial /HPF Urine <1 0 - 1 /HPF Final    Mucous Urine Present (A) NEG^Negative /LPF Final         9/1/2018  8:13 PM      Narrative     CHEST TWO VIEW   9/1/2018 8:08 PM     HISTORY: Shortness of breath.     COMPARISON: 7/17/2018.        Impression     IMPRESSION: No new airspace disease. Trace left pleural fluid versus  costophrenic angle scarring. Stable cardiomegaly. Stable left chest  pacemaker.         9/1/2018  7:24 PM      Component Results     Component Value Ref Range & Units Status    Albumin 2.9 (L) 3.4 - 5.0 g/dL Final         9/1/2018  7:24 PM      Component Results     Component Value Ref Range & Units Status    Alkaline Phosphatase 139 40 - 150 U/L Final         9/1/2018  7:24 PM      Component Results     Component Value Ref Range & Units Status    ALT 25 0 - 50 U/L Final         9/1/2018  7:24 PM      Component Results     Component Value Ref Range & Units Status    AST 29 0 - 45 U/L Final         9/1/2018  7:24 PM      Component Results     Component Value Ref Range & Units Status    Bilirubin Total 0.9 0.2 - 1.3 mg/dL Final         9/1/2018  7:44 PM      Component Results     Component Value Ref Range & Units Status    N-Terminal Pro BNP Inpatient 3762 (H) 0 - 1800 pg/mL Final       Reference range shown and results flagged as abnormal are suggested inpatient   cut points for confirming diagnosis if CHF in an acute setting. Establishing a   baseline value for each individual patient is useful for follow-up. An   inpatient or emergency department NT-proPBNP <300 pg/mL effectively rules out   acute CHF, with 99% negative predictive  value.  The outpatient non-acute reference range for ruling out CHF is:   0-125 pg/mL (age 18 to less than 75)   0-450 pg/mL (age 75 yrs and older)           9/1/2018  7:24 PM      Component Results     Component Value Ref Range & Units Status    Protein Total 7.0 6.8 - 8.8 g/dL Final         9/1/2018  7:44 PM      Component Results     Component Value Ref Range & Units Status    Troponin I ES <0.015 0.000 - 0.045 ug/L Final    The 99th percentile for upper reference range is 0.045 ug/L.  Troponin values   in the range of 0.045 - 0.120 ug/L may be associated with risks of adverse   clinical events.                  Clinical Quality Measure: Blood Pressure Screening     Your blood pressure was checked while you were in the emergency department today. The last reading we obtained was  BP: 142/71 . Please read the guidelines below about what these numbers mean and what you should do about them.  If your systolic blood pressure (the top number) is less than 120 and your diastolic blood pressure (the bottom number) is less than 80, then your blood pressure is normal. There is nothing more that you need to do about it.  If your systolic blood pressure (the top number) is 120-139 or your diastolic blood pressure (the bottom number) is 80-89, your blood pressure may be higher than it should be. You should have your blood pressure rechecked within a year by a primary care provider.  If your systolic blood pressure (the top number) is 140 or greater or your diastolic blood pressure (the bottom number) is 90 or greater, you may have high blood pressure. High blood pressure is treatable, but if left untreated over time it can put you at risk for heart attack, stroke, or kidney failure. You should have your blood pressure rechecked by a primary care provider within the next 4 weeks.  If your provider in the emergency department today gave you specific instructions to follow-up with your doctor or provider even sooner than that,  you should follow that instruction and not wait for up to 4 weeks for your follow-up visit.        Thank you for choosing [facility]       Thank you for choosing [facility] for your care. Our goal is always to provide you with excellent care. Hearing back from our patients is one way we can continue to improve our services. Please take a few minutes to complete the written survey that you may receive in the mail after you visit with us. Thank you!        [employer] [facility] Information     [employer] gives you secure access to your electronic health record. If you see a primary care provider, you can also send messages to your care team and make appointments. If you have questions, please call your primary care clinic.  If you do not have a primary care provider, please call [phone] and they will assist you.        Care EveryWhere ID     This is your Care EveryWhere ID. This could be used by other organizations to access your [facility] medical records  [id number]        Equal Access to Services     ABBE ESPINOSA : Carol Joe, jorge denney, rinku nam . So [facility] [phone].    ATENCIÓN: Si habla español, tiene a dominguez disposición servicios gratuitos de asistencia lingüística. Llame al [phone].    We comply with applicable federal civil rights laws and [place] laws. We do not discriminate on the basis of race, color, national origin, age, disability, sex, sexual orientation, or gender identity.            After Visit Summary       This is your record. Keep this with you and show to your community pharmacist(s) and doctor(s) at your next visit.

## 2018-09-01 NOTE — ED AVS SNAPSHOT
[facility] Emergency Department    [address]    [facility] [address]    Phone:  [phone]    Fax:  [phone]                                       Ana Cristina Dominguez   MRN: [id number]    Department:  [facility] Emergency Department   Date of Visit:  9/1/2018           After Visit Summary Signature Page     I have received my discharge instructions, and my questions have been answered. I have discussed any challenges I see with this plan with the nurse or doctor.    ..........................................................................................................................................  Patient/Patient Representative Signature      ..........................................................................................................................................  Patient Representative Print Name and Relationship to Patient    ..................................................               ................................................  Date                                            Time    ..........................................................................................................................................  Reviewed by Signature/Title    ...................................................              ..............................................  Date                                                            Time          22EPIC Rev 08/18

## 2018-09-01 NOTE — ED TRIAGE NOTES
Pt presents with son for increased weakness, fatigue, cough, and loss of balance in the last week. Pt has hx of pacemaker. ABCs intact, able to transfer independently from wheelchair to bed.    ABCs intact.  Alert and oriented x 3.

## 2018-09-02 NOTE — DISCHARGE INSTRUCTIONS
You should return to the emergency department immediately if you develop any new or worsening symptoms: Including chest pain, shortness of breath, dizziness or lightheadedness, headache or vision changes or you develop a fever.  He should make an appointment with your primary doctor for first thing Tuesday morning for recheck.  She continue to take your diuretics as prescribed.  This is your Bumex medication.

## 2018-09-13 NOTE — PROGRESS NOTES
Toni Valjanice BV  8 week  Post Pacemaker Device Check/ AVNA  AP: 0              %                    BVP: 100 %  Mode: VVIR        Underlying Rhythm: AF/ VS @ 45 bpm  Heart Rate: Histogram blunted at LR of 70 bpm/ pt OK with this  Sensing: Stable    Pacing Threshold: Stable ( LV elevated but stable from implant)    Impedance: WNL  Battery Status: 7 years estimated longevtiy  Incision/Complications: Has healed well  Atrial Arrhythmia: permanent AF/ Xarelto  Ventricular Arrhythmia: NONE  Setting Change: NONE     Care Plan: Remote in 3 months.[email or web address]

## 2018-09-13 NOTE — MR AVS SNAPSHOT
After Visit Summary   9/13/2018    Ana Cristina Dominguez    MRN: [id number]           Patient Information     Date Of Birth          6/6/1931        Visit Information        Provider Department      9/13/2018 2:00 PM HEARD DCR2 [facility]        Today's Diagnoses     Cardiac pacemaker in situ    -  1       Follow-ups after your visit        Your next 10 appointments already scheduled     Oct 24, 2018 11:30 AM CDT   LAB with RU LAB   [facility] [place] ([facility])    [address]  [facility] [id number] [phone]           Please do not eat 10-12 hours before your appointment if you are coming in fasting for labs on lipids, cholesterol, or glucose (sugar). This does not apply to pregnant women. Water, hot tea and black coffee (with nothing added) are okay. Do not drink other fluids, diet soda or chew gum.            Oct 24, 2018 12:30 PM CDT   [facility] EP RETURN with LUCINDA Colbert CNP   [facility] ([facility])    [address] [place] [address] [address]  [facility] [id number] [phone]            Dec 19, 2018 11:45 AM CST   Remote PPM Check with HEARD TECH1   [facility] ([facility])    [facility]  [facility] [id number] [phone] OPT 2           This appointment is for a remote check of your pacemaker.  This is not an appointment at the office.              Who to contact     If you have questions or need follow up information about today's clinic visit or your schedule please contact [facility] directly at [phone].  Normal or non-critical lab and imaging results will be communicated to you by MyChart, letter or phone within 4 business days after the clinic has received the results. If you do not hear from us within 7 days, please  contact the clinic through [employer] or phone. If you have a critical or abnormal lab result, we will notify you by phone as soon as possible.  Submit refill requests through [employer] or call your pharmacy and they will forward the refill request to us. Please allow 3 business days for your refill to be completed.          Additional Information About Your Visit        H[employer]fer[employer] Information     [employer] gives you secure access to your electronic health record. If you see a primary care provider, you can also send messages to your care team and make appointments. If you have questions, please call your primary care clinic.  If you do not have a primary care provider, please call [phone] and they will assist you.        Care EveryWhere ID     This is your Care EveryWhere ID. This could be used by other organizations to access your [facility] medical records  [id number]         Blood Pressure from Last 3 Encounters:   09/01/18 160/76   07/25/18 126/74   07/17/18 132/59    Weight from Last 3 Encounters:   09/01/18 36.3 kg (80 lb)   07/25/18 36.7 kg (81 lb)   07/17/18 36.4 kg (80 lb 4.8 oz)              We Performed the Following     PM DEVICE PROGRAMMING EVAL, MULTI LEAD PACER ([id number])        Primary Care Provider Office Phone # Fax #    Dez Urban [phone]       [address] [facility] [address]  [facility] [address]        Equal Access to Services     : Hadii aad ku hadasho Soomaali, waaxda luqadaha, qaybta kaalmada adeegyada, rinku graham hayhank burciaga . So [facility] [phone].    ATENCIÓN: Si habla español, tiene a dominguez disposición servicios gratuitos de asistencia lingüística. Llame al [phone].    We comply with applicable federal civil rights laws and [place] laws. We do not discriminate on the basis of race, color, national origin, age, disability, sex, sexual orientation, or gender identity.            Thank you!     Thank you for choosing [facility]  HEART CARE   [place]  for your care. Our goal is always to provide you with excellent care. Hearing back from our patients is one way we can continue to improve our services. Please take a few minutes to complete the written survey that you may receive in the mail after your visit with us. Thank you!             Your Updated Medication List - Protect others around you: Learn how to safely use, store and throw away your medicines at www.disposemymeds.org.          This list is accurate as of 9/13/18  5:01 PM.  Always use your most recent med list.                   Brand Name Dispense Instructions for use Diagnosis    acetaminophen 325 MG tablet    TYLENOL     Take 325-650 mg by mouth every 4 hours as needed for mild pain        ascorbic acid 500 MG tablet    VITAMIN C     Take 500 mg by mouth daily        bumetanide 1 MG tablet    BUMEX     Take 2 mg by mouth every morning        Calcium-Vitamin D 500-400 MG-UNIT Tabs      Take 2 tablets by mouth every morning        lisinopril 2.5 MG tablet    PRINIVIL/Zestril    30 tablet    Take 1 tablet (2.5 mg) by mouth daily    Chronic systolic congestive heart failure (H)       metoprolol succinate 50 MG 24 hr tablet    TOPROL-XL    180 tablet    Take 1 tablet (50 mg) by mouth 2 times daily    Essential hypertension, Chronic atrial fibrillation (H)       * PRESERVISION AREDS PO      Take 1 capsule by mouth 2 times daily        * MULTIVITAMIN PO      Take 1 tablet by mouth every morning        REQUIP 0.5 MG tablet   Generic drug:  rOPINIRole      Take 0.5 mg by mouth nightly as needed PT STATES MED ON HOLD        rivaroxaban ANTICOAGULANT 20 MG Tabs tablet    XARELTO     Take 1 tablet (20 mg) by mouth daily (with dinner)    Chronic atrial fibrillation (H)       vitamin E 400 UNIT capsule      Take 400 Units by mouth daily        * Notice:  This list has 2 medication(s) that are the same as other medications prescribed for you. Read the directions carefully, and ask your doctor  or other care provider to review them with you.

## 2018-10-24 NOTE — MR AVS SNAPSHOT
After Visit Summary   10/24/2018    Ana Cristina Dominguez    MRN: [id number]           Patient Information     Date Of Birth          6/6/1931        Visit Information        Provider Department      10/24/2018 12:30 PM Makenzie Paredes APRN CNP [facility]        Today's Diagnoses     Essential hypertension          Care Instructions    Please come to clinic this Friday     Please don't take any bumex the rest of the week    Please avoid his potassium foods (potatoes, tomatoes, orange juice)          Follow-ups after your visit        Additional Services     Follow-Up with Cardiac Advanced Practice Provider                 Your next 10 appointments already scheduled     Oct 29, 2018  1:30 PM CDT   LAB with RU LAB   [facility] ([facility])    [address]  [facility] [id number] [phone]           Please do not eat 10-12 hours before your appointment if you are coming in fasting for labs on lipids, cholesterol, or glucose (sugar). This does not apply to pregnant women. Water, hot tea and black coffee (with nothing added) are okay. Do not drink other fluids, diet soda or chew gum.            Oct 29, 2018  2:30 PM CDT   Return Visit with Nancy Molina PA-C   [facility] ([facility])    [address] [place] [address] [address]  [facility] [id number] [phone]            Dec 19, 2018 11:45 AM CST   Remote PPM Check with HEARD TECH1   [facility] ([facility])    [address] [place] [address] [address]  [facility] [id number] [phone] OPT 2           This appointment is for a remote check of your pacemaker.  This is not an appointment at the office.              Future tests that were ordered for you today     Open Future Orders        Priority Expected Expires Ordered    Basic metabolic panel  Routine 10/31/2018 10/24/2019 10/24/2018    Follow-Up with Cardiac Advanced Practice Provider Routine 10/31/2018 10/24/2019 10/24/2018            Who to contact     If you have questions or need follow up information about today's clinic visit or your schedule please contact [facility] directly at [phone].  Normal or non-critical lab and imaging results will be communicated to you by Eventyardhart, letter or phone within 4 business days after the clinic has received the results. If you do not hear from us within 7 days, please contact the clinic through Event[employer] or phone. If you have a critical or abnormal lab result, we will notify you by phone as soon as possible.  Submit refill requests through [employer] or call your pharmacy and they will forward the refill request to us. Please allow 3 business days for your refill to be completed.          Additional Information About Your Visit        Event[employer] Information     [employer] gives you secure access to your electronic health record. If you see a primary care provider, you can also send messages to your care team and make appointments. If you have questions, please call your primary care clinic.  If you do not have a primary care provider, please call [phone] and they will assist you.        Care EveryWhere ID     This is your Care EveryWhere ID. This could be used by other organizations to access your [facility] medical records  [id number]        Your Vitals Were     Pulse Height Pulse Oximetry BMI (Body Mass Index)          72 1.524 m (5') 93% 17.77 kg/m2         Blood Pressure from Last 3 Encounters:   10/24/18 138/58   09/01/18 160/76   07/25/18 126/74    Weight from Last 3 Encounters:   10/24/18 41.3 kg (91 lb)   09/01/18 36.3 kg (80 lb)   07/25/18 36.7 kg (81 lb)              We Performed the Following     Follow-Up with Cardiac Advanced Practice Provider          Today's Medication Changes          These changes are  accurate as of 10/24/18  1:32 PM.  If you have any questions, ask your nurse or doctor.               Stop taking these medicines if you haven't already. Please contact your care team if you have questions.     bumetanide 1 MG tablet   Commonly known as:  BUMEX   Stopped by:  Makenzie Paredes APRN CNP                    Primary Care Provider Office Phone # Fax #    Dez Urban [phone]       [address] [facility] [address]  [facility] [address]        Equal Access to Services     [facility]: Hadii aad ku hadasho Soomaali, waaxda luqadaha, qaybta kaalmada adeegyada, waxay idiin hayaan adeget kharabrannon burciaga . So [facility] [phone].    ATENCIÓN: Si habla español, tiene a dominguez disposición servicios gratuitos de asistencia lingüística. Ll[facility] [phone].    We comply with applicable federal civil rights laws and [place] laws. We do not discriminate on the basis of race, color, national origin, age, disability, sex, sexual orientation, or gender identity.            Thank you!     Thank you for choosing [facility]  for your care. Our goal is always to provide you with excellent care. Hearing back from our patients is one way we can continue to improve our services. Please take a few minutes to complete the written survey that you may receive in the mail after your visit with us. Thank you!             Your Updated Medication List - Protect others around you: Learn how to safely use, store and throw away your medicines at www.disposemymeds.org.          This list is accurate as of 10/24/18  1:32 PM.  Always use your most recent med list.                   Brand Name Dispense Instructions for use Diagnosis    acetaminophen 325 MG tablet    TYLENOL     Take 325-650 mg by mouth every 4 hours as needed for mild pain        ascorbic acid 500 MG tablet    VITAMIN C     Take 500 mg by mouth daily        Calcium-Vitamin D 500-400 MG-UNIT Tabs      Take 2  tablets by mouth every morning        lisinopril 2.5 MG tablet    PRINIVIL/Zestril    30 tablet    Take 1 tablet (2.5 mg) by mouth daily    Chronic systolic congestive heart failure (H)       metoprolol succinate 50 MG 24 hr tablet    TOPROL-XL    180 tablet    Take 1 tablet (50 mg) by mouth 2 times daily    Essential hypertension, Chronic atrial fibrillation (H)       * PRESERVISION AREDS PO      Take 1 capsule by mouth 2 times daily        * MULTIVITAMIN PO      Take 1 tablet by mouth every morning        REQUIP 0.5 MG tablet   Generic drug:  rOPINIRole      Take 0.5 mg by mouth nightly as needed PT STATES MED ON HOLD        rivaroxaban ANTICOAGULANT 20 MG Tabs tablet    XARELTO     Take 1 tablet (20 mg) by mouth daily (with dinner)    Chronic atrial fibrillation (H)       vitamin E 400 UNIT capsule      Take 400 Units by mouth daily        * Notice:  This list has 2 medication(s) that are the same as other medications prescribed for you. Read the directions carefully, and ask your doctor or other care provider to review them with you.

## 2018-10-24 NOTE — PATIENT INSTRUCTIONS
Please come to clinic this Friday     Please don't take any bumex the rest of the week    Please avoid his potassium foods (potatoes, tomatoes, orange juice)

## 2018-10-24 NOTE — LETTER
10/24/2018    Dez Urban MD, MD  [address] [facility] [address]  [facility] [address]    RE: Ana Cristina Dominguez       Dear Colleague,    I had the pleasure of seeing Ana Cristina Dominguez in the [facility].    HPI:  Ana Cristina Dominguez is a 87 year old female who is a patient of Dr. Simons and presents today for medication adjustment .   Her past medical history includes atrial fibrillation s/p AV node ablation and [employer] biventricular ppm, hypertension, hyperlipidemia, cardiomyopathy, and polymyalgia rheumatic valve disease.    Patient had been undergoing rate control but continued to have difficulty requiring multiple hospitalizations for A-fib w/ RVR despite being on high doses of rate controlling medications (Digoxin, diltiazem 240 mg daily, and metoprolol 100 mg BID). Her activity was very limited due to dyspnea with minimal exertion and fatigue. In addition, echocardiogram from 3/2018 showed showed an EF of 35-40% along with moderate to severe MR, TR, and AI. Therefore, decision was made to undergo AV node ablation and implant a biventricular PPM.   Subsequently her digoxin and diltiazem were discontinued.       Her device check on 9/2018 revealed  100% biv-paced underlying rhythm 100% atrial fibrillation.       she was since on 9/13/2018 and her bumex 1 mg daily and metoprolol were decreased.  Increasing caloric intake was also discussed as her BMI is 15.    Today Ana Cristina Dominguez presents frustrated with her increased lower extremity edema.  She has stopped taking her Bumex 3 days ago.  Her weight is up 10 pounds since July.  However today her kidney function has worsened and her creatinine is 1.5, BUN 65 and her potassium is 5.5.  She does struggle with following a low-sodium diet.  One of her biggest complaints is her restless legs therefore she does not sleep well at night she also struggles with putting her support stockings on therefore does not wear them  very often.  At this time she denies chest pain or pressure, headaches, dizziness, syncope, angina, dyspnea at rest or with exertion, dry cough, palpitations, orthopnea, PND, abdominal pain, abdominal edema, pedal edema, or claudication.  Denies easy bruising or bleeding, hematuria, hematochezia, and epistaxis. Denies signs/symptoms of stroke such as visual disturbance, difficulty speaking, facial drooping, confusion, problems with gait, or any new numbness or weakness.      ASSESSMENT AND PLAN    (I10) Essential hypertension    Controlled  metoprolol XL  50 mg BID  Will ask her to stop her Lisinopril 2.5        (I48.2) Chronic atrial fibrillation (H) s/p AV node ablation and [employer] biventricular ppm  Continue xarelto 20 mg daily for CHADS VASC 5 (age++, female, HTN, HF)  metoprolol XL to 50 mg twice daily     Cardiomyopathy  Repeat ECHO when she sees Dr. Bower in 6/2019   metoprolol XL 50 mg BID  Stop lisinopril 2.5 mg daily at this time until she has a repeat labs and sees an WU  Has lower extremity edema bilaterally encouraged her to keep her feet up and wear her support stockings  Stopped her Bumex and lisinopril due to creatinine of 1.5 up from 0.9, BUN elevated to 65 up from 31, and her potassium is 5.5 up from 3.6.  She is not on any potassium supplements and does not eat a lot of high potassium foods.  I did ask her to avoid potatoes, orange juice, tomatoes, and other high potassium foods.  No ibuprofen or Advil     She will return to clinic with labs, EKG and WU in 2 days.    Patient expresses understanding and agreement with the plan.     I appreciate the chance to help with Ana Cristina Dominguez Please let me know if you have any questions or concerns.    Makenzie Paredes, APRN, CNP    This note was completed in part using Dragon voice recognition software. Although reviewed after completion, some word and grammatical errors may occur.    Orders Placed This Encounter   Procedures     Basic  metabolic panel     Follow-Up with Cardiac Advanced Practice Provider     No orders of the defined types were placed in this encounter.    Medications Discontinued During This Encounter   Medication Reason     bumetanide (BUMEX) 1 MG tablet          Encounter Diagnosis   Name Primary?     Essential hypertension        CURRENT MEDICATIONS:  Current Outpatient Prescriptions   Medication Sig Dispense Refill     acetaminophen (TYLENOL) 325 MG tablet Take 325-650 mg by mouth every 4 hours as needed for mild pain       ascorbic acid (VITAMIN C) 500 MG tablet Take 500 mg by mouth daily       Calcium Carb-Cholecalciferol (CALCIUM-VITAMIN D) 500-400 MG-UNIT TABS Take 2 tablets by mouth every morning       lisinopril (PRINIVIL/ZESTRIL) 2.5 MG tablet Take 1 tablet (2.5 mg) by mouth daily 30 tablet 3     metoprolol succinate (TOPROL-XL) 50 MG 24 hr tablet Take 1 tablet (50 mg) by mouth 2 times daily 180 tablet 3     Multiple Vitamins-Minerals (MULTIVITAMIN PO) Take 1 tablet by mouth every morning       Multiple Vitamins-Minerals (PRESERVISION AREDS PO) Take 1 capsule by mouth 2 times daily        rivaroxaban ANTICOAGULANT (XARELTO) 20 MG TABS tablet Take 1 tablet (20 mg) by mouth daily (with dinner)       vitamin E 400 UNIT capsule Take 400 Units by mouth daily       rOPINIRole (REQUIP) 0.5 MG tablet Take 0.5 mg by mouth nightly as needed PT STATES MED ON HOLD         ALLERGIES     Allergies   Allergen Reactions     Amoxicillin Rash     Codeine GI Disturbance     Stomach pain     Melatonin Nausea     Eyesburned       PAST MEDICAL HISTORY:  Past Medical History:   Diagnosis Date     Aortic regurgitation     mod-severe by echo in 3/2018     Benign essential hypertension      Chronic diastolic heart failure (H)      Other and unspecified hyperlipidemia      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 Father      Family History Negative Mother      Genitourinary Problems Sister      Renal failure     HEART DISEASE Sister      Rhythm issues       SOCIAL HISTORY:  Social History     Social History     Marital status:      Spouse name: N/A     Number of children: 3     Years of education: N/A     Occupational History      Retired     Social History Main Topics     Smoking status: Former Smoker     Packs/day: 0.50     Types: Cigarettes     Quit date: 5/6/1973     Smokeless tobacco: Never Used     Alcohol use Yes      Comment: Socially     Drug use: No     Sexual activity: No     Other Topics Concern     Exercise Yes     Seat Belt Yes     Social History Narrative       Review of Systems:  Skin:  Positive for itching   Eyes:  Positive for glasses  ENT:  Positive for hearing loss  Respiratory:  Negative    Cardiovascular:    edema;Positive for  Gastroenterology: Negative    Genitourinary:  Negative    Musculoskeletal:  Negative    Neurologic:  Positive for numbness or tingling of feet  Psychiatric:  Negative    Heme/Lymph/Imm:  Positive for easy bruising  Endocrine:  Negative      Physical Exam:  Vitals: /58 (BP Location: Right arm, Patient Position: Sitting, Cuff Size: Adult Small)  Pulse 72  Ht 1.524 m (5')  Wt 41.3 kg (91 lb)  SpO2 93%  BMI 17.77 kg/m2    Constitutional:  cooperative, alert and oriented, well developed, well nourished, in no acute distress frail;thin      Skin:  warm and dry to the touch, no apparent skin  lesions or masses noted        Head:  normocephalic, no masses or lesions        Eyes:  pupils equal and round, conjunctivae and lids unremarkable, sclera white, no xanthalasma, EOMS intact, no nystagmus        ENT:  no pallor or cyanosis, dentition good        Neck:  carotid pulses are full and equal bilaterally, JVP normal, no carotid bruit        Chest:  normal breath sounds, clear to auscultation, normal A-P diameter, normal symmetry, normal respiratory excursion, no use of accessory muscles        Cardiac: regular rhythm                  Abdomen:  abdomen soft        Vascular: pulses full and equal, no bruits auscultated     right radial artery;1+             left radial artery;1+                  Extremities and Back:           Neurological:  no gross motor deficits          Recent Lab Results:  LIPID RESULTS:  Lab Results   Component Value Date    CHOL 107 10/07/2016    HDL 22 (L) 10/07/2016    LDL 65 10/07/2016    TRIG 100 10/07/2016    CHOLHDLRATIO 3.3 08/29/2013       LIVER ENZYME RESULTS:  Lab Results   Component Value Date    AST 29 09/01/2018    ALT 25 09/01/2018       CBC RESULTS:  Lab Results   Component Value Date    WBC 9.0 09/01/2018    RBC 3.82 09/01/2018    HGB 11.1 (L) 09/01/2018    HCT 35.2 09/01/2018    MCV 92 09/01/2018    MCH 29.1 09/01/2018    MCHC 31.5 09/01/2018    RDW 17.5 (H) 09/01/2018     09/01/2018       BMP RESULTS:  Lab Results   Component Value Date     10/24/2018    POTASSIUM 5.5 (H) 10/24/2018    CHLORIDE 102 10/24/2018    CO2 31 10/24/2018    ANIONGAP 8 10/24/2018    GLC 99 10/24/2018    BUN 65 (H) 10/24/2018    CR 1.58 (H) 10/24/2018    GFRESTIMATED 31 (L) 10/24/2018    GFRESTBLACK 37 (L) 10/24/2018    KARTHIK 8.7 10/24/2018        A1C RESULTS:  Lab Results   Component Value Date    A1C 5.7 12/16/2015       INR RESULTS:  Lab Results   Component Value Date    INR 1.24 (H) 10/08/2016    INR 1.35 (H) 10/05/2016           CC  Makenzie Paredes, APRN CNP  [address]  [place] [address]                  Thank you for allowing me to participate in the care of your patient.      Sincerely,     LUCINDA Sandoval CNP     [facility]    cc:   LUCINDA Colbert CNP  [address]  [place] [address]

## 2018-10-24 NOTE — PROGRESS NOTES
HPI:  Ana Cristina Dominguez is a 87 year old female who is a patient of Dr. Simons and presents today for medication adjustment .   Her past medical history includes atrial fibrillation s/p AV node ablation and [employer] biventricular ppm, hypertension, hyperlipidemia, cardiomyopathy, and polymyalgia rheumatic valve disease.    Patient had been undergoing rate control but continued to have difficulty requiring multiple hospitalizations for A-fib w/ RVR despite being on high doses of rate controlling medications (Digoxin, diltiazem 240 mg daily, and metoprolol 100 mg BID). Her activity was very limited due to dyspnea with minimal exertion and fatigue. In addition, echocardiogram from 3/2018 showed showed an EF of 35-40% along with moderate to severe MR, TR, and AI. Therefore, decision was made to undergo AV node ablation and implant a biventricular PPM.   Subsequently her digoxin and diltiazem were discontinued.       Her device check on 9/2018 revealed  100% biv-paced underlying rhythm 100% atrial fibrillation.       she was since on 9/13/2018 and her bumex 1 mg daily and metoprolol were decreased.  Increasing caloric intake was also discussed as her BMI is 15.    Today Ana Cristina Dominguez presents frustrated with her increased lower extremity edema.  She has stopped taking her Bumex 3 days ago.  Her weight is up 10 pounds since July.  However today her kidney function has worsened and her creatinine is 1.5, BUN 65 and her potassium is 5.5.  She does struggle with following a low-sodium diet.  One of her biggest complaints is her restless legs therefore she does not sleep well at night she also struggles with putting her support stockings on therefore does not wear them very often.  At this time she denies chest pain or pressure, headaches, dizziness, syncope, angina, dyspnea at rest or with exertion, dry cough, palpitations, orthopnea, PND, abdominal pain, abdominal edema, pedal edema, or claudication.   Denies easy bruising or bleeding, hematuria, hematochezia, and epistaxis. Denies signs/symptoms of stroke such as visual disturbance, difficulty speaking, facial drooping, confusion, problems with gait, or any new numbness or weakness.      ASSESSMENT AND PLAN    (I10) Essential hypertension    Controlled  metoprolol XL  50 mg BID  Will ask her to stop her Lisinopril 2.5        (I48.2) Chronic atrial fibrillation (H) s/p AV node ablation and [employer] biventricular ppm  Continue xarelto 20 mg daily for CHADS VASC 5 (age++, female, HTN, HF)  metoprolol XL to 50 mg twice daily     Cardiomyopathy  Repeat ECHO when she sees Dr. Bower in 6/2019   metoprolol XL 50 mg BID  Stop lisinopril 2.5 mg daily at this time until she has a repeat labs and sees an WU  Has lower extremity edema bilaterally encouraged her to keep her feet up and wear her support stockings  Stopped her Bumex and lisinopril due to creatinine of 1.5 up from 0.9, BUN elevated to 65 up from 31, and her potassium is 5.5 up from 3.6.  She is not on any potassium supplements and does not eat a lot of high potassium foods.  I did ask her to avoid potatoes, orange juice, tomatoes, and other high potassium foods.  No ibuprofen or Advil     She will return to clinic with labs, EKG and WU in 2 days.    Patient expresses understanding and agreement with the plan.     I appreciate the chance to help with Ana Cristina Dominguez Please let me know if you have any questions or concerns.    Makenzie Paredes, APRN, CNP    This note was completed in part using Dragon voice recognition software. Although reviewed after completion, some word and grammatical errors may occur.    Orders Placed This Encounter   Procedures     Basic metabolic panel     Follow-Up with Cardiac Advanced Practice Provider     No orders of the defined types were placed in this encounter.    Medications Discontinued During This Encounter   Medication Reason     bumetanide (BUMEX) 1 MG tablet           Encounter Diagnosis   Name Primary?     Essential hypertension        CURRENT MEDICATIONS:  Current Outpatient Prescriptions   Medication Sig Dispense Refill     acetaminophen (TYLENOL) 325 MG tablet Take 325-650 mg by mouth every 4 hours as needed for mild pain       ascorbic acid (VITAMIN C) 500 MG tablet Take 500 mg by mouth daily       Calcium Carb-Cholecalciferol (CALCIUM-VITAMIN D) 500-400 MG-UNIT TABS Take 2 tablets by mouth every morning       lisinopril (PRINIVIL/ZESTRIL) 2.5 MG tablet Take 1 tablet (2.5 mg) by mouth daily 30 tablet 3     metoprolol succinate (TOPROL-XL) 50 MG 24 hr tablet Take 1 tablet (50 mg) by mouth 2 times daily 180 tablet 3     Multiple Vitamins-Minerals (MULTIVITAMIN PO) Take 1 tablet by mouth every morning       Multiple Vitamins-Minerals (PRESERVISION AREDS PO) Take 1 capsule by mouth 2 times daily        rivaroxaban ANTICOAGULANT (XARELTO) 20 MG TABS tablet Take 1 tablet (20 mg) by mouth daily (with dinner)       vitamin E 400 UNIT capsule Take 400 Units by mouth daily       rOPINIRole (REQUIP) 0.5 MG tablet Take 0.5 mg by mouth nightly as needed PT STATES MED ON HOLD         ALLERGIES     Allergies   Allergen Reactions     Amoxicillin Rash     Codeine GI Disturbance     Stomach pain     Melatonin Nausea     Eyesburned       PAST MEDICAL HISTORY:  Past Medical History:   Diagnosis Date     Aortic regurgitation     mod-severe by echo in 3/2018     Benign essential hypertension      Chronic diastolic heart failure (H)      Other and unspecified hyperlipidemia      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 Father      Family History Negative Mother      Genitourinary Problems Sister      Renal failure     HEART DISEASE Sister      Rhythm issues       SOCIAL HISTORY:  Social History     Social History     Marital status:      Spouse name: N/A     Number of children: 3     Years of education: N/A     Occupational History      Retired     Social History Main Topics     Smoking status: Former Smoker     Packs/day: 0.50     Types: Cigarettes     Quit date: 5/6/1973     Smokeless tobacco: Never Used     Alcohol use Yes      Comment: Socially     Drug use: No     Sexual activity: No     Other Topics Concern     Exercise Yes     Seat Belt Yes     Social History Narrative       Review of Systems:  Skin:  Positive for itching   Eyes:  Positive for glasses  ENT:  Positive for hearing loss  Respiratory:  Negative    Cardiovascular:    edema;Positive for  Gastroenterology: Negative    Genitourinary:  Negative    Musculoskeletal:  Negative    Neurologic:  Positive for numbness or tingling of feet  Psychiatric:  Negative    Heme/Lymph/Imm:  Positive for easy bruising  Endocrine:  Negative      Physical Exam:  Vitals: /58 (BP Location: Right arm, Patient Position: Sitting, Cuff Size: Adult Small)  Pulse 72  Ht 1.524 m (5')  Wt 41.3 kg (91 lb)  SpO2 93%  BMI 17.77 kg/m2    Constitutional:  cooperative, alert and oriented, well developed, well nourished, in no acute distress frail;thin      Skin:  warm and dry to the touch, no apparent skin lesions or masses noted        Head:  normocephalic, no masses or lesions        Eyes:  pupils equal and round, conjunctivae and lids unremarkable, sclera white, no xanthalasma, EOMS intact, no nystagmus        ENT:  no pallor or cyanosis,  dentition good        Neck:  carotid pulses are full and equal bilaterally, JVP normal, no carotid bruit        Chest:  normal breath sounds, clear to auscultation, normal A-P diameter, normal symmetry, normal respiratory excursion, no use of accessory muscles        Cardiac: regular rhythm                  Abdomen:  abdomen soft        Vascular: pulses full and equal, no bruits auscultated     right radial artery;1+             left radial artery;1+                  Extremities and Back:           Neurological:  no gross motor deficits          Recent Lab Results:  LIPID RESULTS:  Lab Results   Component Value Date    CHOL 107 10/07/2016    HDL 22 (L) 10/07/2016    LDL 65 10/07/2016    TRIG 100 10/07/2016    CHOLHDLRATIO 3.3 08/29/2013       LIVER ENZYME RESULTS:  Lab Results   Component Value Date    AST 29 09/01/2018    ALT 25 09/01/2018       CBC RESULTS:  Lab Results   Component Value Date    WBC 9.0 09/01/2018    RBC 3.82 09/01/2018    HGB 11.1 (L) 09/01/2018    HCT 35.2 09/01/2018    MCV 92 09/01/2018    MCH 29.1 09/01/2018    MCHC 31.5 09/01/2018    RDW 17.5 (H) 09/01/2018     09/01/2018       BMP RESULTS:  Lab Results   Component Value Date     10/24/2018    POTASSIUM 5.5 (H) 10/24/2018    CHLORIDE 102 10/24/2018    CO2 31 10/24/2018    ANIONGAP 8 10/24/2018    GLC 99 10/24/2018    BUN 65 (H) 10/24/2018    CR 1.58 (H) 10/24/2018    GFRESTIMATED 31 (L) 10/24/2018    GFRESTBLACK 37 (L) 10/24/2018    KARTHIK 8.7 10/24/2018        A1C RESULTS:  Lab Results   Component Value Date    A1C 5.7 12/16/2015       INR RESULTS:  Lab Results   Component Value Date    INR 1.24 (H) 10/08/2016    INR 1.35 (H) 10/05/2016           CC  Makenzie Paredes, APRN CNP  [id number]  [place] [address]

## 2018-10-24 NOTE — LETTER
10/24/2018    Dez Urban MD, MD  [address] [facility] [address]  [facility] [address]    RE: Ana Cristina Dominguez       Dear Colleague,    I had the pleasure of seeing Ana Cristina Dominguez in the [facility].    HPI:  Ana Cristina Dominguez is a 87 year old female who is a patient of Dr. Simons and presents today for medication adjustment .   Her past medical history includes atrial fibrillation s/p AV node ablation and [employer] biventricular ppm, hypertension, hyperlipidemia, cardiomyopathy, and polymyalgia rheumatic valve disease.    Patient had been undergoing rate control but continued to have difficulty requiring multiple hospitalizations for A-fib w/ RVR despite being on high doses of rate controlling medications (Digoxin, diltiazem 240 mg daily, and metoprolol 100 mg BID). Her activity was very limited due to dyspnea with minimal exertion and fatigue. In addition, echocardiogram from 3/2018 showed showed an EF of 35-40% along with moderate to severe MR, TR, and AI. Therefore, decision was made to undergo AV node ablation and implant a biventricular PPM.   Subsequently her digoxin and diltiazem were discontinued.       Her device check on 9/2018 revealed  100% biv-paced underlying rhythm 100% atrial fibrillation.       she was since on 9/13/2018 and her bumex 1 mg daily and metoprolol were decreased.  Increasing caloric intake was also discussed as her BMI is 15.    Today Ana Cristina Dominguez presents frustrated with her increased lower extremity edema.  She has stopped taking her Bumex 3 days ago.  Her weight is up 10 pounds since July.  However today her kidney function has worsened and her creatinine is 1.5, BUN 65 and her potassium is 5.5.  She does struggle with following a low-sodium diet.  One of her biggest complaints is her restless legs therefore she does not sleep well at night she also struggles with putting her support stockings on therefore does not wear them  very often.  At this time she denies chest pain or pressure, headaches, dizziness, syncope, angina, dyspnea at rest or with exertion, dry cough, palpitations, orthopnea, PND, abdominal pain, abdominal edema, pedal edema, or claudication.  Denies easy bruising or bleeding, hematuria, hematochezia, and epistaxis. Denies signs/symptoms of stroke such as visual disturbance, difficulty speaking, facial drooping, confusion, problems with gait, or any new numbness or weakness.      ASSESSMENT AND PLAN    (I10) Essential hypertension    Controlled  metoprolol XL  50 mg BID  Will ask her to stop her Lisinopril 2.5        (I48.2) Chronic atrial fibrillation (H) s/p AV node ablation and [employer] biventricular ppm  Continue xarelto 20 mg daily for CHADS VASC 5 (age++, female, HTN, HF)  metoprolol XL to 50 mg twice daily     Cardiomyopathy  Repeat ECHO when she sees Dr. Bower in 6/2019   metoprolol XL 50 mg BID  Stop lisinopril 2.5 mg daily at this time until she has a repeat labs and sees an WU  Has lower extremity edema bilaterally encouraged her to keep her feet up and wear her support stockings  Stopped her Bumex and lisinopril due to creatinine of 1.5 up from 0.9, BUN elevated to 65 up from 31, and her potassium is 5.5 up from 3.6.  She is not on any potassium supplements and does not eat a lot of high potassium foods.  I did ask her to avoid potatoes, orange juice, tomatoes, and other high potassium foods.  No ibuprofen or Advil     She will return to clinic with labs, EKG and WU in 2 days.    Patient expresses understanding and agreement with the plan.     I appreciate the chance to help with Ana Cristina Dominguez Please let me know if you have any questions or concerns.    Makenzie Paredes, APRN, CNP    This note was completed in part using Dragon voice recognition software. Although reviewed after completion, some word and grammatical errors may occur.    Orders Placed This Encounter   Procedures     Basic  metabolic panel     Follow-Up with Cardiac Advanced Practice Provider     No orders of the defined types were placed in this encounter.    Medications Discontinued During This Encounter   Medication Reason     bumetanide (BUMEX) 1 MG tablet          Encounter Diagnosis   Name Primary?     Essential hypertension        CURRENT MEDICATIONS:  Current Outpatient Prescriptions   Medication Sig Dispense Refill     acetaminophen (TYLENOL) 325 MG tablet Take 325-650 mg by mouth every 4 hours as needed for mild pain       ascorbic acid (VITAMIN C) 500 MG tablet Take 500 mg by mouth daily       Calcium Carb-Cholecalciferol (CALCIUM-VITAMIN D) 500-400 MG-UNIT TABS Take 2 tablets by mouth every morning       lisinopril (PRINIVIL/ZESTRIL) 2.5 MG tablet Take 1 tablet (2.5 mg) by mouth daily 30 tablet 3     metoprolol succinate (TOPROL-XL) 50 MG 24 hr tablet Take 1 tablet (50 mg) by mouth 2 times daily 180 tablet 3     Multiple Vitamins-Minerals (MULTIVITAMIN PO) Take 1 tablet by mouth every morning       Multiple Vitamins-Minerals (PRESERVISION AREDS PO) Take 1 capsule by mouth 2 times daily        rivaroxaban ANTICOAGULANT (XARELTO) 20 MG TABS tablet Take 1 tablet (20 mg) by mouth daily (with dinner)       vitamin E 400 UNIT capsule Take 400 Units by mouth daily       rOPINIRole (REQUIP) 0.5 MG tablet Take 0.5 mg by mouth nightly as needed PT STATES MED ON HOLD         ALLERGIES     Allergies   Allergen Reactions     Amoxicillin Rash     Codeine GI Disturbance     Stomach pain     Melatonin Nausea     Eyesburned       PAST MEDICAL HISTORY:  Past Medical History:   Diagnosis Date     Aortic regurgitation     mod-severe by echo in 3/2018     Benign essential hypertension      Chronic diastolic heart failure (H)      Other and unspecified hyperlipidemia      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 Father      Family History Negative Mother      Genitourinary Problems Sister      Renal failure     HEART DISEASE Sister      Rhythm issues       SOCIAL HISTORY:  Social History     Social History     Marital status:      Spouse name: N/A     Number of children: 3     Years of education: N/A     Occupational History      Retired     Social History Main Topics     Smoking status: Former Smoker     Packs/day: 0.50     Types: Cigarettes     Quit date: 5/6/1973     Smokeless tobacco: Never Used     Alcohol use Yes      Comment: Socially     Drug use: No     Sexual activity: No     Other Topics Concern     Exercise Yes     Seat Belt Yes     Social History Narrative       Review of Systems:  Skin:  Positive for itching   Eyes:  Positive for glasses  ENT:  Positive for hearing loss  Respiratory:  Negative    Cardiovascular:    edema;Positive for  Gastroenterology: Negative    Genitourinary:  Negative    Musculoskeletal:  Negative    Neurologic:  Positive for numbness or tingling of feet  Psychiatric:  Negative    Heme/Lymph/Imm:  Positive for easy bruising  Endocrine:  Negative      Physical Exam:  Vitals: /58 (BP Location: Right arm, Patient Position: Sitting, Cuff Size: Adult Small)  Pulse 72  Ht 1.524 m (5')  Wt 41.3 kg (91 lb)  SpO2 93%  BMI 17.77 kg/m2    Constitutional:  cooperative, alert and oriented, well developed, well nourished, in no acute distress frail;thin      Skin:  warm and dry to the touch, no apparent skin  lesions or masses noted        Head:  normocephalic, no masses or lesions        Eyes:  pupils equal and round, conjunctivae and lids unremarkable, sclera white, no xanthalasma, EOMS intact, no nystagmus        ENT:  no pallor or cyanosis, dentition good        Neck:  carotid pulses are full and equal bilaterally, JVP normal, no carotid bruit        Chest:  normal breath sounds, clear to auscultation, normal A-P diameter, normal symmetry, normal respiratory excursion, no use of accessory muscles        Cardiac: regular rhythm                  Abdomen:  abdomen soft        Vascular: pulses full and equal, no bruits auscultated     right radial artery;1+             left radial artery;1+                  Extremities and Back:           Neurological:  no gross motor deficits          Recent Lab Results:  LIPID RESULTS:  Lab Results   Component Value Date    CHOL 107 10/07/2016    HDL 22 (L) 10/07/2016    LDL 65 10/07/2016    TRIG 100 10/07/2016    CHOLHDLRATIO 3.3 08/29/2013       LIVER ENZYME RESULTS:  Lab Results   Component Value Date    AST 29 09/01/2018    ALT 25 09/01/2018       CBC RESULTS:  Lab Results   Component Value Date    WBC 9.0 09/01/2018    RBC 3.82 09/01/2018    HGB 11.1 (L) 09/01/2018    HCT 35.2 09/01/2018    MCV 92 09/01/2018    MCH 29.1 09/01/2018    MCHC 31.5 09/01/2018    RDW 17.5 (H) 09/01/2018     09/01/2018       BMP RESULTS:  Lab Results   Component Value Date     10/24/2018    POTASSIUM 5.5 (H) 10/24/2018    CHLORIDE 102 10/24/2018    CO2 31 10/24/2018    ANIONGAP 8 10/24/2018    GLC 99 10/24/2018    BUN 65 (H) 10/24/2018    CR 1.58 (H) 10/24/2018    GFRESTIMATED 31 (L) 10/24/2018    GFRESTBLACK 37 (L) 10/24/2018    KARTHIK 8.7 10/24/2018        A1C RESULTS:  Lab Results   Component Value Date    A1C 5.7 12/16/2015       INR RESULTS:  Lab Results   Component Value Date    INR 1.24 (H) 10/08/2016    INR 1.35 (H) 10/05/2016         Thank you for allowing me to participate in the care  of your patient.    Sincerely,     LUCINDA Sandoval [facility]

## 2018-10-26 NOTE — PATIENT INSTRUCTIONS
Thank you for your [facility] visit today. Your provider has recommended the following:  Medication Changes:  Restart the bumetanide 1mg a daily. If you have extra swelling then you can take an extra 1 tab.  Recommendations:  Echo and labs (non-fasting) in ~ 1 week  Follow-up:  We will call with the results.    Reminder:  Please bring in your current medication list or your medication, over the counter supplements and vitamin bottles as we will review these at each office visit.

## 2018-10-26 NOTE — LETTER
10/26/2018      Dez Urban MD, MD  [address] [facility] [address]  [facility] [address]      RE: Ana Cristina Renzachdanni       Dear Colleague,    I had the pleasure of seeing Ana Cristina Dominguez in the [facility].    Service Date: 10/26/2018      HISTORY OF PRESENT ILLNESS:  Ms. Dominguez is an 87-year-old female who presents to the office today for a 2-day followup after recently having an abnormal basic metabolic panel.      Her cardiovascular history includes atrial fibrillation for which she underwent an AV dixie ablation and permanent pacemaker implantation a few months ago, hypertension, hyperlipidemia, valvular heart disease including moderate to moderately severe mitral regurgitation, moderately severe tricuspid regurgitation, moderate to moderately severe aortic regurgitation and a non-ischemic cardiomyopathy with an EF of 35%-40% on echocardiography in 03/2018 (was in afib with RVR at the time), which also showed mild aortic dilatation.  She also has a history of restless legs syndrome and polymyalgia rheumatica.      Today is the first time I am meeting the patient.  She typically has followed with Dr. Bower although she has been seen by a number of other providers in our office over the past couple of months due to her recent pacemaker implantation.  Most recently she was seen earlier this week by Makenzie Paredes, nurse practitioner.  Just prior to that office visit, she had a basic metabolic panel which showed she was hyperkalemic with a potassium of 5.5.  Additionally, her creatinine had jumped from 0.9 a month prior to 1.58.  Her BUN had also increased to 65.  At that office visit, the patient did complain of lower extremity edema and weight gain.  Due to the metabolic abnormalities, Makenzie recommended that she stop Bumex and lisinopril asked her to follow up in the office today.      Today, Ana Cristina complains of continued weight gain.  Her weight is up another 2 pounds  since 2 days ago.  She has continued lower extremity edema.  She is not having other HF symptoms such as dyspnea on exertion, orthopnea or PND.  She is quite feisty and somewhat argumentative today.  She states that she is apprehensive of me as she has had bad experiences with physician assistants in the past.  She was accompanied by her son, Luis.     CURRENT CARDIAC MEDICATIONS:   1.  Toprol-XL 50 mg b.i.d.   2.  Xarelto 20 mg daily.      The remainder of her medications, allergies and review of systems were reviewed and as are documented separately.      PHYSICAL EXAMINATION:   GENERAL:  The patient is an 87-year-old female who appears younger than her stated age.  She is in no apparent distress.   VITAL SIGNS:  Her blood pressure is 158/78, pulse 70, weight is 93 pounds, which is up 2 pounds on our office scale, and actually up about 13 pounds from the time of her pacemaker implantation earlier this summer in July.   PULMONARY:  Breathing is nonlabored.   LUNGS:  Overall clear to auscultation.  I do not appreciate any crackles or wheezing.   CARDIAC:  Reveals a regular rate and rhythm.  She does have a 2/6 systolic ejection murmur heard best at the upper left sternal border.  She also has a 1/6 diastolic murmur heard there.  She also has a holosystolic murmur noted at the apex.   EXTREMITIES:  Lower extremities show 2+ pitting edema bilaterally.   NEUROLOGIC:  Alert and oriented.      She had a basic metabolic panel today.  Her sodium is 140, potassium is improved at 4.1, BUN is 66 and creatinine is 1.33.  She also had an EKG today.  This showed a ventricularly paced rhythm.  Underlying rhythm is difficult to assess but likely is atrial fibrillation.     Last CXR was in early Sept for SOB. No significant pleural effusions or vascular congestion noted.     ASSESSMENT AND PLAN:  Ms. Dominguez is an 87-year-old female with a history of a nonischemic cardiomyopathy, multivalvular heart disease, none of which  appears to be severe, history of atrial fibrillation, status post AV dixie ablation and BiV permanent pacemaker implantation in 2018 who recently presented for followup.  At that time she was noted to have significant abnormalities in her lab work.  She also had increased lower extremity edema and weight gain.  Her lisinopril and Bumex were discontinued.  She presents today for followup.  Her basic metabolic panel shows normalization of her potassium.  Her creatinine has improved, although is not quite back to baseline of what we have seen before.        Again, the patient was quite apprehensive of the recommendations that I had for her today.  She was accompanied by her son, who also helped tried to explain the reasoning behind the recommendations.  In the end she was agreeable to restarting Bumex 1 mg a day.  I did give her p.r.n. instructions to take an additional 1 mg if her lower extremity edema is significant and her weight is up.  I did suggest that we keep her off of lisinopril for now.  Since she has not had a repeat echocardiogram since she underwent her AV node ablation and permanent pacemaker implantation, I suggested that I think it would be very beneficial for her to have a repeat echocardiogram so we can reassess her EF.   I also wanted her to have repeat blood work in about a week or two.  She was agreeable to come in and have these tests performed, but was not agreeable to have any further followup in the office at this time.  She stated that we can call her with the results of the echocardiogram and blood work and that we can give her further recommendations in regard to follow up  at that time.      Thank you for allowing me to participate in the care of this patient.         JOSSY HUTCHINSON PA-C             D: 10/28/2018   T: 10/28/2018   MT: BENJAMÍN      Name:     FELIZ HAMPTON   MRN:      [id number]        Account:      [id number]   :      1931           Service Date:  10/26/2018      Document: [id number]           Outpatient Encounter Prescriptions as of 10/26/2018   Medication Sig Dispense Refill     bumetanide (BUMEX) 1 MG tablet Take 1 tablet (1 mg) by mouth daily OK to take an extra 1mg as need for leg swelling 45 tablet 3     acetaminophen (TYLENOL) 325 MG tablet Take 325-650 mg by mouth every 4 hours as needed for mild pain       ascorbic acid (VITAMIN C) 500 MG tablet Take 500 mg by mouth daily       Calcium Carb-Cholecalciferol (CALCIUM-VITAMIN D) 500-400 MG-UNIT TABS Take 2 tablets by mouth every morning       metoprolol succinate (TOPROL-XL) 50 MG 24 hr tablet Take 1 tablet (50 mg) by mouth 2 times daily 180 tablet 3     Multiple Vitamins-Minerals (MULTIVITAMIN PO) Take 1 tablet by mouth every morning       Multiple Vitamins-Minerals (PRESERVISION AREDS PO) Take 1 capsule by mouth 2 times daily        rivaroxaban ANTICOAGULANT (XARELTO) 20 MG TABS tablet Take 1 tablet (20 mg) by mouth daily (with dinner)       rOPINIRole (REQUIP) 0.5 MG tablet Take 0.5 mg by mouth nightly as needed PT STATES MED ON HOLD       vitamin E 400 UNIT capsule Take 400 Units by mouth daily       [DISCONTINUED] lisinopril (PRINIVIL/ZESTRIL) 2.5 MG tablet Take 1 tablet (2.5 mg) by mouth daily 90 tablet 0     No facility-administered encounter medications on file as of 10/26/2018.        Again, thank you for allowing me to participate in the care of your patient.      Sincerely,    Nancy Molina PA-C     [facility]

## 2018-10-26 NOTE — PROGRESS NOTES
Please see separate dictation for HPI, PHYSICAL EXAM AND IMPRESSION/PLAN.    CURRENT MEDICATIONS:  Current Outpatient Prescriptions   Medication Sig Dispense Refill     acetaminophen (TYLENOL) 325 MG tablet Take 325-650 mg by mouth every 4 hours as needed for mild pain       ascorbic acid (VITAMIN C) 500 MG tablet Take 500 mg by mouth daily       Calcium Carb-Cholecalciferol (CALCIUM-VITAMIN D) 500-400 MG-UNIT TABS Take 2 tablets by mouth every morning       lisinopril (PRINIVIL/ZESTRIL) 2.5 MG tablet Take 1 tablet (2.5 mg) by mouth daily 90 tablet 0     metoprolol succinate (TOPROL-XL) 50 MG 24 hr tablet Take 1 tablet (50 mg) by mouth 2 times daily 180 tablet 3     Multiple Vitamins-Minerals (MULTIVITAMIN PO) Take 1 tablet by mouth every morning       Multiple Vitamins-Minerals (PRESERVISION AREDS PO) Take 1 capsule by mouth 2 times daily        rivaroxaban ANTICOAGULANT (XARELTO) 20 MG TABS tablet Take 1 tablet (20 mg) by mouth daily (with dinner)       rOPINIRole (REQUIP) 0.5 MG tablet Take 0.5 mg by mouth nightly as needed PT STATES MED ON HOLD       vitamin E 400 UNIT capsule Take 400 Units by mouth daily         ALLERGIES:     Allergies   Allergen Reactions     Amoxicillin Rash     Codeine GI Disturbance     Stomach pain     Melatonin Nausea     Eyesburned       PAST MEDICAL HISTORY:  Past Medical History:   Diagnosis Date     Aortic regurgitation     mod-severe by echo in 3/2018     Benign essential hypertension      Chronic diastolic heart failure (H)      Other and unspecified hyperlipidemia      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     Polymyalgia rheumatica (H)      RLS (restless legs syndrome)      Senile osteoporosis     Reclast 11/16/09, 11/10, 11/11     Systolic heart failure (H)     by echo 3/2018, NM MPI negative for ischemia in 5/2108       PAST SURGICAL HISTORY:  Past Surgical History:   Procedure Laterality Date     APPENDECTOMY        bi ventricular pacemaker  07/16/2018     CATARACT IOL, RT/LT  1/19/09    right     H ABLATION AV NODE  07/16/2018     HC EXCIS PRIMARY GANGLION WRIST         SOCIAL HISTORY:  Social History     Social History     Marital status:      Spouse name: N/A     Number of children: 3     Years of education: N/A     Occupational History      Retired     Social History Main Topics     Smoking status: Former Smoker     Packs/day: 0.50     Types: Cigarettes     Quit date: 5/6/1973     Smokeless tobacco: Never Used     Alcohol use Yes      Comment: Socially     Drug use: No     Sexual activity: No     Other Topics Concern     Exercise Yes     Seat Belt Yes     Social History Narrative       FAMILY HISTORY:  Family History   Problem Relation Age of Onset     C.A.D. Father      Family History Negative Mother      Genitourinary Problems Sister      Renal failure     HEART DISEASE Sister      Rhythm issues       Review of Systems:  Skin:  Positive for itching     Eyes:         ENT:         Respiratory:          Cardiovascular:         Gastroenterology:        Genitourinary:         Musculoskeletal:         Neurologic:         Psychiatric:         Heme/Lymph/Imm:         Endocrine:            Reviewed. Remainder of the note dictated.    Nancy Molina PA-C

## 2018-10-26 NOTE — LETTER
10/26/2018    Dez Urban MD, MD  [address]  [facility] [address]    RE: Ana Cristina Dominguez       Dear Colleague,    I had the pleasure of seeing Ana Cristina Dominguez in the [facility].    Please see separate dictation for HPI, PHYSICAL EXAM AND IMPRESSION/PLAN.    CURRENT MEDICATIONS:  Current Outpatient Prescriptions   Medication Sig Dispense Refill     acetaminophen (TYLENOL) 325 MG tablet Take 325-650 mg by mouth every 4 hours as needed for mild pain       ascorbic acid (VITAMIN C) 500 MG tablet Take 500 mg by mouth daily       Calcium Carb-Cholecalciferol (CALCIUM-VITAMIN D) 500-400 MG-UNIT TABS Take 2 tablets by mouth every morning       lisinopril (PRINIVIL/ZESTRIL) 2.5 MG tablet Take 1 tablet (2.5 mg) by mouth daily 90 tablet 0     metoprolol succinate (TOPROL-XL) 50 MG 24 hr tablet Take 1 tablet (50 mg) by mouth 2 times daily 180 tablet 3     Multiple Vitamins-Minerals (MULTIVITAMIN PO) Take 1 tablet by mouth every morning       Multiple Vitamins-Minerals (PRESERVISION AREDS PO) Take 1 capsule by mouth 2 times daily        rivaroxaban ANTICOAGULANT (XARELTO) 20 MG TABS tablet Take 1 tablet (20 mg) by mouth daily (with dinner)       rOPINIRole (REQUIP) 0.5 MG tablet Take 0.5 mg by mouth nightly as needed PT STATES MED ON HOLD       vitamin E 400 UNIT capsule Take 400 Units by mouth daily         ALLERGIES:     Allergies   Allergen Reactions     Amoxicillin Rash     Codeine GI Disturbance     Stomach pain     Melatonin Nausea     Eyesburned       PAST MEDICAL HISTORY:  Past Medical History:   Diagnosis Date     Aortic regurgitation     mod-severe by echo in 3/2018     Benign essential hypertension      Chronic diastolic heart failure (H)      Other and unspecified hyperlipidemia      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     Polymyalgia rheumatica (H)      RLS (restless legs syndrome)       Senile osteoporosis     Reclast 11/16/09, 11/10, 11/11     Systolic heart failure (H)     by echo 3/2018, NM MPI negative for ischemia in 5/2108       PAST SURGICAL HISTORY:  Past Surgical History:   Procedure Laterality Date     APPENDECTOMY       bi ventricular pacemaker  07/16/2018     CATARACT IOL, RT/LT  1/19/09    right     H ABLATION AV NODE  07/16/2018     HC EXCIS PRIMARY GANGLION WRIST         SOCIAL HISTORY:  Social History     Social History     Marital status:      Spouse name: N/A     Number of children: 3     Years of education: N/A     Occupational History      Retired     Social History Main Topics     Smoking status: Former Smoker     Packs/day: 0.50     Types: Cigarettes     Quit date: 5/6/1973     Smokeless tobacco: Never Used     Alcohol use Yes      Comment: Socially     Drug use: No     Sexual activity: No     Other Topics Concern     Exercise Yes     Seat Belt Yes     Social History Narrative       FAMILY HISTORY:  Family History   Problem Relation Age of Onset     C.A.D. Father      Family History Negative Mother      Genitourinary Problems Sister      Renal failure     HEART DISEASE Sister      Rhythm issues       Review of Systems:  Skin:  Positive for itching     Eyes:         ENT:         Respiratory:          Cardiovascular:         Gastroenterology:        Genitourinary:         Musculoskeletal:         Neurologic:         Psychiatric:         Heme/Lymph/Imm:         Endocrine:            Reviewed. Remainder of the note dictated.    Nancy Molina PA-C        Service Date: 10/26/2018      HISTORY OF PRESENT ILLNESS:  Ms. Dominguez is an 87-year-old female who presents to the office today for a 2-day followup after recently having an abnormal basic metabolic panel.      Her cardiovascular history includes atrial fibrillation for which she underwent an AV dixie ablation and permanent pacemaker implantation a few months ago, hypertension, hyperlipidemia, valvular heart  disease including moderate to moderately severe mitral regurgitation, moderately severe tricuspid regurgitation, moderate to moderately severe aortic regurgitation and a non-ischemic cardiomyopathy with an EF of 35%-40% on echocardiography in 03/2018 (was in afib with RVR at the time), which also showed mild aortic dilatation.  She also has a history of restless legs syndrome and polymyalgia rheumatica.      Today is the first time I am meeting the patient.  She typically has followed with Dr. Bower although she has been seen by a number of other providers in our office over the past couple of months due to her recent pacemaker implantation.  Most recently she was seen earlier this week by Makenzie Paredes, nurse practitioner.  Just prior to that office visit, she had a basic metabolic panel which showed she was hyperkalemic with a potassium of 5.5.  Additionally, her creatinine had jumped from 0.9 a month prior to 1.58.  Her BUN had also increased to 65.  At that office visit, the patient did complain of lower extremity edema and weight gain.  Due to the metabolic abnormalities, Makenzie recommended that she stop Bumex and lisinopril asked her to follow up in the office today.      Today, Ana Cristina complains of continued weight gain.  Her weight is up another 2 pounds since 2 days ago.  She has continued lower extremity edema.  She is not having other HF symptoms such as dyspnea on exertion, orthopnea or PND.  She is quite feisty and somewhat argumentative today.  She states that she is apprehensive of me as she has had bad experiences with physician assistants in the past.  She was accompanied by her son, Luis.     CURRENT CARDIAC MEDICATIONS:   1.  Toprol-XL 50 mg b.i.d.   2.  Xarelto 20 mg daily.      The remainder of her medications, allergies and review of systems were reviewed and as are documented separately.      PHYSICAL EXAMINATION:   GENERAL:  The patient is an 87-year-old female who appears younger than  her stated age.  She is in no apparent distress.   VITAL SIGNS:  Her blood pressure is 158/78, pulse 70, weight is 93 pounds, which is up 2 pounds on our office scale, and actually up about 13 pounds from the time of her pacemaker implantation earlier this summer in July.   PULMONARY:  Breathing is nonlabored.   LUNGS:  Overall clear to auscultation.  I do not appreciate any crackles or wheezing.   CARDIAC:  Reveals a regular rate and rhythm.  She does have a 2/6 systolic ejection murmur heard best at the upper left sternal border.  She also has a 1/6 diastolic murmur heard there.  She also has a holosystolic murmur noted at the apex.   EXTREMITIES:  Lower extremities show 2+ pitting edema bilaterally.   NEUROLOGIC:  Alert and oriented.      She had a basic metabolic panel today.  Her sodium is 140, potassium is improved at 4.1, BUN is 66 and creatinine is 1.33.  She also had an EKG today.  This showed a ventricularly paced rhythm.  Underlying rhythm is difficult to assess but likely is atrial fibrillation.     Last CXR was in early Sept for SOB. No significant pleural effusions or vascular congestion noted.     ASSESSMENT AND PLAN:  Ms. Dominguez is an 87-year-old female with a history of a nonischemic cardiomyopathy, multivalvular heart disease, none of which appears to be severe, history of atrial fibrillation, status post AV dixie ablation and BiV permanent pacemaker implantation in 07/2018 who recently presented for followup.  At that time she was noted to have significant abnormalities in her lab work.  She also had increased lower extremity edema and weight gain.  Her lisinopril and Bumex were discontinued.  She presents today for followup.  Her basic metabolic panel shows normalization of her potassium.  Her creatinine has improved, although is not quite back to baseline of what we have seen before.        Again, the patient was quite apprehensive of the recommendations that I had for her today.  She was  accompanied by her son, who also helped tried to explain the reasoning behind the recommendations.  In the end she was agreeable to restarting Bumex 1 mg a day.  I did give her p.r.n. instructions to take an additional 1 mg if her lower extremity edema is significant and her weight is up.  I did suggest that we keep her off of lisinopril for now.  Since she has not had a repeat echocardiogram since she underwent her AV node ablation and permanent pacemaker implantation, I suggested that I think it would be very beneficial for her to have a repeat echocardiogram so we can reassess her EF.   I also wanted her to have repeat blood work in about a week or two.  She was agreeable to come in and have these tests performed, but was not agreeable to have any further followup in the office at this time.  She stated that we can call her with the results of the echocardiogram and blood work and that we can give her further recommendations in regard to follow up  at that time.      Thank you for allowing me to participate in the care of this patient.         NANCY HUTCHINSON PA-C             D: 10/28/2018   T: 10/28/2018   MT: BENJAMÍN      Name:     FELIZ HAMPTON   MRN:      [id number]        Account:      [id number]   :      1931           Service Date: 10/26/2018      Document: [id number]        Thank you for allowing me to participate in the care of your patient.      Sincerely,     Nancy Hutchinson PA-C     [facility]    cc:   Makenzie Paredes, APRN CNP  [address]  [place] [address]

## 2018-10-26 NOTE — MR AVS SNAPSHOT
After Visit Summary   10/26/2018    Ana Cristina Dominguez    MRN: [id number]           Patient Information     Date Of Birth          6/6/1931        Visit Information        Provider Department      10/26/2018 3:50 PM Nancy Molina PA-C [facility]        Today's Diagnoses     Atrial fibrillation with RVR (H)    -  1    Chronic systolic congestive heart failure (H)          Care Instructions    Thank you for your  Heart Care visit today. Your provider has recommended the following:  Medication Changes:  Restart the bumetanide 1mg a daily. If you have extra swelling then you can take an extra 1 tab.  Recommendations:  Echo and labs (non-fasting) in ~ 1 week  Follow-up:  We will call with the results.    Reminder:  Please bring in your current medication list or your medication, over the counter supplements and vitamin bottles as we will review these at each office visit.                  Follow-ups after your visit        Your next 10 appointments already scheduled     Nov 02, 2018  2:45 PM CDT   Ech Complete with [address] ([facility])    [address]  [facility] [address] [phone]           1.  Please bring or wear a comfortable two-piece outfit. 2.  You may eat, drink and take your normal medicines. 3.  For any questions that cannot be answered, please contact the ordering physician 4.  Please do not wear perfumes or scented lotions on the day of your exam. ***Please check-in at the [place] Registration Office located in [address] in the [facility] building. When you are finished registering, please go to [address] and have a seat. The technician will call your name for the test.            Nov 02, 2018  3:45 PM CDT   LAB with RU LAB   [facility]    [address]  [facility] [address]   [phone]           Please do not eat 10-12 hours before your appointment if you are coming in fasting for labs on lipids, cholesterol, or glucose (sugar). This does not apply to pregnant women. Water, hot tea and black coffee (with nothing added) are okay. Do not drink other fluids, diet soda or chew gum.            Dec 19, 2018 11:45 AM CST   Remote PPM Check with HEARD TECH1   [facility] ([facility])    [address]  [facility] [address]   [phone] OPT 2           This appointment is for a remote check of your pacemaker.  This is not an appointment at the office.              Future tests that were ordered for you today     Open Future Orders        Priority Expected Expires Ordered    Basic metabolic panel Routine 11/2/2018 10/26/2019 10/26/2018    Echocardiogram Routine 11/2/2018 10/26/2019 10/26/2018            Who to contact     If you have questions or need follow up information about today's clinic visit or your schedule please contact [facility] directly at [phone].  Normal or non-critical lab and imaging results will be communicated to you by [employer], letter or phone within 4 business days after the clinic has received the results. If you do not hear from us within 7 days, please contact the clinic through [employer] or phone. If you have a critical or abnormal lab result, we will notify you by phone as soon as possible.  Submit refill requests through [email or web address] or call your pharmacy and they will forward the refill request to us. Please allow 3 business days for your refill to be completed.          Additional Information About Your Visit        Echoing [employer]hart Information     [email or web address] gives you secure access to your electronic health record. If you see a primary care provider, you can also send messages to your care team and make appointments. If you have questions,  please call your primary care clinic.  If you do not have a primary care provider, please call [phone] and they will assist you.        Care EveryWhere ID     This is your Care EveryWhere ID. This could be used by other organizations to access your [place] medical records  [id number]        Your Vitals Were     Pulse Height Pulse Oximetry BMI (Body Mass Index)          70 1.524 m (5') 97% 18.28 kg/m2         Blood Pressure from Last 3 Encounters:   10/26/18 158/78   10/24/18 138/58   09/01/18 160/76    Weight from Last 3 Encounters:   10/26/18 42.5 kg (93 lb 9.6 oz)   10/24/18 41.3 kg (91 lb)   09/01/18 36.3 kg (80 lb)              We Performed the Following     EKG 12-lead complete w/read - Clinics (performed today)          Today's Medication Changes          These changes are accurate as of 10/26/18  4:52 PM.  If you have any questions, ask your nurse or doctor.               Start taking these medicines.        Dose/Directions    bumetanide 1 MG tablet   Commonly known as:  BUMEX   Used for:  Chronic systolic congestive heart failure (H)   Started by:  Nancy Molina PA-C        Dose:  1 mg   Take 1 tablet (1 mg) by mouth daily OK to take an extra 1mg as need for leg swelling   Quantity:  45 tablet   Refills:  3         Stop taking these medicines if you haven't already. Please contact your care team if you have questions.     lisinopril 2.5 MG tablet   Commonly known as:  PRINIVIL/Zestril   Stopped by:  Nancy Molina PA-C                Where to get your medicines      These medications were sent to [facility]/pharmacy #[id number] - [place], [place] - [address], [place] [address]     Phone:  [phone]     bumetanide 1 MG tablet                Primary Care Provider Office Phone # Fax #    Dez Urban [phone]       [address]  [place] [phone]        Equal Access to Services     ABBE ESPINOSA AH: Carol loyd  Tatyana, waindirada lupascaleadaha, qapeñata kamariana szymanski, rinku tayin hayaan jhonget mandabeata lafelipemalinda camille. So [facility] [phone].    ATENCIÓN: Si [facility], tiene a dominguez disposición servicios gratuitos de asistencia lingüística. Fabian al [phone].    We comply with applicable federal civil rights laws and [place] laws. We do not discriminate on the basis of race, color, national origin, age, disability, sex, sexual orientation, or gender identity.            Thank you!     Thank you for choosing [facility]  for your care. Our goal is always to provide you with excellent care. Hearing back from our patients is one way we can continue to improve our services. Please take a few minutes to complete the written survey that you may receive in the mail after your visit with us. Thank you!             Your Updated Medication List - Protect others around you: Learn how to safely use, store and throw away your medicines at www.disposemymeds.org.          This list is accurate as of 10/26/18  4:52 PM.  Always use your most recent med list.                   Brand Name Dispense Instructions for use Diagnosis    acetaminophen 325 MG tablet    TYLENOL     Take 325-650 mg by mouth every 4 hours as needed for mild pain        ascorbic acid 500 MG tablet    VITAMIN C     Take 500 mg by mouth daily        bumetanide 1 MG tablet    BUMEX    45 tablet    Take 1 tablet (1 mg) by mouth daily OK to take an extra 1mg as need for leg swelling    Chronic systolic congestive heart failure (H)       Calcium-Vitamin D 500-400 MG-UNIT Tabs      Take 2 tablets by mouth every morning        metoprolol succinate 50 MG 24 hr tablet    TOPROL-XL    180 tablet    Take 1 tablet (50 mg) by mouth 2 times daily    Essential hypertension, Chronic atrial fibrillation (H)       * PRESERVISION AREDS PO      Take 1 capsule by mouth 2 times daily        * MULTIVITAMIN PO      Take 1 tablet by mouth every morning         REQUIP 0.5 MG tablet   Generic drug:  rOPINIRole      Take 0.5 mg by mouth nightly as needed PT STATES MED ON HOLD        rivaroxaban ANTICOAGULANT 20 MG Tabs tablet    XARELTO     Take 1 tablet (20 mg) by mouth daily (with dinner)    Chronic atrial fibrillation (H)       vitamin E 400 UNIT capsule      Take 400 Units by mouth daily        * Notice:  This list has 2 medication(s) that are the same as other medications prescribed for you. Read the directions carefully, and ask your doctor or other care provider to review them with you.

## 2018-10-28 NOTE — PROGRESS NOTES
Service Date: 10/26/2018      HISTORY OF PRESENT ILLNESS:  Ms. Dominguez is an 87-year-old female who presents to the office today for a 2-day followup after recently having an abnormal basic metabolic panel.      Her cardiovascular history includes atrial fibrillation for which she underwent an AV dixie ablation and permanent pacemaker implantation a few months ago, hypertension, hyperlipidemia, valvular heart disease including moderate to moderately severe mitral regurgitation, moderately severe tricuspid regurgitation, moderate to moderately severe aortic regurgitation and a non-ischemic cardiomyopathy with an EF of 35%-40% on echocardiography in 03/2018 (was in afib with RVR at the time), which also showed mild aortic dilatation.  She also has a history of restless legs syndrome and polymyalgia rheumatica.      Today is the first time I am meeting the patient.  She typically has followed with Dr. Bower although she has been seen by a number of other providers in our office over the past couple of months due to her recent pacemaker implantation.  Most recently she was seen earlier this week by Makenzie Paredes, nurse practitioner.  Just prior to that office visit, she had a basic metabolic panel which showed she was hyperkalemic with a potassium of 5.5.  Additionally, her creatinine had jumped from 0.9 a month prior to 1.58.  Her BUN had also increased to 65.  At that office visit, the patient did complain of lower extremity edema and weight gain.  Due to the metabolic abnormalities, Makenzie recommended that she stop Bumex and lisinopril asked her to follow up in the office today.      Today, Ana Cristina complains of continued weight gain.  Her weight is up another 2 pounds since 2 days ago.  She has continued lower extremity edema.  She is not having other HF symptoms such as dyspnea on exertion, orthopnea or PND.  She is quite feisty and somewhat argumentative today.  She states that she is apprehensive of me as  she has had bad experiences with physician assistants in the past.  She was accompanied by her son, Luis.     CURRENT CARDIAC MEDICATIONS:   1.  Toprol-XL 50 mg b.i.d.   2.  Xarelto 20 mg daily.      The remainder of her medications, allergies and review of systems were reviewed and as are documented separately.      PHYSICAL EXAMINATION:   GENERAL:  The patient is an 87-year-old female who appears younger than her stated age.  She is in no apparent distress.   VITAL SIGNS:  Her blood pressure is 158/78, pulse 70, weight is 93 pounds, which is up 2 pounds on our office scale, and actually up about 13 pounds from the time of her pacemaker implantation earlier this summer in July.   PULMONARY:  Breathing is nonlabored.   LUNGS:  Overall clear to auscultation.  I do not appreciate any crackles or wheezing.   CARDIAC:  Reveals a regular rate and rhythm.  She does have a 2/6 systolic ejection murmur heard best at the upper left sternal border.  She also has a 1/6 diastolic murmur heard there.  She also has a holosystolic murmur noted at the apex.   EXTREMITIES:  Lower extremities show 2+ pitting edema bilaterally.   NEUROLOGIC:  Alert and oriented.      She had a basic metabolic panel today.  Her sodium is 140, potassium is improved at 4.1, BUN is 66 and creatinine is 1.33.  She also had an EKG today.  This showed a ventricularly paced rhythm.  Underlying rhythm is difficult to assess but likely is atrial fibrillation.     Last CXR was in early Sept for SOB. No significant pleural effusions or vascular congestion noted.     ASSESSMENT AND PLAN:  Ms. Dominguez is an 87-year-old female with a history of a nonischemic cardiomyopathy, multivalvular heart disease, none of which appears to be severe, history of atrial fibrillation, status post AV dixie ablation and BiV permanent pacemaker implantation in 07/2018 who recently presented for followup.  At that time she was noted to have significant abnormalities in her lab  work.  She also had increased lower extremity edema and weight gain.  Her lisinopril and Bumex were discontinued.  She presents today for followup.  Her basic metabolic panel shows normalization of her potassium.  Her creatinine has improved, although is not quite back to baseline of what we have seen before.        Again, the patient was quite apprehensive of the recommendations that I had for her today.  She was accompanied by her son, who also helped tried to explain the reasoning behind the recommendations.  In the end she was agreeable to restarting Bumex 1 mg a day.  I did give her p.r.n. instructions to take an additional 1 mg if her lower extremity edema is significant and her weight is up.  I did suggest that we keep her off of lisinopril for now.  Since she has not had a repeat echocardiogram since she underwent her AV node ablation and permanent pacemaker implantation, I suggested that I think it would be very beneficial for her to have a repeat echocardiogram so we can reassess her EF.   I also wanted her to have repeat blood work in about a week or two.  She was agreeable to come in and have these tests performed, but was not agreeable to have any further followup in the office at this time.  She stated that we can call her with the results of the echocardiogram and blood work and that we can give her further recommendations in regard to follow up  at that time.      Thank you for allowing me to participate in the care of this patient.         JOSSY HUTCHINSON PA-C             D: 10/28/2018   T: 10/28/2018   MT: BENJAMÍN      Name:     FELIZ HAMPTON   MRN:      [id number]        Account:      [id number]   :      1931           Service Date: 10/26/2018      Document: [id number]

## 2018-11-02 NOTE — PROGRESS NOTES
Patient was here today to have her echo and labs. Patient requested to speak with Dr. Bower's nurse.    Patient c/o of increased edema in BLE. Wgt today is 95 lbs, patient is up 2 lbs from her last office visit, which was on 10-26-18. Patient increased her Bumex to 2 mg a day. Patient could not remember when she increased her dose. Patient is not SOB.       Edema: Edema is up to the knees, pitting +2 to +3.  The top of her left foot is bruised and looks like cellulitis. Patient denies having a fever, the site is not warm to touch. Patient was advised to contact PMD if the area worsens.     Diet: Patient is not watching her sodium intake. I explained to patient that her swelling will not improve if she continues to eat foods that are in high in sodium.     Recommended low NA diet and leg elevation. Patient was advised to continue Bumex 2 mg/d through the weekend. Patient does not own a scale, so this will be difficult to monitor her wgt at home. Patient is aware that we will contact her with her echo and lab results on Monday or early next week. I did recommend that she come in for an office visit if her swelling does not improve. Patient refused.    Messaged Nancy to review. TGaroberto RN     ADDENDUM 11/5/2018: RN note reviewed. Also reviewed BMP from 11/2 which was stable without concerning findings. However her echo from the same date is quite concerning. Her EF improved to 50-55% (previous was 35-40%), but her ascending aorta size increased to 4.9 cm (from 4.4cm in 3/2018). Additionally she has developed severe AR and severe TR. The IVC is also severely dilated. Per above pt denying sx of HF other than above. She was also seen in the ED yesterday for the LE swelling and discoloration of her skin. No other sx of HF reported at that time. At time of my visit with the patient she was quite upset that she was seeing a PA and would not schedule a follow up visit at that time. I will have the RN staff work on  getting her into see Dr Sathish MOYER and also notify him of the results to see if he has any additional recommendations in the interm. Nancy Molina PA-C

## 2018-11-04 NOTE — ED NOTES
Called several numbers for pt no address or phone available  Called son and he is going to come and get her.

## 2018-11-04 NOTE — ED PROVIDER NOTES
History     Chief Complaint:  Bruise on Leg    HPI   Ana Cristina Dominguez is a 87 year old female, anticoagulated on Xarelto, with a history of atrial fibrillation, hypertension, heart failure, and hyperlipidemia who presents to the emergency department for evaluation of lower extremity swelling and redness. She reports that she rolled out of bed three times 4 nights ago. The next morning, she noted bruising on her right lower calf, and spoke with a nurse who told her that if the discoloration became bigger, she should present to the ED. Last night, she noticed that the bruise had grown and so she called EMS to present her to the ED. Here, she reports that she has been able to ambulate in the 4 days since the falls. She denies any dizziness. She reports that she does have lower extremity edema at baseline. Of note, she lives in an independent living complex.     Allergies:  Amoxicillin  Codeine  Melatonin     Medications:    Bumex  Metoprolol  Ropinirole  Xarelto    Past Medical History:    A. Fib.   HTN  Diastolic heart failure  Hyperlipidemia  Polymyalgia rheumatica  Systolic heart failure  Edema    Past Surgical History:    Appendectomy  Pacemaker placed  Right cataract surgery  AV Node ablation  Wrist surgery    Family History:    CAD  Renal failure  Arrhythmia     Social History:  Marital Status:   [5]  Former smoker.   Social alcohol use.   Presents via EMS.   Lives independently in apartment.      Review of Systems   Cardiovascular: Positive for leg swelling.   Skin: Positive for color change.   Neurological: Negative for dizziness.   All other systems reviewed and are negative.      Physical Exam     Patient Vitals for the past 24 hrs:   BP Temp Temp src Heart Rate Resp SpO2   11/04/18 0524 - - - - - 95 %   11/04/18 0445 - - - - - 96 %   11/04/18 0415 152/63 - - 70 20 97 %   11/04/18 0400 135/59 - - 70 11 -   11/04/18 0345 157/67 - - 70 9 -   11/04/18 0330 146/66 - - 70 18 91 %   11/04/18 0315  147/61 - - 70 14 92 %   11/04/18 0300 153/63 - - 70 28 94 %   11/04/18 0254 164/68 97.4  F (36.3  C) Oral 70 18 -     Physical Exam   Constitutional: She appears well-developed and well-nourished.   HENT:   Head: Atraumatic.   Right Ear: External ear normal.   Left Ear: External ear normal.   Mouth/Throat: Oropharynx is clear and moist. No oropharyngeal exudate.   TM's clear bilaterally   Eyes: Conjunctivae are normal. Pupils are equal, round, and reactive to light. No scleral icterus.   Neck: Normal range of motion. Neck supple.   Cardiovascular: Normal rate, regular rhythm, normal heart sounds and intact distal pulses.  Exam reveals no gallop and no friction rub.    No murmur heard.  Baseline 3+ edema bilaterally. Pulses are normal.    Pulmonary/Chest: Effort normal and breath sounds normal. No respiratory distress. She has no wheezes. She has no rales.   Abdominal: Soft. Bowel sounds are normal. She exhibits no distension and no mass. There is no tenderness.   Musculoskeletal: Normal range of motion. She exhibits edema and tenderness.        Legs:  3+ BLE edema to the knees. 2+ pulses in BLE. Ecchymosis with TTP dorsum of R foot and large area right shin   Neurological: She is alert.   Skin: Skin is warm and dry. No rash noted.   Bruising mid shin right leg with redness over the bruise. Bruising on top of the foot.        Psychiatric: She has a normal mood and affect.       Emergency Department Course   Imaging:  Radiographic findings were communicated with the patient who voiced understanding of the findings.    XR Foot 3 views, Right:   No fracture, dislocation, or retained radiopaque foreign  Body, as per radiology.     XR Tibia & Fibula 2 views, Right:   No fracture, dislocation, or retained radiopaque foreign  body. Arterial calcifications are noted. There is soft tissue swelling  around the ankle, as per radiology.     XR Ankle 3 views, Right:   Soft tissue swelling around the ankle. No  fracture,  dislocation, or retained radiopaque foreign body, as per radiology.       Laboratory:  CBC: WBC: 5.9, HGB: 10.8 (L), PLT: 138 (L0  BMP: Na; 145 (H), BUN: 58 (H), GFR: 41 (L), o/w WNL (Creatinine: 1.24 (H))    INR: 2.25 (H)    Emergency Department Course:  Nursing notes and vitals reviewed. (0382) I performed an exam of the patient as documented above.      Blood drawn. This was sent to the lab for further testing, results above.     The patient was sent for lower extremity x-rays while in the emergency department, findings above.      (3861) I rechecked the patient and discussed the results of her workup thus far.      Findings and plan explained to the Patient. Patient discharged home with instructions regarding supportive care, medications, and reasons to return. The importance of close follow-up was reviewed. The patient was prescribed Keflex.      I personally reviewed the laboratory and imaging results with the Patient and answered all related questions prior to discharge.        Impression & Plan      Medical Decision Making:  Ana Cristina Dominguez is a 87 year old female who presents with leg pain and swelling. Patient did fall 4 days ago, and that is when the ecchymosis and the pain started. There is some redness overlying the area of ecchymosis, and there is some redness on top of her foot. She is afebrile and does have pain with palpation. There is no streaking. Her labs are normal, although she does have a slightly lower hemoglobin than she did before. She also has an elevated INR of 2.25. She is not on coumadin, is only on Xarelto. X-rays were unremarkable. I did ask her to elevate to decrease swelling, and to follow up with her doctor for the anemia to work that up. I don't believe the redness is suggestive of cellulitis, but does seem somewhat erythematous and warm, so I will order keflex as of now, and she is asked to follow up with her physician in 3 days. If her symptoms worsen, she should  return to the ED.     Diagnosis:    ICD-10-CM    1. Contusion of right leg, initial encounter S80.11XA    2. Ecchymosis R58    3. Cellulitis of right lower extremity L03.115    4. Anemia, unspecified type D64.9        Disposition:  discharged to home    Discharge Medications:  New Prescriptions    CEPHALEXIN (KEFLEX) 500 MG CAPSULE    Take 1 capsule (500 mg) by mouth 4 times daily for 7 days     Scribe Disclosure:  I, Rosibel Smith, am serving as a scribe on 11/4/2018 at 2:52 AM to personally document services performed by Cara Cardona MD based on my observations and the provider's statements to me.     Rosibel Smith  11/4/2018   [facility] EMERGENCY DEPARTMENT       Cara Cardona MD  11/04/18 0655

## 2018-11-04 NOTE — DISCHARGE INSTRUCTIONS
Lower Extremity Contusion  You have a contusion (bruise) of a lower extremity (leg, knee, ankle, foot, or toe). Symptoms include pain, swelling, and skin discoloration. No bones are broken. This injury may take from a few days to a few weeks to heal.  During that time, the bruise may change from reddish in color, to purple-blue, to green-yellow, to yellow-brown.  Home care    Unless another medicine was prescribed, you can take acetaminophen, ibuprofen, or naproxen to control pain. (If you have chronic liver or kidney disease or ever had a stomach ulcer or gastrointestinal bleeding, talk with your doctor before using these medicines.)    Elevate the injured area to reduce pain and swelling. As much as possible, sit or lie down with the injured area raised about the level of your heart. This is especially important during the first 48 hours.    Ice the injured area to help reduce pain and swelling. Wrap a cold source (ice pack or ice cubes in a plastic bag) in a thin towel. Apply to the bruised area for 20 minutes every 1 to 2 hours the first day. Continue this 3 to 4 times a day until the pain and swelling goes away.    If crutches have been advised, do not bear full weight on the injured leg until you can do so without pain. You may return to sports when you are able to put full weight and impact on the injured leg without pain.  Follow up  Follow up with your healthcare provider or our staff as advised. Call if you are not improving within the next 1 to 2 weeks.  When to seek medical advice   Call your healthcare provider right away if any of these occur:    Increased pain or swelling    Foot or toes become cold, blue, numb or tingly    Signs of infection: Warmth, drainage, or increased redness or pain around the injury    Inability to move the injured area     Frequent bruising for unknown reasons  Date Last Reviewed: 2/1/2017 2000-2017 [employer]. [address] [place] [address], [place] [address].  All rights reserved. This information is not intended as a substitute for professional medical care. Always follow your healthcare professional's instructions.          Discharge Instructions for Cellulitis  You have been diagnosed with cellulitis. This is an infection in the deepest layer of the skin. In some cases, the infection also affects the muscle. Cellulitis is caused by bacteria. The bacteria can enter the body through broken skin. This can happen with a cut, scratch, animal bite, or an insect bite that has been scratched. You may have been treated in the hospital with antibiotics and fluids. You will likely be given a prescription for antibiotics to take at home. This sheet will help you take care of yourself at home.  Home care  When you are home:    Take the prescribed antibiotic medicine you are given as directed until it is gone. Take it even if you feel better. It treats the infection and stops it from returning. Not taking all the medicine can make future infections hard to treat.    Keep the infected area clean.    When possible, raise the infected area above the level of your heart. This helps keep swelling down.    Talk with your healthcare provider if you are in pain. Ask what kind of over-the-counter medicine you can take for pain.    Apply clean bandages as advised.    Take your temperature once a day for a week.    Wash your hands often to prevent spreading the infection.  In the future, wash your hands before and after you touch cuts, scratches, or bandages. This will help prevent infection.   When to call your healthcare provider  Call your healthcare provider immediately if you have any of the following:    Difficulty or pain when moving the joints above or below the infected area    Discharge or pus draining from the area    Fever of 100.4 F (38 C) or higher, or as directed by your healthcare provider    Pain that gets worse in or around the infected     Redness that gets worse in or  around the infected area, particularly if the area of redness expands to a wider area    Shaking chills    Swelling of the infected area    Vomiting   Date Last Reviewed: 8/1/2016 2000-2017 [employer]. [address] [place] [address], [place] [address]. All rights reserved. This information is not intended as a substitute for professional medical care. Always follow your healthcare professional's instructions.

## 2018-11-04 NOTE — ED AVS SNAPSHOT
[facility] Emergency Department    [address]    [facility] [address]    Phone:  [phone]    Fax:  [phone]                                       Ana Cristina Dominguez   MRN: [id number]    Department:  [facility] Emergency Department   Date of Visit:  11/4/2018           After Visit Summary Signature Page     I have received my discharge instructions, and my questions have been answered. I have discussed any challenges I see with this plan with the nurse or doctor.    ..........................................................................................................................................  Patient/Patient Representative Signature      ..........................................................................................................................................  Patient Representative Print Name and Relationship to Patient    ..................................................               ................................................  Date                                   Time    ..........................................................................................................................................  Reviewed by Signature/Title    ...................................................              ..............................................  Date                                               Time          22EPIC Rev 08/18

## 2018-11-04 NOTE — ED TRIAGE NOTES
Patient alert and oriented times 3 .  Abc intact brought in by ambulance. Pt called 911 because right leg more painful after falling 4 days ago. Fell out of bed 3 times . Now my right leg is more swollen

## 2018-11-04 NOTE — ED AVS SNAPSHOT
[facility] Emergency Department    [address]    [facility] [address]    Phone:  [phone]    Fax:  [phone]                                       Ana Cristina Dominguez   MRN: [id number]    Department:  [facility] Emergency Department   Date of Visit:  11/4/2018           Patient Information     Date Of Birth          6/6/1931        Your diagnoses for this visit were:     Contusion of right leg, initial encounter     Ecchymosis     Cellulitis of right lower extremity     Anemia, unspecified type        You were seen by Cara Cardona MD.      Follow-up Information     Follow up with Dez Urban MD. Go in 3 days.    Specialty:  Internal Medicine    Contact information:    [address]  [facility] [address]  [phone]          Discharge Instructions         Lower Extremity Contusion  You have a contusion (bruise) of a lower extremity (leg, knee, ankle, foot, or toe). Symptoms include pain, swelling, and skin discoloration. No bones are broken. This injury may take from a few days to a few weeks to heal.  During that time, the bruise may change from reddish in color, to purple-blue, to green-yellow, to yellow-brown.  Home care    Unless another medicine was prescribed, you can take acetaminophen, ibuprofen, or naproxen to control pain. (If you have chronic liver or kidney disease or ever had a stomach ulcer or gastrointestinal bleeding, talk with your doctor before using these medicines.)    Elevate the injured area to reduce pain and swelling. As much as possible, sit or lie down with the injured area raised about the level of your heart. This is especially important during the first 48 hours.    Ice the injured area to help reduce pain and swelling. Wrap a cold source (ice pack or ice cubes in a plastic bag) in a thin towel. Apply to the bruised area for 20 minutes every 1 to 2 hours the first day. Continue this 3 to 4 times a day until the pain and  swelling goes away.    If crutches have been advised, do not bear full weight on the injured leg until you can do so without pain. You may return to sports when you are able to put full weight and impact on the injured leg without pain.  Follow up  Follow up with your healthcare provider or our staff as advised. Call if you are not improving within the next 1 to 2 weeks.  When to seek medical advice   Call your healthcare provider right away if any of these occur:    Increased pain or swelling    Foot or toes become cold, blue, numb or tingly    Signs of infection: Warmth, drainage, or increased redness or pain around the injury    Inability to move the injured area     Frequent bruising for unknown reasons  Date Last Reviewed: 2/1/2017 2000-2017 [employer]. [address] [place] [address], [place] [address]. All rights reserved. This information is not intended as a substitute for professional medical care. Always follow your healthcare professional's instructions.          Discharge Instructions for Cellulitis  You have been diagnosed with cellulitis. This is an infection in the deepest layer of the skin. In some cases, the infection also affects the muscle. Cellulitis is caused by bacteria. The bacteria can enter the body through broken skin. This can happen with a cut, scratch, animal bite, or an insect bite that has been scratched. You may have been treated in the hospital with antibiotics and fluids. You will likely be given a prescription for antibiotics to take at home. This sheet will help you take care of yourself at home.  Home care  When you are home:    Take the prescribed antibiotic medicine you are given as directed until it is gone. Take it even if you feel better. It treats the infection and stops it from returning. Not taking all the medicine can make future infections hard to treat.    Keep the infected area clean.    When possible, raise the infected area above the level of your heart.  This helps keep swelling down.    Talk with your healthcare provider if you are in pain. Ask what kind of over-the-counter medicine you can take for pain.    Apply clean bandages as advised.    Take your temperature once a day for a week.    Wash your hands often to prevent spreading the infection.  In the future, wash your hands before and after you touch cuts, scratches, or bandages. This will help prevent infection.   When to call your healthcare provider  Call your healthcare provider immediately if you have any of the following:    Difficulty or pain when moving the joints above or below the infected area    Discharge or pus draining from the area    Fever of 100.4 F (38 C) or higher, or as directed by your healthcare provider    Pain that gets worse in or around the infected     Redness that gets worse in or around the infected area, particularly if the area of redness expands to a wider area    Shaking chills    Swelling of the infected area    Vomiting   Date Last Reviewed: 8/1/2016 2000-2017 [employer]. [address] [place] [address]. All rights reserved. This information is not intended as a substitute for professional medical care. Always follow your healthcare professional's instructions.          Your next 10 appointments already scheduled     Dec 19, 2018 11:45 AM CST   Remote PPM Check with HEARD TECH1   [facility] ([facility])    [address] [place] [address] [phone] OPT 2           This appointment is for a remote check of your pacemaker.  This is not an appointment at the office.              24 Hour Appointment Hotline       To make an appointment at any [facility], call [phone] ([phone]). If you don't have a family doctor or clinic, we will help you find one. [facility] are conveniently located to serve the needs of you and your family.             Review of your  medicines      START taking        Dose / Directions Last dose taken    cephALEXin 500 MG capsule   Commonly known as:  KEFLEX   Dose:  500 mg   Quantity:  28 capsule        Take 1 capsule (500 mg) by mouth 4 times daily for 7 days   Refills:  0          Our records show that you are taking the medicines listed below. If these are incorrect, please call your family doctor or clinic.        Dose / Directions Last dose taken    acetaminophen 325 MG tablet   Commonly known as:  TYLENOL   Dose:  325-650 mg        Take 325-650 mg by mouth every 4 hours as needed for mild pain   Refills:  0        ascorbic acid 500 MG tablet   Commonly known as:  VITAMIN C   Dose:  500 mg        Take 500 mg by mouth daily   Refills:  0        bumetanide 1 MG tablet   Commonly known as:  BUMEX   Dose:  1 mg   Quantity:  45 tablet        Take 1 tablet (1 mg) by mouth daily OK to take an extra 1mg as need for leg swelling   Refills:  3        Calcium-Vitamin D 500-400 MG-UNIT Tabs   Dose:  2 tablet        Take 2 tablets by mouth every morning   Refills:  0        metoprolol succinate 50 MG 24 hr tablet   Commonly known as:  TOPROL-XL   Dose:  50 mg   Quantity:  180 tablet        Take 1 tablet (50 mg) by mouth 2 times daily   Refills:  3        * PRESERVISION AREDS PO   Dose:  1 capsule        Take 1 capsule by mouth 2 times daily   Refills:  0        * MULTIVITAMIN PO   Dose:  1 tablet        Take 1 tablet by mouth every morning   Refills:  0        REQUIP 0.5 MG tablet   Dose:  0.5 mg   Generic drug:  rOPINIRole        Take 0.5 mg by mouth nightly as needed PT STATES MED ON HOLD   Refills:  0        rivaroxaban ANTICOAGULANT 20 MG Tabs tablet   Commonly known as:  XARELTO   Dose:  20 mg        Take 1 tablet (20 mg) by mouth daily (with dinner)   Refills:  0        vitamin E 400 UNIT capsule   Dose:  400 Units        Take 400 Units by mouth daily   Refills:  0        * Notice:  This list has 2 medication(s) that are the same as other  medications prescribed for you. Read the directions carefully, and ask your doctor or other care provider to review them with you.            Prescriptions were sent or printed at these locations (1 Prescription)                   Other Prescriptions                Printed at Department/Unit printer (1 of 1)         cephALEXin (KEFLEX) 500 MG capsule                Procedures and tests performed during your visit     Basic metabolic panel    CBC (platelets, no diff)    Foot  XR, G/E 3 views, right    INR    XR Ankle Right G/E 3 Views    XR Tibia & Fibula Right 2 Views      Orders Needing Specimen Collection     None      Pending Results     No orders found from 11/2/2018 to 11/5/2018.            Pending Culture Results     No orders found from 11/2/2018 to 11/5/2018.            Pending Results Instructions     If you had any lab results that were not finalized at the time of your Discharge, you can call the ED Lab Result RN at [phone]. You will be contacted by this team for any positive Lab results or changes in treatment. The nurses are available 7 days a week from 10A to 6:30P.  You can leave a message 24 hours per day and they will return your call.        Test Results From Your Hospital Stay        11/4/2018  4:56 AM      Narrative     RIGHT TIBIA-FIBULA TWO VIEWS 11/4/2018 4:30 AM     HISTORY: Fall.     COMPARISON: None.        Impression     IMPRESSION: No fracture, dislocation, or retained radiopaque foreign  body. Arterial calcifications are noted. There is soft tissue swelling  around the ankle.    DORENE MARADIAGA MD         11/4/2018  4:56 AM      Narrative     RIGHT FOOT THREE VIEWS   11/4/2018 4:30 AM     HISTORY: Fall.     COMPARISON: None.        Impression     IMPRESSION: No fracture, dislocation, or retained radiopaque foreign  body.    DORENE MARADIAGA MD         11/4/2018  4:56 AM      Narrative     RIGHT ANKLE THREE VIEWS   11/4/2018 4:30 AM     HISTORY: Fall.    COMPARISON: None.         Impression     IMPRESSION: Soft tissue swelling around the ankle. No fracture,  dislocation, or retained radiopaque foreign body.    DORENE MARADIAGA MD         11/4/2018  5:37 AM      Component Results     Component Value Ref Range & Units Status    WBC 5.9 4.0 - 11.0 10e9/L Final    RBC Count 3.92 3.8 - 5.2 10e12/L Final    Hemoglobin 10.8 (L) 11.7 - 15.7 g/dL Final    Hematocrit 35.1 35.0 - 47.0 % Final    MCV 90 78 - 100 fl Final    MCH 27.6 26.5 - 33.0 pg Final    MCHC 30.8 (L) 31.5 - 36.5 g/dL Final    RDW 16.7 (H) 10.0 - 15.0 % Final    Platelet Count 138 (L) 150 - 450 10e9/L Final         11/4/2018  5:52 AM      Component Results     Component Value Ref Range & Units Status    Sodium 145 (H) 133 - 144 mmol/L Final    Potassium 3.6 3.4 - 5.3 mmol/L Final    Chloride 107 94 - 109 mmol/L Final    Carbon Dioxide 30 20 - 32 mmol/L Final    Anion Gap 8 3 - 14 mmol/L Final    Glucose 96 70 - 99 mg/dL Final    Urea Nitrogen 58 (H) 7 - 30 mg/dL Final    Creatinine 1.24 (H) 0.52 - 1.04 mg/dL Final    GFR Estimate 41 (L) >60 mL/min/1.7m2 Final    Non  GFR Calc    GFR Estimate If Black 49 (L) >60 mL/min/1.7m2 Final    African American GFR Calc    Calcium 8.5 8.5 - 10.1 mg/dL Final         11/4/2018  5:45 AM      Component Results     Component Value Ref Range & Units Status    INR 2.25 (H) 0.86 - 1.14 Final                Clinical Quality Measure: Blood Pressure Screening     Your blood pressure was checked while you were in the emergency department today. The last reading we obtained was  BP: 152/63 . Please read the guidelines below about what these numbers mean and what you should do about them.  If your systolic blood pressure (the top number) is less than 120 and your diastolic blood pressure (the bottom number) is less than 80, then your blood pressure is normal. There is nothing more that you need to do about it.  If your systolic blood pressure (the top number) is 120-139 or your diastolic  blood pressure (the bottom number) is 80-89, your blood pressure may be higher than it should be. You should have your blood pressure rechecked within a year by a primary care provider.  If your systolic blood pressure (the top number) is 140 or greater or your diastolic blood pressure (the bottom number) is 90 or greater, you may have high blood pressure. High blood pressure is treatable, but if left untreated over time it can put you at risk for heart attack, stroke, or kidney failure. You should have your blood pressure rechecked by a primary care provider within the next 4 weeks.  If your provider in the emergency department today gave you specific instructions to follow-up with your doctor or provider even sooner than that, you should follow that instruction and not wait for up to 4 weeks for your follow-up visit.        Thank you for choosing [facility]       Thank you for choosing [facility] for your care. Our goal is always to provide you with excellent care. Hearing back from our patients is one way we can continue to improve our services. Please take a few minutes to complete the written survey that you may receive in the mail after you visit with us. Thank you!        [employer] Information     [facility] gives you secure access to your electronic health record. If you see a primary care provider, you can also send messages to your care team and make appointments. If you have questions, please call your primary care clinic.  If you do not have a primary care provider, please call [phone] and they will assist you.        Care EveryWhere ID     This is your Care EveryWhere ID. This could be used by other organizations to access your [facility] medical records  [id number]        Equal Access to Services     ABBE ESPINOSA : Hadii shanelle booo Soshawn, waaxda luqadaha, qaybta kaalmarinku alfonso . So [facility] [phone].    ATENCIÓN: Si [facility], salomon a dominguez disposición  servicios gratuitos de asistencia lingüística. Fabian caldwell [phone].    We comply with applicable federal civil rights laws and [place] laws. We do not discriminate on the basis of race, color, national origin, age, disability, sex, sexual orientation, or gender identity.            After Visit Summary       This is your record. Keep this with you and show to your community pharmacist(s) and doctor(s) at your next visit.

## 2018-11-05 NOTE — PROGRESS NOTES
Spoke to Luis, patient's son. Reviewed patient's echocardiogram and recommendations. Luis is not able to bring patient in this week, Patient is scheduled for 11-13-18. Aldair WEBER

## 2018-11-06 NOTE — TELEPHONE ENCOUNTER
Spoke with Luis, he feels that the redness between ankle and knee may be related to bruising from a fall.  Area is not painful unless palpated, does not feel hot.  Is currently on antibiotics given in ER.  Scheduled for ER follow up tomorrow morning 11:00 with Dr. Siegel.  ZULMA Persaud R.N.

## 2018-11-08 NOTE — TELEPHONE ENCOUNTER
"Patient's son calling to report that Ana Cristina had a fall \"a couple of days ago\" and is complaining of her ribs hurting from \"1 side to the other\".  Patient reported that she fell on carpeted surface and did not strike anything before hitting the floor. Pain worse with deep inspiration, rates 5/10.  Using Tylenol which helps relieve some of the pain and will be buying ibuprofen for her to try to see if it gives better relief than Tylenol.  Advised that pt should probably be seen and evaluated but son feels she is doing OK at home for now.  He will encourage her to take deep breaths to avoid pneumonia and will see how she does but will consider either making an appt or having her seen in UC/ER if after clinic hours.  ZULMA Persaud R.N.    "

## 2018-11-13 NOTE — LETTER
11/13/2018    Dez Urban MD, MD  [address] [facility] [address]  [facility] [address]    RE: Ana Cristina Dominguez       Dear Colleague,    I had the pleasure of seeing Ana Cristina Dominguez in the [facility].    HISTORY:    Ana Cristina Dominguez is a 87-year-old female accompanied by her son today.  She has a history of atrial fibrillation for which she underwent AV dixie ablation with permanent pacemaker implantation several months ago, systolic hypertension, hyperlipidemia, valvular heart disease including moderate to moderately severe mitral regurgitation, moderately severe tricuspid regurgitation, and moderate to moderately severe aortic insufficiency.  She also has a nonischemic cardiomyopathy with an ejection fraction of 35-40% with moderate aortic dilation.  She has had problems with restless leg syndrome and polymyalgia in the past.    The patient recently underwent additional diuresis for peripheral edema, and her potassium went to 5.5 with creatinine increasing from 0.9-1.58 with a BUN increasing to 65.  Dose of Bumex was decreased and her renal function improved but did not normalize.    Patient also recently underwent an echocardiogram which showed that the valvular abnormalities described above had not changed but her ascending aorta, measured at 4.4 cm this spring had increased to 4.9 cm.    Today Mrs. Nova presents with a number of new complaints.  She has been having problems with rolling out of bed and falling to the floor.  Apparently this is happened multiple times and about a week ago she injured her chest wall with 1 of these falls.  Since that time she has had very painful chest wall spasms of pain.  While we were sitting and talking she had 2 such episodes with excruciating pain described as a burning sensation and making her wince.  This is reproducible by palpation of her chest wall at several sites.  She reports that the she has been having waves of pain  lasting minutes at a time for the last week or so.  She has been using Advil with some relief.    Patient also has noticed a significant increase in her degree of peripheral edema.  Her weight is increased 3 pounds from her last visit 3 weeks ago.  She thinks that her edema started about 10 days ago and she has been using Bumex an additional dose of 1 mg a day for several days now.  She reports that she still has some mild dyspnea but it is relatively acceptable to her.  Activities such as changing her bed clothes or carrying groceries will make her short of breath.  She denies PND or orthopnea.    Ana Cristina has been having episodes of imbalance leading to multiple falls.  Does not really feel dizzy with this but simply states that she feels like she is losing her balance and cannot catch it.  She does walk with a walker and I reminded her of the importance of using this all the time.  She does not have it with her today, but she is with her son.    ASSESSMENT/PLAN:    1.  Chest wall injury and pain.  From recent fall.  Suspect broken ribs.  Will check chest x-ray.  I have asked her to see her primary care for further evaluation and treatment.  She is obviously very much in pain from this and may need more powerful analgesic years.  2.  CHF.  Patient has significant peripheral edema but her lungs are clear and I do not see any JVD in a sitting position today.  Instructed her to continue using her Bumex 2 mg/day and we will check a metabolic profile today.  She has had problems with hyperkalemia and renal insufficiency with aggressive diuresis in the past.  Her legs are quite swollen and are quite uncomfortable for her.  3.  Enlarged aortic root.  Went into a lot of detail about this.  It looks like her aortic root has grown quite considerably in the last 6 months but given her advanced age and her clear wishes she does not want to pursue this further for.  No further evaluation is needed she is in complete agreement  with this.  4.  Multi valvular disease.  Unchanged but certainly complicating the situation.  5.  Frequent loss of balance and rolling out of bed at night.  Suspect inner ear problem although this does not really explain her falling out of bed.  Her son is going to work on getting siderails for the bad and she will be seeing her primary care physician in the near future to further discuss evaluation of her balance issues.    Thank you for inviting me to participate in this very complex patient.  Please do not hesitate to call if I can be of further assistance.  One month follow-up visit with [facility] is planned.  40 minutes face-to-face time today, greater than 50% counseling.    Orders Placed This Encounter   Procedures     XR Chest 2 Views     Basic metabolic panel     Follow-Up with Cardiac Advanced Practice Provider     No orders of the defined types were placed in this encounter.    There are no discontinued medications.    10 year ASCVD risk: The ASCVD Risk score ([place] STEVEN Jr, et al., 2013) failed to calculate for the following reasons:    The 2013 ASCVD risk score is only valid for ages 40 to 79    Encounter Diagnoses   Name Primary?     Chronic systolic congestive heart failure (H) Yes     Atrial fibrillation with RVR (H)      Essential hypertension      Cardiac pacemaker in situ      Ischemic cardiomyopathy      SOB (shortness of breath)      Edema, unspecified type      Chest wall pain        CURRENT MEDICATIONS:  Current Outpatient Prescriptions   Medication Sig Dispense Refill     acetaminophen (TYLENOL) 325 MG tablet Take 325-650 mg by mouth every 4 hours as needed for mild pain       ascorbic acid (VITAMIN C) 500 MG tablet Take 500 mg by mouth daily       bumetanide (BUMEX) 1 MG tablet Take 1 tablet (1 mg) by mouth daily OK to take an extra 1mg as need for leg swelling 45 tablet 3     Calcium Carb-Cholecalciferol (CALCIUM-VITAMIN D) 500-400 MG-UNIT TABS Take 2 tablets by mouth every morning        metoprolol succinate (TOPROL-XL) 50 MG 24 hr tablet Take 1 tablet (50 mg) by mouth 2 times daily 180 tablet 3     Multiple Vitamins-Minerals (MULTIVITAMIN PO) Take 1 tablet by mouth every morning       Multiple Vitamins-Minerals (PRESERVISION AREDS PO) Take 1 capsule by mouth 2 times daily        rivaroxaban ANTICOAGULANT (XARELTO) 20 MG TABS tablet Take 1 tablet (20 mg) by mouth daily (with dinner)       rOPINIRole (REQUIP) 0.5 MG tablet Take 0.5 mg by mouth nightly as needed PT STATES MED ON HOLD       vitamin E 400 UNIT capsule Take 400 Units by mouth daily         ALLERGIES     Allergies   Allergen Reactions     Amoxicillin Rash     Codeine GI Disturbance     Stomach pain     Melatonin Nausea     Eyesburned       PAST MEDICAL HISTORY:  Past Medical History:   Diagnosis Date     Aortic regurgitation     mod-severe by echo in 3/2018     Benign essential hypertension      Chronic diastolic heart failure (H)      Other and unspecified hyperlipidemia      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 Father      Family History Negative Mother      Genitourinary Problems Sister      Renal failure     HEART DISEASE Sister      Rhythm issues       SOCIAL HISTORY:  Social History     Social History     Marital status:      Spouse name: N/A     Number of children: 3     Years of education: N/A     Occupational  History      Retired     Social History Main Topics     Smoking status: Former Smoker     Packs/day: 0.50     Types: Cigarettes     Quit date: 5/6/1973     Smokeless tobacco: Never Used     Alcohol use Yes      Comment: Socially     Drug use: No     Sexual activity: No     Other Topics Concern     Exercise Yes     Seat Belt Yes     Social History Narrative       Review of Systems:  Skin:  Negative     Eyes:  Positive for glasses  ENT:  Positive for hearing loss  Respiratory:  Positive for shortness of breath  Cardiovascular:    dizziness;Positive for;edema;palpitations;chest pain  Gastroenterology: Negative    Genitourinary:  Negative    Musculoskeletal:  not assessed    Neurologic:  Positive for numbness or tingling of feet  Psychiatric:  not assessed    Heme/Lymph/Imm:       Endocrine:  not assessed      Physical Exam:  Vitals: BP 90/60 (BP Location: Right arm, Patient Position: Sitting, Cuff Size: Adult Small)  Pulse 71  Ht 1.524 m (5')  Wt 43.5 kg (96 lb)  SpO2 96%  BMI 18.75 kg/m2    Constitutional:    frail;thin uses a walker, Venetie IRA, examined in sitting position    Skin:  warm and dry to the touch, no apparent skin lesions or masses noted   Left anterior chest wall tender to touch at multiple points, no visible hematoma, very thin chest wall pacemaker very evident but appears normal.    Head:  normocephalic, no masses or lesions        Eyes:  pupils equal and round, conjunctivae and lids unremarkable, sclera white, no xanthalasma, EOMS intact, no nystagmus        ENT:  no pallor or cyanosis        Neck:  carotid pulses are full and equal bilaterally, JVP normal, no carotid bruit        Chest:  normal breath sounds, clear to auscultation, normal A-P diameter, normal symmetry, normal respiratory excursion, no use of accessory muscles        Cardiac:   irregularly irregular rhythm     systolic murmur;holosystolic murmur;throughout precordium;grade 2 holosystolic murmur holodiastolic  murmur;decrescendo;throughout precordium;grade 2      Abdomen:  abdomen soft;BS normoactive        Vascular: pulses full and equal, no bruits auscultated                                      Extremities and Back:      Pitting edema 3+ to knees bilaterally.    Neurological:  no gross motor deficits          Recent Lab Results:  LIPID RESULTS:  Lab Results   Component Value Date    CHOL 107 10/07/2016    HDL 22 (L) 10/07/2016    LDL 65 10/07/2016    TRIG 100 10/07/2016    CHOLHDLRATIO 3.3 08/29/2013       LIVER ENZYME RESULTS:  Lab Results   Component Value Date    AST 29 09/01/2018    ALT 25 09/01/2018       CBC RESULTS:  Lab Results   Component Value Date    WBC 5.9 11/04/2018    RBC 3.92 11/04/2018    HGB 10.8 (L) 11/04/2018    HCT 35.1 11/04/2018    MCV 90 11/04/2018    MCH 27.6 11/04/2018    MCHC 30.8 (L) 11/04/2018    RDW 16.7 (H) 11/04/2018     (L) 11/04/2018       BMP RESULTS:  Lab Results   Component Value Date     (H) 11/04/2018    POTASSIUM 3.6 11/04/2018    CHLORIDE 107 11/04/2018    CO2 30 11/04/2018    ANIONGAP 8 11/04/2018    GLC 96 11/04/2018    BUN 58 (H) 11/04/2018    CR 1.24 (H) 11/04/2018    GFRESTIMATED 41 (L) 11/04/2018    GFRESTBLACK 49 (L) 11/04/2018    KARTHIK 8.5 11/04/2018        A1C RESULTS:  Lab Results   Component Value Date    A1C 5.7 12/16/2015       INR RESULTS:  Lab Results   Component Value Date    INR 2.25 (H) 11/04/2018    INR 1.24 (H) 10/08/2016         Thank you for allowing me to participate in the care of your patient.    Sincerely,     Fabiano Bower MD     [facility]

## 2018-11-13 NOTE — MR AVS SNAPSHOT
After Visit Summary   11/13/2018    Ana Cristina Dominguez    MRN: [id number]           Patient Information     Date Of Birth          6/6/1931        Visit Information        Provider Department      11/13/2018 12:45 PM Fabiano Bower MD [facility]        Today's Diagnoses     Chronic systolic congestive heart failure (H)    -  1    Atrial fibrillation with RVR (H)        Essential hypertension        Cardiac pacemaker in situ        Ischemic cardiomyopathy        SOB (shortness of breath)        Edema, unspecified type        Chest wall pain           Follow-ups after your visit        Additional Services     Follow-Up with Cardiac Advanced Practice Provider                 Your next 10 appointments already scheduled     Dec 19, 2018 11:45 AM CST   Remote PPM Check with HEARD TECH1   [facility] ([facility])    [id number]  [facility] [address] [phone] OPT 2           This appointment is for a remote check of your pacemaker.  This is not an appointment at the office.              Future tests that were ordered for you today     Open Future Orders        Priority Expected Expires Ordered    Follow-Up with Cardiac Advanced Practice Provider Routine 12/13/2018 11/13/2019 11/13/2018    XR Chest 2 Views Routine 11/13/2018 11/13/2019 11/13/2018    Basic metabolic panel Routine 11/13/2018 11/13/2019 11/13/2018            Who to contact     If you have questions or need follow up information about today's clinic visit or your schedule please contact [facility] directly at [phone].  Normal or non-critical lab and imaging results will be communicated to you by MyChart, letter or phone within 4 business days after the clinic has received the results. If you do not hear from us within 7 days, please contact the clinic through MyChart or phone.  If you have a critical or abnormal lab result, we will notify you by phone as soon as possible.  Submit refill requests through [employer] or call your pharmacy and they will forward the refill request to us. Please allow 3 business days for your refill to be completed.          Additional Information About Your Visit        [employer] Information     [employer] gives you secure access to your electronic health record. If you see a primary care provider, you can also send messages to your care team and make appointments. If you have questions, please call your primary care clinic.  If you do not have a primary care provider, please call [phone] and they will assist you.        Care EveryWhere ID     This is your Care EveryWhere ID. This could be used by other organizations to access your [place] medical records  [id number]        Your Vitals Were     Pulse Height Pulse Oximetry BMI (Body Mass Index)          71 1.524 m (5') 96% 18.75 kg/m2         Blood Pressure from Last 3 Encounters:   11/13/18 90/60   11/04/18 152/63   10/26/18 158/78    Weight from Last 3 Encounters:   11/13/18 43.5 kg (96 lb)   10/26/18 42.5 kg (93 lb 9.6 oz)   10/24/18 41.3 kg (91 lb)               Primary Care Provider Office Phone # Fax #    Dez Urban [phone]       [address]  [facility] [address]        Equal Access to Services     [facility]: Hadii aad ku hadasho Soomaali, waaxda luqadaha, qaybta kaalmada adeegyada, rinku graham haydontaen ramón burciaga . So [facility] [phone].    ATENCIÓN: Si habla español, tiene a dominguez disposición servicios gratuitos de asistencia lingüística. Fabian al [phone].    We comply with applicable federal civil rights laws and [place] laws. We do not discriminate on the basis of race, color, national origin, age, disability, sex, sexual orientation, or gender identity.            Thank you!     Thank you for choosing [facility]    SHANTAL  for your care. Our goal is always to provide you with excellent care. Hearing back from our patients is one way we can continue to improve our services. Please take a few minutes to complete the written survey that you may receive in the mail after your visit with us. Thank you!             Your Updated Medication List - Protect others around you: Learn how to safely use, store and throw away your medicines at www.disposemymeds.org.          This list is accurate as of 11/13/18  2:00 PM.  Always use your most recent med list.                   Brand Name Dispense Instructions for use Diagnosis    acetaminophen 325 MG tablet    TYLENOL     Take 325-650 mg by mouth every 4 hours as needed for mild pain        ascorbic acid 500 MG tablet    VITAMIN C     Take 500 mg by mouth daily        bumetanide 1 MG tablet    BUMEX    45 tablet    Take 1 tablet (1 mg) by mouth daily OK to take an extra 1mg as need for leg swelling    Chronic systolic congestive heart failure (H)       Calcium-Vitamin D 500-400 MG-UNIT Tabs      Take 2 tablets by mouth every morning        metoprolol succinate 50 MG 24 hr tablet    TOPROL-XL    180 tablet    Take 1 tablet (50 mg) by mouth 2 times daily    Essential hypertension, Chronic atrial fibrillation (H)       * PRESERVISION AREDS PO      Take 1 capsule by mouth 2 times daily        * MULTIVITAMIN PO      Take 1 tablet by mouth every morning        REQUIP 0.5 MG tablet   Generic drug:  rOPINIRole      Take 0.5 mg by mouth nightly as needed PT STATES MED ON HOLD        rivaroxaban ANTICOAGULANT 20 MG Tabs tablet    XARELTO     Take 1 tablet (20 mg) by mouth daily (with dinner)    Chronic atrial fibrillation (H)       vitamin E 400 UNIT capsule      Take 400 Units by mouth daily        * Notice:  This list has 2 medication(s) that are the same as other medications prescribed for you. Read the directions carefully, and ask your doctor or other care provider to review them with you.

## 2018-11-14 NOTE — TELEPHONE ENCOUNTER
Component      Latest Ref Rng & Units 11/13/2018   Sodium      133 - 144 mmol/L 145 (H)   Potassium      3.4 - 5.3 mmol/L 3.2 (L)   Chloride      94 - 109 mmol/L 106   Carbon Dioxide      20 - 32 mmol/L 36 (H)   Anion Gap      3 - 14 mmol/L 3   Glucose      70 - 99 mg/dL 139 (H)   Urea Nitrogen      7 - 30 mg/dL 49 (H)   Creatinine      0.52 - 1.04 mg/dL 1.18 (H)   GFR Estimate      >60 mL/min/1.7m2 43 (L)   GFR Estimate If Black      >60 mL/min/1.7m2 52 (L)   Calcium      8.5 - 10.1 mg/dL 8.7     Dr. Bower reviewed BMP    Per Dr. Bower, add 20 mEq Potassium a day. Recheck BMP in 2 weeks, may consider Spironolactone.    ------------------------------------------------------------------------------------------------    Reviewed results and recommendations with Luis, patient's son. Sent in script for potassium. BMP scheduled on 11-29-18.     TGaroberto WEBER

## 2018-11-15 NOTE — TELEPHONE ENCOUNTER
XR CHEST 2 VW   Performed on 11-13-18.     FINDINGS: Left cardiac device is in place. The heart is enlarged. Small bilateral pleural effusions are present. These have increased in size when compared to 9/1/2018. The lungs are clear. The pulmonary vasculature is normal.  The bones and soft tissues are unremarkable.       IMPRESSION:     1. Cardiomegaly.    2. Small bilateral pleural effusions. These have increased in size when compared to the film of 9/1/2018.         -------------------------------------------------------------------------------------------------------------  Notes Recorded by Fabiano Bower MD on 11/14/2018 at 4:20 PM  We did this to evaluate for rib fractures.  Pt fell and has excruciating chest wall pain.  She will make appointment with PMD.  Hopefully she can get in soon.    Reviewed results with Luis, patient's son. Luis stated he plans on making an appt for patient to see Dr. Urban.     Encounter forwarded to Dr. Urban.     Aldair WEBER

## 2018-11-21 PROBLEM — M79.671 FOOT PAIN, RIGHT: Status: ACTIVE | Noted: 2018-01-01

## 2018-11-21 NOTE — MR AVS SNAPSHOT
After Visit Summary   11/21/2018    Ana Cristina Dominguez    MRN: [id number]           Patient Information     Date Of Birth          6/6/1931        Visit Information        Provider Department      11/21/2018 3:45 PM Gene Rahman MD [facility]        Today's Diagnoses     Hematoma of skin    -  1    Cellulitis and abscess of foot, except toes           Follow-ups after your visit        Your next 10 appointments already scheduled     Nov 29, 2018  1:00 PM CST   LAB with RU LAB   [facility] AT [place] ([facility])    [address] [place] [address] [address]  [facility] [address]   [phone]           Please do not eat 10-12 hours before your appointment if you are coming in fasting for labs on lipids, cholesterol, or glucose (sugar). This does not apply to pregnant women. Water, hot tea and black coffee (with nothing added) are okay. Do not drink other fluids, diet soda or chew gum.            Dec 11, 2018 12:30 PM CST   Return Visit with Nancy Molina PA-C   [facility] ([facility])    [address] [place] [address] [address]  [facility] [address]   [phone]            Dec 19, 2018 11:45 AM CST   Remote PPM Check with HEARD TECH1   [facility] ([facility])    [address] [place] [address] [address]  [facility] [id number] [phone] OPT 2           This appointment is for a remote check of your pacemaker.  This is not an appointment at the office.              Who to contact     If you have questions or need follow up information about today's clinic visit or your schedule please contact [facility] directly at [phone].  Normal or non-critical lab and imaging results will be communicated to you by MyChart, letter or phone within 4 business days after the clinic has received the results. If you do not hear from us within 7  days, please contact the clinic through [employer] or phone. If you have a critical or abnormal lab result, we will notify you by phone as soon as possible.  Submit refill requests through [employer] or call your pharmacy and they will forward the refill request to us. Please allow 3 business days for your refill to be completed.          Additional Information About Your Visit        [employer] Information     [employer] gives you secure access to your electronic health record. If you see a primary care provider, you can also send messages to your care team and make appointments. If you have questions, please call your primary care clinic.  If you do not have a primary care provider, please call [phone] and they will assist you.        Care EveryWhere ID     This is your Care EveryWhere ID. This could be used by other organizations to access your [place] medical records  [id number]        Your Vitals Were     Pulse Temperature Pulse Oximetry BMI (Body Mass Index)          75 97.5  F (36.4  C) (Oral) 97% 18.75 kg/m2         Blood Pressure from Last 3 Encounters:   11/21/18 128/69   11/13/18 90/60   11/04/18 152/63    Weight from Last 3 Encounters:   11/21/18 96 lb (43.5 kg)   11/13/18 96 lb (43.5 kg)   10/26/18 93 lb 9.6 oz (42.5 kg)              Today, you had the following     No orders found for display       Primary Care Provider Office Phone # Fax #    Dez Urban [phone]       [address] [facility] [address]  [place] [id number]        Equal Access to Services     [facility] HospitalASHWINI AH: Hadii aad ku hadasho Soomaali, waaxda luqadaha, qaybta kaalmada adeegyada, waxay gladys hayhank burciaga . So [facility] [phone].    ATENCIÓN: Si habla español, tiene a dominguez disposición servicios gratuitos de asistencia lingüística. Llame al [phone].    We comply with applicable federal civil rights laws and [place] laws. We do not discriminate on the basis of race, color, national origin, age, disability,  sex, sexual orientation, or gender identity.            Thank you!     Thank you for choosing [facility]  for your care. Our goal is always to provide you with excellent care. Hearing back from our patients is one way we can continue to improve our services. Please take a few minutes to complete the written survey that you may receive in the mail after your visit with us. Thank you!             Your Updated Medication List - Protect others around you: Learn how to safely use, store and throw away your medicines at www.disposemymeds.org.          This list is accurate as of 11/21/18  5:04 PM.  Always use your most recent med list.                   Brand Name Dispense Instructions for use Diagnosis    acetaminophen 325 MG tablet    TYLENOL     Take 325-650 mg by mouth every 4 hours as needed for mild pain        ascorbic acid 500 MG tablet    VITAMIN C     Take 500 mg by mouth daily        bumetanide 1 MG tablet    BUMEX    45 tablet    Take 1 tablet (1 mg) by mouth daily OK to take an extra 1mg as need for leg swelling    Chronic systolic congestive heart failure (H)       Calcium-Vitamin D 500-400 MG-UNIT Tabs      Take 2 tablets by mouth every morning        metoprolol succinate 50 MG 24 hr tablet    TOPROL-XL    180 tablet    Take 1 tablet (50 mg) by mouth 2 times daily    Essential hypertension, Chronic atrial fibrillation (H)       potassium chloride ER 20 MEQ ER tablet    K-TAB    30 tablet    Take 1 tablet (20 mEq) by mouth daily    Hypokalemia       * PRESERVISION AREDS PO      Take 1 capsule by mouth 2 times daily        * MULTIVITAMIN PO      Take 1 tablet by mouth every morning        REQUIP 0.5 MG tablet   Generic drug:  rOPINIRole      Take 0.5 mg by mouth nightly as needed PT STATES MED ON HOLD        rivaroxaban ANTICOAGULANT 20 MG Tabs tablet    XARELTO     Take 1 tablet (20 mg) by mouth daily (with dinner)    Chronic atrial fibrillation (H)       vitamin E 400 UNIT capsule       Take 400 Units by mouth daily        * Notice:  This list has 2 medication(s) that are the same as other medications prescribed for you. Read the directions carefully, and ask your doctor or other care provider to review them with you.

## 2018-11-21 NOTE — IP AVS SNAPSHOT
[place] [address] [facility]    [address]    [facility] [address]    Phone:  [phone]    Fax:  [phone]                                       After Visit Summary   11/21/2018    Ana Cristina Dominguez    MRN: [id number]           After Visit Summary Signature Page     I have received my discharge instructions, and my questions have been answered. I have discussed any challenges I see with this plan with the nurse or doctor.    ..........................................................................................................................................  Patient/Patient Representative Signature      ..........................................................................................................................................  Patient Representative Print Name and Relationship to Patient    ..................................................               ................................................  Date                                   Time    ..........................................................................................................................................  Reviewed by Signature/Title    ...................................................              ..............................................  Date                                               Time          22EPIC Rev 08/18

## 2018-11-21 NOTE — IP AVS SNAPSHOT
MRN:[id number]                      After Visit Summary   11/21/2018    Ana Cristina Dominguez    MRN: [id number]           Thank you!     Thank you for choosing [facility] for your care. Our goal is always to provide you with excellent care. Hearing back from our patients is one way we can continue to improve our services. Please take a few minutes to complete the written survey that you may receive in the mail after you visit. If you would like to speak to someone directly about your visit please contact Patient Relations at [phone]. Thank you!          Patient Information     Date Of Birth          6/6/1931        Designated Caregiver       Most Recent Value    Caregiver    Will someone help with your care after discharge? no [no ready family to help with home cares]      About your hospital stay     You were admitted on:  November 21, 2018 You last received care in the:  [place] [address] [facility]    You were discharged on:  November 28, 2018       Who to Call     For medical emergencies, please call 911.  For non-urgent questions about your medical care, please call your primary care provider or clinic, [phone]          Attending Provider     Provider Specialty    Rainer Fernandes MD Emergency Medicine    TanyaLeonard kee MD Internal Medicine       Primary Care Provider Office Phone # Fax #    Dez Urban [phone]      After Care Instructions     Activity       Your activity upon discharge: activity as tolerated            Diet       Follow this diet upon discharge: Orders Placed This Encounter      Combination Diet Regular Diet Adult                  Follow-up Appointments     Follow-up and recommended labs and tests        Follow up with hospice team                  Your next 10 appointments already scheduled     Nov 29, 2018  1:00 PM CST   LAB with RU LAB   [facility] ([facility])     [address]  [facility] [address]   [phone]           Please do not eat 10-12 hours before your appointment if you are coming in fasting for labs on lipids, cholesterol, or glucose (sugar). This does not apply to pregnant women. Water, hot tea and black coffee (with nothing added) are okay. Do not drink other fluids, diet soda or chew gum.            Dec 11, 2018 12:30 PM CST   Return Visit with Nancy Molina PA-C   [facility] ([facility])    [address]  [facility] [address] [phone]            Dec 19, 2018 11:45 AM CST   Remote PPM Check with HEARD TECH1   [facility] ([facility])    [address]  [facility] [address] [phone] OPT 2           This appointment is for a remote check of your pacemaker.  This is not an appointment at the office.              Pending Results     No orders found from 11/19/2018 to 11/22/2018.            Statement of Approval     Ordered          11/28/18 0926  I have reviewed and agree with all the recommendations and orders detailed in this document.  EFFECTIVE NOW     Approved and electronically signed by:  Jace Pacheco MD             Admission Information     Date & Time Provider Department Dept. Phone    11/21/2018 Leonard Martínez MD [place] [address] [facility] [phone]      Your Vitals Were     Blood Pressure Pulse Temperature Respirations Weight Pulse Oximetry    132/86 (BP Location: Left arm) 71 97  F (36.1  C) (Axillary) 20 45.5 kg (100 lb 5 oz) 94%    BMI (Body Mass Index)                   19.59 kg/m2           MyChart Information     [employer] gives you secure access to your electronic health record. If you see a primary care provider, you can also send messages to your care team and make appointments. If you have questions, please call your primary care clinic.  If you do  not have a primary care provider, please call [phone] and they will assist you.        Care EveryWhere ID     This is your Care EveryWhere ID. This could be used by other organizations to access your [place] medical records  [id number]        Equal Access to Services     HATTIEKRYSTLE FRANCISCA : Hadii aad ku hadbetzyyesy Cashali, waindirada luqadaha, qaybta kaalmada nessa, waxvita gladys lakemalinda melgar fredbrannon obrien. So [facility] [phone].    ATENCIÓN: Si habla español, tiene a dominguez disposición servicios gratuitos de asistencia lingüística. Llame al [phone].    We comply with applicable federal civil rights laws and [place] laws. We do not discriminate on the basis of race, color, national origin, age, disability, sex, sexual orientation, or gender identity.               Review of your medicines      START taking        Dose / Directions    atropine 1 % ophthalmic solution   Used for:  Cellulitis of right foot        Dose:  1-2 drop   Place 1-2 drops under the tongue every hour as needed for other (secretions)   Quantity:  1 Bottle   Refills:  0       hypromellose-dextran 0.1-0.3 % ophthalmic solution   Commonly known as:  ARTIFICAL TEARS   Used for:  Cellulitis of right foot        Dose:  1-2 drop   Place 1-2 drops into both eyes every 8 hours as needed for dry eyes   Quantity:  30 mL   Refills:  0       LORazepam 0.5 MG tablet   Commonly known as:  ATIVAN   Used for:  Cellulitis of right foot        Dose:  0.5-1 mg   Place 1-2 tablets (0.5-1 mg) under the tongue every 3 hours as needed for anxiety, seizures or nausea (dyspnea)   Quantity:  60 tablet   Refills:  0       morphine sulfate HIGH CONCENTRATE 10 mg/0.5 mL (HIGH CONC) solution   Commonly known as:  ROXANOL   Used for:  Cellulitis of right foot        Dose:  5-10 mg   Place 0.25-0.5 mLs (5-10 mg) under the tongue every 2 hours as needed for shortness of breath / dyspnea or pain   Quantity:  1 Bottle   Refills:  0       OLANZapine zydis 5 MG ODT tab   Commonly  known as:  zyPREXA   Used for:  Cellulitis of right foot        Dose:  5 mg   Take 1 tablet (5 mg) by mouth every 6 hours as needed for agitation (delirium, nausea)   Quantity:  30 tablet   Refills:  0       ondansetron 4 MG ODT tab   Commonly known as:  ZOFRAN-ODT   Used for:  Cellulitis of right foot        Dose:  4 mg   Take 1 tablet (4 mg) by mouth every 6 hours as needed for nausea or vomiting   Quantity:  120 tablet   Refills:  0       prochlorperazine 25 MG Suppository   Commonly known as:  COMPAZINE   Used for:  Cellulitis of right foot        Dose:  12.5 mg   Place 0.5 suppositories (12.5 mg) rectally every 12 hours as needed for nausea or vomiting   Quantity:  30 suppository   Refills:  0       prochlorperazine 5 MG tablet   Commonly known as:  COMPAZINE   Used for:  Cellulitis of right foot        Dose:  5 mg   Take 1 tablet (5 mg) by mouth every 6 hours as needed for vomiting   Quantity:  30 tablet   Refills:  0         CONTINUE these medicines which have NOT CHANGED        Dose / Directions    acetaminophen 325 MG tablet   Commonly known as:  TYLENOL   Used for:  Cellulitis of right foot        Dose:  325-650 mg   Take 1-2 tablets (325-650 mg) by mouth every 4 hours as needed for mild pain   Quantity:  100 tablet   Refills:  0         STOP taking     bumetanide 1 MG tablet   Commonly known as:  BUMEX           metoprolol succinate 50 MG 24 hr tablet   Commonly known as:  TOPROL-XL           MULTIVITAMIN PO           potassium chloride ER 20 MEQ CR tablet   Commonly known as:  K-TAB           PRESERVISION AREDS PO           REQUIP 0.5 MG tablet   Generic drug:  rOPINIRole           rivaroxaban ANTICOAGULANT 20 MG Tabs tablet   Commonly known as:  XARELTO                Where to get your medicines      Some of these will need a paper prescription and others can be bought over the counter. Ask your nurse if you have questions.     Bring a paper prescription for each of these medications     acetaminophen  325 MG tablet    atropine 1 % ophthalmic solution    hypromellose-dextran 0.1-0.3 % ophthalmic solution    LORazepam 0.5 MG tablet    morphine sulfate HIGH CONCENTRATE 10 mg/0.5 mL (HIGH CONC) solution    OLANZapine zydis 5 MG ODT tab    ondansetron 4 MG ODT tab    prochlorperazine 25 MG Suppository    prochlorperazine 5 MG tablet                Protect others around you: Learn how to safely use, store and throw away your medicines at www.disposemymeds.org.        Information about OPIOIDS     PRESCRIPTION OPIOIDS: WHAT YOU NEED TO KNOW   We gave you an opioid (narcotic) pain medicine. It is important to manage your pain, but opioids are not always the best choice. You should first try all the other options your care team gave you. Take this medicine for as short a time (and as few doses) as possible.    Some activities can increase your pain, such as bandage changes or therapy sessions. It may help to take your pain medicine 30 to 60 minutes before these activities. Reduce your stress by getting enough sleep, working on hobbies you enjoy and practicing relaxation or meditation. Talk to your care team about ways to manage your pain beyond prescription opioids.    These medicines have risks:    DO NOT drive when on new or higher doses of pain medicine. These medicines can affect your alertness and reaction times, and you could be arrested for driving under the influence (DUI). If you need to use opioids long-term, talk to your care team about driving.    DO NOT operate heavy machinery    DO NOT do any other dangerous activities while taking these medicines.    DO NOT drink any alcohol while taking these medicines.     If the opioid prescribed includes acetaminophen, DO NOT take with any other medicines that contain acetaminophen. Read all labels carefully. Look for the word  acetaminophen  or  Tylenol.  Ask your pharmacist if you have questions or are unsure.    You can get addicted to pain medicines, especially if  you have a history of addiction (chemical, alcohol or substance dependence). Talk to your care team about ways to reduce this risk.    All opioids tend to cause constipation. Drink plenty of water and eat foods that have a lot of fiber, such as fruits, vegetables, prune juice, apple juice and high-fiber cereal. Take a laxative (Miralax, milk of magnesia, Colace, Senna) if you don t move your bowels at least every other day. Other side effects include upset stomach, sleepiness, dizziness, throwing up, tolerance (needing more of the medicine to have the same effect), physical dependence and slowed breathing.    Store your pills in a secure place, locked if possible. We will not replace any lost or stolen medicine. If you don t finish your medicine, please throw away (dispose) as directed by your pharmacist. The [place] Pollution [employer] has more information about safe disposal: https://www.pca.[facility].mn.us/living-green/managing-unwanted-medications             Medication List: This is a list of all your medications and when to take them. Check marks below indicate your daily home schedule. Keep this list as a reference.      Medications           Morning Afternoon Evening Bedtime As Needed    acetaminophen 325 MG tablet   Commonly known as:  TYLENOL   Take 1-2 tablets (325-650 mg) by mouth every 4 hours as needed for mild pain                                atropine 1 % ophthalmic solution   Place 1-2 drops under the tongue every hour as needed for other (secretions)   Last time this was given:  1 drop on 11/27/2018  5:47 PM                                hypromellose-dextran 0.1-0.3 % ophthalmic solution   Commonly known as:  ARTIFICAL TEARS   Place 1-2 drops into both eyes every 8 hours as needed for dry eyes   Last time this was given:  1 drop on 11/25/2018  8:24 AM                                LORazepam 0.5 MG tablet   Commonly known as:  ATIVAN   Place 1-2 tablets (0.5-1 mg) under the tongue every 3  hours as needed for anxiety, seizures or nausea (dyspnea)                                morphine sulfate HIGH CONCENTRATE 10 mg/0.5 mL (HIGH CONC) solution   Commonly known as:  ROXANOL   Place 0.25-0.5 mLs (5-10 mg) under the tongue every 2 hours as needed for shortness of breath / dyspnea or pain                                OLANZapine zydis 5 MG ODT tab   Commonly known as:  zyPREXA   Take 1 tablet (5 mg) by mouth every 6 hours as needed for agitation (delirium, nausea)                                ondansetron 4 MG ODT tab   Commonly known as:  ZOFRAN-ODT   Take 1 tablet (4 mg) by mouth every 6 hours as needed for nausea or vomiting                                prochlorperazine 25 MG Suppository   Commonly known as:  COMPAZINE   Place 0.5 suppositories (12.5 mg) rectally every 12 hours as needed for nausea or vomiting                                prochlorperazine 5 MG tablet   Commonly known as:  COMPAZINE   Take 1 tablet (5 mg) by mouth every 6 hours as needed for vomiting

## 2018-11-21 NOTE — IP AVS SNAPSHOT
[place] [address] [facility]: [phone]                                              INTERAGENCY TRANSFER FORM - LAB / IMAGING / EKG / EMG RESULTS   2018                    Hospital Admission Date: 2018  FELIZ HAMPTON   : 1931  Sex: Female        Attending Provider: Leonard Martínez MD     Allergies:  Amoxicillin, Codeine, Melatonin    Infection:  None   Service:  HOSPITALIST    Ht:  1.524 m (5')   Wt:  45.5 kg (100 lb 5 oz)   Admission Wt:  43.1 kg (95 lb)    BMI:  19.59 kg/m 2   BSA:  1.39 m 2            Patient PCP Information     Provider PCP Type    Dez Urban MD, MD General         Lab Results - 3 Days      Creatinine [[id number]] (Abnormal)  Resulted: 18 1630, Result status: Final result    Ordering provider: Jace Pacheco MD  18 1428 Resulting lab: [facility]    Specimen Information    Type Source Collected On   Blood  18 1551          Components       Value Reference Range Flag Lab   Creatinine 2.37 0.52 - 1.04 mg/dL H FrRdHs   GFR Estimate 19 >60 mL/min/1.7m2 L FrRd   Comment:  Non  GFR Calc   GFR Estimate If Black 23 >60 mL/min/1.7m2 L FrRdHs   Comment:   GFR Calc            Blood culture [[id number]]  Resulted: 18 0437, Result status: Final result    Ordering provider: Rainer Fernandes MD  18 Resulting lab: [facility]    Specimen Information    Type Source Collected On   Blood  18   Comment:  Right Arm          Components       Value Reference Range Flag Lab   Specimen Description Blood Right Arm      Special Requests Aerobic and anaerobic bottles received   FrRd   Culture Micro No growth   225            Blood culture [[id number]]  Resulted: 18 0437, Result status: Final result    Ordering provider: Rainer Fernandes MD  18 Resulting lab: [facility]    Specimen Information    Type  Source Collected On   Blood  11/21/18 1833   Comment:  Left Arm          Components       Value Reference Range Flag Lab   Specimen Description Blood Left Arm      Special Requests Aerobic and anaerobic bottles received   75   Culture Micro No growth   225            Methicillin resistant staph aureus cult [[id number]]  Resulted: 11/25/18 0753, Result status: Final result    Ordering provider: Chase Conley MD  11/23/18 0108 Resulting lab: [facility]    Specimen Information    Type Source Collected On   Nares  11/23/18 0105          Components       Value Reference Range Flag Lab   Specimen Description Nares      Culture Micro No MRSA isolated   225            Testing Performed By     Lab - Abbreviation Name Director Address Valid Date Range    12 - [facility] Unknown [address] [facility] [address] 05/08/15 1057 - Present    75 - Unknown [facility] Unknown [address] [address] 01/15/15 1019 - Present    225 - Unknown [facility] Unknown [address] [facility] [address] 12/19/14 0954 - Present            Unresulted Labs     None      Encounter-Level Documents:     There are no encounter-level documents.      Order-Level Documents:     There are no order-level documents.

## 2018-11-21 NOTE — IP AVS SNAPSHOT
` [place] [address] MEDICAL SURGICAL: [phone]                                              INTERAGENCY TRANSFER FORM - NURSING   2018                    Hospital Admission Date: 2018  FELIZ HAMPTON   : 1931  Sex: Female        Attending Provider: Leonard Martínez MD     Allergies:  Amoxicillin, Codeine, Melatonin    Infection:  None   Service:  HOSPITALIST    Ht:  1.524 m (5')   Wt:  45.5 kg (100 lb 5 oz)   Admission Wt:  43.1 kg (95 lb)    BMI:  19.59 kg/m 2   BSA:  1.39 m 2            Patient PCP Information     Provider PCP Type    Dez Urban MD, MD General      Current Code Status     Date Active Code Status Order ID Comments User Context       Prior      Code Status History     Date Active Date Inactive Code Status Order ID Comments User Context    2018  9:19 AM  DNR/DNI [id number]  Jace Pacheco MD Outpatient    2018  8:42 AM 2018  9:19 AM DNR/DNI [id number] Code status discussion is appropriately documented in the chart. Indra Mccann MD Inpatient    2018 10:59 PM 2018  8:42 AM DNR/DNI [id number] Discussed with lamont Lackey and Leonard Freeman MD Inpatient    2018  9:37 AM 2018 10:59 PM Full Code [id number]  Indra Mccann MD Outpatient    2018  1:24 PM 2018  9:37 AM Full Code [id number]  Balwinder Wallace III, MD Inpatient    2018  2:50 AM 2018  6:17 PM Full Code [id number]  Miya Cohn MD Inpatient    10/11/2016 11:07 AM 2018  2:50 AM Full Code [id number]  Gabriele Fraser MD Outpatient    10/6/2016  1:46 PM 10/11/2016 11:07 AM DNR/DNI [id number]  Jarrett Rodrigues DO Inpatient    10/5/2016 11:59 PM 10/6/2016  1:46 PM Full Code [id number]  Ricklefs, Prince D, DO Inpatient      Advance Directives        Scanned docmt in ACP Activity?           Yes, scanned ACP docmt        Hospital Problems as of 2018              Priority Class Noted POA    Foot pain, right  Medium  11/21/2018 Yes      Non-Hospital Problems as of 11/28/2018              Priority Class Noted    Fracture, pelvis closed (H) Medium  10/1/2009    HYPERLIPIDEMIA LDL GOAL <160 Medium  10/31/2010    Achilles bursitis or tendinitis Medium  7/19/2011    Senile osteoporosis Medium  8/29/2013    RLS (restless legs syndrome) Medium  8/29/2013    Abnormal blood sugar Medium  8/29/2013    Atrial fibrillation (H) Medium  10/28/2013    HTN (hypertension) Medium  10/28/2013    Diarrhea Medium  10/5/2016    Edema, unspecified type Medium  2/28/2017    Pain in both knees, unspecified chronicity Medium  4/11/2017    Anemia Medium  6/22/2017    Normochromic normocytic anemia Medium  8/30/2017    PMR (polymyalgia rheumatica) (H) Medium  8/30/2017    MGUS (monoclonal gammopathy of unknown significance) Medium  8/30/2017    Chronic diastolic congestive heart failure (H) Medium  8/30/2017    A-fib (H) Medium  6/4/2018    Atrial fibrillation with RVR (H) Medium  6/21/2018      Immunizations     Name Date      Pneumococcal 23 valent 11/29/13     TD (ADULT, 7+) 06/16/06     TDAP Vaccine (Adacel) 10/28/13          END      ASSESSMENT     Discharge Profile Flowsheet     EXPECTED DISCHARGE     Patient's communication style  spoken language (English or Bilingual) 11/22/18 0630    Expected Discharge Date  -- (TBD > /condo) 11/22/18 1108   FINAL RESOURCES      DISCHARGE NEEDS ASSESSMENT     Resources List  Hospice 11/27/18 0947    Patient/family verbalizes understanding of discharge plan recommendations?  Yes 11/22/18 1126   Other Resources  Other (see comment) (CHF Action Plan) 06/22/18 1523    Medical Team notified of plan?  yes 11/22/18 1126   Hospice  [facility] [phone], Fax: [phone] 11/27/18 0947    Readmission Within The Last 30 Days  no previous admission in last 30 days 11/22/18 1126   SKIN      Anticipated Changes Related to Illness  inability to care for self 11/22/18 1126   Inspection of bony  prominences  Focused Inspection (identify areas inspected) 11/27/18 2252    Equipment Currently Used at Home  walker, rolling 06/27/18 1117   Focused inspection of bony prominences  Coccyx 11/27/18 2252    Transportation Available  family or friend will provide 11/22/18 1126   Full except areas not inspected   Scapula, left;Scapula, right;Elbow, left;Elbow, right;Spine;Hip, left;Hip, right;Buttock, left;Buttock, right;Sacrum;Coccyx;Occiput 11/26/18 1451    # of Referrals Placed by CTS  Communication hand-offs to next level of Care Providers 06/26/18 1105   Inspection under devices  Full except (identify device(s) not inspected) 11/27/18 2252    Does Patient Need a Referral for Clinic CC  Yes 06/22/18 1523   Not Inspected under devices  Blood pressure cuff 11/26/18 1451    New Steerage to  Clinics?  No 11/22/18 1124   Skin WDL  ex 11/27/18 2252    Primary Care Clinic Name  [facility] 11/22/18 1124   Skin Color/Characteristics  bruised (ecchymotic);suman;redness blanchable 11/27/18 2252    Primary Care MD Name  Dr. Urban 11/22/18 1124   Skin Temperature  warm 11/27/18 1259    GASTROINTESTINAL (ADULT,PEDIATRIC,OB)     Skin Moisture  dry 11/27/18 1259    GI WDL  WDL 11/27/18 1016   Skin Elasticity  slow return to original state 11/27/18 1259    Abdominal Appearance  nondistended 11/27/18 1016   Skin Integrity  bruise(s);excoriation 11/27/18 1259    All Quadrants Bowel Sounds  hypoactive;faint 11/27/18 1016   SAFETY      Last Bowel Movement  11/24/18 11/27/18 1016   Safety WDL  WDL 11/27/18 2252    Passing flatus  -- (NICKY) 11/27/18 1016   Safety Equipment  oxygen flowmeter 11/26/18 1451    COMMUNICATION ASSESSMENT     All Alarms  alarm(s) activated and audible 11/27/18 2252                 Assessment WDL (Within Defined Limits) Definitions           Safety WDL     Effective: 09/28/15    Row Information: <b>WDL Definition:</b> Bed in low position, wheels locked; call light in reach; upper side rails  "up x 2; ID band on<br> <font color=\"gray\"><i>Item=AS safety wdl>>List=AS safety wdl>>Version=F14</i></font>      Skin WDL     Effective: 09/28/15    Row Information: <b>WDL Definition:</b> Warm; dry; intact; elastic; without discoloration; pressure points without redness<br> <font color=\"gray\"><i>Item=AS skin wdl>>List=AS skin wdl>>Version=F14</i></font>      Vitals     Vital Signs Flowsheet     VITAL SIGNS     Pain Rating: FLACC (Activity) - Cry  0 11/26/18 0042    Temp  97  F (36.1  C) 11/26/18 0833   Pain Rating: FLACC (Activity) - Consolability  0 11/26/18 0042    Temp src  Axillary 11/26/18 0835   Score: FLACC (activity)  0 11/26/18 0042    Resp  20 11/28/18 0129   CRITICAL-CARE PAIN OBSERVATION TOOL (CPOT)      Pulse  71 11/22/18 2329   Facial Expression  0 11/25/18 2103    Heart Rate  70 11/28/18 0129   Body Movements  0 11/25/18 2103    Pulse/Heart Rate Source  Monitor 11/22/18 2329   Compliance w/ventilator (intubated patients)  Extubated 11/25/18 2103    BP  132/86 11/26/18 0835   Vocalization (extubated patients)  0 11/25/18 2103    BP Location  Left arm 11/26/18 0835   Muscle Tension  0 11/25/18 2103    OXYGEN THERAPY     Total  0 11/25/18 2103    SpO2  94 % 11/28/18 0129   ANALGESIA SIDE EFFECTS MONITORING      O2 Device  Nasal cannula 11/28/18 0129   Side Effects Monitoring: Respiratory Quality  R 11/27/18 1418    Oxygen Delivery  1 LPM 11/28/18 0129   Side Effects Monitoring: Respiratory Depth  N 11/27/18 1418    PACEMAKER     Side Effects Monitoring: Sedation Level  4 11/27/18 1418    Pacemaker  Permanent 11/27/18 1011   ARTURO COMA SCALE      PAIN/COMFORT     Best Eye Response  3-->(E3) to speech 11/26/18 2000    Patient Currently in Pain  denies 11/27/18 0449   Best Motor Response  5-->(M5) localizes pain 11/26/18 2000    Preferred Pain Scale  FLACC (Face Legs Arms Cry Consolability Scale) 11/26/18 0042   Best Verbal Response  5-->(V5) oriented 11/26/18 2000    Patient's Stated Pain Goal  Unable " to Assess 11/26/18 0042   [place] Coma Scale Score  13 11/26/18 2000    0-10 Pain Scale  0 11/24/18 1540   HEIGHT AND WEIGHT      Word Pain Scale  4 11/22/18 1807   Height  -- 11/25/18 2103    Pain Location  Foot 11/22/18 1807   Height Method  Actual 11/21/18 1750    Pain Orientation  Right 11/22/18 1807   Weight  45.5 kg (100 lb 5 oz) 11/24/18 0857    Pain Descriptors  Aching 11/22/18 1807   Weight Method  Bed scale 11/24/18 0522    Pain Management Interventions  pillow support provided 11/22/18 1026   Bed Scale  Standard (fitted sheet, draw sheet/ pad, cover/flat sheet, blanket, two pillows) 11/21/18 2323    Pain Intervention(s)  Medication (See eMAR) 11/22/18 1807   POSITIONING      Response to Interventions  Absence of nonverbal indicators of pain 11/28/18 0357   Body Position  turned;side-lying, left 11/27/18 2249    PAIN ASSESSMENT/FLACC     Head of Bed (HOB)  HOB at 20 degrees 11/27/18 2249    Pain Rating: FLACC (rest) - Face  0 11/27/18 1418   Positioning/Transfer Devices  pillows 11/27/18 2249    Pain Rating: FLACC (rest) - Legs  0 11/27/18 1418   DAILY CARE      Pain Rating: FLACC (rest) - Activity  0 11/27/18 1418   Activity Management  bedrest 11/27/18 2043    Pain Rating: FLACC (rest) - Cry  0 11/27/18 1418   Activity Assistance Provided  assistance, 2 people 11/27/18 2249    Pain Rating: FLACC (rest) - Consolability  0 11/27/18 1418   ECG      Score: FLACC (rest)  0 11/27/18 1418   ECG Rhythm  Paced rhythm 11/25/18 2103    Pain Rating: FLACC (Activity) - Face  0 11/26/18 0042   Ectopy  None 11/25/18 0008    Pain Rating: FLACC (Activity) - Legs  0 11/26/18 0042   Lead Monitored  Lead II;V 1 11/25/18 0354    Pain Rating: FLACC (Activity) - Activity  0 11/26/18 0042                 Patient Lines/Drains/Airways Status    Active LINES/DRAINS/AIRWAYS     Name: Placement date: Placement time: Site: Days: Last dressing change:    Urethral Catheter Double-lumen 11/23/18   0130   Double-lumen   5     Wound  11/21/18 Left;Upper Thigh 11/21/18   2312   Thigh   6     Wound Right;Anterior Foot Abrasion(s) blistering with small abrasion       Foot                Patient Lines/Drains/Airways Status    Active PICC/CVC     Name: Placement date: Placement time: Site: Days: Additional Info Last dressing change:    PICC Triple Lumen 11/23/18 Right Basilic medication drips 11/23/18   1030   Basilic   4 External Cath Length (cm): 3 cm   11/26/18 1030 (47.10 hrs)         Size (Fr): 5 Fr            Orientation: Right            Extremity Circumference (cm): 18 cm            Catheter Brand: Vint Training power provena            Dressing Intervention: Chlorhexidine patch;Transparent;Securing device;New dressing            Description: Valved;Power PICC            Total Catheter Length (cm) Trimmed: 37 cm            Site Prep: Chlorhexidine            Local Anesthetic: Injectable            Inserted by: miranda mayo [email or web address]            Insertion attempts with ultrasound: 1            Patient Tolerance: Tolerated well            Placement Verification: Blood Return;Xray            Difficulty with threading line: No            Tip location: SVC/RA Junction            Full barrier precautions done: Yes            Consent Signed: Yes            Time Out performed: Yes            Lot #: [id number]            Use for : medication drips               Intake/Output Detail Report     Date Intake       Output Net    Shift P.O. I.V. IV Piggyback Colloid Total Urine Total       Noc 11/26/18 2300 - 11/27/18 0659 -- -- -- -- -- 400 400 -400    Day 11/27/18 0700 - 11/27/18 1459 -- -- -- -- -- -- -- 0    Irene 11/27/18 1500 - 11/27/18 2259 -- -- -- -- -- -- -- 0    Noc 11/27/18 2300 - 11/28/18 0659 -- -- -- -- -- 450 450 -450    Day 11/28/18 0700 - 11/28/18 1459 -- -- -- -- -- -- -- 0      Last Void/BM       Most Recent Value    Urine Occurrence     Stool Occurrence 1 at 11/24/2018 0000      Case Management/Discharge Planning     Case  Management/Discharge Planning Flowsheet     REFERRAL INFORMATION     COPING/STRESS      Did the Initial Social Work Assessment result in a Social Work Case?  Yes 11/22/18 1124   Major Change/Loss/Stressor  none 11/22/18 0631    Admission Type  inpatient 11/24/18 1633   EXPECTED DISCHARGE      Arrived From  home or self-care 11/24/18 1633   Expected Discharge Date  -- (TBD > /condo) 11/22/18 1108    Referral Source  physician 11/24/18 1633   DISCHARGE PLANNING      New Steerage to [facility]?  No 11/22/18 1124   Patient/family verbalizes understanding of discharge plan recommendations?  Yes 11/22/18 1126    # of Referrals Placed by CTS  Communication hand-offs to next level of Care Providers 06/26/18 1105   Medical Team notified of plan?  yes 11/22/18 1126    Reason For Consult  discharge planning 11/24/18 1633   Readmission Within The Last 30 Days  no previous admission in last 30 days 11/22/18 1126    Record Reviewed  clinical discipline documentation;history and physical;medical record 11/24/18 1633   Anticipated Changes Related to Illness  inability to care for self 11/22/18 1126    CTS Assigned to Case  [place]  11/24/18 1633   Transportation Available  family or friend will provide 11/22/18 1126    Primary Care Clinic Name  [facility] 11/22/18 1124   Does Patient Need a Referral for Clinic CC  Yes 06/22/18 1523    Primary Care MD Name  Dr. Urban 11/22/18 1124   FINAL RESOURCES      LIVING ENVIRONMENT     Equipment Currently Used at Home  walker, rolling 06/27/18 1117    Lives With  alone 11/24/18 1633   Resources List  Hospice 11/27/18 0947    Living Arrangements  condominium 11/24/18 1633   Other Resources  Other (see comment) (CHF Action Plan) 06/22/18 1523    Provides Primary Care For  no one, unable/limited ability to care for self 11/24/18 1633   Hospice  [facility] [phone], Fax: [phone] 11/27/18 0975    Quality Of Family Relationships   supportive;helpful;involved 11/24/18 1633   ABUSE RISK SCREEN      Able to Return to Prior Living Arrangements  yes 11/22/18 1124   QUESTION TO PATIENT:  Has a member of your family or a partner(now or in the past) intimidated, hurt, manipulated, or controlled you in any way?  no 11/21/18 1750    HOME SAFETY     QUESTION TO PATIENT: Do you feel safe going back to the place where you are living?  yes 11/21/18 1750    Patient Feels Safe Living in Home?  yes 11/22/18 1124   OBSERVATION: Is there reason to believe there has been maltreatment of a vulnerable adult (ie. Physical/Sexual/Emotional abuse, self neglect, lack of adequate food, shelter, medical care, or financial exploitation)?  no 11/21/18 1750    ASSESSMENT OF FAMILY/SOCIAL SUPPORT     OTHER      Marital Status   11/22/18 1126   Are you depressed or being treated for depression?  No 11/22/18 0632    Who is your support system?  Children 11/24/18 1633   HOMICIDE RISK      Description of Support System  Supportive;Involved 11/24/18 1633   Feels Like Hurting Others  no 11/22/18 0631    Support Assessment  Adequate family and caregiver support;Adequate social supports 11/24/18 1633

## 2018-11-21 NOTE — IP AVS SNAPSHOT
` `     [place] [address] MEDICAL SURGICAL: [phone]                 INTERAGENCY TRANSFER FORM - NOTES (H&P, Discharge Summary, Consults, Procedures, Therapies)   2018                    Hospital Admission Date: 2018  FELIZ HAMPTON   : 1931  Sex: Female        Patient PCP Information     Provider PCP Type    Dez Urban MD, MD General         History & Physicals      H&P signed by Leonard Martínez MD at 2018  2:09 PM      Author:  Leonard Martínez MD Service:  Hospitalist Author Type:  Physician    Filed:  2018  2:09 PM Date of Service:  2018 11:11 PM Creation Time:  2018 11:49 PM    Status:  Signed :  Leonard Martínez MD (Physician)         Admitted:     2018      CHIEF COMPLAINT:  Right foot pain.      HISTORY OF PRESENT ILLNESS:  Mrs. Hampton is an 87-year-old woman who initially injured the top of her right foot about 2 weeks ago.  She was seen in the Emergency Department.  There was bruising and concern for infection and she was treated with a course of Keflex.  Her foot subsequently returned to normal.  Her history is obtained with the help of the patient's sons and the ER physician as the patient is quite sleepy after receiving pain medication.  Her son indicated that she was at her house last night and was helping her put on her socks and her feet looked okay.  This afternoon, her other son went to see her to bring to her doctor's appointment.  She was having severe pain in the right foot and it was swollen with darkened skin on the dorsum with surrounding erythema on the toes.  She could barely walk and could not bear weight on the foot.  She apparently did not recall having any injuries overnight.      PAST MEDICAL HISTORY:   1.  Chronic bilateral lower extremity edema.  She follows in Cardiology Clinic and has had problems with worsening renal function with increasing diuretics.   2.  Chronic kidney disease.  Her  recent creatinine seems to have run from 1.1-1.3.   3.  Osteoporosis.   4.  Restless leg syndrome.   5.  Polymyalgia rheumatica.   6.  History of atrial fibrillation with AV ablation and pacemaker in 07/2018.   7.  Diastolic heart failure.  Echocardiogram 11/02/2018 showed a recovered ejection fraction to 50% -55% from previous 35%-40%, left ventricular hypertrophy, severe aortic regurgitation, severe tricuspid regurgitation and dilated ascending aorta.   8.  Chronic knee pains.   9.  MGUS.   10.  Dyslipidemia.   11.  Hypertension.   12.  History of pelvic fracture.   13.  Chronic anemia.   14.  Achilles tendinitis.      MEDICATIONS:   1.  Acetaminophen as needed.   2.  Bumex 1 mg twice a day.   3.  Metoprolol XL 50 mg twice a day.   4.  Multivitamin 1 tab per day.   5.  Preser-Vision multivitamin 1 capsule twice a day.   6.  Potassium chloride 20 mEq daily.   7.  Xarelto 20 mg a day.   8.  Requip 0.5 mg nightly as needed for restless legs.      ALLERGIES:  AMOXICILLIN, CODEINE, MELATONIN.      FAMILY HISTORY:  Her father and sister had coronary artery disease.      SOCIAL HISTORY:  [facility]  Her sons check on her frequently.  She is .  She smoked in the distant past but not for many years.  She occasionally drinks some alcohol.      REVIEW OF SYSTEMS:  A complete review of systems was  attempted and was negative except for the pertinent positives, which can be seen in the history of present illness.      I reviewed previous medical records in Epic.  I discussed the case with the ER physician and the patient's 2 sons.      PHYSICAL EXAMINATION:   VITAL SIGNS:  Blood pressure is 103/49, pulse 70, temperature 98, oxygen saturation 94% on room air.   GENERAL:  She is sleepy and hard of hearing, but arousable.   HEENT AND NECK:    Pupils round and equal.  Conjunctiva, sclerae and lids are within normal limits.  Neck supple with no masses, no thyromegaly.  Oropharynx is pink and moist.   Teeth and lips are unremarkable.   CARDIOVASCULAR:  Heart has a 2/6 systolic murmur, regular rhythm.   LUNGS:  Clear to auscultation bilaterally with normal respiratory effort.   ABDOMEN:  Soft, nontender.  Bowel sounds are active.   EXTREMITIES:  There is 2-3+ bilateral lower extremity pitting edema.  The dorsum of the right foot is blackened and tender with surrounding erythema up to the ankle and to the toes.   NEUROLOGIC:  Difficult to fully assess but she was moving all 4 extremities symmetrically.  Facial musculature is symmetric.  She is hard of hearing.   PSYCHIATRIC:  She is groggy and difficult to communicate.   SKIN:  Please see extremity exam above.      LABORATORY DATA:  Sodium 144, potassium 3.7, chloride 103, bicarb 37, BUN 59, creatinine 1.47, glucose 83.  White blood cell count is 15.3, hemoglobin 12.6, platelets 218.      ASSESSMENT AND PLAN:  Mrs. Dominguez is an 87-year-old woman who presents with severe right foot pain.  She has a history of a previous right foot injury a couple of weeks ago, chronic bilateral extremity edema, chronic kidney disease, osteoporosis, restless legs syndrome, polymyalgia rheumatica, atrial fibrillation with atrioventricular ablation and pacemaker, diastolic heart failure, severe aortic and tricuspid regurgitation, dilated ascending aorta, dyslipidemia, hypertension and chronic knee pains.   1.  Foot pain.  Differential diagnosis includes trauma and injury versus acute infection.  This does not appear to be related to her previous injury a couple of weeks ago.  That had completely healed.  An x-ray was normal at that time.  This seems to have occurred over the past 16 hours.  A CT angiogram was obtained in the ER which showed reasonably decent blood flow to the right foot.  She does have an occluded posterior tibial artery on the left.  We will check an x-ray of the right foot to assess for any fractures.  We will continue for now with empiric antibiotics and monitor  her clinical course.  She is also on anticoagulant which would potentially cause more bleeding and hematoma if she did have injury.  In addition, she will not be able to return to home due to decrease in her mobility.  We will request physical and occupational therapy evaluation as well as social work to help with disposition planning.  She was treated with cefazolin for now, IV.   2.  Code status:  DNR/DNI.  This was confirmed with the patient's sons.   3.  Diastolic heart failure and chronic lower extremity edema.  We will continue with her outpatient regimen of Bumex and metoprolol.  She has not tolerated any increase in diuretics and her creatinine already is elevated above her baseline.   4.  Acute kidney injury.  As mentioned above, she has had problems with volume overload and has not tolerated diuretics.  Her creatinine is up a bit from her baseline.  We will continue with her diuretics as previously ordered and monitor her renal function.  We are going to try to avoid giving IV fluids given her peripheral edema.   5.  History of atrial fibrillation.  As mentioned above, she has had an atrioventricular ablation and pacemaker.  We will continue with Xarelto for now.  If we are worried about increasing hematoma, this may need to be held.         LEONARD DALE MD             D: 2018   T: 2018   MT: TONA      Name:     FELIZ HAMPTON   MRN:      [id number]        Account:      [id number]   :      1931        Admitted:     2018                   Document: [id number]       cc: Dez Urban MD   [DF1.1]   Revision History        User Key Date/Time User Provider Type Action    > DF1.1 2018  2:09 PM Leonard Dale MD Physician Sign                     Discharge Summaries      Discharge Summaries by Jace Pacheco MD at 2018  9:19 AM     Author:  Jace Pacheco MD Service:  Hospitalist Author Type:  Physician    Filed:  2018  9:26 AM Date of  Service:  11/28/2018  9:19 AM Creation Time:  11/28/2018  9:19 AM    Status:  Signed :  Jace Pacheco MD (Physician)           [facility]  Discharge Summary        Ana Cristina Dominguez MRN# [id number]   YOB: 1931 Age: 87 year old     Date of Admission:  11/21/2018  Date of Discharge:[AP1.1]  11/28/2018[AP1.2]  Admitting Physician:  Leonard Martínez MD  Discharge Physician:[AP1.1] Jace Pacheco MD[AP1.2]  Discharging Service: Hospitalist     Primary Provider: Dez Urban  Primary Care Physician Phone Number: [phone]         Discharge Diagnoses/Problem Oriented Hospital Course (Providers):    Ana Cristina Dominguez was admitted on 11/21/2018 by Leonard Martínez MD and I would refer you to their history and physical.  The following problems were addressed during her hospitalization:[AP1.1]    1. Right foot swelling, redness, tenderness likely secondary to soft tissue infection/cellulitis.    2.   Acute on CKD stage 3.    3. Septic shock.    4. A fib.    5. Acute on chronic diastolic HF.     Ana Cristina Dominguez is a 87 year old female admitted on 11/21/2018 with history of chronic bilateral lower extremity edema, chronic kidney disease stage III, polymyalgia rheumatica, arterial fibrillation with AV ablation status post pacemaker placement in July 2018, diastolic congestive heart failure, with recent echo showed ejection fraction of 50-55% with improvement from previous study who presented today with complaint of right foot pain and discoloration.  Patient initially had injury to the top of her right foot about 2 weeks ago at the time she was evaluated there was bruising and concern for infection and she was treated with chronic Keflex.  This time the significant discoloration and redness was noted and in less than 24 hours and patient was brought to the emergency room and admitted on 11/21/2018.  Her hospital course was complicated by severe sepsis last night,  patient was given a dose of vancomycin and levofloxacin and transferred to intensive care unit.  Her renal function deteriorated, urine output decreased, blood pressure is soft but holding.  She was on pressors and now family have opted for comfort care.[AP1.3]        Code Status:      Comfort Care        Brief Hospital Stay Summary Sent Home With Patient in AVS:                Important Results:               Pending Results:[AP1.1]        Unresulted Labs Ordered in the Past 30 Days of this Admission     No orders found from 9/22/2018 to 11/22/2018.[AP1.2]            Discharge Instructions and Follow-Up:[AP1.1]      Follow-up Appointments     Follow-up and recommended labs and tests        Follow up with hospice team[AP1.2]                      Discharge Disposition:      Discharged to home         Discharge Medications:[AP1.1]        Current Discharge Medication List      START taking these medications    Details   atropine 1 % ophthalmic solution Place 1-2 drops under the tongue every hour as needed for other (secretions)  Qty: 1 Bottle, Refills: 0    Associated Diagnoses: Cellulitis of right foot      hypromellose-dextran (ARTIFICAL TEARS) 0.1-0.3 % ophthalmic solution Place 1-2 drops into both eyes every 8 hours as needed for dry eyes  Qty: 30 mL, Refills: 0    Associated Diagnoses: Cellulitis of right foot      LORazepam (ATIVAN) 0.5 MG tablet Place 1-2 tablets (0.5-1 mg) under the tongue every 3 hours as needed for anxiety, seizures or nausea (dyspnea)  Qty: 60 tablet, Refills: 0    Associated Diagnoses: Cellulitis of right foot      morphine sulfate HIGH CONCENTRATE (ROXANOL) 10 mg/0.5 mL (HIGH CONC) solution Place 0.25-0.5 mLs (5-10 mg) under the tongue every 2 hours as needed for shortness of breath / dyspnea or pain  Qty: 1 Bottle, Refills: 0    Associated Diagnoses: Cellulitis of right foot      OLANZapine zydis (ZYPREXA) 5 MG ODT tab Take 1 tablet (5 mg) by mouth every 6 hours as needed for agitation  (delirium, nausea)  Qty: 30 tablet, Refills: 0    Associated Diagnoses: Cellulitis of right foot      ondansetron (ZOFRAN-ODT) 4 MG ODT tab Take 1 tablet (4 mg) by mouth every 6 hours as needed for nausea or vomiting  Qty: 120 tablet, Refills: 0    Associated Diagnoses: Cellulitis of right foot      prochlorperazine (COMPAZINE) 25 MG Suppository Place 0.5 suppositories (12.5 mg) rectally every 12 hours as needed for nausea or vomiting  Qty: 30 suppository, Refills: 0    Associated Diagnoses: Cellulitis of right foot      prochlorperazine (COMPAZINE) 5 MG tablet Take 1 tablet (5 mg) by mouth every 6 hours as needed for vomiting  Qty: 30 tablet, Refills: 0    Associated Diagnoses: Cellulitis of right foot         CONTINUE these medications which have CHANGED    Details   acetaminophen (TYLENOL) 325 MG tablet Take 1-2 tablets (325-650 mg) by mouth every 4 hours as needed for mild pain  Qty: 100 tablet, Refills: 0    Associated Diagnoses: Cellulitis of right foot         STOP taking these medications       bumetanide (BUMEX) 1 MG tablet Comments:   Reason for Stopping:         metoprolol succinate (TOPROL-XL) 50 MG 24 hr tablet Comments:   Reason for Stopping:         Multiple Vitamins-Minerals (MULTIVITAMIN PO) Comments:   Reason for Stopping:         Multiple Vitamins-Minerals (PRESERVISION AREDS PO) Comments:   Reason for Stopping:         Potassium Chloride ER 20 MEQ TBCR Comments:   Reason for Stopping:         rivaroxaban ANTICOAGULANT (XARELTO) 20 MG TABS tablet Comments:   Reason for Stopping:         rOPINIRole (REQUIP) 0.5 MG tablet Comments:   Reason for Stopping:[AP1.2]                 Allergies:[AP1.1]         Allergies   Allergen Reactions     Amoxicillin Rash     Codeine GI Disturbance     Stomach pain     Melatonin Nausea     Eyesburned[AP1.2]           Consultations This Hospital Stay:      Consultation during this admission received from nephrology and podiatry         Condition and Physical on  Discharge:      Discharge condition: Stable   Vitals:[AP1.1] Blood pressure 132/86, pulse 71, temperature 97  F (36.1  C), temperature source Axillary, resp. rate 20, weight 45.5 kg (100 lb 5 oz), SpO2 94 %, not currently breastfeeding.  100 lbs 4.95 oz[AP1.2]      Gen - lethargic.  Lungs - CTA B anteriorly.  Heart - RR,S1+S2 nml, 4/6 systolic murmur.   [AP1.3]         Discharge Time:[AP1.1]        > 30 mins on chart review, exam and documentation.[AP1.3]        Image Results From This Hospital Stay (For Non-EPIC Providers):        Results for orders placed or performed during the hospital encounter of 11/21/18   CTA Abdomen Pelvis Bilat Leg Runoff w Contr    Narrative    CTA ABDOMEN AND PELVIS BILATERAL LEG RUNOFF WITH CONTRAST   11/21/2018  9:10 PM     HISTORY: Right foot concern for ischemia/nec fasc.     TECHNIQUE: Arterial phase images from the diaphragm to the toes.  Radiation dose for this scan was reduced using automated exposure  control, adjustment of the mA and/or kV according to patient size, or  iterative reconstruction technique.    COMPARISON: None.    FINDINGS: The heart is enlarged. There is reflux of contrast in  hepatic veins, compatible with right heart dysfunction. There is a  moderate-sized right pleural effusion. Trace left pleural effusion.  There is a lobular contour to the liver which may be related to  cardiac cirrhosis. The spleen, pancreas, adrenal glands, and kidneys  are unremarkable. There is a large volume of retained colonic stool  suggestive of constipation. No enlarged abdominal or retroperitoneal  lymph nodes. No evidence of bowel obstruction or free air. There is  trace ascites. The uterus is enlarged with several calcifications,  likely related to fibroids.    The aorta caliber is normal. There is minimal aortic atherosclerosis.  The visceral branches of the aorta are patent. Bilateral common,  external, and internal iliac arteries are patent.    The right common femoral artery  and profunda femoris are patent. There  is mild disease of the superficial femoral artery with minimal  stenosis at the abductor canal. There is long segment popliteal artery  stenosis beginning at the level of the joint that is greater than 60%.  Runoff to the right foot with both the anterior and posterior tibial  arteries crossing the ankle.    The left common femoral artery and profunda femoris are patent. There  is mild disease of the superficial femoral artery. There is a long  segment stenosis of the popliteal artery at the level of the joint  that measures approximately 50%. There is marked disease of the runoff  vessels with several areas of high-grade stenosis. The anterior tibial  artery becomes occluded before crossing the ankle. There are several  areas of near occlusion involving the posterior tibial artery.    There is marked edema of both lower legs. No soft tissue gas  demonstrated.      Impression    IMPRESSION:  1. Right heart failure with a right pleural effusion and a small  amount of ascites.  2. Patent aorta and inflow arteries. There is bilateral popliteal  artery stenosis measuring greater than 50%. There is patent  three-vessel runoff on the right with severely diseased runoff  arteries on the left.  3. Marked bilateral lower extremity edema but no soft tissue gas.  4. Cirrhotic appearing liver that may be secondary to right heart  failure.  5. Probable constipation.    DORENE MARADIAGA MD   Foot  XR, G/E 3 views, right    Narrative    RIGHT FOOT THREE VIEWS   11/21/2018 10:54 PM     HISTORY: Ecchymosis and concern for trauma.     COMPARISON: None.      Impression    IMPRESSION: The bones are osteopenic. No fracture or dislocation. Soft  tissue swelling noted over the dorsum of the foot.    DORENE MARADIAGA MD   CT Foot Right w/o Contrast    Narrative    CT RIGHT FOOT WITHOUT CONTRAST   11/22/2018 4:20 PM    HISTORY: Dorsal foot discoloration and swelling, suspect hematoma.  Possible  concurrent cellulitis. Evaluate for fracture. Patient unable  to get MRI due to pacemaker.     COMPARISON: Radiographs on 11/21/2018.    TECHNIQUE: Routine CT imaging was performed to the right foot without  contrast. Radiation dose for this scan was reduced using automated  exposure control, adjustment of the mA and/or kV according to patient  size, or iterative reconstruction technique.     FINDINGS: No acute fracture or osseous lesion is seen. There are  moderate degenerative changes throughout several of the midfoot joint  spaces, most prominent in the second through fifth tarsometatarsal  joints. No definite joint effusion is seen. There is calcification in  the vascular structures. There is prominent soft tissue swelling over  the dorsum of the foot. A distinct focal mass or fluid collection is  not identified, although CT is suboptimal for this. No other soft  tissue abnormality is seen.      Impression    IMPRESSION:  1. Degenerative changes with no fracture or acute osseous abnormality.  2. Prominent soft tissue swelling over the dorsum of the foot. A  distinct mass is not definitely seen, although CT is not optimal for  differentiation between edema and a mass within the extremities. If  there is clinical concern for a hematoma or abscess and further  imaging is needed, an ultrasound could be considered as the presence  or absence of blood flow can help differentiate between cellulitis and  a hematoma/abscess.     JOHAN REYNA MD   US Extremity Non Vascular Right    Narrative    NONVASCULAR ULTRASOUND RIGHT FOOT   11/23/2018 8:31 AM    HISTORY: Swelling and discoloration. Evaluate for hematoma, abscess,  or mass.    COMPARISON: None.      Impression    IMPRESSION: Sonographic examination at the area of clinical concern  along the dorsal aspect of the right foot demonstrates extensive soft  tissue edema. However, no distinct focal mass or fluid collection is  seen. This likely represents cellulitis  with no evidence of a  hematoma, abscess, or mass.     JOHAN REYNA MD   XR Chest Port 1 View    Narrative    CHEST ONE VIEW PORTABLE   11/23/2018 10:45 AM     HISTORY: PICC.     COMPARISON: 11/13/2018      Impression    IMPRESSION: Tip of the right-sided PICC is projected over the atrial  caval junction. The heart is markedly enlarged. There is interstitial  edema and small bilateral pleural effusions. Left-sided pacer device  unchanged.    DORENE MARADIAGA MD           Most Recent Lab Results In EPIC (For Non-EPIC Providers):[AP1.1]      Results for orders placed or performed during the hospital encounter of 11/21/18 (from the past 24 hour(s))   Creatinine   Result Value Ref Range    Creatinine 2.37 (H) 0.52 - 1.04 mg/dL    GFR Estimate 19 (L) >60 mL/min/1.7m2    GFR Estimate If Black 23 (L) >60 mL/min/1.7m2[AP1.3]            Revision History        User Key Date/Time User Provider Type Action    > AP1.3 11/28/2018  9:26 AM Jace Pacheco MD Physician Sign     AP1.2 11/28/2018  9:20 AM Jcae Pacheco MD Physician      AP1.1 11/28/2018  9:19 AM Jace Pacheco MD Physician                      Consult Notes      Consults by Sharda Perry at 11/24/2018  4:41 PM     Author:  Sharda Perry Service:  (none) Author Type:      Filed:  11/24/2018  4:41 PM Date of Service:  11/24/2018  4:41 PM Creation Time:  11/24/2018  4:33 PM    Status:  Signed :  Sharda Perry ()     Consult Orders:    1. Social Work IP Consult [[id number]] ordered by Esau Victoria MD at 11/23/18 1015                Care Transition Initial Assessment -   Reason For Consult: discharge planning  Met with: Patient's son Luis and Ziyad  Active Problems:    Foot pain, right       DATA  Lives With: alone  Living Arrangements: condominium  Description of Support System: Supportive, Involved  Who is your support system?: Children  Support Assessment: Adequate family and caregiver support,  Adequate social supports.   Identified issues/concerns regarding health management:             Quality Of Family Relationships: supportive, helpful, involved  Transportation Available: family or friend will provide    ASSESSMENT  Cognitive Status:  awake, alert and oriented  Concerns to be addressed: Patient sons reported patient was fairly independent before this hospitalization. They reported they were looking into assisted living facilities. Writer discussed options for discharge as their mother is on comfort care. Patient son reported they would like a place their mother can be well cared for. Discussed hospice homes. Patient's son reported they would interested in place in area. Informed [facility] hosp[facility] homes. Patient son reported that would a great options. Writer informed usually hospice homes are a cost to the family. Patient sons reported they would like to find out extract cost before they make a decision. Writer informed pt son writer will contact the lodges and let them know of cost. Writer called the [facility] and spoke with Merlene who reported they have a bed open in [place] and cost is $395 a day. Merlene requested for writer call [phone] for referral. Met patient's sons and informed them of cost. Patient son reported they will think about this tonight and get back to writer tomarrow.     PLAN  Continue to follow for discharge planning.[FM1.1]          Revision History        User Key Date/Time User Provider Type Action    > FM1.1 11/24/2018  4:41 PM Sharda Perry  Sign            Consults by David Figueredo MD at 11/23/2018  1:32 PM     Author:  David Figueredo MD Service:  Infectious Disease Author Type:  Physician    Filed:  11/23/2018  5:11 PM Date of Service:  11/23/2018  1:32 PM Creation Time:  11/23/2018  1:31 PM    Status:  Signed :  David Figueredo MD (Physician)     Consult Orders:    1. Infectious Diseases IP Consult: Patient to be seen: Routine  - within 24 hours; sepsis, foot infection; Consultant may enter orders: Yes [[id number]] ordered by Indra Mccann MD at 11/23/18 0751                [facility]    Infectious Disease Consultation     Date of Admission:  11/21/2018  Date of Consult (When I saw the patient): 11/23/18    Assessment & Plan   Ana Cristina Dominguez is a 87 year old female who was admitted on 11/21/2018.     Impression:[DS1.1]  1. 87 y.o female with multiple co morbidities.   2. Polymyalgia rheumatica, CKD stage III, DANNIELLE currently.   3. Pacemaker in place   4. Admitted now with red, swollen right foot with overlying skin necrosis, concerning for infection.     Recommendations:   Would recommend treating the foot as infected with ancef and clindamycin, avoid vancomycin.   Keep the foot elevated[DS1.2]     David Figueredo MD    Reason for Consult   Reason for consult: I was asked by Dr. Mccann  to evaluate this patient for right foot infection.    Primary Care Physician   Dez Urban MD    Chief Complaint   Right foot infection     History is obtained from the patient and medical records    History of Present Illness   Ana Cristina Dominguez is a 87 year old female with chronic bilateral lower extremity edema, chronic kidney disease stage III, polymyalgia rheumatica, arterial fibrillation with AV ablation status post pacemaker placement in July 2018, admitted with right foot pain and discoloration.  Patient initially had injury to the top of her right foot about 2 weeks ago at the time she was evaluated there was bruising and concern for infection and she was treated with  Keflex.  This time the significant discoloration and redness was noted and in less than 24 hours and patient was brought to the emergency room and admitted on 11/21/2018.     Past Medical History   I have reviewed this patient's medical history and updated it with pertinent information if needed.   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  Past Surgical History:   Procedure Laterality Date     APPENDECTOMY       bi ventricular pacemaker  07/16/2018     CATARACT IOL, RT/LT  1/19/09    right     H ABLATION AV NODE  07/16/2018     HC EXCIS PRIMARY GANGLION WRIST         Prior to Admission Medications   Prior to Admission Medications   Prescriptions Last Dose Informant Patient Reported? Taking?   Multiple Vitamins-Minerals (MULTIVITAMIN PO) 11/20/2018 at Unknown time Self Yes Yes   Sig: Take 1 tablet by mouth every morning   Multiple Vitamins-Minerals (PRESERVISION AREDS PO) 11/20/2018 at Unknown time Self Yes Yes   Sig: Take 1 capsule by mouth 2 times daily    Potassium Chloride ER 20 MEQ TBCR Unknown at Unknown time  No No   Sig: Take 1 tablet (20 mEq) by mouth daily   acetaminophen (TYLENOL) 325 MG tablet  Self Yes Yes   Sig: Take 325-650 mg by mouth every 4 hours as needed for mild pain   bumetanide (BUMEX) 1 MG tablet 11/20/2018 at Unknown time  Yes Yes   Sig: Take 1 mg by mouth 2 times daily   metoprolol succinate (TOPROL-XL) 50 MG 24 hr tablet 11/20/2018 at Unknown time  No Yes   Sig: Take 1 tablet (50 mg) by mouth 2 times daily   rOPINIRole (REQUIP) 0.5 MG tablet   Yes Yes   Sig: Take 0.5 mg by mouth nightly as needed    rivaroxaban ANTICOAGULANT (XARELTO) 20 MG TABS tablet 11/20/2018 at Unknown time  Yes Yes   Sig: Take 1 tablet (20 mg)  by mouth daily (with dinner)      Facility-Administered Medications: None     Allergies   Allergies   Allergen Reactions     Amoxicillin Rash     Codeine GI Disturbance     Stomach pain     Melatonin Nausea     Eyesburned       Immunization History   Immunization History   Administered Date(s) Administered     Pneumococcal 23 valent 11/29/2013     TD (ADULT, 7+) 06/16/2006     TDAP Vaccine (Adacel) 10/28/2013       Social History   I have reviewed this patient's social history and updated it with pertinent information if needed. Ana Cristina Dominguez  reports that she quit smoking about 45 years ago. Her smoking use included Cigarettes. She smoked 0.50 packs per day. She has never used smokeless tobacco. She reports that she drinks alcohol. She reports that she does not use illicit drugs.    Family History   I have reviewed this patient's family history and updated it with pertinent information if needed.   Family History   Problem Relation Age of Onset     C.A.D. Father      Family History Negative Mother      Genitourinary Problems Sister      Renal failure     HEART DISEASE Sister      Rhythm issues       Review of Systems   The 10 point Review of Systems is negative other than noted in the HPI or here.     Physical Exam   Temp: 97.4  F (36.3  C) Temp src: Oral BP: 93/47 Pulse: 71 Heart Rate: 70 Resp: 19 SpO2: 100 % O2 Device: None (Room air) Oxygen Delivery: 2 LPM  Vital Signs with Ranges  Temp:  [97.2  F (36.2  C)-98  F (36.7  C)] 97.4  F (36.3  C)  Pulse:  [70-72] 71  Heart Rate:  [69-78] 70  Resp:  [13-36] 19  BP: ()/(36-75) 93/47  SpO2:  [67 %-100 %] 100 %  93 lbs .55 oz  Body mass index is 18.17 kg/(m^2).    GENERAL APPEARANCE:[DS1.1]  Elderly lady very frail hard of hearing[DS1.2]   EYES: Eyes grossly normal to inspection, PERRL and conjunctivae and sclerae normal  HENT: ear canals and TM's normal and nose and mouth without ulcers or lesions  NECK: no adenopathy, no asymmetry, masses, or scars and  thyroid normal to palpation  RESP: lungs clear to auscultation - no rales, rhonchi or wheezes  CV: regular rates and rhythm, normal S1 S2, no S3 or S4 and no murmur, click or rub  LYMPHATICS: normal ant/post cervical and supraclavicular nodes  ABDOMEN: soft, nontender, without hepatosplenomegaly or masses and bowel sounds normal  MS:[DS1.1] the leg is swollen ( right) red with skin necrosis on top[DS1.2]   SKIN: no suspicious lesions or rashes      Data   Lab Results   Component Value Date    WBC 20.1 (H) 11/23/2018    HGB 11.5 (L) 11/23/2018    HCT 38.0 11/23/2018     (L) 11/23/2018     11/23/2018    POTASSIUM 5.3 11/23/2018    CHLORIDE 102 11/23/2018    CO2 28 11/23/2018    BUN 69 (H) 11/23/2018    CR 2.52 (H) 11/23/2018    GLC 89 11/23/2018    SED 16 11/23/2018    DD 2.3 (H) 06/03/2018    NTBNPI 3762 (H) 09/01/2018    NTBNP 3343 (H) 07/06/2018    TROPI <0.015 09/01/2018    AST 59 (H) 11/23/2018    ALT 34 11/23/2018    ALKPHOS 123 11/23/2018    BILITOTAL 0.9 11/23/2018    INR 1.52 (H) 11/21/2018       Recent Labs  Lab 11/23/18  0105 11/21/18  1833 11/21/18  1819   CULT Culture negative < 24 hours, reincubate No growth after 2 days No growth after 2 days     Recent Labs   Lab Test  11/23/18   0105  11/21/18   1833  11/21/18   1819  10/08/16   1300   CULT  Culture negative < 24 hours, reincubate  No growth after 2 days  No growth after 2 days  No growth[DS1.1]                Revision History        User Key Date/Time User Provider Type Action    > DS1.2 11/23/2018  5:11 PM David Figueredo MD Physician Sign     DS1.1 11/23/2018  1:31 PM David Figueredo MD Physician             Consults by Ronald Spence MD at 11/23/2018  1:10 PM     Author:  Ronald Spence MD Service:  Nephrology Author Type:  Physician    Filed:  11/23/2018  1:45 PM Date of Service:  11/23/2018  1:10 PM Creation Time:  11/23/2018 12:51 PM    Status:  Signed :  Ronald Spence MD (Physician)     Consult Orders:    1. Nephrology  IP Consult: Patient to be seen: ASAP - within 4 hours; Call back #: [phone]; ARF, sepsis, decreased urine output; Consultant may enter orders: Yes [[id number]] ordered by Indra Mccann MD at 11/23/18 1217                RENAL CONSULTATION NOTE    REFERRING MD:  Indra Mccann MD    REASON FOR CONSULTATION:  Anuric renal failure    HPI:[OP1.1]  87 y.o woman with hypertension, severe aortic regurgitation, severe mitral regurgitation, MGUS and polymyalgia rheumatica, who presented on 11/21 with right foot pain and erythema. She was found to have sepsis from right foot cellulitis.  She was started on IV antibiotic and fluid. Lactic acid level peaked at 4.6 and it is currently at 2.1. Systolic BP remains low in the 90s. CXR showed some vascular congestion and pleural effusion. She is DNR/I. IVF is running at 75 ml/hr. She is ~ 2.3 liters positive.     She had a serum creatinine of 0.9 mg/dl in September, which was around her baseline. Scr was 1.58 mg/dl on 10/24, which improved when Bumex and lisinopril were held. Bumex was restrted on 10/29 for lower extremity edema. Scr was 1.18 mg/dl on 11/13 and 1.47 ng/dl on admission on 11/21. It is currently at 2.52 mg/dl. She is not making much urine.     She had a CTA on 11/21.[OP1.2]    ROS:  A complete review of systems was performed and is negative except as noted above.    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     Polymyalgia rheumatica (H)      RLS (restless legs syndrome)      Senile osteoporosis     Reclast 11/16/09, 11/10, 11/11     Systolic heart failure (H)     by echo 3/2018, NM MPI negative for ischemia in 5/2108       PSH:    Past Surgical History:    Procedure Laterality Date     APPENDECTOMY       bi ventricular pacemaker  07/16/2018     CATARACT IOL, RT/LT  1/19/09    right     H ABLATION AV NODE  07/16/2018     HC EXCIS PRIMARY GANGLION WRIST         MEDICATIONS:      sodium chloride 0.9%  1,000 mL Intravenous Once     albumin human         ceFAZolin  1 g Intravenous Q12H     clindamycin  600 mg Intravenous Q8H     metoprolol succinate  50 mg Oral BID     potassium chloride SA  20 mEq Oral Daily     sodium chloride (PF)  10 mL Intracatheter Q7 Days     vancomycin place medina - receiving intermittent dosing  1 each Intravenous See Admin Instructions       ALLERGIES:    Allergies as of 11/21/2018 - Miguel as Reviewed 11/21/2018   Allergen Reaction Noted     Amoxicillin Rash 09/08/2010     Codeine GI Disturbance 06/08/2004     Melatonin Nausea 05/21/2018       FH:    Family History   Problem Relation Age of Onset     C.A.D. Father      Family History Negative Mother      Genitourinary Problems Sister      Renal failure     HEART DISEASE Sister      Rhythm issues       SH:    Social History     Social History     Marital status:      Spouse name: N/A     Number of children: 3     Years of education: N/A     Occupational History      Retired     Social History Main Topics     Smoking status: Former Smoker     Packs/day: 0.50     Types: Cigarettes     Quit date: 5/6/1973     Smokeless tobacco: Never Used     Alcohol use Yes      Comment: Socially     Drug use: No     Sexual activity: No     Other Topics Concern     Exercise Yes     Seat Belt Yes     Social History Narrative       PHYSICAL EXAM:    BP 92/53  Pulse 71  Temp 97.7  F (36.5  C) (Oral)  Resp 25  Wt 42.2 kg (93 lb 0.6 oz)  SpO2 96%  BMI 18.17 kg/m2  GENERAL:[OP1.1] Frail.[OP1.2]   HEENT:  Normocephalic. No gross abnormalities.  Pupils equal.  MMM.    CV: RRR,[OP1.1] +[OP1.2] murmurs, no clicks, gallops, or rubs,[OP1.1] no[OP1.2] edema[OP1.1] LLE[OP1.2]  RESP:[OP1.1] Poor airflow. No  wheezes.[OP1.2]  GI: Abdomen[OP1.1] soft, NT[OP1.2]  MUSCULOSKELETAL:[OP1.1] R foot is pretty red, + tenderness and some edema. LLE no edema. Some pitting edema at the thigh.[OP1.2]   SKIN:[OP1.1] R foot cellulitis.[OP1.2]   NEURO:[OP1.1]  Generalized weakness. Hard of hearing. Awake and answering questions.[OP1.2]   PSYCH: mood good, affect appropriate  LYMPH: No palpable ant/post cervical[OP1.1]   +gamboa catheter[OP1.2]    LABS:      CBC RESULTS:     Recent Labs  Lab 11/23/18  0905 11/23/18  0520 11/23/18  0039 11/22/18  0627 11/21/18  1819   WBC 20.1* 17.3* 22.0* 20.2* 15.3*   RBC 4.27 4.07 4.35 4.16 4.61   HGB 11.5* 10.9* 11.8 11.3* 12.6   HCT 38.0 36.5 39.5 37.0 41.2   * 155 169 180 218       BMP RESULTS:    Recent Labs  Lab 11/23/18  0905 11/23/18  0318 11/23/18  0039 11/22/18  0627 11/21/18  1819    140 141 145* 144   POTASSIUM 5.3 5.7* 5.6* 4.0 3.7   CHLORIDE 102 103 104 105 103   CO2 28 26 26 33* 37*   BUN 69* 69* 71* 61* 59*   CR 2.52* 2.32* 2.29* 1.64* 1.47*   GLC 89 89 76 79 83   KARTHIK 7.6* 7.6* 8.1* 8.1* 8.9       INR  Recent Labs  Lab 11/21/18  1819   INR 1.52*        DIAGNOSTICS:  Reviewed    A/P:[OP1.1]  87 y.o frail woman with multiple co-morbidities, admitted for sepsis from R foot infection, consulted for acute renal failure.     # Baseline serum creatinine is  0.9 mg/dl.     # Anuric renal failure 2/2 septic shock and contrast exposure. UA with abundant hyaline casts.     # Septic shock 2/2 R fight infection. Perfusion pressure is soft. LA level is improving but still high. On clindamycin, Keflex and vancomycin?    # Pulm edema. CXR showed vascular congestion. She is DNR/I.     # NICM. EF 50-55%, mild decrease RV function    # Severe AR and TR.     # Atria fibrillation s/p ablation and pacemaker.     Plan.   # Keep MAP >65  # Consider pressors support  # Decrease IVF to 50 ml/hr given vascular congestion on CXR and she is DNR/I  # Can try intermittent 5% albumin but be care with IVF as  she is DNR/I  # Repeat renal panel at 1800 to monitor  # Stop Toprol and potassium  # I had a discussed with the three sons at the bedside. They don't think renal replacement therapy is something that their mother would want, which I agree given her co-morbidities.   # 60 minutes was spent on this case, >50% on counseling    O[OP1.2]aubrey Spence MD  [facility] - Nephrology  Office Phone: [phone]  Pager: [phone][OP1.1]     Revision History        User Key Date/Time User Provider Type Action    > OP1.2 11/23/2018  1:45 PM Ronald Spence MD Physician Sign     OP1.1 11/23/2018 12:51 PM Ronald Spence MD Physician             Consults by Carlos Manuel Cardoza DPM at 11/22/2018  3:09 PM     Author:  Carlos Manuel Cardoza DPM Service:  Surgery Author Type:  Podiatrist    Filed:  11/22/2018  4:26 PM Date of Service:  11/22/2018  3:09 PM Creation Time:  11/22/2018  2:53 PM    Status:  Addendum :  Carlos Manuel Cardoza DPM (Podiatrist)     Consult Orders:    1. Podiatry IP Consult: Patient to be seen: Routine - within 24 hours; foot pain, swelling, after recent minor trauma, cellulitis; Consultant may enter orders: Yes [[id number]] ordered by Indra Mccann MD at 11/22/18 1104                [facility]  November 22, 2018      ASSESSMENT:    R foot dorsal edema, discoloration, erythema -- suspect hematoma from occult trauma, though concurrent cellulitis also[MP1.1] suspected[MP1.2]     PLAN:  Reviewed patient's chart in epic.  Discussed condition and treatment options including pros and cons with pt and family.    -Advised CT scan[MP1.1] of R foot[MP1.3] to further eval for occult fracture, hematoma.  Pt has been on[MP1.1] X[MP1.3]arelto.  MRI not an option due to pacemaker.  -Doubt acute necrotizing infection.  No gas noted on imaging.  Continue IV abx, however, as some concurrent cellulitis may be present.[MP1.1]  Elevated WBC noted.[MP1.2]   Consider abscess, but clinically this presents as hematoma.[MP1.4]    -CT angio suggests adequate flow to R foot, so doubt acute limb ischemia.  Limb is warm.  Skin atrophic, fragile.  She is certainly at risk for necrosis, healing complications.  This can lead to amputation.  -Pt may need[MP1.1] procedure/[MP1.3]drainage of this area pending progress, results.  -Hold[MP1.1] X[MP1.3]arelto for now.  -ESR and CRP in AM.[MP1.1]  -Elevate foot.[MP1.3]  -Will f/u on results tomorrow  -Thank you for the consult.     Carlos Manuel Cardoza DPM, FACFAS  Pager: [phone]      PATIENT HISTORY:  Ana Cristina Dominguez is a 87 year old female who was admitted for R foot pain and discoloration.  Family present including pt's son and niece, who provide most history.  2 days ago pt's son, who is present, noted quite a bit of swelling in her legs with fluid extravasation, but no evidence of trauma.  His brother saw her the next day and noted the R foot discoloration.  She was taken to the ED.  XR was negative for fx.  Pt denies any trauma that may have caused this, but she is not the best historian.  Pt is comfortable at this time.[MP1.1]  Normally on Xarelto.[MP1.3]    Review of Systems:  Patient denies fever, chills.  Rest of 10 pt ROS neg except for HPI.     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     Polymyalgia rheumatica (H)      RLS (restless legs syndrome)      Senile osteoporosis     Reclast 11/16/09, 11/10, 11/11     Systolic heart failure (H)     by echo 3/2018, NM MPI negative for ischemia in 5/2108        PAST SURGICAL HISTORY:   Past Surgical History:   Procedure Laterality Date     APPENDECTOMY       bi  ventricular pacemaker  07/16/2018     CATARACT IOL, RT/LT  1/19/09    right     H ABLATION AV NODE  07/16/2018     HC EXCIS PRIMARY GANGLION WRIST          MEDICATIONS:   Current Facility-Administered Medications:      acetaminophen (TYLENOL) tablet 325-650 mg, 325-650 mg, Oral, Q4H PRN, Leonard Martínez MD     bumetanide (BUMEX) tablet 1 mg, 1 mg, Oral, BID, Leonard Martínez MD, 1 mg at 11/22/18 0924     ceFAZolin (ANCEF) intermittent infusion 1 g, 1 g, Intravenous, Q8H, Leonard Martínez MD, 1 g at 11/22/18 0924     clindamycin (CLEOCIN) infusion 600 mg, 600 mg, Intravenous, Q8H, Indra Mccann MD, Last Rate: 100 mL/hr at 11/22/18 1258, 600 mg at 11/22/18 1258     magnesium hydroxide (MILK OF MAGNESIA) suspension 30 mL, 30 mL, Oral, Daily PRN, Leonard Martínez MD     melatonin tablet 1 mg, 1 mg, Oral, At Bedtime PRN, Leonard Martínez MD     metoprolol succinate (TOPROL-XL) 24 hr tablet 50 mg, 50 mg, Oral, BID, Leonard Martínez MD, 50 mg at 11/22/18 0923     naloxone (NARCAN) injection 0.1-0.4 mg, 0.1-0.4 mg, Intravenous, Q2 Min PRN, Leonard Martínez MD     ondansetron (ZOFRAN-ODT) ODT tab 4 mg, 4 mg, Oral, Q6H PRN **OR** ondansetron (ZOFRAN) injection 4 mg, 4 mg, Intravenous, Q6H PRN, Leonard Martínez MD     oxyCODONE IR (ROXICODONE) half-tab 2.5-5 mg, 2.5-5 mg, Oral, Q4H PRN, Leonard Martínez MD, 2.5 mg at 11/22/18 0432     Patient is already receiving anticoagulation with heparin, enoxaparin (LOVENOX), warfarin (COUMADIN)  or other anticoagulant medication, , Does not apply, Continuous PRN, Leonard Martínez MD     potassium chloride SA (K-DUR/KLOR-CON M) CR tablet 20 mEq, 20 mEq, Oral, Daily, Leonard Martínez MD, 20 mEq at 11/22/18 0924     rOPINIRole (REQUIP) tablet 0.5 mg, 0.5 mg, Oral, At Bedtime PRN, Leonard Martínez MD     sodium chloride 0.9% infusion, , Intravenous, Continuous, Indra Mccann MD      ALLERGIES:    Allergies   Allergen Reactions     Amoxicillin Rash     Codeine GI Disturbance     Stomach pain     Melatonin Nausea     Eyesburned        SOCIAL HISTORY:   Social History     Social History     Marital status:      Spouse name: N/A     Number of children: 3     Years of education: N/A     Occupational History      Retired     Social History Main Topics     Smoking status: Former Smoker     Packs/day: 0.50     Types: Cigarettes     Quit date: 5/6/1973     Smokeless tobacco: Never Used     Alcohol use Yes      Comment: Socially     Drug use: No     Sexual activity: No     Other Topics Concern     Exercise Yes     Seat Belt Yes     Social History Narrative        FAMILY HISTORY:   Family History   Problem Relation Age of Onset     C.A.D. Father      Family History Negative Mother      Genitourinary Problems Sister      Renal failure     HEART DISEASE Sister      Rhythm issues        EXAM:Vitals: BP 91/44  Pulse 77  Temp 98.2  F (36.8  C) (Axillary)  Resp 20  Wt 42.9 kg (94 lb 9.6 oz)  SpO2 96%  BMI 18.48 kg/m2  BMI= Body mass index is 18.48 kg/(m^2).    General appearance: Patient is alert and fully cooperative with history & exam.  No sign of distress is noted during the visit.     Psychiatric: Affect is pleasant & appropriate.  Patient appears motivated to improve health.     Respiratory: Breathing is regular & unlabored while sitting.     HEENT: Hearing is intact to spoken word.  Speech is clear.  No gross evidence of visual impairment that would impact ambulation.     Dermatologic: Dorsum of R foot with dark purple discoloration, underlying edema and fluctuance.  Atrophic, fragile skin.  Mild surrounding erythema, marked on skin.  Skin[MP1.1] appears[MP1.3] intact to foot.  No obvious wound.     Vascular:   I could not palpate a R DP due to edema.  R PT 1/4 on palpation.  L foot pedal pulses not palpable.  CFT minimally delayed.  Extremities are warm.     Neurologic: Lower extremity  sensation is intact to light touch.  No evidence of weakness or contracture in the lower extremities.     Musculoskeletal:[MP1.1] R dorsal foot pain with palpation.[MP1.5]  No gross ankle deformity noted.  No foot or ankle joint effusion is noted.         Lab Results   Component Value Date    WBC 20.2 11/22/2018     Lab Results   Component Value Date    RBC 4.16 11/22/2018     Lab Results   Component Value Date    HGB 11.3 11/22/2018     Lab Results   Component Value Date    HCT 37.0 11/22/2018     No components found for: MCT  Lab Results   Component Value Date    MCV 89 11/22/2018     Lab Results   Component Value Date    MCH 27.2 11/22/2018     Lab Results   Component Value Date    MCHC 30.5 11/22/2018     Lab Results   Component Value Date    RDW 20.2 11/22/2018     Lab Results   Component Value Date     11/22/2018           Imaging reviewed with pt/family:    RIGHT FOOT THREE VIEWS   11/21/2018 10:54 PM      HISTORY: Ecchymosis and concern for trauma.      COMPARISON: None.         IMPRESSION: The bones are osteopenic. No fracture or dislocation. Soft  tissue swelling noted over the dorsum of the foot.     DORENE MARADIAGA MD          CTA ABDOMEN AND PELVIS BILATERAL LEG RUNOFF WITH CONTRAST   11/21/2018  9:10 PM      HISTORY: Right foot concern for ischemia/nec fasc.      TECHNIQUE: Arterial phase images from the diaphragm to the toes.  Radiation dose for this scan was reduced using automated exposure  control, adjustment of the mA and/or kV according to patient size, or  iterative reconstruction technique.     COMPARISON: None.     FINDINGS: The heart is enlarged. There is reflux of contrast in  hepatic veins, compatible with right heart dysfunction. There is a  moderate-sized right pleural effusion. Trace left pleural effusion.  There is a lobular contour to the liver which may be related to  cardiac cirrhosis. The spleen, pancreas, adrenal glands, and kidneys  are unremarkable. There is a large  volume of retained colonic stool  suggestive of constipation. No enlarged abdominal or retroperitoneal  lymph nodes. No evidence of bowel obstruction or free air. There is  trace ascites. The uterus is enlarged with several calcifications,  likely related to fibroids.     The aorta caliber is normal. There is minimal aortic atherosclerosis.  The visceral branches of the aorta are patent. Bilateral common,  external, and internal iliac arteries are patent.     The right common femoral artery and profunda femoris are patent. There  is mild disease of the superficial femoral artery with minimal  stenosis at the abductor canal. There is long segment popliteal artery  stenosis beginning at the level of the joint that is greater than 60%.  Runoff to the right foot with both the anterior and posterior tibial  arteries crossing the ankle.     The left common femoral artery and profunda femoris are patent. There  is mild disease of the superficial femoral artery. There is a long  segment stenosis of the popliteal artery at the level of the joint  that measures approximately 50%. There is marked disease of the runoff  vessels with several areas of high-grade stenosis. The anterior tibial  artery becomes occluded before crossing the ankle. There are several  areas of near occlusion involving the posterior tibial artery.     There is marked edema of both lower legs. No soft tissue gas  demonstrated.         IMPRESSION:  1. Right heart failure with a right pleural effusion and a small  amount of ascites.  2. Patent aorta and inflow arteries. There is bilateral popliteal  artery stenosis measuring greater than 50%. There is patent  three-vessel runoff on the right with severely diseased runoff  arteries on the left.  3. Marked bilateral lower extremity edema but no soft tissue gas.  4. Cirrhotic appearing liver that may be secondary to right heart  failure.  5. Probable constipation.     DORENE MARADIAGA MD[MP1.1]     Revision  History        User Key Date/Time User Provider Type Action    > MP1.5 11/22/2018  4:26 PM Carlos Manuel Cardoza, DPM Podiatrist Addend     MP1.4 11/22/2018  3:30 PM Carlos Manuel Cardoza, DPM Podiatrist Addend     MP1.2 11/22/2018  3:18 PM Carlos Manuel Cardoza, DPM Podiatrist Addend     MP1.3 11/22/2018  3:16 PM Carlos Manuel Cardoza, DPM Podiatrist Sign     MP1.1 11/22/2018  2:53 PM Carlos Manuel Cardoza, DPM Podiatrist             Consults by Marian Dooley CM at 11/22/2018  3:08 PM     Author:  Marian Dooley CM Service:  Care Coordinator Author Type:      Filed:  11/22/2018  3:08 PM Date of Service:  11/22/2018  3:08 PM Creation Time:  11/22/2018  3:06 PM    Status:  Signed :  Marian Dooley CM ()     Consult Orders:    1. Care Coordinator IP Consult [[id number]] ordered by Indra Mccann MD at 11/22/18 1506                ALYSHA score is elevated. Will give hand off to clinic RN CTS at time of discharge and assist in making follow up appointments. Patient has had multiple admissions over the last 6 months for LE edema/cellulitis, contusion RLE, Afib RVR X 2, CHF & paroxymal Afib. She was seen in the ED for acute on chronic CHF. She is followed by [facility]. Her current medications include Xarelto. No other high risk factors exist.      Mandi Dooley, RN, BSN, CTS  [facility]  [phone][BS1.1]         Revision History        User Key Date/Time User Provider Type Action    > BS1.1 11/22/2018  3:08 PM Marian Dooley CM  Sign                     Progress Notes - Physician (Notes from 11/25/18 through 11/28/18)      Progress Notes by White, Corinne C, LSW at 11/28/2018  9:28 AM     Author:  White, Corinne C, LSW Service:  (none) Author Type:      Filed:  11/28/2018  9:29 AM Date of Service:  11/28/2018  9:28 AM Creation Time:  11/28/2018  9:28 AM    Status:  Signed :  White, Corinne C, LSW ()         Discharge Planner    Discharge Plans in progress: Pt discharge today to [facility]   Barriers to discharge plan: none  Follow up plan: H/E transport @ 10 am . Order faxed to [phone]       Entered by: Corinne C. White 11/28/2018 9:28 AM[CW1.1]          Revision History        User Key Date/Time User Provider Type Action    > CW1.1 11/28/2018  9:29 AM White, Corinne C, LSW  Sign            Progress Notes by Whitney Conti LICSW at 11/27/2018  4:19 PM     Author:  Whitney Conti LICSW Service:  Hospice Author Type:      Filed:  11/27/2018  4:27 PM Date of Service:  11/27/2018  4:19 PM Creation Time:  11/27/2018  4:19 PM    Status:  Signed :  Whitney Conti LICSW ()         Hospice SW met with pt three sons in preparation for discharge tomorrow morning to [facility]. 86 y/o female who will be admitted to hospice with a primary dx of Septic Shock related to Cellulitis. She is not alert today. Did not participate in the meeting. Hospice SW reviewed the Medicare covered portion of hospice care and clarified for pt sons what  Hospice will do vs [facility] caregivers. They verbalized understanding. Very few questions today as they are on board with the plan and supportive, Writer spoke with Pauly at [facility] to let her know 02 has been ordered and will arrive tonight. Pt will go via stretcher transport tomorrow so no portable 02 is needed at hospital. Two of three sons will meet with Hospice team at [facility] tomorrow at 1pm to complete electronic consents. Coordinated with Ladi HART.     CYDNEY Enamorado  [facility] Social Worker[RW1.1]     Revision History        User Key Date/Time User Provider Type Action    > RW1.1 11/27/2018  4:27 PM Whitney Conti LICSW  Sign            Progress Notes by White, Corinne C, LSW at 11/27/2018  8:07 AM     Author:  White, Corinne C, LSW Service:  (none) Author Type:       Filed:  11/27/2018 11:50 AM Date of Service:  11/27/2018  8:07 AM Creation Time:  11/27/2018  8:07 AM    Status:  Addendum :  White, Corinne C, LSW ()         CM: late entry for family meeting from 11/27/18  Met with Ziyad and Luis. About discharge planning support. Family expressed a lot of frustration that they have not had a conversation with a Doctor about  discharge planning and the need to make a plan now that pt is on comfort care.  Family did not want to agree to any meetings with Hospice or where we could d./c pt to because according to them. No one from the Medical team other than sw spoke with them about needing to make a plan to discharge. They seem to have a lot of questions about prognosis and how things may progress from here    Paged MD to come and speak with family. MD Monday was new to family and pt. Md was wiling to speak with family but due to time, offered to speak with family over the phone.. Family refused this stating that they would only consider a face to face meeting. When asking to set up meeting for vidhya, they were willing to come at 2:00 today. SW attempted to try and get family here sooner understanding that this once again will delay the plan for discharge support    [facility]ed [facility]. Sadaf stated although they had an open bed, that another family was interested in this bed. Therefore we may lost this.  SW will call [facility] as there may be a bed there tomorrow,.    I/P: SW will need to follow up with family after family meeting with MD,   Still need to find location and set up Hospice meeting.[CW1.1]     CM:Sadaf from [facility] called. They spoke with Luis who stated that they would like to have the meeting here with MD at 2 pm, then Hospice meeting at 1530. Set up meeting with  Hospice for d./c for tomorrow.[CW1.2]    P: H/E stretcher set up for 10:00 am For 11/28. On o2 unable to regulate and Hospice, lethargic[CW1.3]        Revision History         User Key Date/Time User Provider Type Action    > [N/A] 11/27/2018 11:50 AM White, Corinne C, LYLEW  Addend     CW1.3 11/27/2018 11:49 AM White, Corinne C, LYLEW  Addend     CW1.2 11/27/2018  9:55 AM White, Corinne C, LSW  Addend     CW1.1 11/27/2018  8:15 AM White, Corinne C, LYLEW  Sign            Progress Notes by Jace Pacheco MD at 11/27/2018 11:30 AM     Author:  Jace Pacheco MD Service:  Hospitalist Author Type:  Physician    Filed:  11/27/2018 11:32 AM Date of Service:  11/27/2018 11:30 AM Creation Time:  11/27/2018 11:30 AM    Status:  Signed :  Jace Pacheco MD (Physician)           [facility]  Hospitalist Progress Note  Name: Ana Cristina Dominguez    MRN: [id number]  Provider:  Jace Pacheco MD  11/27/18    Initial presenting complaint/issue to hospital (Diagnosis): Right foot pain/soft tissue infection/septic shock         Assessment and Plan:      Summary of Stay: Ana Cristina Dominguez is a 87 year old female admitted on 11/21/2018 with history of chronic bilateral lower extremity edema, chronic kidney disease stage III, polymyalgia rheumatica, arterial fibrillation with AV ablation status post pacemaker placement in July 2018, diastolic congestive heart failure, with recent echo showed ejection fraction of 50-55% with improvement from previous study who presented today with complaint of right foot pain and discoloration.  Patient initially had injury to the top of her right foot about 2 weeks ago at the time she was evaluated there was bruising and concern for infection and she was treated with chronic Keflex.  This time the significant discoloration and redness was noted and in less than 24 hours and patient was brought to the emergency room and admitted on 11/21/2018.  Her hospital course was complicated by severe sepsis last night, patient was given a dose of vancomycin and levofloxacin and transferred to intensive care unit.   Her renal function deteriorated, urine output decreased, blood pressure is soft but holding.  She was on pressors and now family have opted for comfort care.      Problem List:   1. Right foot swelling, redness, tenderness likely secondary to soft tissue infection/cellulitis.  -It is not clear if this is full-blown cellulitis or infected hematoma.  -There is leukocytosis, WBC trended upward, no fever.  -CT scan of the foot done which showed prominent soft tissue swelling over the dorsum of the foot, no distinct mass identified.  -Ultrasound done also showed extensive soft tissue edema.  -There was a discussion with podiatry earlier regarding management and surgical intervention but family and patient declined.      -Discontinued antibiotics.  -Comfort care  -Pain controlled      2. Acute renal failure on chronic kidney disease stage III.      -No lab check  -No input output monitoring  -No IV fluids      3. Severe sepsis/septic shock likely secondary to lower extremity infection.  -Clinical status changed to comfort  -No pressors  -No IV fluids  -No IV antibiotics.      4. Acute on chronic diastolic congestive heart failure and right-sided systolic congestive heart failure, possible pulmonary hypertension, tricuspid regurg, and severe aortic regurgitation.  -Discontinue diuretics  -Oral intake for comfort.      5. Arterial fibrillation with recent AV ablation and pacemaker placement.  -Rate controlled.  -Hold Xarelto for now.      -Discontinue tele monitoring.          DVT Prophylaxis: Stop DVT prophylaxis patient is on comfort      Code Status: DNR/DNI, comfort care      Discharge Dispo: After arrangements is done, patient can be discharged on hospice and comfort care.           Interval History:        No fevers/chills. Conference today at 2 pm.                  Physical Exam:      Last Vital Signs:          Heart Rate: 70 Resp: 16 SpO2: 91 % O2 Device: Nasal cannula Oxygen Delivery: 1 LPM[AP1.1]    Intake/Output  "Summary (Last 24 hours) at 11/27/18 1131  Last data filed at 11/27/18 0548   Gross per 24 hour   Intake               50 ml   Output              900 ml   Net             -850 ml     I/O last 3 completed shifts:  In: 50 [P.O.:50]  Out: 1175 [Urine:1175]  Vitals:    11/21/18 1750 11/21/18 2313 11/23/18 0130 11/24/18 0000   Weight: 43.1 kg (95 lb) 42.9 kg (94 lb 9.6 oz) 42.2 kg (93 lb 0.6 oz) 46 kg (101 lb 6.6 oz)    11/24/18 0800   Weight: 45.5 kg (100 lb 5 oz)[AP1.2]       Gen - lethargic.  Lungs - CTA B anteriorly.  Heart - RR,S1+S2 nml, 4/6 systolic murmur.         Medications:      All current medications were reviewed.         Data:      All new lab and imaging data was reviewed.   Labs:  Recent labs and studies reviewed.   Recent Imaging:   No results found for this or any previous visit (from the past 24 hour(s)).[AP1.1]     Revision History        User Key Date/Time User Provider Type Action    > AP1.2 11/27/2018 11:32 AM Jace Pacheco MD Physician Sign     AP1.1 11/27/2018 11:30 AM Jace Pacheco MD Physician             Progress Notes by White, Corinne C, LSW at 11/26/2018 10:40 AM     Author:  White, Corinne C, LSW Service:  (none) Author Type:      Filed:  11/26/2018 10:43 AM Date of Service:  11/26/2018 10:40 AM Creation Time:  11/26/2018 10:40 AM    Status:  Signed :  White, Corinne C, LSW ()         CM; spoke with pt's son Luis about d.c planning and Hospice support. Family reported that they were surprised that we needed to discuss d.c with Hospice. They stated that a MD did not inform them that they needed to address discontinue now that pt was comfort care.    SW did discuss that weekend staff had a conversation with them. They felt this was \" pushy\" and did not understand sw staff were under the direction of the Md to work out a plan    Discussed again with Luis about Hospice support and options. It is clear that pt will not be able to return to home.  "   Luis needs to talk with his sibling and will try and come to [facility] today for face to face meeting with  staff[CW1.1]    Discharge Planner[CW1.2]   Discharge Plans in progress: [facility]. They do have open bed for pt should family agree with plan.   Barriers to discharge plan: Cost per day and need to still set up Hospice meeting with provider  Follow up plan: Updated MD that pt will not be able to discharge today as family did not understand goal for discharge.   Working on plan for hopefull discharge tomorrow        Entered by:[CW1.1] Corinne C. White 11/26/2018 10:42 AM[CW1.2]          Revision History        User Key Date/Time User Provider Type Action    > CW1.2 11/26/2018 10:43 AM White, Corinne C, LSW  Sign     CW1.1 11/26/2018 10:40 AM White, Corinne C, LSW              Progress Notes by Jace Pacheco MD at 11/26/2018  9:14 AM     Author:  Jace Pacheco MD Service:  Hospitalist Author Type:  Physician    Filed:  11/26/2018  9:19 AM Date of Service:  11/26/2018  9:14 AM Creation Time:  11/26/2018  9:14 AM    Status:  Signed :  Jace Pacheco MD (Physician)           [facility]  Hospitalist Progress Note  Name: Ana Cristina Dominguez    MRN: [id number]  Provider:  Jace Pacheco MD  11/26/18    Initial presenting complaint/issue to hospital (Diagnosis): Right foot pain/soft tissue infection/septic shock         Assessment and Plan:      Summary of Stay: Ana Cristina Dominguez is a 87 year old female admitted on 11/21/2018 with history of chronic bilateral lower extremity edema, chronic kidney disease stage III, polymyalgia rheumatica, arterial fibrillation with AV ablation status post pacemaker placement in July 2018, diastolic congestive heart failure, with recent echo showed ejection fraction of 50-55% with improvement from previous study who presented today with complaint of right foot pain and discoloration.  Patient initially had injury  to the top of her right foot about 2 weeks ago at the time she was evaluated there was bruising and concern for infection and she was treated with chronic Keflex.  This time the significant discoloration and redness was noted and in less than 24 hours and patient was brought to the emergency room and admitted on 11/21/2018.  Her hospital course was complicated by severe sepsis last night, patient was given a dose of vancomycin and levofloxacin and transferred to intensive care unit.  Her renal function deteriorated, urine output decreased, blood pressure is soft but holding.  She was on pressors and now family have opted for comfort care.      Problem List:   1. Right foot swelling, redness, tenderness likely secondary to soft tissue infection/cellulitis.  -It is not clear if this is full-blown cellulitis or infected hematoma.  -There is leukocytosis, WBC trended upward, no fever.  -CT scan of the foot done which showed prominent soft tissue swelling over the dorsum of the foot, no distinct mass identified.  -Ultrasound done also showed extensive soft tissue edema.  -There was a discussion with podiatry earlier regarding management and surgical intervention but family and patient declined.     -Discontinued antibiotics.  -Comfort care  -Pain controlled     2. Acute renal failure on chronic kidney disease stage III.     -No lab check  -No input output monitoring  -No IV fluids     3. Severe sepsis/septic shock likely secondary to lower extremity infection.  -Clinical status changed to comfort  -No pressors  -No IV fluids  -No IV antibiotics.     4. Acute on chronic diastolic congestive heart failure and right-sided systolic congestive heart failure, possible pulmonary hypertension, tricuspid regurg, and severe aortic regurgitation.  -Discontinue diuretics  -Oral intake for comfort.     5. Arterial fibrillation with recent AV ablation and pacemaker placement.  -Rate controlled.  -Hold Xarelto for now.     -Discontinue  tele monitoring.         DVT Prophylaxis: Stop DVT prophylaxis patient is on comfort     Code Status: DNR/DNI, comfort care     Discharge Dispo: After arrangements is done, patient can be discharged on hospice and comfort care.            Interval History:        No fevers/chills. Some pain in R foot.                  Physical Exam:      Last Vital Signs:  Temp: 97  F (36.1  C) Temp src: Axillary BP: 132/86   Heart Rate: 70 Resp: 20 SpO2: (!) 88 % O2 Device: None (Room air) Oxygen Delivery: 1.5 LPM[AP1.1]    Intake/Output Summary (Last 24 hours) at 11/26/18 0917  Last data filed at 11/26/18 0629   Gross per 24 hour   Intake               40 ml   Output              650 ml   Net             -610 ml     I/O last 3 completed shifts:  In: 70 [P.O.:40; I.V.:30]  Out: 700 [Urine:700]  Vitals:    11/21/18 1750 11/21/18 2313 11/23/18 0130 11/24/18 0000   Weight: 43.1 kg (95 lb) 42.9 kg (94 lb 9.6 oz) 42.2 kg (93 lb 0.6 oz) 46 kg (101 lb 6.6 oz)    11/24/18 0800   Weight: 45.5 kg (100 lb 5 oz)[AP1.2]       Gen - lethargic.  Lungs - CTA B anteriorly.  Heart - RR,S1+S2 nml, 4/6 systolic murmur.  Abd - soft, NT, ND, + BS.  Ext - + erythema, warmth and ecchymosis R foot on dorsum.         Medications:      All current medications were reviewed.         Data:      All new lab and imaging data was reviewed.   Labs:[AP1.1]    Recent Labs  Lab 11/23/18  0105 11/21/18  1833 11/21/18  1819   CULT No MRSA isolated No growth after 5 days No growth after 5 days       Recent Labs  Lab 11/23/18  0905 11/23/18  0520 11/23/18  0039   WBC 20.1* 17.3* 22.0*   HGB 11.5* 10.9* 11.8   HCT 38.0 36.5 39.5   MCV 89 90 91   * 155 169       Recent Labs  Lab 11/24/18  0513 11/23/18 2120 11/23/18  1515  11/23/18  0039  11/21/18  1819    140 138  < > 141  < > 144   POTASSIUM 5.9* 5.7* 5.8*  < > 5.6*  < > 3.7   CHLORIDE 101 102 101  < > 104  < > 103   CO2 28 28 28  < > 26  < > 37*   ANIONGAP 10 10 9  < > 11  < > 4   * 110* 112*  < >  76  < > 83   BUN 84* 76* 73*  < > 71*  < > 59*   CR 3.21* 3.01* 2.75*  < > 2.29*  < > 1.47*   GFRESTIMATED 14* 15* 16*  < > 20*  < > 34*   GFRESTBLACK 17* 18* 20*  < > 24*  < > 41*   KARTHIK 7.4* 7.4* 7.3*  < > 8.1*  < > 8.9   PROTTOTAL  --   --   --   --  6.1*  --  6.6*   ALBUMIN  --   --   --   --  2.4*  --  3.1*   BILITOTAL  --   --   --   --  0.9  --  1.3   ALKPHOS  --   --   --   --  123  --  156*   AST  --   --   --   --  59*  --  37   ALT  --   --   --   --  34  --  33   < > = values in this interval not displayed.[AP1.3]   Recent Imaging:   No results found for this or any previous visit (from the past 24 hour(s)).[AP1.1]     Revision History        User Key Date/Time User Provider Type Action    > AP1.3 11/26/2018  9:19 AM Jace Pacheco MD Physician Sign     AP1.2 11/26/2018  9:17 AM Jace Pacheco MD Physician      AP1.1 11/26/2018  9:14 AM Jace Pacheco MD Physician             Progress Notes by Indra Mccann MD at 11/25/2018  4:14 PM     Author:  Indra Mccann MD Service:  Hospitalist Author Type:  Physician    Filed:  11/25/2018  4:21 PM Date of Service:  11/25/2018  4:14 PM Creation Time:  11/25/2018  4:14 PM    Status:  Signed :  Indra Mccann MD (Physician)         [facility]  Hospitalist Progress Note[AO1.1]  Indra Mccann MD 11/25/2018[AO1.2]    Reason for Stay (Diagnosis): Right foot pain/soft tissue infection/septic shock         Assessment and Plan:      Summary of Stay: Ana Cristina Dominguez is a 87 year old female with history of chronic bilateral lower extremity edema, chronic kidney disease stage III, polymyalgia rheumatica, arterial fibrillation with AV ablation status post pacemaker placement in July 2018, diastolic congestive heart failure, with recent echo showed ejection fraction of 50-55% with improvement from previous study who presented today with complaint of right foot pain and discoloration.  Patient initially had  injury to the top of her right foot about 2 weeks ago at the time she was evaluated there was bruising and concern for infection and she was treated with chronic Keflex.  This time the significant discoloration and redness was noted and in less than 24 hours and patient was brought to the emergency room and admitted on 11/21/2018.  Her hospital course was complicated by severe sepsis last night, patient was given a dose of vancomycin and levofloxacin and transferred to intensive care unit.  Her renal function deteriorated, urine output decreased, blood pressure is soft but holding.  At this point she did require pressors but is at increased risk of deterioration.     Problem List:   1. Right foot swelling, redness, tenderness likely secondary to soft tissue infection/cellulitis.  -It is not clear if this is full-blown cellulitis or infected hematoma.  -There is leukocytosis, WBC trended upward, no fever.  -Podiatry is consulted  -We will hold anticoagulation for now, in case surgical intervention of the foot is needed.  -Unable to do MRI as patient has permanent pacemaker placed recently.  -CRP significantly elevated is 220  -Continue Ancef and Clindamycin.  -Follow blood cultures  -Elevate the leg, the edema is better with elevation.  -CT scan of the foot done which showed prominent soft tissue swelling over the dorsum of the foot, no distinct mass identified.  -Ultrasound done also showed extensive soft tissue edema.  -There was a discussion with podiatry earlier regarding management and surgical intervention but family and patient declined.    -Discontinued antibiotics.  -Comfort care  -Pain controlled    2. Acute renal failure on chronic kidney disease stage III.  -Creatinine is increasing from baseline of 1.1 to 2.32  -Urine output is decreasing  -Patient was on Bumex which was stopped  -Nephrology consulted awaiting input  -We will try gentle hydration with D5 NS, and will give a dose of IV albumin x1  -There  is increased risk of fluid overload given her underlying congestive heart failure.  -BMP later this afternoon, and in a.m.    -No lab check  -No input output monitoring  -No IV fluids    3. Severe sepsis/septic shock likely secondary to lower extremity infection.  -Patient was given bolus of IV fluids gently due to congestive heart failure and significant valvular disease and fluid overload  -Patient was on clindamycin and Ancef for cellulitis  -She got a dose of vancomycin and also renal dose of levofloxacin last night  -Infectious diseases consulted  -Blood culture done so far negative,  -Lactic acid is improving.  -Procardia is elevated,11.9.  -PICC line ordered to secure IV access in case patient needs pressors.  -Overall patient is deteriorating  -Clinical status changed to comfort  -No pressors  -No IV fluids  -No IV antibiotics.    4. Acute on chronic diastolic congestive heart failure and right-sided systolic congestive heart failure, possible pulmonary hypertension, tricuspid regurg, and severe aortic regurgitation.  -Ii improved on recent study to 50-55%  -Patient has bilateral lower extremity edema, likely due to congestive heart failure.  -Bumex 1 mg twice a day on hold today due to sepsis and renal failure.  -Daily weight, input and output.  -We will gently hydrate the patient and watch for fluid overload and respiratory distress  -Patient has follow-up with cardiology recently, recommendation noted.    -Discontinue diuretics  -Oral intake for comfort.    5. Arterial fibrillation with recent AV ablation and pacemaker placement.  -Rate controlled.  -Hold Xarelto for now.    -Discontinue tele monitoring.    6. Restless leg syndrome-continue Requip.    7. Hyperkalemia secondary to acute renal failure, on subsequent study potassium is high normal continue to monitor.    8. Polymyalgia rheumatica- At this time she is not on any treatment for this, currently no complaint.    Total time spent with this patient  and coordinating her care is over 60 minutes      DVT Prophylaxis: Stop DVT prophylaxis patient is on comfort    Code Status: DNR/DNI, comfort care    Discharge Dispo: After arrangements is done, patient can be discharged on hospice and comfort care.  If her condition significantly deteriorates, consider end-of-life care in the hospital.    Estimated Disch Date / # of Days-on comfort care, pending social service eval.     May transfer to general medical floor    I discussed with patient's sons today at bedside, he stated that patient is comfortable.           Interval History (Subjective):      Patient seen and examined, she is sleeping and comfortable, restorative measures were stopped yesterday.                  Physical Exam:      Last Vital Signs:[AO1.1]  BP (!) 87/47 (BP Location: Left arm)  Pulse 71  Temp 97.2  F (36.2  C) (Axillary)  Resp 14  Wt 45.5 kg (100 lb 5 oz)  SpO2 94%  BMI 19.59 kg/m2    I/O last 3 completed shifts:  In: 50 [P.O.:20; I.V.:30]  Out: 250 [Urine:250]  Vitals:    11/21/18 1750 11/21/18 2313 11/23/18 0130 11/24/18 0000   Weight: 43.1 kg (95 lb) 42.9 kg (94 lb 9.6 oz) 42.2 kg (93 lb 0.6 oz) 46 kg (101 lb 6.6 oz)    11/24/18 0800   Weight: 45.5 kg (100 lb 5 oz)     Current Facility-Administered Medications   Medication     acetaminophen (TYLENOL) tablet 325-650 mg     atropine 1 % ophthalmic solution 1-2 drop     haloperidol lactate (HALDOL) injection 2 mg     hypromellose-dextran (ARTIFICAL TEARS) 0.1-0.3 % ophthalmic solution 1-2 drop     lidocaine 1 % 0.5-5 mL     LORazepam (ATIVAN) injection 0.5-1 mg    Or     LORazepam (ATIVAN) tablet 0.5-1 mg    Or     LORazepam (ATIVAN) tablet 0.5-1 mg     melatonin tablet 1 mg     morphine (PF) injection 1-2 mg     morphine solution 5-10 mg    Or     morphine sulfate HIGH CONCENTRATE (ROXANOL) 10 mg/0.5 mL (HIGH CONC) solution 5-10 mg     naloxone (NARCAN) injection 0.1-0.4 mg     OLANZapine zydis (zyPREXA) ODT half-tab 2.5-5 mg      ondansetron (ZOFRAN-ODT) ODT tab 4 mg     prochlorperazine (COMPAZINE) injection 5 mg    Or     prochlorperazine (COMPAZINE) tablet 5 mg    Or     prochlorperazine (COMPAZINE) Suppository 12.5 mg     rOPINIRole (REQUIP) tablet 0.5 mg     sodium chloride (PF) 0.9% PF flush 10 mL     sodium chloride (PF) 0.9% PF flush 10-20 mL     sodium chloride (PF) 0.9% PF flush 5-50 mL[AO1.2]       Constitutional:  Lethargic and sleepy, no apparent distress   Respiratory: Clear to auscultation bilaterally, no crackles or wheezing   Cardiovascular: Regular rate and rhythm, normal S1 and S2, and no murmur noted   Abdomen: Normal bowel sounds, soft, non-distended, non-tender   Skin: No rashes, no cyanosis, dry to touch   Neuro:  Lethargic, respond to tactile stimuli, not verbalizing, no weakness, numbness, memory loss   Extremities: +2-3 bilateral lower extremity edema.  +redness, tenderness, slight warmth and also ecchymotic area on the right dorsum of the foot extending to the ankle joint, normal range of motion   Other(s):HEENT Pink, nonicteric, moist oral mucosa       All other systems: Negative          Medications:      All current medications were reviewed with changes reflected in problem list.         Data:      All new lab and imaging data was reviewed.   Labs:[AO1.1]    Recent Labs  Lab 11/23/18  0105 11/21/18  1833 11/21/18  1819   CULT No MRSA isolated No growth after 4 days No growth after 4 days       Recent Labs  Lab 11/24/18  0513 11/23/18  2120 11/23/18  1515    140 138   POTASSIUM 5.9* 5.7* 5.8*   CHLORIDE 101 102 101   CO2 28 28 28   ANIONGAP 10 10 9   * 110* 112*   BUN 84* 76* 73*   CR 3.21* 3.01* 2.75*   GFRESTIMATED 14* 15* 16*   GFRESTBLACK 17* 18* 20*   KARTHIK 7.4* 7.4* 7.3*       Recent Labs  Lab 11/23/18  0905 11/23/18  0520 11/23/18  0039   WBC 20.1* 17.3* 22.0*   HGB 11.5* 10.9* 11.8   HCT 38.0 36.5 39.5   MCV 89 90 91   * 155 169       Recent Labs  Lab 11/24/18  0513 11/24/18  0008  11/23/18 2120 11/23/18  1956 11/23/18  1622 11/23/18  1515 11/23/18  1207 11/23/18  0905 11/23/18  0318   *  --  110*  --   --  112*  --  89 89   BGM  --  103*  --  107* 129*  --  76  --   --[AO1.2]       Imaging:   Results for orders placed or performed during the hospital encounter of 11/21/18   CTA Abdomen Pelvis Bilat Leg Runoff w Contr    Narrative    CTA ABDOMEN AND PELVIS BILATERAL LEG RUNOFF WITH CONTRAST   11/21/2018  9:10 PM     HISTORY: Right foot concern for ischemia/nec fasc.     TECHNIQUE: Arterial phase images from the diaphragm to the toes.  Radiation dose for this scan was reduced using automated exposure  control, adjustment of the mA and/or kV according to patient size, or  iterative reconstruction technique.    COMPARISON: None.    FINDINGS: The heart is enlarged. There is reflux of contrast in  hepatic veins, compatible with right heart dysfunction. There is a  moderate-sized right pleural effusion. Trace left pleural effusion.  There is a lobular contour to the liver which may be related to  cardiac cirrhosis. The spleen, pancreas, adrenal glands, and kidneys  are unremarkable. There is a large volume of retained colonic stool  suggestive of constipation. No enlarged abdominal or retroperitoneal  lymph nodes. No evidence of bowel obstruction or free air. There is  trace ascites. The uterus is enlarged with several calcifications,  likely related to fibroids.    The aorta caliber is normal. There is minimal aortic atherosclerosis.  The visceral branches of the aorta are patent. Bilateral common,  external, and internal iliac arteries are patent.    The right common femoral artery and profunda femoris are patent. There  is mild disease of the superficial femoral artery with minimal  stenosis at the abductor canal. There is long segment popliteal artery  stenosis beginning at the level of the joint that is greater than 60%.  Runoff to the right foot with both the anterior and posterior  tibial  arteries crossing the ankle.    The left common femoral artery and profunda femoris are patent. There  is mild disease of the superficial femoral artery. There is a long  segment stenosis of the popliteal artery at the level of the joint  that measures approximately 50%. There is marked disease of the runoff  vessels with several areas of high-grade stenosis. The anterior tibial  artery becomes occluded before crossing the ankle. There are several  areas of near occlusion involving the posterior tibial artery.    There is marked edema of both lower legs. No soft tissue gas  demonstrated.      Impression    IMPRESSION:  1. Right heart failure with a right pleural effusion and a small  amount of ascites.  2. Patent aorta and inflow arteries. There is bilateral popliteal  artery stenosis measuring greater than 50%. There is patent  three-vessel runoff on the right with severely diseased runoff  arteries on the left.  3. Marked bilateral lower extremity edema but no soft tissue gas.  4. Cirrhotic appearing liver that may be secondary to right heart  failure.  5. Probable constipation.    DORENE MARADIAGA MD   Foot  XR, G/E 3 views, right    Narrative    RIGHT FOOT THREE VIEWS   11/21/2018 10:54 PM     HISTORY: Ecchymosis and concern for trauma.     COMPARISON: None.      Impression    IMPRESSION: The bones are osteopenic. No fracture or dislocation. Soft  tissue swelling noted over the dorsum of the foot.    DORENE MARADIAGA MD[AO1.1]          Revision History        User Key Date/Time User Provider Type Action    > AO1.2 11/25/2018  4:21 PM Indra Mccann MD Physician Sign     AO1.1 11/25/2018  4:14 PM Indra Mccann MD Physician             Progress Notes by Sharda Perry at 11/25/2018  8:12 AM     Author:  Sharda Perry Service:  (none) Author Type:      Filed:  11/25/2018  2:42 PM Date of Service:  11/25/2018  8:12 AM Creation Time:  11/25/2018  8:12 AM    Status:   Addendum :  Sharda Perry ()         Received a voice message from son asking if writer can call [facility] and if out if they have any availability.  Pc to [facility] regarding bed available and cost. Spoke with Vesta who reported cost is $600 per day with a minimum of three days and payment of weeks worth upfront. Vesta reported bed availability for tomorrow.  Pc to Luis patient son to inform him of cost and bed availability. Luis reported he will discuss this with his brother and get back to writer.[FM1.1]    Nurse informed writer that patient will transferred to medical floor when medically ready then hospice decision will made then.[FM1.2]      Revision History        User Key Date/Time User Provider Type Action    > FM1.2 11/25/2018  2:42 PM Sharda Perry  Addend     FM1.1 11/25/2018  8:17 AM Sharda Perry  Sign                  Procedure Notes     No notes of this type exist for this encounter.      Progress Notes - Therapies (Notes from 11/25/18 through 11/28/18)     No notes of this type exist for this encounter.

## 2018-11-21 NOTE — IP AVS SNAPSHOT
` ` Patient Information     Patient Name Sex     Ana Cristina Dominguez ([id number]) Female 1931       Room Bed    [address] [place] [address]      Patient Demographics     Address Phone E-mail Address    [address] [place] [address] [phone] (Home) *Preferred*  [phone] (Mobile) [email or web address]      Patient Ethnicity & Race     Ethnic Group Patient Race    American White      Emergency Contact(s)     Name Relation Home Work Mobile    Luis Dominguez Son [phone]    Ziyad Dominguez Son [phone]      Documents on File        Status Date Received Description       Documents for the Patient    Face Sheet  () 08     Insurance Card  ()      Privacy Notice - [place] Received 03     Insurance Card  () 05     Insurance Card  () 08     External Medication Information Consent Accepted () 09     Face Sheet Received () 09     Face Sheet Received () 07/22/10     External Medication Information Consent Accepted () 07/22/10     Patient ID Received () 11 [place] ex     Consent for Services - Hospital/Clinic Received () 11     Consent for Services - Hospital/Clinic Received () 10/28/10     External Medication Information Consent Accepted () 11     Consent for Services - Hospital/Clinic Received () 11     Advance Directives and Living Will Received  Health Care Directive 3/9/2012    Consent for Services - Hospital/Clinic Received () 12     External Medication Information Consent Accepted 12     Insurance Card Received () 13     Consent for EHR Access  13 Copied from existing Consent for services - C/HOD collected on 2012    [facility] Specified Other       Consent for Services - Hospital/Clinic Received () 10/09/13     HIM [place] Authorization   David Release- 3/14/2014     Advance Directives and Living Will Not Received  Validation of AD- 3/9/2012    Power of  Not Received  Power of - 2012    Consent for Services - Hospital/Clinic Received () 02/16/15     Insurance Card Received 17 [employer]    Consent for Services/Privacy Notice - Hospital/Clinic Received () 16     Patient ID Received 17     Consent to Communicate Received 17     Insurance Card Received () 17     Consent for Services/Privacy Notice - Hospital/Clinic Received () 17     Care Everywhere Prospective Auth Received 17     Insurance Card Received 18 MEDICA PRIME     Patient ID Received 18 [place] 2021    Consent for Services - Hospital and Clinic Received 18     HIE Auth Received 18     Business/Insurance/Care Coordination/Health Form - Patient  18 MOBILITY DEVICE AUTHORIZATION FORM 18 [facility]    Power of  Received (Deleted) 18     Advance Directives and Living Will Received (Deleted) 18        Documents for the Encounter    CMS IM for Patient Signature Received 18       Admission Information     Attending Provider Admitting Provider Admission Type Admission Date/Time    Leonard Martínez MD Frechette, Daniel Bret, MD Emergency 18  1758    Discharge Date Hospital Service Auth/Cert Status Service Area     Hospitalist Incomplete [facility]    Unit Room/Bed Admission Status        5 MEDICAL SURGICAL 0523- Admission (Confirmed)       Admission     Complaint    Foot pain, right      Hospital Account     Name Acct ID Class Status Primary Coverage    Ana Cristina Dominguez [id number] Inpatient Open MEDICARE - MEDICARE FOR HB SUPPLEMENT            Guarantor Account (for Hospital Account #[id number])     Name Relation to Pt Service Area Active? Acct Type    Ana Cristina Dominguez  FCS Yes Personal/Family    Address Phone          [address]  [place] [address] [phone](H)              Coverage Information (for Hospital Account #[id number])     1. MEDICARE/MEDICARE FOR HB SUPPLEMENT     F/O Payor/Plan Precert #    MEDICARE/MEDICARE FOR HB SUPPLEMENT     Subscriber Subscriber #    Ana Cristina Dominguez [id number]    Address Phone    ATTN CLAIMS  [address]  [facility] [place] [address] [phone]          2. MEDICA/MEDICA PRIME SOLUTION     F/O Payor/Plan Precert #    MEDICA/MEDICA PRIME SOLUTION     Subscriber Subscriber #    Ana Cristina Dominguez [id number]    Address Phone    [address] [address]  [place] [address] [phone]

## 2018-11-21 NOTE — LETTER
Transition Communication Hand-off for Care Transitions to Next Level of Care Provider    Name: Ana Cristina Dominguez  : 1931  MRN #: [id number]  Primary Care Provider: Dez Urban MD  Primary Care MD Name: Dr. Urban  Primary Clinic: [facility] [address] [facility] [address]  [facility] [address]  Primary Care Clinic Name: [facility]  Reason for Hospitalization:  Lower extremity edema [R60.0]  Cellulitis of right foot [L03.115]  Admit Date/Time: 2018  5:58 PM  Discharge Date: 18  Payor Source: Payor: MEDICA / Plan: MEDICA PRIME SOLUTION / Product Type: Indemnity /     Readmission Assessment Measure (ALYSHA) Risk Score/category: ELEVATED    Plan of Care Goals/Milestone Events:            Reason for Communication Hand-off Referral: Fragility    Discharge Plan:       Concern for non-adherence with plan of care:  NO  Discharge Needs Assessment:  Needs       Most Recent Value    Anticipated Changes Related to Illness inability to care for self    Transportation Available family or friend will provide    [facility] [phone], Fax: [phone]          Already enrolled in Tele-monitoring program and name of program:  na  Follow-up specialty is recommended: No    Follow-up plan:  Future Appointments  Date Time Provider Department Center   2018 12:30 PM Nancy Molina, LORA [facility]   2018 11:45 AM HEARD TECH1 [facility]       Any outstanding tests or procedures:              Key Recommendations:  ALYSHA ELEVATED. Pt was discharged on Hospice to [facility].  No gaps in care.      K[facility] [place]    AVS/Discharge Summary is the source of truth; this is a helpful guide for improved communication of patient story

## 2018-11-21 NOTE — ED TRIAGE NOTES
Right foot painful and increased swelling x 2 days - patient state foot color is now black. ABC intact alert and no distress. Patient is on blood thinners- sent from clinic.

## 2018-11-21 NOTE — PROGRESS NOTES
SUBJECTIVE:   Ana Cristina Dominguez is a 87 year old female who presents to clinic today for the following health issues:      ED/UC Followup:    Facility:  [facility]  Date of visit: 11/4/18  Reason for visit: Fall, right leg pain  Current Status: it's been getting worse the last two days   Her pain in the dorsum of her foot is getting much more severe, no fever,   Took all of her antibiotics, has oral novel anticoagulent  I reviewed her ER visit of 11/4  Her bilateral foot edema is much worse on the left and has a nodular 3cm by 3cm tender, painful expanding area   Past Medical History:   Diagnosis Date     Aortic regurgitation     mod-severe by echo in 3/2018     Benign essential hypertension      Chronic diastolic heart failure (H)      Other and unspecified hyperlipidemia      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 Father      Family History Negative Mother      Genitourinary Problems Sister      Renal failure     HEART DISEASE Sister      Rhythm issues       Social History   Substance Use Topics     Smoking status: Former Smoker     Packs/day: 0.50     Types: Cigarettes     Quit date: 5/6/1973     Smokeless tobacco: Never Used     Alcohol use Yes      Comment: Socially     /69 (BP Location: Right arm, Patient Position: Chair, Cuff Size: Child)  Pulse 75  Temp 97.5  F (36.4  C) (Oral)  Wt 96 lb (43.5  kg)  SpO2 97%  BMI 18.75 kg/m2  On exam the vital signs are stable  Weight is myocardial infarction   Eyes show anayeli  No neck masses or thyromegaly.Ear nose and throat shows normal   cardiomegaly is noted, and previously documented . Lungs clear, no abdominal masses or organomegaly. No CVA tenderness.  Skin eval right foot dorsum expansile area with progressively increased pain   No adenopathy.  Hematoma with or without infection in a fragile elderly cardiac patient      (T14.8XXA) Hematoma of skin  (primary encounter diagnosis)  Comment:   Plan:     (L03.119,  L02.619) Cellulitis and abscess of foot, except toes  Comment:   Plan:   Contacted [facility] ER triage re: evaluation and potential surgical needs and her son will transfer her

## 2018-11-21 NOTE — IP AVS SNAPSHOT
` `     [place] [address] MEDICAL SURGICAL: [phone]            Medication Administration Report for Ana Cristina Dominguez as of 18 0956   Legend:    Given Hold Not Given Due Canceled Entry Other Actions    Time Time (Time) Time  Time-Action       Inactive    Active    Linked        Medications 18    acetaminophen (TYLENOL) tablet 325-650 mg  Dose: 325-650 mg  Freq: EVERY 4 HOURS PRN Route: PO  PRN Reason: mild pain  Start: 18   Admin Instructions: Maximum acetaminophen dose from all sources = 75 mg/kg/day not to exceed 4 grams/day.    Admin. Amount: 1-2 tablet (1-2 × 325 mg tablet)  Dispense Loc:  [facility] MS5[facility]     (4449)-Not Given [C]                   Dose: 2 mg  Freq: EVERY 2 HOURS Route: IV  Start: 18 1600   End: 18   Admin Instructions: For Central Venous Catheter  Use 10 mL syringe to draw up 2 ML into clotted catheter & allow to dwell for 30 mins, then DRAW BACK and discard contents to assess catheter function.  If still occluded, allow mixture to dwell for an additional 90 mins, then DRAW BACK and discard contents to reassess catheter function.  May repeat dose once if occlusion persists.  Contact provider if 2nd dose is unsuccessful.  Only use a 10 ml syringe to administer the solution into each lumen. For catheters with lumen volumes greater than the volume dispensed, request additional syringe(s) from pharmacy.  To prevent inadvertent embolization of thrombus from the catheter, ALWAYS WITHDRAW alteplase (ACTIVASE) at the end of the dwell time before administering any solution through the catheter.    Admin. Amount: 2 mg  Dispense Loc:  Main Pharmacy  Administrations Remainin          (6473)-Not Given              -Med Discontinued        atropine 1 % ophthalmic solution 1-2 drop  Dose: 1-2 drop  Freq: EVERY 1 HOUR PRN Route: SL  PRN Reason: other  PRN Comment: secretions  Start: 18 0840   Admin.  Amount: 1-2 drop  Last Admin: 18  Dispense Loc:  [facility]  Volume: 2 mL          1747 (1 drop)-Given            haloperidol lactate (HALDOL) injection 2 mg  Dose: 2 mg  Freq: EVERY 6 HOURS PRN Route: IV  PRN Reason: agitation  Start: 18   Admin Instructions: Vials marked for intraMUScular use may be used for intraVENous use in the hospital setting.   For ordered doses up to 5 mg, drug can be give IV Push undiluted over 1 minute.    Admin. Amount: 2 mg = 0.4 mL Conc: 5 mg/mL  Last Admin: 18  Dispense Loc:  [facility]  Infused Over: 1 Minutes  Volume: 1 mL       0131 (2 mg)-Given [C]               hypromellose-dextran (ARTIFICAL TEARS) 0.1-0.3 % ophthalmic solution 1-2 drop  Dose: 1-2 drop  Freq: EVERY 8 HOURS PRN Route: Both Eyes  PRN Reason: dry eyes  Start: 18   Admin Instructions: Offer every shift.    Admin. Amount: 1-2 drop  Last Admin: 18  Dispense Loc:  Main Pharmacy  Volume: 15 mL       1252 (1 drop)-Given        0824 (1 drop)-Given              lidocaine 1 % 0.5-5 mL  Dose: 0.5-5 mL  Freq: ONCE PRN Route: OTHER  PRN Comment: mild pain For local anesthetic during PICC insertion.  Start: 18   Admin Instructions: Give Sub-Q/Intradermal.  Give in divided doses as needed.    Admin. Amount: 0.5-5 mL  Dispense Loc: [facility] Floor Stock  Administrations Remainin  Volume: 5 mL               LORazepam (ATIVAN) injection 0.5-1 mg  Dose: 0.5-1 mg  Freq: EVERY 4 HOURS PRN Route: IV  PRN Reasons: anxiety,seizures,nausea  PRN Comment: dyspnea  Start: 18   Admin Instructions: Avoid if delirium present.  Use fourth line for nausea.  For IV PUSH: Dilute with equal volume of NS. For ordered IV doses 0.1-4 mg give IV Push. Administer each 2mg over 1-5 minutes.    Admin. Amount: 0.5-1 mg = 0.25-0.5 mL Conc: 2 mg/mL  Last Admin: 18 0644  Dispense Loc:  [facility]  Volume: 1 mL   Current Line: PICC Triple Lumen 18 Right Basilic medication  drips (White)         0644 (0.5 mg)-Given           Or  LORazepam (ATIVAN) tablet 0.5-1 mg  Dose: 0.5-1 mg  Freq: EVERY 3 HOURS PRN Route: PO  PRN Reasons: anxiety,seizures,nausea  PRN Comment: dyspnea  Start: 11/24/18 0841   Admin Instructions: Avoid if delirium present.  Use fourth line for nausea.    Admin. Amount: 1-2 tablet (1-2 × 0.5 mg tablet)  Dispense Loc: [facility]                     Or  LORazepam (ATIVAN) tablet 0.5-1 mg  Dose: 0.5-1 mg  Freq: EVERY 3 HOURS PRN Route: SL  PRN Reasons: anxiety,seizures,nausea  PRN Comment: dyspnea  Start: 11/24/18 0841   Admin Instructions: Avoid if delirium present.  Use fourth line for nausea.    Admin. Amount: 1-2 tablet (1-2 × 0.5 mg tablet)  Dispense Loc: [facility]                      melatonin tablet 1 mg  Dose: 1 mg  Freq: AT BEDTIME PRN Route: PO  PRN Reason: sleep  Start: 11/21/18 2259   Admin Instructions: Do not give unless at least 6 hours of uninterrupted sleep is expected.    Admin. Amount: 1 tablet (1 × 1 mg tablet)  Dispense Loc: [facility]               morphine (PF) injection 1-2 mg  Dose: 1-2 mg  Freq: EVERY 1 HOUR PRN Route: IV  PRN Reason: moderate to severe pain  PRN Comment: or dyspnea  Start: 11/24/18 0840   Admin Instructions: For ordered IV doses 0.1-15 mg give IV Push undiluted over 4-5 minutes.    Admin. Amount: 1-2 mg  Last Admin: 11/27/18 2259  Dispense Loc: [facility]   Current Line: PICC Triple Lumen 11/23/18 Right Basilic medication drips (Red)      1255 (1 mg)-Given [C]       1936 (1 mg)-Given [C]        2129 (1 mg)-Given [C]        2146 (1 mg)-Given        0515 (1 mg)-Given       2259 (1 mg)-Given            morphine solution 5-10 mg  Dose: 5-10 mg  Freq: EVERY 2 HOURS PRN Route: PO  PRN Reason: moderate to severe pain  PRN Comment: or dyspnea  Start: 11/24/18 0840   Admin. Amount: 5-10 mg = 2.5-5 mL Conc: 2 mg/mL  Dispense Loc:  Main Pharmacy  Volume: 5 mL              Or  morphine sulfate HIGH CONCENTRATE (ROXANOL) 10 mg/0.5  mL (HIGH CONC) solution 5-10 mg  Dose: 5-10 mg  Freq: EVERY 2 HOURS PRN Route: SL  PRN Reasons: moderate to severe pain,other  PRN Comment: or dyspnea  Start: 11/24/18 0840   Admin Instructions: Caution: solution is 10 times more concentrated than standard solution. Verify dose volume.    Admin. Amount: 5-10 mg = 0.25-0.5 mL Conc: 10 mg/0.5 mL  Dispense Loc: [facility] MS[facility]  Volume: 0.5 mL               naloxone (NARCAN) injection 0.1-0.4 mg  Dose: 0.1-0.4 mg  Freq: EVERY 2 MIN PRN Route: IV  PRN Reason: opioid reversal  Start: 11/21/18 5592   Admin Instructions: For respiratory rate LESS than or EQUAL to 8.  Partial reversal dose:  0.1 mg titrated q 2 minutes for Analgesia Side Effects Monitoring Sedation Level of 3 (frequently drowsy, arousable, drifts to sleep during conversation).Full reversal dose:  0.4 mg bolus for Analgesia Side Effects Monitoring Sedation Level of 4 (somnolent, minimal or no response to stimulation).  For ordered IV doses 0.1-2mg give IVP. Give each 0.4mg over 15 seconds in emergency situations. For non-emergent situations further dilute in 9mL of NS to facilitate titration of response.    Admin. Amount: 0.1-0.4 mg = 0.25-1 mL Conc: 0.4 mg/mL  Dispense Loc: [facility] MS[facility]  Volume: 1 mL               OLANZapine zydis (zyPREXA) ODT half-tab 2.5-5 mg  Dose: 2.5-5 mg  Freq: EVERY 6 HOURS PRN Route: SL  PRN Reason: agitation  PRN Comment: delirium, nausea  Start: 11/24/18 0840   Admin Instructions: Use if other ordered anti-nausea medications are ineffective.  With dry hands, peel back foil backing and gently remove tablet; do not push oral disintegrating tablet through foil backing; administer immediately on tongue and oral disintegrating tablet dissolves in seconds; then swallow with saliva; liquid not required.    Admin. Amount: 1-2 half-tab (1-2 × 2.5 mg half-tab)  Dispense Loc:  Main Pharmacy               ondansetron (ZOFRAN-ODT) ODT tab 4 mg  Dose: 4 mg  Freq: EVERY 6 HOURS PRN Route: PO  PRN  Reasons: nausea,vomiting  Start: 11/24/18 0840   Admin Instructions: This is Step 1 of nausea and vomiting management.  If nausea not resolved in 15 minutes, go to Step 2 prochlorperazine (COMPAZINE). Do not push through foil backing. Peel back foil and gently remove. Place on tongue immediately. Administration with liquid unnecessary  With dry hands, peel back foil backing and gently remove tablet; do not push oral disintegrating tablet through foil backing; administer immediately on tongue and oral disintegrating tablet dissolves in seconds; then swallow with saliva; liquid not required.    Admin. Amount: 1 tablet (1 × 4 mg tablet)  Dispense Loc: [facility] MS[facility]               prochlorperazine (COMPAZINE) injection 5 mg  Dose: 5 mg  Freq: EVERY 6 HOURS PRN Route: IV  PRN Reasons: nausea,vomiting  Start: 11/24/18 0840   Admin Instructions: This is Step 2 of nausea and vomiting management. Give if nausea not resolved 15 minutes after giving ondansetron (ZOFRAN). If nausea not resolved in 15 minutes, go to Step 3 metoclopramide (REGLAN), if ordered.  For ordered IV doses 0.1-10 mg, give IV Push undiluted. Each 5mg over 1 minute.    Admin. Amount: 5 mg = 1 mL Conc: 5 mg/mL  Dispense Loc: [facility]  Infused Over: 1-2 Minutes  Volume: 1 mL              Or  prochlorperazine (COMPAZINE) tablet 5 mg  Dose: 5 mg  Freq: EVERY 6 HOURS PRN Route: PO  PRN Reason: vomiting  Start: 11/24/18 0840   Admin Instructions: This is Step 2 of nausea and vomiting management. Give if nausea not resolved 15 minutes after giving ondansetron (ZOFRAN). If nausea not resolved in 15 minutes, go to Step 3 metoclopramide (REGLAN), if ordered.    Admin. Amount: 1 tablet (1 × 5 mg tablet)  Dispense Loc: [facility] MS5[facility]              Or  prochlorperazine (COMPAZINE) Suppository 12.5 mg  Dose: 12.5 mg  Freq: EVERY 12 HOURS PRN Route: RE  PRN Reasons: nausea,vomiting  Start: 11/24/18 0840   Admin Instructions: This is Step 2 of nausea and vomiting management.  Give if nausea not resolved 15 minutes after giving ondansetron (ZOFRAN). If nausea not resolved in 15 minutes, go to Step 3 metoclopramide (REGLAN), if ordered.    Admin. Amount: 0.5 suppository (0.5 × 25 mg suppository)  Dispense Loc: [facility]               rOPINIRole (REQUIP) tablet 0.5 mg  Dose: 0.5 mg  Freq: AT BEDTIME PRN Route: PO  PRN Comment: restless legs  Start: 11/21/18 2259   Admin. Amount: 2 tablet (2 × 0.25 mg tablet)  Last Admin: 11/23/18 2154  Dispense Loc: [facility] MS[facility]      2154 (0.5 mg)-Given [C]                sodium chloride (PF) 0.9% PF flush 10 mL  Dose: 10 mL  Freq: EVERY 7 DAYS Route: IK  Start: 11/23/18 0730   Admin Instructions: And Q1H PRN, to lock each CVC - Valved (Tunneled and Non-Tunneled) dormant lumen.    Admin. Amount: 10 mL  Last Admin: 11/24/18 1255  Dispense Loc: [facility] Floor Stock  Volume: 10 mL   Current Line: PICC Triple Lumen 11/23/18 Right Basilic medication drips (Red)     (2872)-Not Given        0800 (10 mL)-Given [C]       0830 (10 mL)-Given [C]       1248 (10 mL)-Given       1255 (10 mL)-Given [C]               sodium chloride (PF) 0.9% PF flush 10-20 mL  Dose: 10-20 mL  Freq: EVERY 1 HOUR PRN Route: IK  PRN Reasons: line flush,post meds or blood draw  Start: 11/23/18 0719   Admin Instructions: to flush CVC - Valved (Tunneled and Non-Tunneled).   10 mL post IV meds; 20 mL post blood draw.    Admin. Amount: 10-20 mL  Last Admin: 11/27/18 2259  Dispense Loc: [facility] Floor Stock  Volume: 20 mL   Current Line: PICC Triple Lumen 11/23/18 Right Basilic medication drips (Red)         1602 (20 mL)-Given [C]       1603 (20 mL)-Given [C]       1604 (20 mL)-Given [C]       2259 (20 mL)-Given            sodium chloride (PF) 0.9% PF flush 5-50 mL  Dose: 5-50 mL  Freq: ONCE PRN Route: IK  PRN Reason: line flush  PRN Comment: to flush each lumen with line placement  Start: 11/23/18 0101   Admin Instructions: May repeat x 1    Admin. Amount: 5-50 mL  Dispense Loc: [facility] Floor  Stock  Administrations Remainin  Volume: 50 mL              Completed Medications  Medications 18         Dose: 0.5 mg  Freq: ONCE Route: IV  Start: 18   End: 18   Admin Instructions: For ordered IV doses 0.1-15 mg give IV Push undiluted over 4-5 minutes.    Admin. Amount: 0.5 mg  Last Admin: 18  Dispense Loc:  [facility] MS5[facility]  Administrations Remainin   Current Line: PICC Triple Lumen 18 Right Basilic medication drips (Red)          0925 (0.5 mg)-Given          Discontinued Medications  Medications 18         Start: 18   End: 18   Admin Instructions: Velia Stewart : cabinet override    Administrations Remainin      (1232)-Not Given [C]         -Med Discontinued            Start: 18 1413   End: 18   Admin Instructions: Velia Stewart : cabinet override  Protect from light. Vesicant.    Administrations Remainin      (9947)-Not Given [C]         -Med Discontinued

## 2018-11-21 NOTE — IP AVS SNAPSHOT
[place] [address] MEDICAL SURGICAL: [phone]                                              INTERAGENCY TRANSFER FORM - PHYSICIAN ORDERS   2018                    Hospital Admission Date: 2018  FELIZ HAMPTON   : 1931  Sex: Female        Attending Provider: Leonard Martínez MD     Allergies:  Amoxicillin, Codeine, Melatonin    Infection:  None   Service:  HOSPITALIST    Ht:  1.524 m (5')   Wt:  45.5 kg (100 lb 5 oz)   Admission Wt:  43.1 kg (95 lb)    BMI:  19.59 kg/m 2   BSA:  1.39 m 2            Patient PCP Information     Provider PCP Type    Dez Urban MD, MD General      ED Clinical Impression     Diagnosis Description Comment Added By Time Added    Lower extremity edema [R60.0] Lower extremity edema [R60.0]  Rainer Fernandes MD 2018  9:54 PM    Cellulitis of right foot [L03.115] Cellulitis of right foot [L03.115]  Rainer Fernandes MD 2018  9:54 PM      Hospital Problems as of 2018              Priority Class Noted POA    Foot pain, right Medium  2018 Yes      Non-Hospital Problems as of 2018              Priority Class Noted    Fracture, pelvis closed (H) Medium  10/1/2009    HYPERLIPIDEMIA LDL GOAL <160 Medium  10/31/2010    Achilles bursitis or tendinitis Medium  2011    Senile osteoporosis Medium  2013    RLS (restless legs syndrome) Medium  2013    Abnormal blood sugar Medium  2013    Atrial fibrillation (H) Medium  10/28/2013    HTN (hypertension) Medium  10/28/2013    Diarrhea Medium  10/5/2016    Edema, unspecified type Medium  2017    Pain in both knees, unspecified chronicity Medium  2017    Anemia Medium  2017    Normochromic normocytic anemia Medium  2017    PMR (polymyalgia rheumatica) (H) Medium  2017    MGUS (monoclonal gammopathy of unknown significance) Medium  2017    Chronic diastolic congestive heart failure (H) Medium  2017    A-fib (H) Medium  2018    Atrial  fibrillation with RVR (H) Medium  6/21/2018      Code Status History     Date Active Date Inactive Code Status Order ID Comments User Context    11/28/2018  9:19 AM  DNR/DNI [id number]  Jace Pacheco MD Outpatient    11/24/2018  8:42 AM 11/28/2018  9:19 AM DNR/DNI [id number] Code status discussion is appropriately documented in the chart. Indra Mccann MD Inpatient    11/21/2018 10:59 PM 11/24/2018  8:42 AM DNR/DNI [id number] Discussed with lamont Lackey and Leonard Freeman MD Inpatient    6/26/2018  9:37 AM 11/21/2018 10:59 PM Full Code [id number]  Indra Mccann MD Outpatient    6/21/2018  1:24 PM 6/26/2018  9:37 AM Full Code [id number]  Balwinder Wallace III, MD Inpatient    6/4/2018  2:50 AM 6/5/2018  6:17 PM Full Code [id number]  Miya Cohn MD Inpatient    10/11/2016 11:07 AM 6/4/2018  2:50 AM Full Code [id number]  Gabriele Fraser MD Outpatient    10/6/2016  1:46 PM 10/11/2016 11:07 AM DNR/DNI [id number]  Jarrett Rodrigues DO Inpatient    10/5/2016 11:59 PM 10/6/2016  1:46 PM Full Code [id number]  Prince Abreu DO Inpatient         Medication Review      START taking        Dose / Directions Comments    atropine 1 % ophthalmic solution   Used for:  Cellulitis of right foot        Dose:  1-2 drop   Place 1-2 drops under the tongue every hour as needed for other (secretions)   Quantity:  1 Bottle   Refills:  0        hypromellose-dextran 0.1-0.3 % ophthalmic solution   Commonly known as:  ARTIFICAL TEARS   Used for:  Cellulitis of right foot        Dose:  1-2 drop   Place 1-2 drops into both eyes every 8 hours as needed for dry eyes   Quantity:  30 mL   Refills:  0        LORazepam 0.5 MG tablet   Commonly known as:  ATIVAN   Used for:  Cellulitis of right foot        Dose:  0.5-1 mg   Place 1-2 tablets (0.5-1 mg) under the tongue every 3 hours as needed for anxiety, seizures or nausea (dyspnea)   Quantity:  60 tablet   Refills:  0        morphine sulfate HIGH CONCENTRATE 10  mg/0.5 mL (HIGH CONC) solution   Commonly known as:  ROXANOL   Used for:  Cellulitis of right foot        Dose:  5-10 mg   Place 0.25-0.5 mLs (5-10 mg) under the tongue every 2 hours as needed for shortness of breath / dyspnea or pain   Quantity:  1 Bottle   Refills:  0        OLANZapine zydis 5 MG ODT tab   Commonly known as:  zyPREXA   Used for:  Cellulitis of right foot        Dose:  5 mg   Take 1 tablet (5 mg) by mouth every 6 hours as needed for agitation (delirium, nausea)   Quantity:  30 tablet   Refills:  0        ondansetron 4 MG ODT tab   Commonly known as:  ZOFRAN-ODT   Used for:  Cellulitis of right foot        Dose:  4 mg   Take 1 tablet (4 mg) by mouth every 6 hours as needed for nausea or vomiting   Quantity:  120 tablet   Refills:  0        prochlorperazine 25 MG Suppository   Commonly known as:  COMPAZINE   Used for:  Cellulitis of right foot        Dose:  12.5 mg   Place 0.5 suppositories (12.5 mg) rectally every 12 hours as needed for nausea or vomiting   Quantity:  30 suppository   Refills:  0        prochlorperazine 5 MG tablet   Commonly known as:  COMPAZINE   Used for:  Cellulitis of right foot        Dose:  5 mg   Take 1 tablet (5 mg) by mouth every 6 hours as needed for vomiting   Quantity:  30 tablet   Refills:  0          CONTINUE these medications which have NOT CHANGED        Dose / Directions Comments    acetaminophen 325 MG tablet   Commonly known as:  TYLENOL   Used for:  Cellulitis of right foot        Dose:  325-650 mg   Take 1-2 tablets (325-650 mg) by mouth every 4 hours as needed for mild pain   Quantity:  100 tablet   Refills:  0          STOP taking     bumetanide 1 MG tablet   Commonly known as:  BUMEX           metoprolol succinate 50 MG 24 hr tablet   Commonly known as:  TOPROL-XL           MULTIVITAMIN PO           potassium chloride ER 20 MEQ CR tablet   Commonly known as:  K-TAB           PRESERVISION AREDS PO           REQUIP 0.5 MG tablet   Generic drug:  rOPINIRole            rivaroxaban ANTICOAGULANT 20 MG Tabs tablet   Commonly known as:  XARELTO                   After Care     Activity       Your activity upon discharge: activity as tolerated       Diet       Follow this diet upon discharge: Orders Placed This Encounter      Combination Diet Regular Diet Adult             Your next 10 appointments already scheduled     Nov 29, 2018  1:00 PM CST   LAB with RU LAB   [facility] [place] ([facility])    [address]  [facility] [address]   [phone]           Please do not eat 10-12 hours before your appointment if you are coming in fasting for labs on lipids, cholesterol, or glucose (sugar). This does not apply to pregnant women. Water, hot tea and black coffee (with nothing added) are okay. Do not drink other fluids, diet soda or chew gum.            Dec 11, 2018 12:30 PM CST   Return Visit with Nancy Molina PA-C   [facility] ([facility])    [address]  [place] [facility] [address]   [phone]            Dec 19, 2018 11:45 AM CST   Remote PPM Check with HEARD TECH1   [facility] ([facility])    [address] [place] [address] [address]  [facility] [address] [phone] OPT 2           This appointment is for a remote check of your pacemaker.  This is not an appointment at the office.              Follow-Up Appointment Instructions     Future Labs/Procedures    Follow-up and recommended labs and tests      Comments:    Follow up with hospice team      Follow-Up Appointment Instructions     Follow-up and recommended labs and tests        Follow up with hospice team             Statement of Approval     Ordered          11/28/18 0926  I have reviewed and agree with all the recommendations and orders detailed in this document.  EFFECTIVE NOW     Approved and electronically signed by:  Jace Pacheco MD

## 2018-11-22 NOTE — PLAN OF CARE
Problem: Patient Care Overview  Goal: Plan of Care/Patient Progress Review  Outcome: No Change  Pt declined to get out of bed. Repositioning frequently. BLE elevated. Redness to RLE marked. On IV clindomyacin, ancef. Also bumex, xarelto. IVF TKO. BLE edema. Confused at times. Oxy available try to avoid narcotics to lessen confusion. UC collected via straight cath. [place]- pocket talker in room. Left hearing aid in place- family unsure if pt came with right hearing aid and will check pts house for it. Foam to coccyx for prevention. Will continue to monitor.

## 2018-11-22 NOTE — PROGRESS NOTES
[facility]  Hospitalist Progress Note  Indra Mccann MD 11/22/2018    Reason for Stay (Diagnosis): Right foot pain/soft tissue infection.         Assessment and Plan:      Summary of Stay: Ana Cristina Dominguez is a 87 year old female with history of chronic bilateral lower extremity edema, chronic kidney disease stage III, polymyalgia rheumatica, arterial fibrillation with AV ablation status post pacemaker placement in July 2018, diastolic congestive heart failure, with recent echo showed ejection fraction of 50-55% with improvement from previous study who presented today with complaint of right foot pain and discoloration.  Patient initially had injury to the top of her right foot about 2 weeks ago at the time she was evaluated there was bruising and concern for infection and she was treated with chronic Keflex.  This time the significant discoloration and redness was noted and in less than 24 hours and patient was brought to the emergency room and admitted on 11/21/2018.     Problem List:   1. Right foot swelling, redness, tenderness likely secondary to soft tissue infection/cellulitis.  -It is not clear if this is full-blown cellulitis or infected previous area of trauma.  -There is leukocytosis, no fever.  -Podiatry is consulted  -We will hold anticoagulation for now, in case podiatry wants to do a procedure  -Unable to do MRI as patient has permanent pacemaker placed recently.  -CRP  -Continue Ancef added clindamycin.  -Follow blood cultures  -Elevate the leg, the edema is better with elevation    2. Acute renal insufficiency on chronic kidney disease stage III.  -This is likely related to diuretics, and underlying CHF  -BMP daily.    3. Acute on chronic diastolic congestive heart failure and right-sided systolic congestive heart failure, possible pulmonary hypertension, tricuspid regurg, and severe aortic regurgitation.  -If improved on recent study to 50-55%  -Patient has bilateral  lower extremity edema, likely due to congestive heart failure.  -Continue Bumex 1 mg twice a day.  -Daily weight, input and output.  -Patient has follow-up with cardiology recently, recommendation noted    4. Arterial fibrillation with recent AV ablation and pacemaker placement.  -Rate controlled  -Hold Xarelto for now, until we hear from podiatrist on management plan.    5. Restless leg syndrome-continue Requip.    6. Polymyalgia rheumatica- At this time she is not on any treatment for this, currently no complaint.    DVT Prophylaxis: Patient is on Xarelto, will hold just for tonight  Code Status: DNR/DNI  Discharge Dispo: Patient will likely be discharged to TCU  Estimated Disch Date / # of Days until Disch: >2-3 days more in the hospital  I discussed with patient, patient's son, and daughter at length at bedside all their question and concerns addressed      Interval History (Subjective):      Patient seen and examined, assumed care today, H&P reviewed, hard of hearing, sense of humor, responds to question appropriately,                  Physical Exam:      Last Vital Signs:  BP 91/44  Pulse 77  Temp 98.2  F (36.8  C) (Axillary)  Resp 20  Wt 42.9 kg (94 lb 9.6 oz)  SpO2 96%  BMI 18.48 kg/m2    I/O last 3 completed shifts:  In: 480 [P.O.:380; I.V.:100]  Out: -   Vitals:    11/21/18 1750 11/21/18 2313   Weight: 43.1 kg (95 lb) 42.9 kg (94 lb 9.6 oz)     Current Facility-Administered Medications   Medication     acetaminophen (TYLENOL) tablet 325-650 mg     bumetanide (BUMEX) tablet 1 mg     ceFAZolin (ANCEF) intermittent infusion 1 g     clindamycin (CLEOCIN) infusion 600 mg     magnesium hydroxide (MILK OF MAGNESIA) suspension 30 mL     melatonin tablet 1 mg     metoprolol succinate (TOPROL-XL) 24 hr tablet 50 mg     naloxone (NARCAN) injection 0.1-0.4 mg     ondansetron (ZOFRAN-ODT) ODT tab 4 mg    Or     ondansetron (ZOFRAN) injection 4 mg     oxyCODONE IR (ROXICODONE) half-tab 2.5-5 mg     Patient is  already receiving anticoagulation with heparin, enoxaparin (LOVENOX), warfarin (COUMADIN)  or other anticoagulant medication     potassium chloride SA (K-DUR/KLOR-CON M) CR tablet 20 mEq     rivaroxaban ANTICOAGULANT (XARELTO) tablet 15 mg     rOPINIRole (REQUIP) tablet 0.5 mg     sodium chloride 0.9% infusion       Constitutional: Awake, alert, cooperative, no apparent distress   Respiratory: Clear to auscultation bilaterally, no crackles or wheezing   Cardiovascular: Regular rate and rhythm, normal S1 and S2, and no murmur noted   Abdomen: Normal bowel sounds, soft, non-distended, non-tender   Skin: No rashes, no cyanosis, dry to touch   Neuro: Alert and oriented x3, no weakness, numbness, memory loss   Extremities:  +3 bilateral lower extremity edema, positive redness, tenderness, slight warmth, hyperemia and also ecchymotic area on the right dorsum of the foot extending to the ankle joint, normal range of motion   Other(s):HEENT Pink, nonicteric, moist oral mucosa       All other systems: Negative          Medications:      All current medications were reviewed with changes reflected in problem list.         Data:      All new lab and imaging data was reviewed.   Labs:    Recent Labs  Lab 11/21/18  1833 11/21/18 1819   CULT No growth after 7 hours No growth after 7 hours       Recent Labs  Lab 11/22/18 0627 11/21/18 1819   * 144   POTASSIUM 4.0 3.7   CHLORIDE 105 103   CO2 33* 37*   ANIONGAP 7 4   GLC 79 83   BUN 61* 59*   CR 1.64* 1.47*   GFRESTIMATED 30* 34*   GFRESTBLACK 36* 41*   KARTHIK 8.1* 8.9       Recent Labs  Lab 11/22/18 0627 11/21/18  1819   WBC 20.2* 15.3*   HGB 11.3* 12.6   HCT 37.0 41.2   MCV 89 89    218       Recent Labs  Lab 11/22/18 0627 11/21/18  1819   GLC 79 83      Imaging:   Results for orders placed or performed during the hospital encounter of 11/21/18   CTA Abdomen Pelvis Bilat Leg Runoff w Contr    Narrative    CTA ABDOMEN AND PELVIS BILATERAL LEG RUNOFF WITH CONTRAST    11/21/2018  9:10 PM     HISTORY: Right foot concern for ischemia/nec fasc.     TECHNIQUE: Arterial phase images from the diaphragm to the toes.  Radiation dose for this scan was reduced using automated exposure  control, adjustment of the mA and/or kV according to patient size, or  iterative reconstruction technique.    COMPARISON: None.    FINDINGS: The heart is enlarged. There is reflux of contrast in  hepatic veins, compatible with right heart dysfunction. There is a  moderate-sized right pleural effusion. Trace left pleural effusion.  There is a lobular contour to the liver which may be related to  cardiac cirrhosis. The spleen, pancreas, adrenal glands, and kidneys  are unremarkable. There is a large volume of retained colonic stool  suggestive of constipation. No enlarged abdominal or retroperitoneal  lymph nodes. No evidence of bowel obstruction or free air. There is  trace ascites. The uterus is enlarged with several calcifications,  likely related to fibroids.    The aorta caliber is normal. There is minimal aortic atherosclerosis.  The visceral branches of the aorta are patent. Bilateral common,  external, and internal iliac arteries are patent.    The right common femoral artery and profunda femoris are patent. There  is mild disease of the superficial femoral artery with minimal  stenosis at the abductor canal. There is long segment popliteal artery  stenosis beginning at the level of the joint that is greater than 60%.  Runoff to the right foot with both the anterior and posterior tibial  arteries crossing the ankle.    The left common femoral artery and profunda femoris are patent. There  is mild disease of the superficial femoral artery. There is a long  segment stenosis of the popliteal artery at the level of the joint  that measures approximately 50%. There is marked disease of the runoff  vessels with several areas of high-grade stenosis. The anterior tibial  artery becomes occluded before crossing  the ankle. There are several  areas of near occlusion involving the posterior tibial artery.    There is marked edema of both lower legs. No soft tissue gas  demonstrated.      Impression    IMPRESSION:  1. Right heart failure with a right pleural effusion and a small  amount of ascites.  2. Patent aorta and inflow arteries. There is bilateral popliteal  artery stenosis measuring greater than 50%. There is patent  three-vessel runoff on the right with severely diseased runoff  arteries on the left.  3. Marked bilateral lower extremity edema but no soft tissue gas.  4. Cirrhotic appearing liver that may be secondary to right heart  failure.  5. Probable constipation.    DORENE MARADIAGA MD   Foot  XR, G/E 3 views, right    Narrative    RIGHT FOOT THREE VIEWS   11/21/2018 10:54 PM     HISTORY: Ecchymosis and concern for trauma.     COMPARISON: None.      Impression    IMPRESSION: The bones are osteopenic. No fracture or dislocation. Soft  tissue swelling noted over the dorsum of the foot.    DORENE MARADIAGA MD

## 2018-11-22 NOTE — PLAN OF CARE
Problem: Patient Care Overview  Goal: Plan of Care/Patient Progress Review  OT/PT: Discussed with RN, pt in a lot of pain and not appropriate for therapy today. Per chart CT planned for tomorrow. Will hold therapies today

## 2018-11-22 NOTE — PHARMACY-ADMISSION MEDICATION HISTORY
Admission medication history interview status for this patient is complete. See [facility] admission navigator for allergy information, prior to admission medications and immunization status.     Medication history interview source(s):Patient and Family  Medication history resources (including written lists, pill bottles, clinic record):None  Primary pharmacy:[facility]    Changes made to PTA medication list:  Added: -  Deleted: vit c, Ca vit d, vit e  Changed: bumex to 1 mg BID    Actions taken by pharmacist (provider contacted, etc):None     Additional medication history information:None    Medication reconciliation/reorder completed by provider prior to medication history? No    Do you take OTC medications (eg tylenol, ibuprofen, fish oil, eye/ear drops, etc)? yes(Y/N)    For patients on insulin therapy: no (Y/N)  Lantus/levemir/NPH/Mix 70/30 dose:   (Y/N) (see Med list for doses)   Sliding scale Novolog Y/N  If Yes, do you have a baseline novolog pre-meal dose:  units with meals  Patients eat three meals a day:   Y/N    How many episodes of hypoglycemia do you have per week: _______  How many missed doses do you have per week: ______  How many times do you check your blood glucose per day: _______  Do you have a Continuous glucose monitor (CGM)   Y/N (remind pt that not approved for hospital use)   Any Barriers to therapy - Be specific :  cost of medications, comfortable with giving injections (if applicable), comfortable and confident with current diabetes regimen: Y/N ______________      Prior to Admission medications    Medication Sig Last Dose Taking? Auth Provider   acetaminophen (TYLENOL) 325 MG tablet Take 325-650 mg by mouth every 4 hours as needed for mild pain  Yes Unknown, Entered By History   bumetanide (BUMEX) 1 MG tablet Take 1 mg by mouth 2 times daily 11/20/2018 at Unknown time Yes Unknown, Entered By History   metoprolol succinate (TOPROL-XL) 50 MG 24 hr tablet Take 1 tablet (50 mg) by mouth 2 times  daily 11/20/2018 at Unknown time Yes Makenzie Paredes APRN CNP   Multiple Vitamins-Minerals (MULTIVITAMIN PO) Take 1 tablet by mouth every morning 11/20/2018 at Unknown time Yes Unknown, Entered By History   Multiple Vitamins-Minerals (PRESERVISION AREDS PO) Take 1 capsule by mouth 2 times daily  11/20/2018 at Unknown time Yes Reported, Patient   rivaroxaban ANTICOAGULANT (XARELTO) 20 MG TABS tablet Take 1 tablet (20 mg) by mouth daily (with dinner) 11/20/2018 at Unknown time Yes Carlton Carballo PA-C   rOPINIRole (REQUIP) 0.5 MG tablet Take 0.5 mg by mouth nightly as needed   Yes Unknown, Entered By History   Potassium Chloride ER 20 MEQ TBCR Take 1 tablet (20 mEq) by mouth daily Unknown at Unknown time  Fabiano Bower MD

## 2018-11-22 NOTE — CONSULTS
ALYSHA score is elevated. Will give hand off to clinic RN CTS at time of discharge and assist in making follow up appointments. Patient has had multiple admissions over the last 6 months for LE edema/cellulitis, contusion RLE, Afib RVR X 2, CHF & paroxymal Afib. She was seen in the ED for acute on chronic CHF. She is followed by [facility]. Her current medications include Xarelto. No other high risk factors exist.      Mandi Dooley RN, BSN, CTS  [facility]  [phone]

## 2018-11-22 NOTE — H&P
Admitted:     11/21/2018      CHIEF COMPLAINT:  Right foot pain.      HISTORY OF PRESENT ILLNESS:  Mrs. Dominguez is an 87-year-old woman who initially injured the top of her right foot about 2 weeks ago.  She was seen in the Emergency Department.  There was bruising and concern for infection and she was treated with a course of Keflex.  Her foot subsequently returned to normal.  Her history is obtained with the help of the patient's sons and the ER physician as the patient is quite sleepy after receiving pain medication.  Her son indicated that she was at her house last night and was helping her put on her socks and her feet looked okay.  This afternoon, her other son went to see her to bring to her doctor's appointment.  She was having severe pain in the right foot and it was swollen with darkened skin on the dorsum with surrounding erythema on the toes.  She could barely walk and could not bear weight on the foot.  She apparently did not recall having any injuries overnight.      PAST MEDICAL HISTORY:   1.  Chronic bilateral lower extremity edema.  She follows in Cardiology Clinic and has had problems with worsening renal function with increasing diuretics.   2.  Chronic kidney disease.  Her recent creatinine seems to have run from 1.1-1.3.   3.  Osteoporosis.   4.  Restless leg syndrome.   5.  Polymyalgia rheumatica.   6.  History of atrial fibrillation with AV ablation and pacemaker in 07/2018.   7.  Diastolic heart failure.  Echocardiogram 11/02/2018 showed a recovered ejection fraction to 50% -55% from previous 35%-40%, left ventricular hypertrophy, severe aortic regurgitation, severe tricuspid regurgitation and dilated ascending aorta.   8.  Chronic knee pains.   9.  MGUS.   10.  Dyslipidemia.   11.  Hypertension.   12.  History of pelvic fracture.   13.  Chronic anemia.   14.  Achilles tendinitis.      MEDICATIONS:   1.  Acetaminophen as needed.   2.  Bumex 1 mg twice a day.   3.  Metoprolol XL 50 mg twice  a day.   4.  Multivitamin 1 tab per day.   5.  Preser-Vision multivitamin 1 capsule twice a day.   6.  Potassium chloride 20 mEq daily.   7.  Xarelto 20 mg a day.   8.  Requip 0.5 mg nightly as needed for restless legs.      ALLERGIES:  AMOXICILLIN, CODEINE, MELATONIN.      FAMILY HISTORY:  Her father and sister had coronary artery disease.      SOCIAL HISTORY:  [facility]  Her sons check on her frequently.  She is .  She smoked in the distant past but not for many years.  She occasionally drinks some alcohol.      REVIEW OF SYSTEMS:  A complete review of systems was  attempted and was negative except for the pertinent positives, which can be seen in the history of present illness.      I reviewed previous medical records in Epic.  I discussed the case with the ER physician and the patient's 2 sons.      PHYSICAL EXAMINATION:   VITAL SIGNS:  Blood pressure is 103/49, pulse 70, temperature 98, oxygen saturation 94% on room air.   GENERAL:  She is sleepy and hard of hearing, but arousable.   HEENT AND NECK:    Pupils round and equal.  Conjunctiva, sclerae and lids are within normal limits.  Neck supple with no masses, no thyromegaly.  Oropharynx is pink and moist.  Teeth and lips are unremarkable.   CARDIOVASCULAR:  Heart has a 2/6 systolic murmur, regular rhythm.   LUNGS:  Clear to auscultation bilaterally with normal respiratory effort.   ABDOMEN:  Soft, nontender.  Bowel sounds are active.   EXTREMITIES:  There is 2-3+ bilateral lower extremity pitting edema.  The dorsum of the right foot is blackened and tender with surrounding erythema up to the ankle and to the toes.   NEUROLOGIC:  Difficult to fully assess but she was moving all 4 extremities symmetrically.  Facial musculature is symmetric.  She is hard of hearing.   PSYCHIATRIC:  She is groggy and difficult to communicate.   SKIN:  Please see extremity exam above.      LABORATORY DATA:  Sodium 144, potassium 3.7, chloride 103,  bicarb 37, BUN 59, creatinine 1.47, glucose 83.  White blood cell count is 15.3, hemoglobin 12.6, platelets 218.      ASSESSMENT AND PLAN:  Mrs. Dominguez is an 87-year-old woman who presents with severe right foot pain.  She has a history of a previous right foot injury a couple of weeks ago, chronic bilateral extremity edema, chronic kidney disease, osteoporosis, restless legs syndrome, polymyalgia rheumatica, atrial fibrillation with atrioventricular ablation and pacemaker, diastolic heart failure, severe aortic and tricuspid regurgitation, dilated ascending aorta, dyslipidemia, hypertension and chronic knee pains.   1.  Foot pain.  Differential diagnosis includes trauma and injury versus acute infection.  This does not appear to be related to her previous injury a couple of weeks ago.  That had completely healed.  An x-ray was normal at that time.  This seems to have occurred over the past 16 hours.  A CT angiogram was obtained in the ER which showed reasonably decent blood flow to the right foot.  She does have an occluded posterior tibial artery on the left.  We will check an x-ray of the right foot to assess for any fractures.  We will continue for now with empiric antibiotics and monitor her clinical course.  She is also on anticoagulant which would potentially cause more bleeding and hematoma if she did have injury.  In addition, she will not be able to return to home due to decrease in her mobility.  We will request physical and occupational therapy evaluation as well as social work to help with disposition planning.  She was treated with cefazolin for now, IV.   2.  Code status:  DNR/DNI.  This was confirmed with the patient's sons.   3.  Diastolic heart failure and chronic lower extremity edema.  We will continue with her outpatient regimen of Bumex and metoprolol.  She has not tolerated any increase in diuretics and her creatinine already is elevated above her baseline.   4.  Acute kidney injury.  As  mentioned above, she has had problems with volume overload and has not tolerated diuretics.  Her creatinine is up a bit from her baseline.  We will continue with her diuretics as previously ordered and monitor her renal function.  We are going to try to avoid giving IV fluids given her peripheral edema.   5.  History of atrial fibrillation.  As mentioned above, she has had an atrioventricular ablation and pacemaker.  We will continue with Xarelto for now.  If we are worried about increasing hematoma, this may need to be held.         COREY DALE MD             D: 2018   T: 2018   MT: TONA      Name:     FELIZ HAMPTON   MRN:      [id number]        Account:      [id number]   :      1931        Admitted:     2018                   Document: [id number]       cc: Dez Urban MD

## 2018-11-22 NOTE — PROGRESS NOTES
Care Transition Initial Assessment - [facility]  Reason For Consult: discharge planning, facility placement  Met with: Patient and Family    Active Problems:    Foot pain, right       DATA  Lives With: alone  Living Arrangements: condominium  Description of Support System: Supportive, Involved  Who is your support system?: Children  Support Assessment: Adequate family and caregiver support, Adequate social supports.   Identified issues/concerns regarding health management: Pt present to [facility] yesterday with foot pain. Pt reportedly is not able to walk due to pain.   Quality Of Family Relationships: supportive, helpful, involved  Transportation Available: family or friend will provide    ASSESSMENT  Cognitive Status:  oriented and Hard of Hearing  Concerns to be addressed: SW met with pt, pt's son (Will), and pt's niece to introduce SW role. Pt is a 87 y.o female who resides alone in a condo. Pt has support from family who live close by. Pt has been independent in [facility] prior to admission. Will stated that his brother was trying to work with [employer] for private pay services to help pt at home.      PLAN  Financial costs for the patient includes: SW did not discuss financial concerns.  Patient given options and choices for discharge: SW introduce role and did not talk about options other than son feels that pt needs more support, if able to return to home.  Patient/family is agreeable to the plan?  Pt and family are open to continuing to meet with SW to discuss options for discharge.   Patient Goals and Preferences: Pt was grimacing in pain, once had one hearing aide in, and needed to be seen to determine the plan for admission.  Patient anticipates discharging to:  Home or TCU. Needs to be determined based on PT and MD recommendations.    SW: ALVAREZ Salcedo, LICSW

## 2018-11-22 NOTE — CONSULTS
[facility]SULTATION  November 22, 2018      ASSESSMENT:    R foot dorsal edema, discoloration, erythema -- suspect hematoma from occult trauma, though concurrent cellulitis also suspected     PLAN:  Reviewed patient's chart in epic.  Discussed condition and treatment options including pros and cons with pt and family.    -Advised CT scan of R foot to further eval for occult fracture, hematoma.  Pt has been on Xarelto.  MRI not an option due to pacemaker.  -Doubt acute necrotizing infection.  No gas noted on imaging.  Continue IV abx, however, as some concurrent cellulitis may be present.  Elevated WBC noted.  Consider abscess, but clinically this presents as hematoma.    -CT angio suggests adequate flow to R foot, so doubt acute limb ischemia.  Limb is warm.  Skin atrophic, fragile.  She is certainly at risk for necrosis, healing complications.  This can lead to amputation.  -Pt may need procedure/drainage of this area pending progress, results.  -Hold Xarelto for now.  -ESR and CRP in AM.  -Elevate foot.  -Will f/u on results tomorrow  -Thank you for the consult.     Carlos Manuel Cardoza DPM, FACFAS  Pager: [phone]      PATIENT HISTORY:  Ana Cristina Dominguez is a 87 year old female who was admitted for R foot pain and discoloration.  Family present including pt's son and niece, who provide most history.  2 days ago pt's son, who is present, noted quite a bit of swelling in her legs with fluid extravasation, but no evidence of trauma.  His brother saw her the next day and noted the R foot discoloration.  She was taken to the ED.  XR was negative for fx.  Pt denies any trauma that may have caused this, but she is not the best historian.  Pt is comfortable at this time.  Normally on Xarelto.    Review of Systems:  Patient denies fever, chills.  Rest of 10 pt ROS neg except for HPI.     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     Polymyalgia rheumatica (H)      RLS (restless legs syndrome)      Senile osteoporosis     Reclast 11/16/09, 11/10, 11/11     Systolic heart failure (H)     by echo 3/2018, NM MPI negative for ischemia in 5/2108        PAST SURGICAL HISTORY:   Past Surgical History:   Procedure Laterality Date     APPENDECTOMY       bi ventricular pacemaker  07/16/2018     CATARACT IOL, RT/LT  1/19/09    right     H ABLATION AV NODE  07/16/2018     HC EXCIS PRIMARY GANGLION WRIST          MEDICATIONS:   Current Facility-Administered Medications:      acetaminophen (TYLENOL) tablet 325-650 mg, 325-650 mg, Oral, Q4H PRN, Leonard Martínez MD     bumetanide (BUMEX) tablet 1 mg, 1 mg, Oral, BID, Leonard Martínez MD, 1 mg at 11/22/18 0924     ceFAZolin (ANCEF) intermittent infusion 1 g, 1 g, Intravenous, Q8H, Leonard Martínez MD, 1 g at 11/22/18 0924     clindamycin (CLEOCIN) infusion 600 mg, 600 mg, Intravenous, Q8H, Indra Mccann MD, Last Rate: 100 mL/hr at 11/22/18 1258, 600 mg at 11/22/18 1258     magnesium hydroxide (MILK OF MAGNESIA) suspension 30 mL, 30 mL, Oral, Daily PRN, Leonard Martínez MD     melatonin tablet 1 mg, 1 mg, Oral, At Bedtime PRN, Leonard Martínez MD     metoprolol succinate (TOPROL-XL) 24 hr tablet 50 mg, 50 mg, Oral, BID, Leonard Martínez MD, 50 mg at 11/22/18 0923     naloxone (NARCAN) injection 0.1-0.4 mg, 0.1-0.4 mg, Intravenous, Q2 Min PRN, Leonard Martínez MD     ondansetron (ZOFRAN-ODT) ODT tab 4 mg, 4 mg, Oral, Q6H PRN **OR** ondansetron (ZOFRAN) injection 4 mg, 4 mg, Intravenous, Q6H PRN, Leonard Martínez MD     oxyCODONE IR (ROXICODONE) half-tab 2.5-5 mg, 2.5-5 mg, Oral, Q4H PRN,  Leonard Martínez MD, 2.5 mg at 11/22/18 0432     Patient is already receiving anticoagulation with heparin, enoxaparin (LOVENOX), warfarin (COUMADIN)  or other anticoagulant medication, , Does not apply, Continuous PRN, Leonard Martínez MD     potassium chloride SA (K-DUR/KLOR-CON M) CR tablet 20 mEq, 20 mEq, Oral, Daily, Leonard Martínez MD, 20 mEq at 11/22/18 0924     rOPINIRole (REQUIP) tablet 0.5 mg, 0.5 mg, Oral, At Bedtime PRN, Leonard Martínez MD     sodium chloride 0.9% infusion, , Intravenous, Continuous, Indra Mccann MD     ALLERGIES:    Allergies   Allergen Reactions     Amoxicillin Rash     Codeine GI Disturbance     Stomach pain     Melatonin Nausea     Eyesburned        SOCIAL HISTORY:   Social History     Social History     Marital status:      Spouse name: N/A     Number of children: 3     Years of education: N/A     Occupational History      Retired     Social History Main Topics     Smoking status: Former Smoker     Packs/day: 0.50     Types: Cigarettes     Quit date: 5/6/1973     Smokeless tobacco: Never Used     Alcohol use Yes      Comment: Socially     Drug use: No     Sexual activity: No     Other Topics Concern     Exercise Yes     Seat Belt Yes     Social History Narrative        FAMILY HISTORY:   Family History   Problem Relation Age of Onset     C.A.D. Father      Family History Negative Mother      Genitourinary Problems Sister      Renal failure     HEART DISEASE Sister      Rhythm issues        EXAM:Vitals: BP 91/44  Pulse 77  Temp 98.2  F (36.8  C) (Axillary)  Resp 20  Wt 42.9 kg (94 lb 9.6 oz)  SpO2 96%  BMI 18.48 kg/m2  BMI= Body mass index is 18.48 kg/(m^2).    General appearance: Patient is alert and fully cooperative with history & exam.  No sign of distress is noted during the visit.     Psychiatric: Affect is pleasant & appropriate.  Patient appears motivated to improve health.     Respiratory: Breathing is regular & unlabored  while sitting.     HEENT: Hearing is intact to spoken word.  Speech is clear.  No gross evidence of visual impairment that would impact ambulation.     Dermatologic: Dorsum of R foot with dark purple discoloration, underlying edema and fluctuance.  Atrophic, fragile skin.  Mild surrounding erythema, marked on skin.  Skin appears intact to foot.  No obvious wound.     Vascular:   I could not palpate a R DP due to edema.  R PT 1/4 on palpation.  L foot pedal pulses not palpable.  CFT minimally delayed.  Extremities are warm.     Neurologic: Lower extremity sensation is intact to light touch.  No evidence of weakness or contracture in the lower extremities.     Musculoskeletal: R dorsal foot pain with palpation.  No gross ankle deformity noted.  No foot or ankle joint effusion is noted.         Lab Results   Component Value Date    WBC 20.2 11/22/2018     Lab Results   Component Value Date    RBC 4.16 11/22/2018     Lab Results   Component Value Date    HGB 11.3 11/22/2018     Lab Results   Component Value Date    HCT 37.0 11/22/2018     No components found for: MCT  Lab Results   Component Value Date    MCV 89 11/22/2018     Lab Results   Component Value Date    MCH 27.2 11/22/2018     Lab Results   Component Value Date    MCHC 30.5 11/22/2018     Lab Results   Component Value Date    RDW 20.2 11/22/2018     Lab Results   Component Value Date     11/22/2018           Imaging reviewed with pt/family:    RIGHT FOOT THREE VIEWS   11/21/2018 10:54 PM      HISTORY: Ecchymosis and concern for trauma.      COMPARISON: None.         IMPRESSION: The bones are osteopenic. No fracture or dislocation. Soft  tissue swelling noted over the dorsum of the foot.     DORENE MARADIAGA MD          CTA ABDOMEN AND PELVIS BILATERAL LEG RUNOFF WITH CONTRAST   11/21/2018  9:10 PM      HISTORY: Right foot concern for ischemia/nec fasc.      TECHNIQUE: Arterial phase images from the diaphragm to the toes.  Radiation dose for this scan  was reduced using automated exposure  control, adjustment of the mA and/or kV according to patient size, or  iterative reconstruction technique.     COMPARISON: None.     FINDINGS: The heart is enlarged. There is reflux of contrast in  hepatic veins, compatible with right heart dysfunction. There is a  moderate-sized right pleural effusion. Trace left pleural effusion.  There is a lobular contour to the liver which may be related to  cardiac cirrhosis. The spleen, pancreas, adrenal glands, and kidneys  are unremarkable. There is a large volume of retained colonic stool  suggestive of constipation. No enlarged abdominal or retroperitoneal  lymph nodes. No evidence of bowel obstruction or free air. There is  trace ascites. The uterus is enlarged with several calcifications,  likely related to fibroids.     The aorta caliber is normal. There is minimal aortic atherosclerosis.  The visceral branches of the aorta are patent. Bilateral common,  external, and internal iliac arteries are patent.     The right common femoral artery and profunda femoris are patent. There  is mild disease of the superficial femoral artery with minimal  stenosis at the abductor canal. There is long segment popliteal artery  stenosis beginning at the level of the joint that is greater than 60%.  Runoff to the right foot with both the anterior and posterior tibial  arteries crossing the ankle.     The left common femoral artery and profunda femoris are patent. There  is mild disease of the superficial femoral artery. There is a long  segment stenosis of the popliteal artery at the level of the joint  that measures approximately 50%. There is marked disease of the runoff  vessels with several areas of high-grade stenosis. The anterior tibial  artery becomes occluded before crossing the ankle. There are several  areas of near occlusion involving the posterior tibial artery.     There is marked edema of both lower legs. No soft tissue  gas  demonstrated.         IMPRESSION:  1. Right heart failure with a right pleural effusion and a small  amount of ascites.  2. Patent aorta and inflow arteries. There is bilateral popliteal  artery stenosis measuring greater than 50%. There is patent  three-vessel runoff on the right with severely diseased runoff  arteries on the left.  3. Marked bilateral lower extremity edema but no soft tissue gas.  4. Cirrhotic appearing liver that may be secondary to right heart  failure.  5. Probable constipation.     DORENE MARADIAGA MD

## 2018-11-22 NOTE — ED NOTES
[facility]  ED Nurse Handoff Report    Ana Cristina Dominguez is a 87 year old female   ED Chief complaint: Foot Pain  . ED Diagnosis:   Final diagnoses:   Lower extremity edema   Cellulitis of right foot     Allergies:   Allergies   Allergen Reactions     Amoxicillin Rash     Codeine GI Disturbance     Stomach pain     Melatonin Nausea     Eyesburned       Code Status: Full Code  Activity level - Baseline/Home:  Independent. Activity Level - Current:   Stand with Assist. Lift room needed: No. Bariatric: No   Needed: No   Isolation: No. Infection: Not Applicable.     Vital Signs:   Vitals:    11/21/18 2115 11/21/18 2130 11/21/18 2145 11/21/18 2200   BP: 114/53 118/72 119/59 96/49   Pulse:       Resp:       Temp:       TempSrc:       SpO2: 94% 94% 99% 99%   Weight:           Cardiac Rhythm:  ,      Pain level: 0-10 Pain Scale: 10  Patient confused: No. Patient Falls Risk: Yes.   Elimination Status: Unable to void   Patient Report - Initial Complaint: Skin infection/foot pain. Focused Assessment:    17:50 ED Triage Notes Filed Right foot painful and increased swelling x 2 days - patient state foot color is now black. ABC intact alert and no distress. Patient is on blood thinners- sent from clinic.      18:24 Skin Color/Condition Skin - Skin Integrity: wound(s); bruise(s) Skin Comment: right foot swelling, redness, bruising, open wounds, weaping. blisters bilateral lower legs. son states increased swelling to both lower legs and feet for a couple of weeks. no known injury. good cap refill, unable to palpate pulses on either pedal pulse. MD aware.        Tests Performed: labs, imaging. Abnormal Results:   Labs Ordered and Resulted from Time of ED Arrival Up to the Time of Departure from the ED   CBC WITH PLATELETS DIFFERENTIAL - Abnormal; Notable for the following:        Result Value    WBC 15.3 (*)     MCHC 30.6 (*)     RDW 20.2 (*)     Absolute Neutrophil 13.5 (*)     Absolute Lymphocytes 0.5  (*)     All other components within normal limits   INR - Abnormal; Notable for the following:     INR 1.52 (*)     All other components within normal limits   COMPREHENSIVE METABOLIC PANEL - Abnormal; Notable for the following:     Carbon Dioxide 37 (*)     Urea Nitrogen 59 (*)     Creatinine 1.47 (*)     GFR Estimate 34 (*)     GFR Estimate If Black 41 (*)     Albumin 3.1 (*)     Protein Total 6.6 (*)     Alkaline Phosphatase 156 (*)     All other components within normal limits   LACTIC ACID WHOLE BLOOD   CK TOTAL   BLOOD CULTURE   BLOOD CULTURE     CTA Abdomen Pelvis Bilat Leg Runoff w Contr   Final Result   IMPRESSION:   1. Right heart failure with a right pleural effusion and a small   amount of ascites.   2. Patent aorta and inflow arteries. There is bilateral popliteal   artery stenosis measuring greater than 50%. There is patent   three-vessel runoff on the right with severely diseased runoff   arteries on the left.   3. Marked bilateral lower extremity edema but no soft tissue gas.   4. Cirrhotic appearing liver that may be secondary to right heart   failure.   5. Probable constipation.      DORENE MARADIAGA MD        .   Treatments provided: antibiotics, pain control  Family Comments: son at bedside  OBS brochure/video discussed/provided to patient:  No  ED Medications:   Medications   HYDROmorphone (DILAUDID) injection 0.2 mg (0.2 mg Intravenous Given 11/21/18 1848)   iopamidol (ISOVUE-370) solution 500 mL (100 mLs Intravenous Given 11/21/18 2042)   0.9% sodium chloride BOLUS (0 mLs Intravenous Stopped 11/21/18 2043)   clindamycin (CLEOCIN) infusion 900 mg (0 mg Intravenous Stopped 11/21/18 2214)     Drips infusing:  No  For the majority of the shift, the patient's behavior Green. Interventions performed were n/a.     Severe Sepsis OR Septic Shock Diagnosis Present: No      ED Nurse Name/Phone Number: Bhumi Hearn,   10:14 PM    RECEIVING UNIT ED HANDOFF REVIEW    Above ED Nurse Handoff Report was  reviewed: Yes  Reviewed by: Barbara Ascencio on November 21, 2018 at 10:34 PM

## 2018-11-22 NOTE — ED PROVIDER NOTES
History     Chief Complaint:  Foot Pain     HPI   Ana Cristina Dominguez is a 87 year old female with a history of atrial fibrillation on Xarelto who presents accompanied by her son for evaluation of foot pain. On 11/4/2018, the patient was seen here in the ED by Dr. Cardona for right foot and right lower leg pain, redness, and swelling that occurred after rolling out of bed several nights prior. She had unremarkable X-rays of the right foot, ankle, tibia, and fibula. She was diagnosed with cellulitis and prescribed a course of Keflex. The patient's son reports that several days after starting the Keflex her pain, swelling, and redness had improved significantly. However, approximately two days ago she started to develop much more severe pain, swelling, and red and black discoloration over the top of her right foot. She has had difficulty walking at home due to the severity of her pain. Due to her worsening condition, the patient's son initially brought her into her primary care clinic today, but they were referred to the ED for further evaluation and management. Currently in the ED, the patient rates her pain at a severity of 10/10. She has not had any recent fever.     Allergies:  Amoxicillin  Codeine   Melatonin      Medications:    acetaminophen (TYLENOL) 325 MG tablet  ascorbic acid (VITAMIN C) 500 MG tablet  bumetanide (BUMEX) 1 MG tablet  Calcium Carb-Cholecalciferol (CALCIUM-VITAMIN D) 500-400 MG-UNIT TABS  metoprolol succinate (TOPROL-XL) 50 MG 24 hr tablet  Multiple Vitamins-Minerals (MULTIVITAMIN PO)  Multiple Vitamins-Minerals (PRESERVISION AREDS PO)  Potassium Chloride ER 20 MEQ TBCR  rivaroxaban ANTICOAGULANT (XARELTO) 20 MG TABS tablet  rOPINIRole (REQUIP) 0.5 MG tablet  vitamin E 400 UNIT capsule    Past Medical History:    Aortic regurgitation  Benign essential hypertension  Chronic diastolic heart failure  Hyperlipidemia  Polymyalgia rheumatica  Restless legs syndrome   Senile osteoporosis  Systolic  heart failure  Atrial fibrillation   Malignant neoplasm of skin     Past Surgical History:    Appendectomy  Bi ventricular pacemaker   Right cataract iol  Ablation AV node  Excise primary ganglion wrist     Family History:    CAD - Father   Renal failure - Sister   Heart rhythm issues - Sister     Social History:  Tobacco use:    Former smoker - 0.50 packs / day, quit 1973   Alcohol use:    Positive, socially   Marital status:       Accompanied to ED by:  Son      Review of Systems   Constitutional: Negative for fever.   Musculoskeletal: Positive for arthralgias (right foot) and joint swelling (right foot).   Skin: Positive for color change (right foot).   All other systems reviewed and are negative.    Physical Exam   First Vitals:  BP: 121/53  Pulse: 70  Temp: 98  F (36.7  C)  Resp: 20  Weight: 43.1 kg (95 lb)  SpO2: 98 %      Physical Exam  Constitutional: vitals reviewed  HENT: Moist oral mucosa.   Eyes: Grossly normal vision. Pupils are equal and round. Extraocular movements intact.  Sclera clear and without icterus.   Cardiovascular: Normal rate. Regular rhythm. S1 and S2 without audible murmurs. 2+ radial pulses. Normal capillary refill on all toes bilaterally.  Unable to palpate PT or DP pulses due to edema bilaterally.  Respiratory: Effort normal. Clear breath sounds bilaterally.  Gastrointestinal: Soft. No distension.   Neurologic: Alert and awake. Coordinated movement of extremities. Speech normal.  Sensation to light touch is fully intact.  Skin: There is a 10 cm area of ecchymosis and erythema on the dorsum of the right foot.  Erythema extends approximately 1 inch beyond the border of the ecchymosis.  No palpable bullae or crepitus.  The skin is very tender to light palpation.  Musculoskeletal: Bilateral lower extremity edema that is pitting and extending to the knees bilaterally.  There are some areas of weeping serous fluid.  Range of motion at the ankle and toes on the right foot is intact,  although with some pain.  She is unable to weight-bear on the right foot due to pain..  Psychiatric: Appropriate affect. Behavior is normal. Intact recent and remote memory. Linear thought process.      Emergency Department Course     Imaging:  Radiographic findings were communicated with the patient and family who voiced understanding of the findings.    CTA Abdomen Pelvis Bilateral Leg Runoff w Contrast:  IMPRESSION:  1. Right heart failure with a right pleural effusion and a small amount of ascites.  2. Patent aorta and inflow arteries. There is bilateral popliteal artery stenosis measuring greater than 50%. There is patent three-vessel runoff on the right with severely diseased runoff arteries on the left.  3. Marked bilateral lower extremity edema but no soft tissue gas.  4. Cirrhotic appearing liver that may be secondary to right heart failure.  5. Probable constipation.  Per radiology.     Foot XR, Right:  IMPRESSION: The bones are osteopenic. No fracture or dislocation. Soft tissue swelling noted over the dorsum of the foot.  Per radiology.      Laboratory:  CBC: WBC 15.3 high, o/w WNL (HGB 12.6, )  CMP: Carbon dioxide 37 high, BUN 59 high, Creatinine 1.47, GFR Estimate 34 low, Albumin 3.1 low, Protein total 6.6 low, Alkphos 156 high, o/w WNL   INR: 1.52 high   Lactic acid whole blood: 2.0   CK total: 138   Blood culture: Pending x2      Interventions:  1848 Dilaudid 0.2 mg IV   2104 Clindamycin 900 mg IV     Emergency Department Course:  Nursing notes and vitals reviewed.  1801: I performed an exam of the patient as documented above.     2140: I updated and reassessed the patient.     2206: I spoke with Dr. Martínez of the hospitalist service regarding patient's presentation, findings, and plan of care.     Findings and plan explained to the Patient and son who consents to admission. Discussed the patient with Dr. Martínez, who will admit the patient to a med/surg bed for further monitoring,  evaluation, and treatment.     Impression & Plan      Medical Decision Making:  Patient presents with a painful and swollen right foot.  History of an injury approximately 2 weeks ago and was treated with cephalexin with some improvement.  She does have a history of recurrent falls from bed.  Exam shows that she is unable to weight-bear on the affected extremity and there is evidence of ecchymosis and erythema over the dorsum of the foot.  Due to the perceived severity of her pain, there was initial concern for necrotizing soft tissue infection or ischemia.  On exam, she does have distal cap refill intact.  After labs were drawn including blood cultures, CT angiogram was done of the leg and shows normal blood flow and soft tissue edema without abscess or signs of gas.  Repeat exam shows no extending areas of erythema and she had good pain alleviation after Dilaudid 0.2 mg IV.  This alleviated the concerns for the above limb threatening diagnoses.  Laboratory studies are remarkable only for leukocytosis but no other endorgan dysfunction at this time.  Because of the swelling and history of trauma, it is possible that there is a prick purulent component so she was given clindamycin IV for treatment of this soft tissue infection.  Due to social factors and the diffuse edema of the lower extremities, she would benefit from inpatient admission for IV antibiotics and wound management.  The patient and family are agreeable to this plan.  Her care was signed out to the hospitalist team who requested an x-ray of the foot be performed.  This was done on the way to the admission floor and she left the emergency department in improved condition.    Diagnosis:    ICD-10-CM   1. Lower extremity edema R60.0   2. Cellulitis of right foot L03.115       Disposition:  Admitted to Dr. Martínez.       Eran BOOKER, am serving as a scribe at 6:01 PM on 11/21/2018 to document services personally performed by Rainer Fernandes MD, based  on my observations and the provider's statements to me.    [facility] EMERGENCY DEPARTMENT       Rainer Fernandes MD  11/22/18 0104

## 2018-11-22 NOTE — PLAN OF CARE
Problem: Patient Care Overview  Goal: Plan of Care/Patient Progress Review  Outcome: No Change  Vss. Afebrile. Audible murmur. Lungs clr-room air low 90s. Ecdb. +bs/+gas, no nausea. Tolerating diet. Last bm 11/21/18. Pt unable to void except for small amount in pad (baseline-stress incontinence)-new pad in place. Bladder scanned for 184 ml- information passed onto receiving Day shift Rn. Saline locked. Cms-(baseline) ble 2+ edema. 3+ edema to rt foot. Rt foot is red/black/purple in color & dry/flaky. Very painful to even slight touch. Blanchable redness to coccyx-intact skin (mepilex applied). Skin has scabs & bruising. Bles have had intermittent weeping-scant moisture noted. Pain managed with Oxycodone. Tolerating rle elevation and repositioning. Not tolerating ice. Some confusion to time & situation. No other significant issues noted overnight.

## 2018-11-23 NOTE — PROGRESS NOTES
PICC Insertion Time-Out   Proceduralist prior to procedure:    Confirmed provider order for procedure    Verified appropriate supplies/equipment are available for procedure    Verified appropriate assessments have been completed     Prior to procedure the proceduralist instituted a 'Cease all activity' to confirm with a second nursing staff member the following:     Confirm patient identifiers using patient name and date of birth    Verify procedure to be performed    Verify consent was signed and witnessed    Verbalize any allergies    Verify code status     [Co-signature verification:  IV Access flowsheet > click any insertion column associated to a note > view Value Information)     Cyndi Bianchi RN

## 2018-11-23 NOTE — PROGRESS NOTES
[facility]  Hospitalist Progress Note  Indra Mccann MD 11/23/2018    Reason for Stay (Diagnosis): Right foot pain/soft tissue infection.         Assessment and Plan:      Summary of Stay: Ana Cristina Dominguez is a 87 year old female with history of chronic bilateral lower extremity edema, chronic kidney disease stage III, polymyalgia rheumatica, arterial fibrillation with AV ablation status post pacemaker placement in July 2018, diastolic congestive heart failure, with recent echo showed ejection fraction of 50-55% with improvement from previous study who presented today with complaint of right foot pain and discoloration.  Patient initially had injury to the top of her right foot about 2 weeks ago at the time she was evaluated there was bruising and concern for infection and she was treated with chronic Keflex.  This time the significant discoloration and redness was noted and in less than 24 hours and patient was brought to the emergency room and admitted on 11/21/2018.  Her hospital course was complicated by severe sepsis last night, patient was given a dose of vancomycin and levofloxacin and transferred to intensive care unit.  Her renal function deteriorated, urine output decreased, blood pressure is soft but holding.  At this point she did require pressors but is at increased risk of deterioration.     Problem List:   1. Right foot swelling, redness, tenderness likely secondary to soft tissue infection/cellulitis.  -It is not clear if this is full-blown cellulitis or infected hematoma.  -There is leukocytosis, WBC trended upward, no fever.  -Podiatry is consulted  -We will hold anticoagulation for now, in case surgical intervention of the foot is needed.  -Unable to do MRI as patient has permanent pacemaker placed recently.  -CRP significantly elevated is 220  -Continue Ancef and Clindamycin.  -Follow blood cultures  -Elevate the leg, the edema is better with elevation.  -CT scan of the  foot done which showed prominent soft tissue swelling over the dorsum of the foot, no distinct mass identified.  -Ultrasound done also showed extensive soft tissue edema.    2. Acute renal failure on chronic kidney disease stage III.  -Creatinine is increasing from baseline of 1.1 to 2.32  -Urine output is decreasing  -Patient was on Bumex which was stopped  -Nephrology consulted awaiting input  -We will try gentle hydration with D5 NS, and will give a dose of IV albumin x1  -There is increased risk of fluid overload given her underlying congestive heart failure.  -BMP later this afternoon, and in a.m.    3. Severe sepsis likely secondary to lower extremity infection.  -Patient was given bolus of IV fluids gently due to congestive heart failure and significant valvular disease and fluid overload  -Patient was on clindamycin and Ancef for cellulitis  -She got a dose of vancomycin and also renal dose of levofloxacin last night  -Infectious diseases consulted  -Blood culture done so far negative  -Lactic acid is improving.  -Procardia is elevated,11.9.  -PICC line ordered to secure IV access in case patient needs pressors.  -Overall patient is deteriorating    4. Acute on chronic diastolic congestive heart failure and right-sided systolic congestive heart failure, possible pulmonary hypertension, tricuspid regurg, and severe aortic regurgitation.  -Ii improved on recent study to 50-55%  -Patient has bilateral lower extremity edema, likely due to congestive heart failure.  -Bumex 1 mg twice a day on hold today due to sepsis and renal failure.  -Daily weight, input and output.  -We will gently hydrate the patient and watch for fluid overload and respiratory distress  -Patient has follow-up with cardiology recently, recommendation noted    5. Arterial fibrillation with recent AV ablation and pacemaker placement.  -Rate controlled  -Hold Xarelto for now.    6. Restless leg syndrome-continue Requip.    7. Hyperkalemia  secondary to acute renal failure, on subsequent study potassium is high normal continue to monitor.    8. Polymyalgia rheumatica- At this time she is not on any treatment for this, currently no complaint.    Addendum 15:46  I discussed with Dr. Cardoza from podiatry, who recommended it for debridement and associated risks and benefits, he discussed with family on to go with conservative management at this point.  I also went in again and discuss with family members about 5 of them in the room.  After long discussion the family wanted to continue with treating patient as much as possible including pressors as needed.  At this time is not for the best interest of the patient to undergo aggressive surgical intervention.  The goal of care is restorative, IV antibiotic IV fluids as needed.  Levophed ordered.  -Also discussed with nephrologist Dr. Spence, input appreciated continue with the current management.    Total time spent with this patient and coordinating her care is over 60 minutes    DVT Prophylaxis: Patient is on Xarelto, will hold just for tonight  Code Status: DNR/DNI  Discharge Dispo: Patient will likely be discharged to TCU  Estimated Disch Date / # of Days until Disch: >2-3 days more in the hospital  I discussed with patient, patient's sons, and daughter at length at bedside all their question and concerns addressed.   I discussed with them that there is significant decline in overall patient condition and also in particular related to her sepsis and renal failure.  They are aware of her condition and will continue restorative care for now.        Interval History (Subjective):      Patient seen and examined, overnight transferred  to ICU for severe sepsis, hard of hearing, responds to question, she is more confused today and asked if she was in a crash.  She denied any pain, no nausea or vomiting, no fever or chills                  Physical Exam:      Last Vital Signs:  BP 92/53  Pulse 71  Temp 97.7  F  (36.5  C) (Oral)  Resp 25  Wt 42.2 kg (93 lb 0.6 oz)  SpO2 96%  BMI 18.17 kg/m2    I/O last 3 completed shifts:  In: 1929 [P.O.:150; I.V.:779; IV Piggyback:1000]  Out: 265 [Urine:265]  Vitals:    11/21/18 1750 11/21/18 2313 11/23/18 0130   Weight: 43.1 kg (95 lb) 42.9 kg (94 lb 9.6 oz) 42.2 kg (93 lb 0.6 oz)     Current Facility-Administered Medications   Medication     0.9% sodium chloride BOLUS     acetaminophen (TYLENOL) tablet 325-650 mg     albumin human 25 % injection 12.5 g     ceFAZolin (ANCEF) intermittent infusion 1 g     clindamycin (CLEOCIN) infusion 600 mg     dextrose 5% and 0.9% NaCl infusion     lidocaine 1 % 0.5-5 mL     magnesium hydroxide (MILK OF MAGNESIA) suspension 30 mL     melatonin tablet 1 mg     metoprolol succinate (TOPROL-XL) 24 hr tablet 50 mg     naloxone (NARCAN) injection 0.1-0.4 mg     ondansetron (ZOFRAN-ODT) ODT tab 4 mg    Or     ondansetron (ZOFRAN) injection 4 mg     oxyCODONE IR (ROXICODONE) half-tab 2.5-5 mg     Patient is already receiving anticoagulation with heparin, enoxaparin (LOVENOX), warfarin (COUMADIN)  or other anticoagulant medication     potassium chloride SA (K-DUR/KLOR-CON M) CR tablet 20 mEq     rOPINIRole (REQUIP) tablet 0.5 mg     sodium chloride (PF) 0.9% PF flush 10 mL     sodium chloride (PF) 0.9% PF flush 10-20 mL     sodium chloride (PF) 0.9% PF flush 5-50 mL     vancomycin place medina - receiving intermittent dosing       Constitutional: Awake, confused, cooperative, no apparent distress   Respiratory: Clear to auscultation bilaterally, no crackles or wheezing   Cardiovascular: Regular rate and rhythm, normal S1 and S2, and no murmur noted   Abdomen: Normal bowel sounds, soft, non-distended, non-tender   Skin: No rashes, no cyanosis, dry to touch   Neuro: Alert and oriented x3, no weakness, numbness, memory loss   Extremities:  +2-3 bilateral lower extremity edema.  +redness, tenderness, slight warmth and also ecchymotic area on the right dorsum of  the foot extending to the ankle joint, normal range of motion   Other(s):HEENT Pink, nonicteric, moist oral mucosa       All other systems: Negative          Medications:      All current medications were reviewed with changes reflected in problem list.         Data:      All new lab and imaging data was reviewed.   Labs:    Recent Labs  Lab 11/21/18  1833 11/21/18  1819   CULT No growth after 2 days No growth after 2 days       Recent Labs  Lab 11/23/18  0905 11/23/18  0318 11/23/18  0039    140 141   POTASSIUM 5.3 5.7* 5.6*   CHLORIDE 102 103 104   CO2 28 26 26   ANIONGAP 10 11 11   GLC 89 89 76   BUN 69* 69* 71*   CR 2.52* 2.32* 2.29*   GFRESTIMATED 18* 20* 20*   GFRESTBLACK 22* 24* 24*   KARTHIK 7.6* 7.6* 8.1*       Recent Labs  Lab 11/23/18  0905 11/23/18  0520 11/23/18  0039   WBC 20.1* 17.3* 22.0*   HGB 11.5* 10.9* 11.8   HCT 38.0 36.5 39.5   MCV 89 90 91   * 155 169       Recent Labs  Lab 11/23/18  0905 11/23/18  0318 11/23/18  0039 11/22/18  0627 11/21/18  1819   GLC 89 89 76 79 83      Imaging:   Results for orders placed or performed during the hospital encounter of 11/21/18   CTA Abdomen Pelvis Bilat Leg Runoff w Contr    Narrative    CTA ABDOMEN AND PELVIS BILATERAL LEG RUNOFF WITH CONTRAST   11/21/2018  9:10 PM     HISTORY: Right foot concern for ischemia/nec fasc.     TECHNIQUE: Arterial phase images from the diaphragm to the toes.  Radiation dose for this scan was reduced using automated exposure  control, adjustment of the mA and/or kV according to patient size, or  iterative reconstruction technique.    COMPARISON: None.    FINDINGS: The heart is enlarged. There is reflux of contrast in  hepatic veins, compatible with right heart dysfunction. There is a  moderate-sized right pleural effusion. Trace left pleural effusion.  There is a lobular contour to the liver which may be related to  cardiac cirrhosis. The spleen, pancreas, adrenal glands, and kidneys  are unremarkable. There is a large  volume of retained colonic stool  suggestive of constipation. No enlarged abdominal or retroperitoneal  lymph nodes. No evidence of bowel obstruction or free air. There is  trace ascites. The uterus is enlarged with several calcifications,  likely related to fibroids.    The aorta caliber is normal. There is minimal aortic atherosclerosis.  The visceral branches of the aorta are patent. Bilateral common,  external, and internal iliac arteries are patent.    The right common femoral artery and profunda femoris are patent. There  is mild disease of the superficial femoral artery with minimal  stenosis at the abductor canal. There is long segment popliteal artery  stenosis beginning at the level of the joint that is greater than 60%.  Runoff to the right foot with both the anterior and posterior tibial  arteries crossing the ankle.    The left common femoral artery and profunda femoris are patent. There  is mild disease of the superficial femoral artery. There is a long  segment stenosis of the popliteal artery at the level of the joint  that measures approximately 50%. There is marked disease of the runoff  vessels with several areas of high-grade stenosis. The anterior tibial  artery becomes occluded before crossing the ankle. There are several  areas of near occlusion involving the posterior tibial artery.    There is marked edema of both lower legs. No soft tissue gas  demonstrated.      Impression    IMPRESSION:  1. Right heart failure with a right pleural effusion and a small  amount of ascites.  2. Patent aorta and inflow arteries. There is bilateral popliteal  artery stenosis measuring greater than 50%. There is patent  three-vessel runoff on the right with severely diseased runoff  arteries on the left.  3. Marked bilateral lower extremity edema but no soft tissue gas.  4. Cirrhotic appearing liver that may be secondary to right heart  failure.  5. Probable constipation.    DORENE MARADIAGA MD   Foot  XR, G/E  3 views, right    Narrative    RIGHT FOOT THREE VIEWS   11/21/2018 10:54 PM     HISTORY: Ecchymosis and concern for trauma.     COMPARISON: None.      Impression    IMPRESSION: The bones are osteopenic. No fracture or dislocation. Soft  tissue swelling noted over the dorsum of the foot.    DORENE MARADIAGA MD

## 2018-11-23 NOTE — CONSULTS
RENAL CONSULTATION NOTE    REFERRING MD:  Indra Mccann MD    REASON FOR CONSULTATION:  Anuric renal failure    HPI:  87 y.o woman with hypertension, severe aortic regurgitation, severe mitral regurgitation, MGUS and polymyalgia rheumatica, who presented on 11/21 with right foot pain and erythema. She was found to have sepsis from right foot cellulitis.  She was started on IV antibiotic and fluid. Lactic acid level peaked at 4.6 and it is currently at 2.1. Systolic BP remains low in the 90s. CXR showed some vascular congestion and pleural effusion. She is DNR/I. IVF is running at 75 ml/hr. She is ~ 2.3 liters positive.     She had a serum creatinine of 0.9 mg/dl in September, which was around her baseline. Scr was 1.58 mg/dl on 10/24, which improved when Bumex and lisinopril were held. Bumex was restrted on 10/29 for lower extremity edema. Scr was 1.18 mg/dl on 11/13 and 1.47 ng/dl on admission on 11/21. It is currently at 2.52 mg/dl. She is not making much urine.     She had a CTA on 11/21.    ROS:  A complete review of systems was performed and is negative except as noted above.    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     Polymyalgia rheumatica (H)      RLS (restless legs syndrome)      Senile osteoporosis     Reclast 11/16/09, 11/10, 11/11     Systolic heart failure (H)     by echo 3/2018, NM MPI negative for ischemia in 5/2108       PSH:    Past Surgical History:   Procedure Laterality Date     APPENDECTOMY       bi ventricular pacemaker  07/16/2018     CATARACT IOL, RT/LT  1/19/09    right     H ABLATION AV NODE  07/16/2018     HC EXCIS PRIMARY GANGLION WRIST         MEDICATIONS:      sodium chloride 0.9%   1,000 mL Intravenous Once     albumin human         ceFAZolin  1 g Intravenous Q12H     clindamycin  600 mg Intravenous Q8H     metoprolol succinate  50 mg Oral BID     potassium chloride SA  20 mEq Oral Daily     sodium chloride (PF)  10 mL Intracatheter Q7 Days     vancomycin place medina - receiving intermittent dosing  1 each Intravenous See Admin Instructions       ALLERGIES:    Allergies as of 11/21/2018 - Miguel as Reviewed 11/21/2018   Allergen Reaction Noted     Amoxicillin Rash 09/08/2010     Codeine GI Disturbance 06/08/2004     Melatonin Nausea 05/21/2018       FH:    Family History   Problem Relation Age of Onset     C.A.D. Father      Family History Negative Mother      Genitourinary Problems Sister      Renal failure     HEART DISEASE Sister      Rhythm issues       SH:    Social History     Social History     Marital status:      Spouse name: N/A     Number of children: 3     Years of education: N/A     Occupational History      Retired     Social History Main Topics     Smoking status: Former Smoker     Packs/day: 0.50     Types: Cigarettes     Quit date: 5/6/1973     Smokeless tobacco: Never Used     Alcohol use Yes      Comment: Socially     Drug use: No     Sexual activity: No     Other Topics Concern     Exercise Yes     Seat Belt Yes     Social History Narrative       PHYSICAL EXAM:    BP 92/53  Pulse 71  Temp 97.7  F (36.5  C) (Oral)  Resp 25  Wt 42.2 kg (93 lb 0.6 oz)  SpO2 96%  BMI 18.17 kg/m2  GENERAL: Frail.   HEENT:  Normocephalic. No gross abnormalities.  Pupils equal.  MMM.    CV: RRR, + murmurs, no clicks, gallops, or rubs, no edema LLE  RESP: Poor airflow. No wheezes.  GI: Abdomen soft, NT  MUSCULOSKELETAL: R foot is pretty red, + tenderness and some edema. LLE no edema. Some pitting edema at the thigh.   SKIN: R foot cellulitis.   NEURO:  Generalized weakness. Hard of hearing. Awake and answering questions.   PSYCH: mood good, affect appropriate  LYMPH: No palpable  ant/post cervical   +gamboa catheter    LABS:      CBC RESULTS:     Recent Labs  Lab 11/23/18  0905 11/23/18  0520 11/23/18  0039 11/22/18  0627 11/21/18  1819   WBC 20.1* 17.3* 22.0* 20.2* 15.3*   RBC 4.27 4.07 4.35 4.16 4.61   HGB 11.5* 10.9* 11.8 11.3* 12.6   HCT 38.0 36.5 39.5 37.0 41.2   * 155 169 180 218       BMP RESULTS:    Recent Labs  Lab 11/23/18  0905 11/23/18  0318 11/23/18  0039 11/22/18  0627 11/21/18  1819    140 141 145* 144   POTASSIUM 5.3 5.7* 5.6* 4.0 3.7   CHLORIDE 102 103 104 105 103   CO2 28 26 26 33* 37*   BUN 69* 69* 71* 61* 59*   CR 2.52* 2.32* 2.29* 1.64* 1.47*   GLC 89 89 76 79 83   KARTHIK 7.6* 7.6* 8.1* 8.1* 8.9       INR  Recent Labs  Lab 11/21/18 1819   INR 1.52*        DIAGNOSTICS:  Reviewed    A/P:  87 y.o frail woman with multiple co-morbidities, admitted for sepsis from R foot infection, consulted for acute renal failure.     # Baseline serum creatinine is  0.9 mg/dl.     # Anuric renal failure 2/2 septic shock and contrast exposure. UA with abundant hyaline casts.     # Septic shock 2/2 R fight infection. Perfusion pressure is soft. LA level is improving but still high. On clindamycin, Keflex and vancomycin?    # Pulm edema. CXR showed vascular congestion. She is DNR/I.     # NICM. EF 50-55%, mild decrease RV function    # Severe AR and TR.     # Atria fibrillation s/p ablation and pacemaker.     Plan.   # Keep MAP >65  # Consider pressors support  # Decrease IVF to 50 ml/hr given vascular congestion on CXR and she is DNR/I  # Can try intermittent 5% albumin but be care with IVF as she is DNR/I  # Repeat renal panel at 1800 to monitor  # Stop Toprol and potassium  # I had a discussed with the three sons at the bedside. They don't think renal replacement therapy is something that their mother would want, which I agree given her co-morbidities.   # 60 minutes was spent on this case, >50% on counseling    Ronald Spence MD  [facility] - Nephrology  Office Phone:  [phone]  Pager: [phone]

## 2018-11-23 NOTE — CONSULTS
[facility]    Infectious Disease Consultation     Date of Admission:  11/21/2018  Date of Consult (When I saw the patient): 11/23/18    Assessment & Plan   Ana Cristina Dominguez is a 87 year old female who was admitted on 11/21/2018.     Impression:  1. 87 y.o female with multiple co morbidities.   2. Polymyalgia rheumatica, CKD stage III, DANNIELLE currently.   3. Pacemaker in place   4. Admitted now with red, swollen right foot with overlying skin necrosis, concerning for infection.     Recommendations:   Would recommend treating the foot as infected with ancef and clindamycin, avoid vancomycin.   Keep the foot elevated     David Figueredo MD    Reason for Consult   Reason for consult: I was asked by Dr. Mccann  to evaluate this patient for right foot infection.    Primary Care Physician   Dez Urban MD    Chief Complaint   Right foot infection     History is obtained from the patient and medical records    History of Present Illness   Ana Cristina Dominguez is a 87 year old female with chronic bilateral lower extremity edema, chronic kidney disease stage III, polymyalgia rheumatica, arterial fibrillation with AV ablation status post pacemaker placement in July 2018, admitted with right foot pain and discoloration.  Patient initially had injury to the top of her right foot about 2 weeks ago at the time she was evaluated there was bruising and concern for infection and she was treated with  Keflex.  This time the significant discoloration and redness was noted and in less than 24 hours and patient was brought to the emergency room and admitted on 11/21/2018.     Past Medical History   I have reviewed this patient's medical history and updated it with pertinent information if needed.   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  Past Surgical History:   Procedure Laterality Date     APPENDECTOMY       bi ventricular pacemaker  07/16/2018     CATARACT IOL, RT/LT  1/19/09    right     H ABLATION AV NODE  07/16/2018     HC EXCIS PRIMARY GANGLION WRIST         Prior to Admission Medications   Prior to Admission Medications   Prescriptions Last Dose Informant Patient Reported? Taking?   Multiple Vitamins-Minerals (MULTIVITAMIN PO) 11/20/2018 at Unknown time Self Yes Yes   Sig: Take 1 tablet by mouth every morning   Multiple Vitamins-Minerals (PRESERVISION AREDS PO) 11/20/2018 at Unknown time Self Yes Yes   Sig: Take 1 capsule by mouth 2 times daily    Potassium Chloride ER 20 MEQ TBCR Unknown at Unknown time  No No   Sig: Take 1 tablet (20 mEq) by mouth daily   acetaminophen (TYLENOL) 325 MG tablet  Self Yes Yes   Sig: Take 325-650 mg by mouth every 4 hours as needed for mild pain   bumetanide (BUMEX) 1 MG tablet 11/20/2018 at Unknown time  Yes Yes   Sig: Take 1 mg by mouth 2 times daily   metoprolol succinate (TOPROL-XL) 50 MG 24 hr tablet 11/20/2018 at Unknown time  No Yes   Sig: Take 1 tablet (50 mg) by mouth 2 times daily   rOPINIRole (REQUIP) 0.5 MG tablet   Yes Yes   Sig: Take 0.5 mg by mouth nightly as needed    rivaroxaban ANTICOAGULANT (XARELTO) 20 MG TABS tablet 11/20/2018 at Unknown time  Yes Yes   Sig: Take 1 tablet (20 mg) by mouth daily (with dinner)      Facility-Administered Medications: None     Allergies   Allergies   Allergen Reactions     Amoxicillin Rash     Codeine GI Disturbance      Stomach pain     Melatonin Nausea     Eyesburned       Immunization History   Immunization History   Administered Date(s) Administered     Pneumococcal 23 valent 11/29/2013     TD (ADULT, 7+) 06/16/2006     TDAP Vaccine (Adacel) 10/28/2013       Social History   I have reviewed this patient's social history and updated it with pertinent information if needed. Ana Cristina Dominguez  reports that she quit smoking about 45 years ago. Her smoking use included Cigarettes. She smoked 0.50 packs per day. She has never used smokeless tobacco. She reports that she drinks alcohol. She reports that she does not use illicit drugs.    Family History   I have reviewed this patient's family history and updated it with pertinent information if needed.   Family History   Problem Relation Age of Onset     C.A.D. Father      Family History Negative Mother      Genitourinary Problems Sister      Renal failure     HEART DISEASE Sister      Rhythm issues       Review of Systems   The 10 point Review of Systems is negative other than noted in the HPI or here.     Physical Exam   Temp: 97.4  F (36.3  C) Temp src: Oral BP: 93/47 Pulse: 71 Heart Rate: 70 Resp: 19 SpO2: 100 % O2 Device: None (Room air) Oxygen Delivery: 2 LPM  Vital Signs with Ranges  Temp:  [97.2  F (36.2  C)-98  F (36.7  C)] 97.4  F (36.3  C)  Pulse:  [70-72] 71  Heart Rate:  [69-78] 70  Resp:  [13-36] 19  BP: ()/(36-75) 93/47  SpO2:  [67 %-100 %] 100 %  93 lbs .55 oz  Body mass index is 18.17 kg/(m^2).    GENERAL APPEARANCE:  Elderly lady very frail hard of hearing   EYES: Eyes grossly normal to inspection, PERRL and conjunctivae and sclerae normal  HENT: ear canals and TM's normal and nose and mouth without ulcers or lesions  NECK: no adenopathy, no asymmetry, masses, or scars and thyroid normal to palpation  RESP: lungs clear to auscultation - no rales, rhonchi or wheezes  CV: regular rates and rhythm, normal S1 S2, no S3 or S4 and no murmur, click or  rub  LYMPHATICS: normal ant/post cervical and supraclavicular nodes  ABDOMEN: soft, nontender, without hepatosplenomegaly or masses and bowel sounds normal  MS: the leg is swollen ( right) red with skin necrosis on top   SKIN: no suspicious lesions or rashes      Data   Lab Results   Component Value Date    WBC 20.1 (H) 11/23/2018    HGB 11.5 (L) 11/23/2018    HCT 38.0 11/23/2018     (L) 11/23/2018     11/23/2018    POTASSIUM 5.3 11/23/2018    CHLORIDE 102 11/23/2018    CO2 28 11/23/2018    BUN 69 (H) 11/23/2018    CR 2.52 (H) 11/23/2018    GLC 89 11/23/2018    SED 16 11/23/2018    DD 2.3 (H) 06/03/2018    NTBNPI 3762 (H) 09/01/2018    NTBNP 3343 (H) 07/06/2018    TROPI <0.015 09/01/2018    AST 59 (H) 11/23/2018    ALT 34 11/23/2018    ALKPHOS 123 11/23/2018    BILITOTAL 0.9 11/23/2018    INR 1.52 (H) 11/21/2018       Recent Labs  Lab 11/23/18  0105 11/21/18  1833 11/21/18  1819   CULT Culture negative < 24 hours, reincubate No growth after 2 days No growth after 2 days     Recent Labs   Lab Test  11/23/18   0105  11/21/18   1833  11/21/18   1819  10/08/16   1300   CULT  Culture negative < 24 hours, reincubate  No growth after 2 days  No growth after 2 days  No growth

## 2018-11-23 NOTE — PLAN OF CARE
Problem: Patient Care Overview  Goal: Plan of Care/Patient Progress Review  OT/PT: Per discussion with hospitalist, the pt is not medically appropriate for PT/OT interventions this date. Will reschedule as appropriate.

## 2018-11-23 NOTE — PLAN OF CARE
Problem: Skin and Soft Tissue Infection (Adult)  Goal: Signs and Symptoms of Listed Potential Problems Will be Absent, Minimized or Managed (Skin and Soft Tissue Infection)  Signs and symptoms of listed potential problems will be absent, minimized or managed by discharge/transition of care (reference Skin and Soft Tissue Infection (Adult) CPG).   Outcome: Declining  Paged admit pager at [phone] with this text   lactic back at 3.8 now. please call nurse Chery.

## 2018-11-23 NOTE — PROGRESS NOTES
X-cover    Called for elevated lactate    Initial evaluation she was notably lethargic and confused, unable to participate in interview and resistant to examination although was not in respiratory distress.  PE notable for soft belly.     Given significant elevation of lactate at 3.8 hours, initiated LR bolus. CMP/CBC, procal  Ordered.     WBC up from this am, cmp notable for worsening renal function (in setting of recent IV contrast) and mild hyperkalemia-therefore changed LR to NS     Transferred to ICU    Re-evaluation:  Much more alert and cooperative, BPs marginally improved, but lactate (drawn prior to bolus) did show worsening at 4.6    Cont IVF bolus, stat PICC, gamboa  Single dose vanco and levofloxacin for broader coverage in light of sepsis.   No e/o limb ischemia by admission CT and podiatry eval earlier today.       Echo during this admission with e/o right sided volume overload-so not sure we'll get very far with IVF, stat PICC for pressor support if needed.  Discussed with tele hub    35 minutes critical time spent    Addendum:  Rechecked patient and she continues to improve.

## 2018-11-23 NOTE — PROGRESS NOTES
[facility]    Sepsis Evaluation Progress Note    Date of Service: 11/22/2018    I was called to see Ana Cristina Dominguez due to abnormal vital signs triggering the Sepsis SIRS screening alert. She is known to have an infection.     Physical Exam    Vital Signs:  Temp: 97.6  F (36.4  C) Temp src: Axillary BP: 104/42 Pulse: 71   Resp: 24 SpO2: 93 % O2 Device: None (Room air)      Lab:  Lactic Acid   Date Value Ref Range Status   11/21/2018 2.0 0.7 - 2.0 mmol/L Final     Lactate for Sepsis Protocol   Date Value Ref Range Status   11/22/2018 3.8 (H) 0.7 - 2.0 mmol/L Final     Comment:     Significant value called to and read back by  MRAYBETH BEEBE ([email or web address]) OM 11/22/18 AT 2258 BY TB         The patient has signs of altered level of consciousness suspicious for sepsis.    The rest of their physical exam is significant for very hard of hearing but mental status is clearly off, right foot with large swelling hematoma and tenderness with surrounding erythema.      Assessment and Plan    The SIRS and exam findings are likely due to   sepsis.     ID: The patient is currently on the following antibiotics:  Anti-infectives (Future)    Start     Dose/Rate Route Frequency Ordered Stop    11/22/18 1115  clindamycin (CLEOCIN) infusion 600 mg      600 mg  100 mL/hr over 30 Minutes Intravenous EVERY 8 HOURS 11/22/18 1104      11/22/18 0000  ceFAZolin (ANCEF) intermittent infusion 1 g      1 g  over 30 Minutes Intravenous EVERY 8 HOURS 11/21/18 2259          Current antibiotic coverage is appropriate for source of infection. But given decline will add in vanco    Fluid: Fluid bolus ordered.    Lab: Repeat lactic acid ordered for 2 hours from now.    Disposition: The [place] [facility] Attending Physician, none, was notified.  Called and left message for son.    Miya Cohn MD

## 2018-11-23 NOTE — PROGRESS NOTES
"[facility]  November 23, 2018    A/P:  R foot cellulitis, ecchymosis  Complicated by sepsis; pt in ICU    -Discussed condition and treatment options including pros and cons.  -Reviewed case with Dr. Mccann.    -Imaging neg for fracture or fluid collection.  Infection is the primary concern at this point.  No other clear source of infection other than the foot.  Clinically foot actually looks better, but pt is deteriorating.  -I had a long discussion with pt's family, and pt was present, though sleepy.  -Options discussed and offered include:  1)  Continuing current cares/nonoperative treatment with IV abx and making pt comfortable.  2)  Surgical intervention which would involve R foot excisional debridement to reduce burden of infection.  Pt's fitness for surgery is questionable.  Pt would likely not be extubated for a long time after surgery.  She may not be able to be extubated.  This would also leave her with a large open wound that would require prolonged wound care with risk of future infection, amputation.  Her foot may never heal.  3)  Below knee amputation.  Again, pt may not be able to be extubated after any surgical intervention.    -I made it clear this is a limb and life threatening infection.  If no surgery is done, her life is in danger.  If surgery is done, her life would also be in danger.  -Family opted for nonoperative care.  They feel this would be her wish.  -ID was consulted earlier today.  -Continue IV abx.  Consider palliative care consult.  -Further cares per hospitalist/ICU team.  -Since no surgery is planned, our service will sign off at this time.  -Please call/reconsult with questions or concerns.    Carlos Manuel Cardoza DPM, FACFAS  Pager: [phone]    S:  Pt seen at bedside.  Sleepy.  Not responding to questions.  Family present.  Nurse reported pt asked \"why is it taking so long to die?\"    O:  BP 93/47  Pulse 71  Temp 97.4  F (36.3  C) (Oral)  Resp " 19  Wt 42.2 kg (93 lb 0.6 oz)  SpO2 100%  BMI 18.17 kg/m2  Pt sleepy.  R foot:  Dorsum of foot with erythema to marked lines.  Dorsal central purple discoloration seems less dark today.  ?  few tiny subcutaneous pustules at lateral periphery of area, though no obvious open wound or active purulent drainage.      Imaging reviewed with pt/family:    CT R foot:    IMPRESSION:  1. Degenerative changes with no fracture or acute osseous abnormality.  2. Prominent soft tissue swelling over the dorsum of the foot. A  distinct mass is not definitely seen, although CT is not optimal for  differentiation between edema and a mass within the extremities. If  there is clinical concern for a hematoma or abscess and further  imaging is needed, an ultrasound could be considered as the presence  or absence of blood flow can help differentiate between cellulitis and  a hematoma/abscess.      JOHAN REYNA MD      US R foot:    IMPRESSION: Sonographic examination at the area of clinical concern  along the dorsal aspect of the right foot demonstrates extensive soft  tissue edema. However, no distinct focal mass or fluid collection is  seen. This likely represents cellulitis with no evidence of a  hematoma, abscess, or mass.      JOHAN REYNA MD      Lab Results   Component Value Date    WBC 20.1 11/23/2018     Lab Results   Component Value Date    RBC 4.27 11/23/2018     Lab Results   Component Value Date    HGB 11.5 11/23/2018     Lab Results   Component Value Date    HCT 38.0 11/23/2018     No components found for: MCT  Lab Results   Component Value Date    MCV 89 11/23/2018     Lab Results   Component Value Date    MCH 26.9 11/23/2018     Lab Results   Component Value Date    MCHC 30.3 11/23/2018     Lab Results   Component Value Date    RDW 20.3 11/23/2018     Lab Results   Component Value Date     11/23/2018         ESR 16        Billing based on time today (>35 minutes) with >50% spent on counseling  and coordination of care, including floor time.

## 2018-11-23 NOTE — PLAN OF CARE
ICU End of Shift Summary.  For vital signs and complete assessments, please see documentation flowsheets.     Pertinent assessments: pt alert and oriented, forgetful. Pt with soft blood pressures, stable. Pt's right foot red/purple in spots, swollen, painful. Pt with a gamboa placed, low urine output reported to Dr. Cohn. Pt on 4 antibiotics, double checked with Dr. Cohn and was confirmed for all antibiotics. Lungs clear, on oxygen due to desats with sleeping. Pt with a paced, rate 70. CRP inflammation and procalcitonin elevated, Dr. Cohn aware. Lactic acid trending down, 3.3 this am.   Major Shift Events: transfer from 6 th floor  Plan (Upcoming Events): continue current cares, PT/OT  Discharge/Transfer Needs: unclear    Bedside Shift Report Completed :   Bedside Safety Check Completed:

## 2018-11-23 NOTE — PLAN OF CARE
Problem: Patient Care Overview  Goal: Plan of Care/Patient Progress Review  Outcome: Declining  Pt disorientated to time and situation. VS stable; lower bp's @ beginning of shift. Afebrile. PO oxycodone given for increased pain in R foot. CMS intact. +2-3 edema to BLE's. RLE cellulitis marked-kept elevated on pillows. Continues on IV Ancef and clindamycin. Turned and repositioned q2hr. Pt has not voided this shift-bladder scanned for 68 @ 2200. Tolerating regular diet-minimal appetite. CT scan done this evening.     Pt became more confused and respirations were increased later this evening-triggered sepsis protocol-lactic came back @ 3.8. MD notified and bolus started. Pt being transferred to ICU. Spoke with family and updated them on pt's current condition.

## 2018-11-23 NOTE — PROGRESS NOTES
Pt arrived from 6th floor. Pt alert and oriented, answers questions appropriately. Pt able to move all extremities except right leg weak and somewhat sore to move. mepilex to coccyx, no open areas noted. Dr. Cohn at the bedside to assess pt. Vieyra placed per order. PICC stat called to place PICC due to rising lactic acid and changing sepsis picture.

## 2018-11-24 NOTE — PLAN OF CARE
Problem: Patient Care Overview  Goal: Plan of Care/Patient Progress Review  Outcome: No Change  ICU End of Shift Summary.  For vital signs and complete assessments, please see documentation flowsheets.     Pertinent assessments: Pt confused to situation and time, with intermittent anxiousness and agitation throughout night. Tele-hub notified and haldol order received. BPs soft. Continues on levo. Max dose increased on levo order and utilized. 100% paced rhythm. Increased work of breathing throughout night and SOB reported. Increased oxygen usage. 4L NC effective. Lungs coarse and diminished with slight wheezes and congestion in upper airway. Oral cares done with green swabs d/t pt not tolerating sitting up and risk for aspiration is high. Vieyra in place with a total of 15cc of UOP. Abdomen distended and rounded. BM x1 post kayexalate. Labs throughout shift. Creatine and K continuing to worsen.     Major Shift Events: 0000 BPs low, reaching max dose of Levo. Notified tele-hub awaiting orders. Pt very anxious, restless, and agitated. Called family in regards to comfort cares vs adding another pressor                                    0013 Tele-hub MD increased dosing on levo. Family called and notified that they are on way                                    0442 reached max dose on levo. Pt in trendelenburg. Family discussing next options.                                        0650 family and  Patient confirmed that they would like to switch to comfort cares. Tele-hub notified. Informed to have MD at site visit with family once they arrive. Will report to un-coming nurse to page hospitalist                                       Plan (Upcoming Events): Transition to comfort cares per family and pt request  Discharge/Transfer Needs: Comfort cares ??    Bedside Shift Report Completed : yes   Bedside Safety Check Completed: yes

## 2018-11-24 NOTE — PLAN OF CARE
Problem: Skin and Soft Tissue Infection (Adult)  Goal: Signs and Symptoms of Listed Potential Problems Will be Absent, Minimized or Managed (Skin and Soft Tissue Infection)  Signs and symptoms of listed potential problems will be absent, minimized or managed by discharge/transition of care (reference Skin and Soft Tissue Infection (Adult) CPG).   Outcome: Declining  ICU End of Shift Summary.  For vital signs and complete assessments, please see documentation flowsheets.     Pertinent assessments:  No urine output. MD and renal involved.  Bumex held for low bp and kidney function.  BP low. Pt sad and confused. States she just wants to die. Care discussed with family.  R foot cool to touch.  Looks better. Edema decreasing.  No pain.   Major Shift Events: Kidney function declining.  BP low. Levo started.  IV fluids stopped. Kaexolate given for K+ of 5.8.  Renal and ID and Podiatry here to see pt and family today.   Plan (Upcoming Events):  Family would like to continue with current plan of care until tomorrow. If things do not improve would move to Palliative comfort care goals.   Discharge/Transfer Needs:  Pt will need to go to TCU if she does end up discharging. Social service aware.     Bedside Shift Report Completed : yes  Bedside Safety Check Completed: yes

## 2018-11-24 NOTE — PROGRESS NOTES
Note comfort care plan.  Signing off.  Thanks.    Sg Correa MD  Nephrology; [facility] [employer]  [phone]

## 2018-11-24 NOTE — CONSULTS
Care Transition Initial Assessment -   Reason For Consult: discharge planning  Met with: Patient's son Lisette  Active Problems:    Foot pain, right       DATA  Lives With: alone  Living Arrangements: condominium  Description of Support System: Supportive, Involved  Who is your support system?: Children  Support Assessment: Adequate family and caregiver support, Adequate social supports.   Identified issues/concerns regarding health management:             Quality Of Family Relationships: supportive, helpful, involved  Transportation Available: family or friend will provide    ASSESSMENT  Cognitive Status:  awake, alert and oriented  Concerns to be addressed: Patient sons reported patient was fairly independent before this hospitalization. They reported they were looking into assisted living facilities. Writer discussed options for discharge as their mother is on comfort care. Patient son reported they would like a place their mother can be well cared for. Discussed hospice homes. Patient's son reported they would interested in place in area. Informed [facility] have hospice homes. Patient son reported that would a great options. Writer informed usually hospice homes are a cost to the family. Patient sons reported they would like to find out extract cost before they make a decision. Writer informed pt son writer will contact the lodges and let them know of cost. Writer called the [facility] and spoke with Merlene who reported they have a bed open in [place] and cost is $395 a day. Merlene requested for writer call [phone] for referral. Met patient's sons and informed them of cost. Patient son reported they will think about this tonight and get back to writer tomarrow.     PLAN  Continue to follow for discharge planning.

## 2018-11-24 NOTE — PLAN OF CARE
Problem: Skin and Soft Tissue Infection (Adult)  Goal: Signs and Symptoms of Listed Potential Problems Will be Absent, Minimized or Managed (Skin and Soft Tissue Infection)  Signs and symptoms of listed potential problems will be absent, minimized or managed by discharge/transition of care (reference Skin and Soft Tissue Infection (Adult) CPG).   Outcome: No Change  Patient on levophed at 0.4 mcg /  bp being taken q 5 minutes.  Varible,  Uncomfortable  Family at bedside, hr varible too freq oral cares.  Weaned off levo, move to comfort cares  Hr paced, around 70-80, resting between cares, denies pain, occasionally restless.  Lungs soubd decreased, diminished rhonchi.  sats /. 96 % on nc 3 L.    Family at bedside  3 sons and neice

## 2018-11-24 NOTE — PROGRESS NOTES
[facility]  Hospitalist Progress Note  Indra Mccann MD 11/24/2018    Reason for Stay (Diagnosis): Right foot pain/soft tissue infection/septic shock         Assessment and Plan:      Summary of Stay: Ana Cristina Dominguez is a 87 year old female with history of chronic bilateral lower extremity edema, chronic kidney disease stage III, polymyalgia rheumatica, arterial fibrillation with AV ablation status post pacemaker placement in July 2018, diastolic congestive heart failure, with recent echo showed ejection fraction of 50-55% with improvement from previous study who presented today with complaint of right foot pain and discoloration.  Patient initially had injury to the top of her right foot about 2 weeks ago at the time she was evaluated there was bruising and concern for infection and she was treated with chronic Keflex.  This time the significant discoloration and redness was noted and in less than 24 hours and patient was brought to the emergency room and admitted on 11/21/2018.  Her hospital course was complicated by severe sepsis last night, patient was given a dose of vancomycin and levofloxacin and transferred to intensive care unit.  Her renal function deteriorated, urine output decreased, blood pressure is soft but holding.  At this point she did require pressors but is at increased risk of deterioration.     Problem List:   1. Right foot swelling, redness, tenderness likely secondary to soft tissue infection/cellulitis.  -It is not clear if this is full-blown cellulitis or infected hematoma.  -There is leukocytosis, WBC trended upward, no fever.  -Podiatry is consulted  -We will hold anticoagulation for now, in case surgical intervention of the foot is needed.  -Unable to do MRI as patient has permanent pacemaker placed recently.  -CRP significantly elevated is 220  -Continue Ancef and Clindamycin.  -Follow blood cultures  -Elevate the leg, the edema is better with elevation.  -CT  scan of the foot done which showed prominent soft tissue swelling over the dorsum of the foot, no distinct mass identified.  -Ultrasound done also showed extensive soft tissue edema.    -Discontinued antibiotics.  -Comfort care  -Pain controlled    2. Acute renal failure on chronic kidney disease stage III.  -Creatinine is increasing from baseline of 1.1 to 2.32  -Urine output is decreasing  -Patient was on Bumex which was stopped  -Nephrology consulted awaiting input  -We will try gentle hydration with D5 NS, and will give a dose of IV albumin x1  -There is increased risk of fluid overload given her underlying congestive heart failure.  -BMP later this afternoon, and in a.m.    -No lab check  -No input output monitoring  -No IV fluids    3. Severe sepsis/septic shock likely secondary to lower extremity infection.  -Patient was given bolus of IV fluids gently due to congestive heart failure and significant valvular disease and fluid overload  -Patient was on clindamycin and Ancef for cellulitis  -She got a dose of vancomycin and also renal dose of levofloxacin last night  -Infectious diseases consulted  -Blood culture done so far negative,  -Lactic acid is improving.  -Procardia is elevated,11.9.  -PICC line ordered to secure IV access in case patient needs pressors.  -Overall patient is deteriorating  -Clinical status changed to comfort  -No pressors  -No IV fluids  -No IV antibiotics.    4. Acute on chronic diastolic congestive heart failure and right-sided systolic congestive heart failure, possible pulmonary hypertension, tricuspid regurg, and severe aortic regurgitation.  -Ii improved on recent study to 50-55%  -Patient has bilateral lower extremity edema, likely due to congestive heart failure.  -Bumex 1 mg twice a day on hold today due to sepsis and renal failure.  -Daily weight, input and output.  -We will gently hydrate the patient and watch for fluid overload and respiratory distress  -Patient has  follow-up with cardiology recently, recommendation noted.    -Discontinue diuretics  -Oral intake for comfort.    5. Arterial fibrillation with recent AV ablation and pacemaker placement.  -Rate controlled.  -Hold Xarelto for now.    -Discontinue tele monitoring.  -    6. Restless leg syndrome-continue Requip.    7. Hyperkalemia secondary to acute renal failure, on subsequent study potassium is high normal continue to monitor.    8. Polymyalgia rheumatica- At this time she is not on any treatment for this, currently no complaint.      I discussed with Dr. Cardoza from podiatry, who recommended it for debridement and associated risks and benefits, he discussed with family on to go with conservative management at this point.  I also went in again and discuss with family members about 5 of them in the room.  After long discussion the family wanted to continue with treating patient as much as possible including pressors as needed.  At this time is not for the best interest of the patient to undergo aggressive surgical intervention.  The goal of care is restorative, IV antibiotic IV fluids as needed.  Levophed ordered.  -Also discussed with nephrologist Dr. Spence, input appreciated continue with the current management.    Total time spent with this patient and coordinating her care is over 60 minutes          DVT Prophylaxis: Stop DVT prophylaxis patient is on comfort  Code Status: DNR/DNI, comfort care  Discharge Dispo: Continue inpatient for likely end-of-life care, social service will be consulted for discharge planning  Estimated Disch Date / # of Days-on comfort care, pending social service eval.   May transfer to general medical floor    I discussed with patient, patient's sons, and daughters at length at bedside all their question and concerns addressed.   I discussed with them that there is significant decline in overall patient condition and also in particular related to her sepsis and renal failure.  They are  aware of her condition and as patient also wishes to be on comfort and does not want any aggressive measure, patient condition changed to comfort care today.        Interval History (Subjective):      Patient seen and examined, overall she is deteriorating, patient is scheduled to be comfortable and does not want any aggressive measures , hard of hearing, responds to question, she understands questions, and responds appropriately.  She denied any pain, no nausea or vomiting.                  Physical Exam:      Last Vital Signs:  /51  Pulse 71  Temp 97.6  F (36.4  C) (Axillary)  Resp (!) 32  Wt 45.5 kg (100 lb 5 oz)  SpO2 99%  BMI 19.59 kg/m2    I/O last 3 completed shifts:  In: 474.16 [P.O.:120; I.V.:354.16]  Out: 30 [Urine:30]  Vitals:    11/21/18 1750 11/21/18 2313 11/23/18 0130 11/24/18 0000   Weight: 43.1 kg (95 lb) 42.9 kg (94 lb 9.6 oz) 42.2 kg (93 lb 0.6 oz) 46 kg (101 lb 6.6 oz)    11/24/18 0800   Weight: 45.5 kg (100 lb 5 oz)     Current Facility-Administered Medications   Medication     acetaminophen (TYLENOL) tablet 325-650 mg     atropine 1 % ophthalmic solution 1-2 drop     haloperidol lactate (HALDOL) injection 2 mg     hypromellose-dextran (ARTIFICAL TEARS) 0.1-0.3 % ophthalmic solution 1-2 drop     lidocaine 1 % 0.5-5 mL     LORazepam (ATIVAN) injection 0.5-1 mg    Or     LORazepam (ATIVAN) tablet 0.5-1 mg    Or     LORazepam (ATIVAN) tablet 0.5-1 mg     melatonin tablet 1 mg     morphine (PF) injection 1-2 mg     morphine solution 5-10 mg    Or     morphine sulfate HIGH CONCENTRATE (ROXANOL) 10 mg/0.5 mL (HIGH CONC) solution 5-10 mg     naloxone (NARCAN) injection 0.1-0.4 mg     OLANZapine zydis (zyPREXA) ODT half-tab 2.5-5 mg     ondansetron (ZOFRAN-ODT) ODT tab 4 mg     prochlorperazine (COMPAZINE) injection 5 mg    Or     prochlorperazine (COMPAZINE) tablet 5 mg    Or     prochlorperazine (COMPAZINE) Suppository 12.5 mg     rOPINIRole (REQUIP) tablet 0.5 mg     sodium chloride (PF)  0.9% PF flush 10 mL     sodium chloride (PF) 0.9% PF flush 10-20 mL     sodium chloride (PF) 0.9% PF flush 5-50 mL       Constitutional: Awake, confused, cooperative, no apparent distress   Respiratory: Clear to auscultation bilaterally, no crackles or wheezing   Cardiovascular: Regular rate and rhythm, normal S1 and S2, and no murmur noted   Abdomen: Normal bowel sounds, soft, non-distended, non-tender   Skin: No rashes, no cyanosis, dry to touch   Neuro: Alert and oriented x3, no weakness, numbness, memory loss   Extremities: +2-3 bilateral lower extremity edema.  +redness, tenderness, slight warmth and also ecchymotic area on the right dorsum of the foot extending to the ankle joint, normal range of motion   Other(s):HEENT Pink, nonicteric, moist oral mucosa       All other systems: Negative          Medications:      All current medications were reviewed with changes reflected in problem list.         Data:      All new lab and imaging data was reviewed.   Labs:    Recent Labs  Lab 11/23/18  0105 11/21/18  1833 11/21/18  1819   CULT Culture negative monitoring continues No growth after 3 days No growth after 3 days       Recent Labs  Lab 11/24/18  0513 11/23/18 2120 11/23/18  1515    140 138   POTASSIUM 5.9* 5.7* 5.8*   CHLORIDE 101 102 101   CO2 28 28 28   ANIONGAP 10 10 9   * 110* 112*   BUN 84* 76* 73*   CR 3.21* 3.01* 2.75*   GFRESTIMATED 14* 15* 16*   GFRESTBLACK 17* 18* 20*   KARTHIK 7.4* 7.4* 7.3*       Recent Labs  Lab 11/23/18  0905 11/23/18  0520 11/23/18  0039   WBC 20.1* 17.3* 22.0*   HGB 11.5* 10.9* 11.8   HCT 38.0 36.5 39.5   MCV 89 90 91   * 155 169       Recent Labs  Lab 11/24/18  0513 11/24/18  0008 11/23/18  2120 11/23/18  1956 11/23/18  1622 11/23/18  1515 11/23/18  1207 11/23/18  0905 11/23/18  0318   [facility] 100*  --  110*  --   --  112*  --  89 89   [facility]  --  103*  --  107* 129*  --  76  --   --       Imaging:   Results for orders placed or performed during the hospital  encounter of 11/21/18   CTA Abdomen Pelvis Bilat Leg Runoff w Contr    Narrative    CTA ABDOMEN AND PELVIS BILATERAL LEG RUNOFF WITH CONTRAST   11/21/2018  9:10 PM     HISTORY: Right foot concern for ischemia/nec fasc.     TECHNIQUE: Arterial phase images from the diaphragm to the toes.  Radiation dose for this scan was reduced using automated exposure  control, adjustment of the mA and/or kV according to patient size, or  iterative reconstruction technique.    COMPARISON: None.    FINDINGS: The heart is enlarged. There is reflux of contrast in  hepatic veins, compatible with right heart dysfunction. There is a  moderate-sized right pleural effusion. Trace left pleural effusion.  There is a lobular contour to the liver which may be related to  cardiac cirrhosis. The spleen, pancreas, adrenal glands, and kidneys  are unremarkable. There is a large volume of retained colonic stool  suggestive of constipation. No enlarged abdominal or retroperitoneal  lymph nodes. No evidence of bowel obstruction or free air. There is  trace ascites. The uterus is enlarged with several calcifications,  likely related to fibroids.    The aorta caliber is normal. There is minimal aortic atherosclerosis.  The visceral branches of the aorta are patent. Bilateral common,  external, and internal iliac arteries are patent.    The right common femoral artery and profunda femoris are patent. There  is mild disease of the superficial femoral artery with minimal  stenosis at the abductor canal. There is long segment popliteal artery  stenosis beginning at the level of the joint that is greater than 60%.  Runoff to the right foot with both the anterior and posterior tibial  arteries crossing the ankle.    The left common femoral artery and profunda femoris are patent. There  is mild disease of the superficial femoral artery. There is a long  segment stenosis of the popliteal artery at the level of the joint  that measures approximately 50%. There  is marked disease of the runoff  vessels with several areas of high-grade stenosis. The anterior tibial  artery becomes occluded before crossing the ankle. There are several  areas of near occlusion involving the posterior tibial artery.    There is marked edema of both lower legs. No soft tissue gas  demonstrated.      Impression    IMPRESSION:  1. Right heart failure with a right pleural effusion and a small  amount of ascites.  2. Patent aorta and inflow arteries. There is bilateral popliteal  artery stenosis measuring greater than 50%. There is patent  three-vessel runoff on the right with severely diseased runoff  arteries on the left.  3. Marked bilateral lower extremity edema but no soft tissue gas.  4. Cirrhotic appearing liver that may be secondary to right heart  failure.  5. Probable constipation.    DORENE MARADIAGA MD   Foot  XR, G/E 3 views, right    Narrative    RIGHT FOOT THREE VIEWS   11/21/2018 10:54 PM     HISTORY: Ecchymosis and concern for trauma.     COMPARISON: None.      Impression    IMPRESSION: The bones are osteopenic. No fracture or dislocation. Soft  tissue swelling noted over the dorsum of the foot.    DORENE MARADIAGA MD

## 2018-11-25 NOTE — PLAN OF CARE
Problem: Patient Care Overview  Goal: Plan of Care/Patient Progress Review  Outcome: Declining  ICU End of Shift Summary.  For vital signs and complete assessments, please see documentation flowsheets.     Pertinent assessments: Pt made comfort care during day shift.   Major Shift Events: Pt remained comfortable this shift, family made aware of availability of morphine when she needs it.  Plan (Upcoming Events): continue to support family and keep pt comfortable  Discharge/Transfer Needs: TBD    Bedside Shift Report Completed : Yes  Bedside Safety Check Completed: Yes

## 2018-11-25 NOTE — PLAN OF CARE
Problem: Patient Care Overview  Goal: Plan of Care/Patient Progress Review  Outcome: Change based on patient need/priority Date Met: 11/25/18  Patient arousable, asked for jello, swallowed scant amts.  uo picked up to 125 ml / last 6 hours.    Family at bedside

## 2018-11-25 NOTE — PROGRESS NOTES
[facility]  Hospitalist Progress Note  Indra Mccann MD 11/25/2018    Reason for Stay (Diagnosis): Right foot pain/soft tissue infection/septic shock         Assessment and Plan:      Summary of Stay: Ana Cristina Dominguez is a 87 year old female with history of chronic bilateral lower extremity edema, chronic kidney disease stage III, polymyalgia rheumatica, arterial fibrillation with AV ablation status post pacemaker placement in July 2018, diastolic congestive heart failure, with recent echo showed ejection fraction of 50-55% with improvement from previous study who presented today with complaint of right foot pain and discoloration.  Patient initially had injury to the top of her right foot about 2 weeks ago at the time she was evaluated there was bruising and concern for infection and she was treated with chronic Keflex.  This time the significant discoloration and redness was noted and in less than 24 hours and patient was brought to the emergency room and admitted on 11/21/2018.  Her hospital course was complicated by severe sepsis last night, patient was given a dose of vancomycin and levofloxacin and transferred to intensive care unit.  Her renal function deteriorated, urine output decreased, blood pressure is soft but holding.  At this point she did require pressors but is at increased risk of deterioration.     Problem List:   1. Right foot swelling, redness, tenderness likely secondary to soft tissue infection/cellulitis.  -It is not clear if this is full-blown cellulitis or infected hematoma.  -There is leukocytosis, WBC trended upward, no fever.  -Podiatry is consulted  -We will hold anticoagulation for now, in case surgical intervention of the foot is needed.  -Unable to do MRI as patient has permanent pacemaker placed recently.  -CRP significantly elevated is 220  -Continue Ancef and Clindamycin.  -Follow blood cultures  -Elevate the leg, the edema is better with elevation.  -CT  scan of the foot done which showed prominent soft tissue swelling over the dorsum of the foot, no distinct mass identified.  -Ultrasound done also showed extensive soft tissue edema.  -There was a discussion with podiatry earlier regarding management and surgical intervention but family and patient declined.    -Discontinued antibiotics.  -Comfort care  -Pain controlled    2. Acute renal failure on chronic kidney disease stage III.  -Creatinine is increasing from baseline of 1.1 to 2.32  -Urine output is decreasing  -Patient was on Bumex which was stopped  -Nephrology consulted awaiting input  -We will try gentle hydration with D5 NS, and will give a dose of IV albumin x1  -There is increased risk of fluid overload given her underlying congestive heart failure.  -BMP later this afternoon, and in a.m.    -No lab check  -No input output monitoring  -No IV fluids    3. Severe sepsis/septic shock likely secondary to lower extremity infection.  -Patient was given bolus of IV fluids gently due to congestive heart failure and significant valvular disease and fluid overload  -Patient was on clindamycin and Ancef for cellulitis  -She got a dose of vancomycin and also renal dose of levofloxacin last night  -Infectious diseases consulted  -Blood culture done so far negative,  -Lactic acid is improving.  -Procardia is elevated,11.9.  -PICC line ordered to secure IV access in case patient needs pressors.  -Overall patient is deteriorating  -Clinical status changed to comfort  -No pressors  -No IV fluids  -No IV antibiotics.    4. Acute on chronic diastolic congestive heart failure and right-sided systolic congestive heart failure, possible pulmonary hypertension, tricuspid regurg, and severe aortic regurgitation.  -Ii improved on recent study to 50-55%  -Patient has bilateral lower extremity edema, likely due to congestive heart failure.  -Bumex 1 mg twice a day on hold today due to sepsis and renal failure.  -Daily weight,  input and output.  -We will gently hydrate the patient and watch for fluid overload and respiratory distress  -Patient has follow-up with cardiology recently, recommendation noted.    -Discontinue diuretics  -Oral intake for comfort.    5. Arterial fibrillation with recent AV ablation and pacemaker placement.  -Rate controlled.  -Hold Xarelto for now.    -Discontinue tele monitoring.    6. Restless leg syndrome-continue Requip.    7. Hyperkalemia secondary to acute renal failure, on subsequent study potassium is high normal continue to monitor.    8. Polymyalgia rheumatica- At this time she is not on any treatment for this, currently no complaint.    Total time spent with this patient and coordinating her care is over 60 minutes      DVT Prophylaxis: Stop DVT prophylaxis patient is on comfort    Code Status: DNR/DNI, comfort care    Discharge Dispo: After arrangements is done, patient can be discharged on hospice and comfort care.  If her condition significantly deteriorates, consider end-of-life care in the hospital.    Estimated Disch Date / # of Days-on comfort care, pending social service eval.     May transfer to general medical floor    I discussed with patient's sons today at bedside, he stated that patient is comfortable.           Interval History (Subjective):      Patient seen and examined, she is sleeping and comfortable, restorative measures were stopped yesterday.                  Physical Exam:      Last Vital Signs:  BP (!) 87/47 (BP Location: Left arm)  Pulse 71  Temp 97.2  F (36.2  C) (Axillary)  Resp 14  Wt 45.5 kg (100 lb 5 oz)  SpO2 94%  BMI 19.59 kg/m2    I/O last 3 completed shifts:  In: 50 [P.O.:20; I.V.:30]  Out: 250 [Urine:250]  Vitals:    11/21/18 1750 11/21/18 2313 11/23/18 0130 11/24/18 0000   Weight: 43.1 kg (95 lb) 42.9 kg (94 lb 9.6 oz) 42.2 kg (93 lb 0.6 oz) 46 kg (101 lb 6.6 oz)    11/24/18 0800   Weight: 45.5 kg (100 lb 5 oz)     Current Facility-Administered Medications    Medication     acetaminophen (TYLENOL) tablet 325-650 mg     atropine 1 % ophthalmic solution 1-2 drop     haloperidol lactate (HALDOL) injection 2 mg     hypromellose-dextran (ARTIFICAL TEARS) 0.1-0.3 % ophthalmic solution 1-2 drop     lidocaine 1 % 0.5-5 mL     LORazepam (ATIVAN) injection 0.5-1 mg    Or     LORazepam (ATIVAN) tablet 0.5-1 mg    Or     LORazepam (ATIVAN) tablet 0.5-1 mg     melatonin tablet 1 mg     morphine (PF) injection 1-2 mg     morphine solution 5-10 mg    Or     morphine sulfate HIGH CONCENTRATE (ROXANOL) 10 mg/0.5 mL (HIGH CONC) solution 5-10 mg     naloxone (NARCAN) injection 0.1-0.4 mg     OLANZapine zydis (zyPREXA) ODT half-tab 2.5-5 mg     ondansetron (ZOFRAN-ODT) ODT tab 4 mg     prochlorperazine (COMPAZINE) injection 5 mg    Or     prochlorperazine (COMPAZINE) tablet 5 mg    Or     prochlorperazine (COMPAZINE) Suppository 12.5 mg     rOPINIRole (REQUIP) tablet 0.5 mg     sodium chloride (PF) 0.9% PF flush 10 mL     sodium chloride (PF) 0.9% PF flush 10-20 mL     sodium chloride (PF) 0.9% PF flush 5-50 mL       Constitutional:  Lethargic and sleepy, no apparent distress   Respiratory: Clear to auscultation bilaterally, no crackles or wheezing   Cardiovascular: Regular rate and rhythm, normal S1 and S2, and no murmur noted   Abdomen: Normal bowel sounds, soft, non-distended, non-tender   Skin: No rashes, no cyanosis, dry to touch   Neuro:  Lethargic, respond to tactile stimuli, not verbalizing, no weakness, numbness, memory loss   Extremities: +2-3 bilateral lower extremity edema.  +redness, tenderness, slight warmth and also ecchymotic area on the right dorsum of the foot extending to the ankle joint, normal range of motion   Other(s):HEENT Pink, nonicteric, moist oral mucosa       All other systems: Negative          Medications:      All current medications were reviewed with changes reflected in problem list.         Data:      All new lab and imaging data was reviewed.    Labs:    Recent Labs  Lab 11/23/18  0105 11/21/18  1833 11/21/18  1819   CULT No MRSA isolated No growth after 4 days No growth after 4 days       Recent Labs  Lab 11/24/18  0513 11/23/18  2120 11/23/18  1515    140 138   POTASSIUM 5.9* 5.7* 5.8*   CHLORIDE 101 102 101   CO2 28 28 28   ANIONGAP 10 10 9   * 110* 112*   BUN 84* 76* 73*   CR 3.21* 3.01* 2.75*   GFRESTIMATED 14* 15* 16*   GFRESTBLACK 17* 18* 20*   KARTHIK 7.4* 7.4* 7.3*       Recent Labs  Lab 11/23/18  0905 11/23/18  0520 11/23/18  0039   WBC 20.1* 17.3* 22.0*   HGB 11.5* 10.9* 11.8   HCT 38.0 36.5 39.5   MCV 89 90 91   * 155 169       Recent Labs  Lab 11/24/18  0513 11/24/18  0008 11/23/18 2120 11/23/18  1956 11/23/18  1622 11/23/18  1515 11/23/18  1207 11/23/18  0905 11/23/18  0318   *  --  110*  --   --  112*  --  89 89   BGM  --  103*  --  107* 129*  --  76  --   --       Imaging:   Results for orders placed or performed during the hospital encounter of 11/21/18   CTA Abdomen Pelvis Bilat Leg Runoff w Contr    Narrative    CTA ABDOMEN AND PELVIS BILATERAL LEG RUNOFF WITH CONTRAST   11/21/2018  9:10 PM     HISTORY: Right foot concern for ischemia/nec fasc.     TECHNIQUE: Arterial phase images from the diaphragm to the toes.  Radiation dose for this scan was reduced using automated exposure  control, adjustment of the mA and/or kV according to patient size, or  iterative reconstruction technique.    COMPARISON: None.    FINDINGS: The heart is enlarged. There is reflux of contrast in  hepatic veins, compatible with right heart dysfunction. There is a  moderate-sized right pleural effusion. Trace left pleural effusion.  There is a lobular contour to the liver which may be related to  cardiac cirrhosis. The spleen, pancreas, adrenal glands, and kidneys  are unremarkable. There is a large volume of retained colonic stool  suggestive of constipation. No enlarged abdominal or retroperitoneal  lymph nodes. No evidence of bowel  obstruction or free air. There is  trace ascites. The uterus is enlarged with several calcifications,  likely related to fibroids.    The aorta caliber is normal. There is minimal aortic atherosclerosis.  The visceral branches of the aorta are patent. Bilateral common,  external, and internal iliac arteries are patent.    The right common femoral artery and profunda femoris are patent. There  is mild disease of the superficial femoral artery with minimal  stenosis at the abductor canal. There is long segment popliteal artery  stenosis beginning at the level of the joint that is greater than 60%.  Runoff to the right foot with both the anterior and posterior tibial  arteries crossing the ankle.    The left common femoral artery and profunda femoris are patent. There  is mild disease of the superficial femoral artery. There is a long  segment stenosis of the popliteal artery at the level of the joint  that measures approximately 50%. There is marked disease of the runoff  vessels with several areas of high-grade stenosis. The anterior tibial  artery becomes occluded before crossing the ankle. There are several  areas of near occlusion involving the posterior tibial artery.    There is marked edema of both lower legs. No soft tissue gas  demonstrated.      Impression    IMPRESSION:  1. Right heart failure with a right pleural effusion and a small  amount of ascites.  2. Patent aorta and inflow arteries. There is bilateral popliteal  artery stenosis measuring greater than 50%. There is patent  three-vessel runoff on the right with severely diseased runoff  arteries on the left.  3. Marked bilateral lower extremity edema but no soft tissue gas.  4. Cirrhotic appearing liver that may be secondary to right heart  failure.  5. Probable constipation.    DORENE MARADIAGA MD   Foot  XR, G/E 3 views, right    Narrative    RIGHT FOOT THREE VIEWS   11/21/2018 10:54 PM     HISTORY: Ecchymosis and concern for trauma.      COMPARISON: None.      Impression    IMPRESSION: The bones are osteopenic. No fracture or dislocation. Soft  tissue swelling noted over the dorsum of the foot.    DORENE MARADIAGA MD

## 2018-11-25 NOTE — PLAN OF CARE
Problem: Patient Care Overview  Goal: Plan of Care/Patient Progress Review  Outcome: Change based on patient need/priority Date Met: 11/25/18  Patient cont minimal response, arouses to stimulation, doesn't open eyes, hr cont 70-80, paced 100%, sats on 1.5 L nc 96% -100%  ICU End of Shift Summary.  For vital signs and complete assessments, please see documentation flowsheets.     Pertinent assessments: cont comfort care  Major Shift Events: decreased responsiveness  Plan (Upcoming Events): cont comfort care  Discharge/Transfer Needs: tbd, cont support for family and patient over choices    Bedside Shift Report Completed : yes  Bedside Safety Check Completed: yes

## 2018-11-25 NOTE — PLAN OF CARE
Problem: Patient Care Overview  Goal: Plan of Care/Patient Progress Review  Outcome: No Change  ICU End of Shift Summary.  For vital signs and complete assessments, please see documentation flowsheets.     Pertinent assessments: Pt on comfort cares. Morphine given x1 at beginning of shift. On 2L NC throughout night. Slept entire night. No signs of pain/discomfort/or SOB. Provided family with pamphlet on Death and Dying process.   Major Shift Events: None  Plan (Upcoming Events): Continue with comfort cares  Discharge/Transfer Needs: Possible discharge to hospice if/when family find the right facility for them?    Bedside Shift Report Completed : yes  Bedside Safety Check Completed: yes

## 2018-11-25 NOTE — PLAN OF CARE
Problem: Skin and Soft Tissue Infection (Adult)  Goal: Signs and Symptoms of Listed Potential Problems Will be Absent, Minimized or Managed (Skin and Soft Tissue Infection)  Signs and symptoms of listed potential problems will be absent, minimized or managed by discharge/transition of care (reference Skin and Soft Tissue Infection (Adult) CPG).   Outcome: Improving  Patient resting comfortably, not restless, legs and feet elevated, edema in legs, feet and alexy rt foot decreased, line of demarcation decreased, rt foot warm, pulses intermittent.  Color of rt foot injury still dark, mottled,  Skin over injury thin, brittle.  Lotion applied.

## 2018-11-25 NOTE — PROGRESS NOTES
Received a voice message from son asking if writer can call [facility] and if out if they have any availability.  Pc to [facility] regarding bed available and cost. Spoke with Vesta who reported cost is $600 per day with a minimum of three days and payment of weeks worth upfront. Vesta reported bed availability for tomorrow.  Pc to Luis patient son to inform him of cost and bed availability. Luis reported he will discuss this with his brother and get back to writer.    Nurse informed writer that patient will transferred to medical floor when medically ready then hospice decision will made then.

## 2018-11-26 NOTE — PLAN OF CARE
Problem: Patient Care Overview  Goal: Individualization & Mutuality  Outcome: No Change  Pt transferred to floor at 2200. Sons at bedside. Settled pt in bed.

## 2018-11-26 NOTE — PROGRESS NOTES
"CM; spoke with pt's son Luis about d.c planning and Hospice support. Family reported that they were surprised that we needed to discuss d.c with Hospice. They stated that a MD did not inform them that they needed to address discontinue now that pt was comfort care.    SW did discuss that weekend staff had a conversation with them. They felt this was \" pushy\" and did not understand sw staff were under the direction of the Md to work out a plan    Discussed again with Luis about Hospice support and options. It is clear that pt will not be able to return to home.    Luis needs to talk with his sibling and will try and come to [facility] today for face to face meeting with  staff    Discharge Planner   Discharge Plans in progress: Called [facility]. They do have open bed for pt should family agree with plan.   Barriers to discharge plan: Cost per day and need to still set up Hospice meeting with provider  Follow up plan: Updated MD that pt will not be able to discharge today as family did not understand goal for discharge.   Working on plan for [facility] discharge tomorrow        Entered by: Corinne C. White 11/26/2018 10:42 AM       "

## 2018-11-26 NOTE — PLAN OF CARE
Problem: Patient Care Overview  Goal: Plan of Care/Patient Progress Review  Outcome: No Change  Patient now on comfort cares. Lethargic most of the night. Slept well, does arouse when turning. Kiana, wearing one hearing aid. Mepilex on coccyx. Vieyra in place. Alarm on bed for safety. Regular diet. PICC - SL. Pacemaker present. LS diminished. BS hypoactive. Pt does not appear to be in pain. Discharge pending, possibility of discharging to [facility] today.     Problem: Skin and Soft Tissue Infection (Adult)  Goal: Signs and Symptoms of Listed Potential Problems Will be Absent, Minimized or Managed (Skin and Soft Tissue Infection)  Signs and symptoms of listed potential problems will be absent, minimized or managed by discharge/transition of care (reference Skin and Soft Tissue Infection (Adult) CPG).    Outcome: No Change  (R) foot remains hot, red, and black.

## 2018-11-26 NOTE — PLAN OF CARE
Problem: Patient Care Overview  Goal: Plan of Care/Patient Progress Review  Outcome: Therapy, progress toward functional goals as expected  Orientation: Alert and oriented, lethargic  VSS. 90% on RA.   Tele: None, but pacemaker pacing at 70 bpm. HR 71.   LS: Clear and diminished  GI:  Passing gas. No BM. Denies N/V.   : Adequate urine output. Has indwelling gamboa in place  Skin: clean and dry with cellulitis on R leg. Red/black wound on R foot. Has wound on left thigh. Open to air and swelling has decreased with wounds. Has mepilex on coccyx, CDI. Repositioned every 2 hrs.  Activity: Total assist/lift. mechanical. Pt Rested comfortably throughout shift.   Pain: 0/10. Denies  Plan: Continue with current cares. Pt is on comfort cares. Pts sons have been at the bedside all day. SW is following. Trying to decide placement on hospice care pending financial means of family. Reg diet. DNR/DNI. Pt is expected to have a discharge plan later tonight since all sons are present.

## 2018-11-26 NOTE — PROGRESS NOTES
[facility]  Hospitalist Progress Note  Name: Ana Cristina Dominguez    MRN: [id number]  Provider:  Jace Pacheco MD  11/26/18    Initial presenting complaint/issue to hospital (Diagnosis): Right foot pain/soft tissue infection/septic shock         Assessment and Plan:      Summary of Stay: Ana Cristina Dominguez is a 87 year old female admitted on 11/21/2018 with history of chronic bilateral lower extremity edema, chronic kidney disease stage III, polymyalgia rheumatica, arterial fibrillation with AV ablation status post pacemaker placement in July 2018, diastolic congestive heart failure, with recent echo showed ejection fraction of 50-55% with improvement from previous study who presented today with complaint of right foot pain and discoloration.  Patient initially had injury to the top of her right foot about 2 weeks ago at the time she was evaluated there was bruising and concern for infection and she was treated with chronic Keflex.  This time the significant discoloration and redness was noted and in less than 24 hours and patient was brought to the emergency room and admitted on 11/21/2018.  Her hospital course was complicated by severe sepsis last night, patient was given a dose of vancomycin and levofloxacin and transferred to intensive care unit.  Her renal function deteriorated, urine output decreased, blood pressure is soft but holding.  She was on pressors and now family have opted for comfort care.      Problem List:   1. Right foot swelling, redness, tenderness likely secondary to soft tissue infection/cellulitis.  -It is not clear if this is full-blown cellulitis or infected hematoma.  -There is leukocytosis, WBC trended upward, no fever.  -CT scan of the foot done which showed prominent soft tissue swelling over the dorsum of the foot, no distinct mass identified.  -Ultrasound done also showed extensive soft tissue edema.  -There was a discussion with podiatry earlier regarding management  and surgical intervention but family and patient declined.     -Discontinued antibiotics.  -Comfort care  -Pain controlled     2. Acute renal failure on chronic kidney disease stage III.     -No lab check  -No input output monitoring  -No IV fluids     3. Severe sepsis/septic shock likely secondary to lower extremity infection.  -Clinical status changed to comfort  -No pressors  -No IV fluids  -No IV antibiotics.     4. Acute on chronic diastolic congestive heart failure and right-sided systolic congestive heart failure, possible pulmonary hypertension, tricuspid regurg, and severe aortic regurgitation.  -Discontinue diuretics  -Oral intake for comfort.     5. Arterial fibrillation with recent AV ablation and pacemaker placement.  -Rate controlled.  -Hold Xarelto for now.     -Discontinue tele monitoring.         DVT Prophylaxis: Stop DVT prophylaxis patient is on comfort     Code Status: DNR/DNI, comfort care     Discharge Dispo: After arrangements is done, patient can be discharged on hospice and comfort care.            Interval History:        No fevers/chills. Some pain in R foot.                  Physical Exam:      Last Vital Signs:  Temp: 97  F (36.1  C) Temp src: Axillary BP: 132/86   Heart Rate: 70 Resp: 20 SpO2: (!) 88 % O2 Device: None (Room air) Oxygen Delivery: 1.5 LPM    Intake/Output Summary (Last 24 hours) at 11/26/18 0917  Last data filed at 11/26/18 0629   Gross per 24 hour   Intake               40 ml   Output              650 ml   Net             -610 ml     I/O last 3 completed shifts:  In: 70 [P.O.:40; I.V.:30]  Out: 700 [Urine:700]  Vitals:    11/21/18 1750 11/21/18 2313 11/23/18 0130 11/24/18 0000   Weight: 43.1 kg (95 lb) 42.9 kg (94 lb 9.6 oz) 42.2 kg (93 lb 0.6 oz) 46 kg (101 lb 6.6 oz)    11/24/18 0800   Weight: 45.5 kg (100 lb 5 oz)       Gen - lethargic.  Lungs - CTA B anteriorly.  Heart - RR,S1+S2 nml, 4/6 systolic murmur.  Abd - soft, NT, ND, + BS.  Ext - + erythema, warmth and  ecchymosis R foot on dorsum.         Medications:      All current medications were reviewed.         Data:      All new lab and imaging data was reviewed.   Labs:    Recent Labs  Lab 11/23/18  0105 11/21/18  1833 11/21/18  1819   CULT No MRSA isolated No growth after 5 days No growth after 5 days       Recent Labs  Lab 11/23/18  0905 11/23/18  0520 11/23/18  0039   WBC 20.1* 17.3* 22.0*   HGB 11.5* 10.9* 11.8   HCT 38.0 36.5 39.5   MCV 89 90 91   * 155 169       Recent Labs  Lab 11/24/18  0513 11/23/18  2120 11/23/18  1515  11/23/18  0039  11/21/18  1819    140 138  < > 141  < > 144   POTASSIUM 5.9* 5.7* 5.8*  < > 5.6*  < > 3.7   CHLORIDE 101 102 101  < > 104  < > 103   CO2 28 28 28  < > 26  < > 37*   ANIONGAP 10 10 9  < > 11  < > 4   * 110* 112*  < > 76  < > 83   BUN 84* 76* 73*  < > 71*  < > 59*   CR 3.21* 3.01* 2.75*  < > 2.29*  < > 1.47*   GFRESTIMATED 14* 15* 16*  < > 20*  < > 34*   GFRESTBLACK 17* 18* 20*  < > 24*  < > 41*   KARTHIK 7.4* 7.4* 7.3*  < > 8.1*  < > 8.9   PROTTOTAL  --   --   --   --  6.1*  --  6.6*   ALBUMIN  --   --   --   --  2.4*  --  3.1*   BILITOTAL  --   --   --   --  0.9  --  1.3   ALKPHOS  --   --   --   --  123  --  156*   AST  --   --   --   --  59*  --  37   ALT  --   --   --   --  34  --  33   < > = values in this interval not displayed.   Recent Imaging:   No results found for this or any previous visit (from the past 24 hour(s)).

## 2018-11-27 NOTE — PLAN OF CARE
Problem: Patient Care Overview  Goal: Plan of Care/Patient Progress Review  Outcome: Declining  Patient lethargic. 1L 02 per sons request. Repositioned throughout shift A2, son refused at certain times. Dressing to coccyx CDI. Lung sounds diminished, shallow breathing. No signs of discomfort. PICC saline locked. Oral cares done throughout shift. Vieyra with good urine output.

## 2018-11-27 NOTE — PROGRESS NOTES
[facility]  Hospitalist Progress Note  Name: Ana Cristina Dominguez    MRN: [id number]  Provider:  Jace Pacheco MD  11/27/18    Initial presenting complaint/issue to hospital (Diagnosis): Right foot pain/soft tissue infection/septic shock         Assessment and Plan:      Summary of Stay: Ana Cristina Dominguez is a 87 year old female admitted on 11/21/2018 with history of chronic bilateral lower extremity edema, chronic kidney disease stage III, polymyalgia rheumatica, arterial fibrillation with AV ablation status post pacemaker placement in July 2018, diastolic congestive heart failure, with recent echo showed ejection fraction of 50-55% with improvement from previous study who presented today with complaint of right foot pain and discoloration.  Patient initially had injury to the top of her right foot about 2 weeks ago at the time she was evaluated there was bruising and concern for infection and she was treated with chronic Keflex.  This time the significant discoloration and redness was noted and in less than 24 hours and patient was brought to the emergency room and admitted on 11/21/2018.  Her hospital course was complicated by severe sepsis last night, patient was given a dose of vancomycin and levofloxacin and transferred to intensive care unit.  Her renal function deteriorated, urine output decreased, blood pressure is soft but holding.  She was on pressors and now family have opted for comfort care.      Problem List:   1. Right foot swelling, redness, tenderness likely secondary to soft tissue infection/cellulitis.  -It is not clear if this is full-blown cellulitis or infected hematoma.  -There is leukocytosis, WBC trended upward, no fever.  -CT scan of the foot done which showed prominent soft tissue swelling over the dorsum of the foot, no distinct mass identified.  -Ultrasound done also showed extensive soft tissue edema.  -There was a discussion with podiatry earlier regarding management  and surgical intervention but family and patient declined.      -Discontinued antibiotics.  -Comfort care  -Pain controlled      2. Acute renal failure on chronic kidney disease stage III.      -No lab check  -No input output monitoring  -No IV fluids      3. Severe sepsis/septic shock likely secondary to lower extremity infection.  -Clinical status changed to comfort  -No pressors  -No IV fluids  -No IV antibiotics.      4. Acute on chronic diastolic congestive heart failure and right-sided systolic congestive heart failure, possible pulmonary hypertension, tricuspid regurg, and severe aortic regurgitation.  -Discontinue diuretics  -Oral intake for comfort.      5. Arterial fibrillation with recent AV ablation and pacemaker placement.  -Rate controlled.  -Hold Xarelto for now.      -Discontinue tele monitoring.          DVT Prophylaxis: Stop DVT prophylaxis patient is on comfort      Code Status: DNR/DNI, comfort care      Discharge Dispo: After arrangements is done, patient can be discharged on hospice and comfort care.           Interval History:        No fevers/chills. Conference today at 2 pm.                  Physical Exam:      Last Vital Signs:          Heart Rate: 70 Resp: 16 SpO2: 91 % O2 Device: Nasal cannula Oxygen Delivery: 1 LPM    Intake/Output Summary (Last 24 hours) at 11/27/18 1131  Last data filed at 11/27/18 0548   Gross per 24 hour   Intake               50 ml   Output              900 ml   Net             -850 ml     I/O last 3 completed shifts:  In: 50 [P.O.:50]  Out: 1175 [Urine:1175]  Vitals:    11/21/18 1750 11/21/18 2313 11/23/18 0130 11/24/18 0000   Weight: 43.1 kg (95 lb) 42.9 kg (94 lb 9.6 oz) 42.2 kg (93 lb 0.6 oz) 46 kg (101 lb 6.6 oz)    11/24/18 0800   Weight: 45.5 kg (100 lb 5 oz)       Gen - lethargic.  Lungs - CTA B anteriorly.  Heart - RR,S1+S2 nml, 4/6 systolic murmur.         Medications:      All current medications were reviewed.         Data:      All new lab and imaging  data was reviewed.   Labs:  Recent labs and studies reviewed.   Recent Imaging:   No results found for this or any previous visit (from the past 24 hour(s)).

## 2018-11-27 NOTE — PROVIDER NOTIFICATION
Web based paged Dr. Pacheco regarding: Cannot get blood return from PICC, can we get an order for alteplase?

## 2018-11-27 NOTE — PLAN OF CARE
Problem: Patient Care Overview  Goal: Plan of Care/Patient Progress Review  Outcome: No Change  Pt on comfort cares.   Assist x 2, repositioned q 2hrs.  PICC saline locked.   IV Morphine x 1 and Ativan x 1  Vieyra with good output  Per son request pt on 1L for comfort.

## 2018-11-27 NOTE — PROGRESS NOTES
CM: late entry for family meeting from 11/27/18  Met with Ziyad and Luis. About discharge planning support. Family expressed a lot of frustration that they have not had a conversation with a Doctor about  discharge planning and the need to make a plan now that pt is on comfort care.  Family did not want to agree to any meetings with Hospice or where we could d./c pt to because according to them. No one from the Medical team other than sw spoke with them about needing to make a plan to discharge. They seem to have a lot of questions about prognosis and how things may progress from here    Paged MD to come and speak with family. MD Monday was new to family and pt. Md was wiling to speak with family but due to time, offered to speak with family over the phone.. Family refused this stating that they would only consider a face to face meeting. When asking to set up meeting for Tue, they were willing to come at 2:00 today. SW attempted to try and get family here sooner understanding that this once again will delay the plan for discharge support    [facility]ed [facility]. Sadaf stated although they had an open bed, that another family was interested in this bed. Therefore we may lost this.  SW will call [facility] as there may be a bed there tomorrow,.    I/P: SW will need to follow up with family after family meeting with MD,   Still need to find location and set up Hospice meeting.     CM:Sadaf from [facility] called. They spoke with Luis who stated that they would like to have the meeting here with MD at 2 pm, then Hospice meeting at 1530. Set up meeting with  Hospice for d./c for tomorrow.    P: H/E stretcher set up for 10:00 am For 11/28. On o2 unable to regulate and Hospice, lethargic

## 2018-11-27 NOTE — PROGRESS NOTES
Hospice SW met with pt three sons in preparation for discharge tomorrow morning to [facility]. 86 y/o female who will be admitted to hospice with a primary dx of Septic Shock related to Cellulitis. She is not alert today. Did not participate in the meeting. Hospice SW reviewed the Medicare covered portion of hospice care and clarified for pt sons what  Hospice will do vs [facility] caregivers. They verbalized understanding. Very few questions today as they are on board with the plan and supportive, Writer spoke with Pauly at [facility] to let her know 02 has been ordered and will arrive tonight. Pt will go via stretcher transport tomorrow so no portable 02 is needed at hospital. Two of three sons will meet with Hospice team at [facility] tomorrow at 1pm to complete electronic consents. Coordinated with Ladi HART.     Whitney Conti, [facility] Social Worker

## 2018-11-27 NOTE — PLAN OF CARE
Problem: Patient Care Overview  Goal: Plan of Care/Patient Progress Review  On comfort cares. O2 85% on RA denies SOB. Lung sounds diminished. Pacemaker paced at 70 bpm. Denies pain. A&O x2. Black/red wound on top of R foot open to air. Mepilex on coccyx CDI. No BM this shift. Vieyra patent with adequate urine output. Regular diet. PICC in RUE. [place], hears best on L side. Morphine 1 mg given d/t restlessness. Turn/Reposition Q2h. Awaiting hospice placement.

## 2018-11-28 NOTE — PLAN OF CARE
Problem: Patient Care Overview  Goal: Plan of Care/Patient Progress Review  Outcome: No Change  Pt lethargic, sleeping most of shift, minimally arouses.  Sons present at bedside and assisting with cares.  Later in evening patient more verbal, calling out for water and restless.  1mg IV morphine x1, will continue to monitor.  Wet breath sounds at times, loose occasional cough, atropine x1.  Vieyra patent, no BM this shift. Tolerating minimal PO intake with oral swab.  Plan to discharge on hospice to [facility] tomorrow.

## 2018-11-28 NOTE — PROGRESS NOTES
Discharge Planner   Discharge Plans in progress: Pt discharge today to [facility]   Barriers to discharge plan: none  Follow up plan: H/E transport @ 10 am . Order faxed to [phone]       Entered by: Corinne C. White 11/28/2018 9:28 AM

## 2018-11-28 NOTE — PLAN OF CARE
Problem: Patient Care Overview  Goal: Plan of Care/Patient Progress Review  Outcome: Adequate for Discharge Date Met: 11/28/18  Patient hospitalized for end of life. Cleared for discharge to [facility] today per MD. Discharge instructions, medications, and follow-ups reviewed with patient and son in detail.  Son verbalized understanding of discharge instructions. Belongings were returned to patient at time of discharge. Transport providing transport to facility. Morphine given prior to discharge. PICC removed, catheter in place, site without redness.

## 2018-11-28 NOTE — PROGRESS NOTES
ALYSHA ELEVATED.  Pt is discharging to [facility].  Will give hand off to Clinic RN CTS.  Charlene RN CTS [id number]

## 2018-11-28 NOTE — DISCHARGE SUMMARY
[facility]  Discharge Summary        Ana Cristina Dominguez MRN# [id number]   YOB: 1931 Age: 87 year old     Date of Admission:  11/21/2018  Date of Discharge:  11/28/2018  Admitting Physician:  Leonard Martínez MD  Discharge Physician: Jace Pacheco MD  Discharging Service: Hospitalist     Primary Provider: Dez Urban  Primary Care Physician Phone Number: [phone]         Discharge Diagnoses/Problem Oriented Hospital Course (Providers):    Ana Cristina Dominguez was admitted on 11/21/2018 by Leonard Martínez MD and I would refer you to their history and physical.  The following problems were addressed during her hospitalization:    1. Right foot swelling, redness, tenderness likely secondary to soft tissue infection/cellulitis.    2.   Acute on CKD stage 3.    3. Septic shock.    4. A fib.    5. Acute on chronic diastolic HF.     Ana Cristina Dominguez is a 87 year old female admitted on 11/21/2018 with history of chronic bilateral lower extremity edema, chronic kidney disease stage III, polymyalgia rheumatica, arterial fibrillation with AV ablation status post pacemaker placement in July 2018, diastolic congestive heart failure, with recent echo showed ejection fraction of 50-55% with improvement from previous study who presented today with complaint of right foot pain and discoloration.  Patient initially had injury to the top of her right foot about 2 weeks ago at the time she was evaluated there was bruising and concern for infection and she was treated with chronic Keflex.  This time the significant discoloration and redness was noted and in less than 24 hours and patient was brought to the emergency room and admitted on 11/21/2018.  Her hospital course was complicated by severe sepsis last night, patient was given a dose of vancomycin and levofloxacin and transferred to intensive care unit.  Her renal function deteriorated, urine output decreased, blood pressure  is soft but holding.  She was on pressors and now family have opted for comfort care.        Code Status:      Comfort Care        Brief Hospital Stay Summary Sent Home With Patient in AVS:                Important Results:               Pending Results:        Unresulted Labs Ordered in the Past 30 Days of this Admission     No orders found from 9/22/2018 to 11/22/2018.            Discharge Instructions and Follow-Up:      Follow-up Appointments     Follow-up and recommended labs and tests        Follow up with hospice team                      Discharge Disposition:      Discharged to home         Discharge Medications:        Current Discharge Medication List      START taking these medications    Details   atropine 1 % ophthalmic solution Place 1-2 drops under the tongue every hour as needed for other (secretions)  Qty: 1 Bottle, Refills: 0    Associated Diagnoses: Cellulitis of right foot      hypromellose-dextran (ARTIFICAL TEARS) 0.1-0.3 % ophthalmic solution Place 1-2 drops into both eyes every 8 hours as needed for dry eyes  Qty: 30 mL, Refills: 0    Associated Diagnoses: Cellulitis of right foot      LORazepam (ATIVAN) 0.5 MG tablet Place 1-2 tablets (0.5-1 mg) under the tongue every 3 hours as needed for anxiety, seizures or nausea (dyspnea)  Qty: 60 tablet, Refills: 0    Associated Diagnoses: Cellulitis of right foot      morphine sulfate HIGH CONCENTRATE (ROXANOL) 10 mg/0.5 mL (HIGH CONC) solution Place 0.25-0.5 mLs (5-10 mg) under the tongue every 2 hours as needed for shortness of breath / dyspnea or pain  Qty: 1 Bottle, Refills: 0    Associated Diagnoses: Cellulitis of right foot      OLANZapine zydis (ZYPREXA) 5 MG ODT tab Take 1 tablet (5 mg) by mouth every 6 hours as needed for agitation (delirium, nausea)  Qty: 30 tablet, Refills: 0    Associated Diagnoses: Cellulitis of right foot      ondansetron (ZOFRAN-ODT) 4 MG ODT tab Take 1 tablet (4 mg) by mouth every 6 hours as needed for nausea or  vomiting  Qty: 120 tablet, Refills: 0    Associated Diagnoses: Cellulitis of right foot      prochlorperazine (COMPAZINE) 25 MG Suppository Place 0.5 suppositories (12.5 mg) rectally every 12 hours as needed for nausea or vomiting  Qty: 30 suppository, Refills: 0    Associated Diagnoses: Cellulitis of right foot      prochlorperazine (COMPAZINE) 5 MG tablet Take 1 tablet (5 mg) by mouth every 6 hours as needed for vomiting  Qty: 30 tablet, Refills: 0    Associated Diagnoses: Cellulitis of right foot         CONTINUE these medications which have CHANGED    Details   acetaminophen (TYLENOL) 325 MG tablet Take 1-2 tablets (325-650 mg) by mouth every 4 hours as needed for mild pain  Qty: 100 tablet, Refills: 0    Associated Diagnoses: Cellulitis of right foot         STOP taking these medications       bumetanide (BUMEX) 1 MG tablet Comments:   Reason for Stopping:         metoprolol succinate (TOPROL-XL) 50 MG 24 hr tablet Comments:   Reason for Stopping:         Multiple Vitamins-Minerals (MULTIVITAMIN PO) Comments:   Reason for Stopping:         Multiple Vitamins-Minerals (PRESERVISION AREDS PO) Comments:   Reason for Stopping:         Potassium Chloride ER 20 MEQ TBCR Comments:   Reason for Stopping:         rivaroxaban ANTICOAGULANT (XARELTO) 20 MG TABS tablet Comments:   Reason for Stopping:         rOPINIRole (REQUIP) 0.5 MG tablet Comments:   Reason for Stopping:                 Allergies:         Allergies   Allergen Reactions     Amoxicillin Rash     Codeine GI Disturbance     Stomach pain     Melatonin Nausea     Eyesburned           Consultations This Hospital Stay:      Consultation during this admission received from nephrology and podiatry         Condition and Physical on Discharge:      Discharge condition: Stable   Vitals: Blood pressure 132/86, pulse 71, temperature 97  F (36.1  C), temperature source Axillary, resp. rate 20, weight 45.5 kg (100 lb 5 oz), SpO2 94 %, not currently breastfeeding.  100  lbs 4.95 oz      Gen - lethargic.  Lungs - CTA B anteriorly.  Heart - RR,S1+S2 nml, 4/6 systolic murmur.            Discharge Time:        > 30 mins on chart review, exam and documentation.        Image Results From This Hospital Stay (For Non-EPIC Providers):        Results for orders placed or performed during the hospital encounter of 11/21/18   CTA Abdomen Pelvis Bilat Leg Runoff w Contr    Narrative    CTA ABDOMEN AND PELVIS BILATERAL LEG RUNOFF WITH CONTRAST   11/21/2018  9:10 PM     HISTORY: Right foot concern for ischemia/nec fasc.     TECHNIQUE: Arterial phase images from the diaphragm to the toes.  Radiation dose for this scan was reduced using automated exposure  control, adjustment of the mA and/or kV according to patient size, or  iterative reconstruction technique.    COMPARISON: None.    FINDINGS: The heart is enlarged. There is reflux of contrast in  hepatic veins, compatible with right heart dysfunction. There is a  moderate-sized right pleural effusion. Trace left pleural effusion.  There is a lobular contour to the liver which may be related to  cardiac cirrhosis. The spleen, pancreas, adrenal glands, and kidneys  are unremarkable. There is a large volume of retained colonic stool  suggestive of constipation. No enlarged abdominal or retroperitoneal  lymph nodes. No evidence of bowel obstruction or free air. There is  trace ascites. The uterus is enlarged with several calcifications,  likely related to fibroids.    The aorta caliber is normal. There is minimal aortic atherosclerosis.  The visceral branches of the aorta are patent. Bilateral common,  external, and internal iliac arteries are patent.    The right common femoral artery and profunda femoris are patent. There  is mild disease of the superficial femoral artery with minimal  stenosis at the abductor canal. There is long segment popliteal artery  stenosis beginning at the level of the joint that is greater than 60%.  Runoff to the right  foot with both the anterior and posterior tibial  arteries crossing the ankle.    The left common femoral artery and profunda femoris are patent. There  is mild disease of the superficial femoral artery. There is a long  segment stenosis of the popliteal artery at the level of the joint  that measures approximately 50%. There is marked disease of the runoff  vessels with several areas of high-grade stenosis. The anterior tibial  artery becomes occluded before crossing the ankle. There are several  areas of near occlusion involving the posterior tibial artery.    There is marked edema of both lower legs. No soft tissue gas  demonstrated.      Impression    IMPRESSION:  1. Right heart failure with a right pleural effusion and a small  amount of ascites.  2. Patent aorta and inflow arteries. There is bilateral popliteal  artery stenosis measuring greater than 50%. There is patent  three-vessel runoff on the right with severely diseased runoff  arteries on the left.  3. Marked bilateral lower extremity edema but no soft tissue gas.  4. Cirrhotic appearing liver that may be secondary to right heart  failure.  5. Probable constipation.    DORENE MARADIAGA MD   Foot  XR, G/E 3 views, right    Narrative    RIGHT FOOT THREE VIEWS   11/21/2018 10:54 PM     HISTORY: Ecchymosis and concern for trauma.     COMPARISON: None.      Impression    IMPRESSION: The bones are osteopenic. No fracture or dislocation. Soft  tissue swelling noted over the dorsum of the foot.    DORENE MARADIAGA MD   CT Foot Right w/o Contrast    Narrative    CT RIGHT FOOT WITHOUT CONTRAST   11/22/2018 4:20 PM    HISTORY: Dorsal foot discoloration and swelling, suspect hematoma.  Possible concurrent cellulitis. Evaluate for fracture. Patient unable  to get MRI due to pacemaker.     COMPARISON: Radiographs on 11/21/2018.    TECHNIQUE: Routine CT imaging was performed to the right foot without  contrast. Radiation dose for this scan was reduced using  automated  exposure control, adjustment of the mA and/or kV according to patient  size, or iterative reconstruction technique.     FINDINGS: No acute fracture or osseous lesion is seen. There are  moderate degenerative changes throughout several of the midfoot joint  spaces, most prominent in the second through fifth tarsometatarsal  joints. No definite joint effusion is seen. There is calcification in  the vascular structures. There is prominent soft tissue swelling over  the dorsum of the foot. A distinct focal mass or fluid collection is  not identified, although CT is suboptimal for this. No other soft  tissue abnormality is seen.      Impression    IMPRESSION:  1. Degenerative changes with no fracture or acute osseous abnormality.  2. Prominent soft tissue swelling over the dorsum of the foot. A  distinct mass is not definitely seen, although CT is not optimal for  differentiation between edema and a mass within the extremities. If  there is clinical concern for a hematoma or abscess and further  imaging is needed, an ultrasound could be considered as the presence  or absence of blood flow can help differentiate between cellulitis and  a hematoma/abscess.     JOHAN REYNA MD   US Extremity Non Vascular Right    Narrative    NONVASCULAR ULTRASOUND RIGHT FOOT   11/23/2018 8:31 AM    HISTORY: Swelling and discoloration. Evaluate for hematoma, abscess,  or mass.    COMPARISON: None.      Impression    IMPRESSION: Sonographic examination at the area of clinical concern  along the dorsal aspect of the right foot demonstrates extensive soft  tissue edema. However, no distinct focal mass or fluid collection is  seen. This likely represents cellulitis with no evidence of a  hematoma, abscess, or mass.     JOHAN REYNA MD   XR Chest Port 1 View    Narrative    CHEST ONE VIEW PORTABLE   11/23/2018 10:45 AM     HISTORY: PICC.     COMPARISON: 11/13/2018      Impression    IMPRESSION: Tip of the right-sided  PICC is projected over the atrial  caval junction. The heart is markedly enlarged. There is interstitial  edema and small bilateral pleural effusions. Left-sided pacer device  unchanged.    DORENE MARADIAGA MD           Most Recent Lab Results In EPIC (For Non-EPIC Providers):      Results for orders placed or performed during the hospital encounter of 11/21/18 (from the past 24 hour(s))   Creatinine   Result Value Ref Range    Creatinine 2.37 (H) 0.52 - 1.04 mg/dL    GFR Estimate 19 (L) >60 mL/min/1.7m2    GFR Estimate If Black 23 (L) >60 mL/min/1.7m2

## 2018-11-28 NOTE — PLAN OF CARE
Problem: Patient Care Overview  Goal: Plan of Care/Patient Progress Review  Outcome: No Change  Pt on comfort cares. Oxygen on  for comfort. Slept all shift & through cares. Repo Q 2hrs & oral completed. Vieyra patent, 450 mls UOP. PICC SL. Pt appears comfortable. Discharges today on hospice to [facility].

## 2018-11-29 NOTE — PROGRESS NOTES
Transition Communication Hand-off for Care Transitions to Next Level of Care Provider    Name: Ana Cristina Dominguez  : 1931  MRN #: [id number]  Primary Care Provider: Dez Urban MD  Primary Care MD Name: Dr. Urban  Primary Clinic: [facility] [address] [facility] [address]  [facility] [address]  Primary Care Clinic Name: [facility]  Reason for Hospitalization:  Lower extremity edema [R60.0]  Cellulitis of right foot [L03.115]  Admit Date/Time: 2018  5:58 PM  Discharge Date: 18  Payor Source: Payor: MEDICA / Plan: MEDICA PRIME SOLUTION / Product Type: Indemnity /     Readmission Assessment Measure (ALYSHA) Risk Score/category: ELEVATED    Plan of Care Goals/Milestone Events:            Reason for Communication Hand-off Referral: Fragility    Discharge Plan:       Concern for non-adherence with plan of care:  NO  Discharge Needs Assessment:  Needs       Most Recent Value    Anticipated Changes Related to Illness inability to care for self    Transportation Available family or friend will provide    Hospice [facility] [phone], Fax: [phone]          Already enrolled in Tele-monitoring program and name of program:  na  Follow-up specialty is recommended: No    Follow-up plan:  Future Appointments  Date Time Provider Department Center   2018 12:30 PM Nancy Molina, LOR[facility]   2018 11:45 AM HEARD TECH1 [facility]       Any outstanding tests or procedures:              Key Recommendations:  ALYSHA ELEVATED. Pt was discharged on Hospice to [facility].  No gaps in care.      Karma Kearney    AVS/Discharge Summary is the source of truth; this is a helpful guide for improved communication of patient story

## 2018-11-30 NOTE — PROGRESS NOTES
Patient missed her lab appt yesterday. Spoke to Luis, patient's son. Patient was recently discharged from the hospital and is now in hospice in [place]. Offered condolences. Upcoming appt cancelled, will notify device RN and providers. TGarbers TIA

## 2018-11-30 NOTE — PROGRESS NOTES
Clinic Care Coordination Contact    Situation:  Patient chart reviewed by RN care coordinator.    Background:   Patient received treatment at [facility] from 11/21 to 11/29/2018 for lower extremity edema and cellulitis of right foot. She will not be able to return to home due to decrease in her mobility  Patient's sons said she was pretty independent until this hospitalization, but were concerned for fragility and safety upon discharge from hospital.    Assessment:   Patient's sons reported they were looking into assisted living facilities for patient to go after discharge.  discussed hospice homes with them. That seemed to be a great option according to patient's sons.     Plan/Recommendations:   Patient was discharged on Hospice to [facility].  No further outreaches will be made at this time unless a new referral is made or a change in the pt's status occurs.       Caity Bueno RN   Care Coordinator  [facility]  Phone:  [phone]  Email: [email or web address]

## 2018-12-04 ENCOUNTER — TELEPHONE (OUTPATIENT)
Dept: INTERNAL MEDICINE | Facility: CLINIC | Age: 83
End: 2018-12-04

## 2018-12-04 NOTE — TELEPHONE ENCOUNTER
Spoke with representative at [facility], he confirmed that patient passed away in the early AM (between 6 and 7 AM) on 12/2/2018.

## 2018-12-14 ENCOUNTER — DOCUMENTATION ONLY (OUTPATIENT)
Dept: CARDIOLOGY | Facility: CLINIC | Age: 83
End: 2018-12-14

## 2018-12-14 NOTE — PROGRESS NOTES
X-cover    Called for -[phone]    Reviewed chart, dilt gtt just turned off this evening.  Will trial oral dilt 60 mg q 4 hours prn HR > 120 to assess home needs   pt returned w continued bleeding in posterior OP despite bilat anterior rhinorockets. refusing posterior rhinorockets. sprayed afrin and 10 cc of 1% lido and epi via atomizer. packed nare w vaseline impregnated gauze. labs pt w slight continued bleeding from R nare. , bp 160/100. slight hand tremor and tonguefasic. given 1L NS . ativan, librium. ER to ER transfer accepted bt Jung Sosa attending ER. called ENT started rebleeding. reapplied affrin and lido/epi. placed posterior rhinorocket R nare

## 2018-12-14 NOTE — PROGRESS NOTES
Received notice that patient passed away on 12/2/2018. Notified providers. Sympathy card will be sent to family. Aldair WEBER

## 2018-12-15 NOTE — TELEPHONE ENCOUNTER
Son Ziyad calling to alert Dr. Urban of his mother's passing on 18. Advised caller that per the [employer] system, she is listed as . Ziyad requesting a quick message be sent to Dr. Urban about this. Caller requesting that all future appointments within the [employer] system be cancelled.     Thank you,    Renetta Forbes RN  [facility]

## 2018-12-17 NOTE — TELEPHONE ENCOUNTER
Left detailed voice mail message for Ziyad Dominguez on his voice mail, offering my condolences and inviting him to call me back if he would like.

## 2019-07-05 NOTE — PLAN OF CARE
Problem: Skin and Soft Tissue Infection (Adult)  Goal: Signs and Symptoms of Listed Potential Problems Will be Absent, Minimized or Managed (Skin and Soft Tissue Infection)  Signs and symptoms of listed potential problems will be absent, minimized or managed by discharge/transition of care (reference Skin and Soft Tissue Infection (Adult) CPG).   Outcome: Change based on patient need/priority Date Met: 11/24/18  Patient restless, moaning, grimacing, rr increased, hr increased,  med with ms iv, patient settled, hr wnl, rr wnl, cpot 0    ICU End of Shift Summary.  For vital signs and complete assessments, please see documentation flowsheets.     Pertinent assessments: cont assessment for discomfort work of breathing rr hr restlessness  Major Shift Events:move to comfort care   Plan (Upcoming Events): cont comfort cares  Discharge/Transfer Needs: tbd, meet with social work, palliative care    Bedside Shift Report Completed : yes  Bedside Safety Check Completed: yes         Pat Vieira Consult- PT evaluate and treat, Pat Vieira  Consult- PT evaluate and treat,

## 2020-07-14 NOTE — LETTER
7/25/2018    Dez Urban MD, MD  [address] [facility] [address]  [facility] [address]    RE: Ana Cristina Dominguez       Dear Colleague,    I had the pleasure of seeing Ana Cristina Dominguez in the [facility].    HPI:  Ana Cristina Dominguez is a 87 year old female who is a patient of Dr. Ratliff who presents today for medicaiton adjustment after undergoing an AV node ablation and [employer] biventricular  ppm.   Past medical history includes atrial fibrillation, hypertension, hyperlipidemia, cardiomyopathy, and polymyalgia rheumatic valve disease.    Patient had been undergoing rate control but continued to have difficulty requiring multiple hospitalizations for A-fib w/ RVR despite being on high doses of rate controlling medications (Digoxin, diltiazem 240 mg daily, and metoprolol 100 mg BID). Her activity was very limited due to dyspnea with minimal exertion and fatigue. In addition, echocardiogram from 3/2018 showed showed an EF of 35-40% along with moderate to severe MR, TR, and AI. Therefore, decision was made to undergo AV node ablation and implant a biventricular PPM.   Subsequently her digoxin and diltiazem were discontinued.      Her device check today reveals  100% biv-paced      Today Ana Cristina Dominguez presents with her son Marcio.   She is feeling great.  We discussed increasing her caloric intake as her BMI is 15.  She lives alone and makes her own meals.   Discussed decreasing her medication needs.  Will decrease her metoprolol XL to 50 mg BID from 100 mg BID.  She is going to decrease her bumex to 1 mg daily and see how she feels and evaluate her LE edema.    Denies chest pain or pressure, headaches, dizziness, syncope, angina, dyspnea at rest or with exertion, dry cough, palpitations, orthopnea, PND, abdominal pain, abdominal edema, pedal edema, or claudication.  Denies easy bruising or bleeding, hematuria, hematochezia, and epistaxis. Denies signs/symptoms of stroke  such as visual disturbance, difficulty speaking, facial drooping, confusion, problems with gait, or any new numbness or weakness.      ASSESSMENT AND PLAN    (I10) Essential hypertension  (primary encounter diagnosis)  Controlled  Decrease her metoprolol XL to 50 mg BID  She will decrease her bumex  to 1 mg daily and see how she feels and evaluate her LE edema.    Will will meet again in 2 months to decrease her bumex and possibly her metoprolol again    (I48.2) Chronic atrial fibrillation (H) s/p AV node ablation and [employer] biventricular ppm  Continue xarelto 20 mg daily for CHADS VASC 5 (age++, female, HTN, HF)  Decreasing metoprolol XL to 50 mg twice daily    Cardiomyopathy  Repeat ECHO when she sees Dr. Bower in 6/2019  Continue metoprolol XL 50 mg BID  Continue lisinopril 2.5 mg daily     Patient expresses understanding and agreement with the plan.     I appreciate the chance to help with Ana Cristina Dominguez Please let me know if you have any questions or concerns.    Makenzie Paredes APRN, CNP    This note was completed in part using Dragon voice recognition software. Although reviewed after completion, some word and grammatical errors may occur.    Orders Placed This Encounter   Procedures     Basic metabolic panel     Follow-Up with Cardiac Advanced Practice Provider     Orders Placed This Encounter   Medications     metoprolol succinate (TOPROL-XL) 50 MG 24 hr tablet     Sig: Take 1 tablet (50 mg) by mouth 2 times daily     Dispense:  180 tablet     Refill:  3     Pt will call for refills     Medications Discontinued During This Encounter   Medication Reason     metoprolol succinate (TOPROL-XL) 100 MG 24 hr tablet Reorder         Encounter Diagnoses   Name Primary?     Essential hypertension Yes     Chronic atrial fibrillation (H)      Cardiomyopathy, unspecified type (H)        CURRENT MEDICATIONS:  Current Outpatient Prescriptions   Medication Sig Dispense Refill     acetaminophen (TYLENOL)  325 MG tablet Take 325-650 mg by mouth every 4 hours as needed for mild pain       ascorbic acid (VITAMIN C) 500 MG tablet Take 500 mg by mouth daily       bumetanide (BUMEX) 1 MG tablet Take 2 mg by mouth every morning        Calcium Carb-Cholecalciferol (CALCIUM-VITAMIN D) 500-400 MG-UNIT TABS Take 2 tablets by mouth every morning       lisinopril (PRINIVIL/ZESTRIL) 2.5 MG tablet Take 1 tablet (2.5 mg) by mouth daily 30 tablet 3     metoprolol succinate (TOPROL-XL) 50 MG 24 hr tablet Take 1 tablet (50 mg) by mouth 2 times daily 180 tablet 3     Multiple Vitamins-Minerals (MULTIVITAMIN PO) Take 1 tablet by mouth every morning       Multiple Vitamins-Minerals (PRESERVISION AREDS PO) Take 1 capsule by mouth 2 times daily        rivaroxaban ANTICOAGULANT (XARELTO) 20 MG TABS tablet Take 1 tablet (20 mg) by mouth daily (with dinner)       rOPINIRole (REQUIP) 0.5 MG tablet Take 0.5 mg by mouth nightly as needed PT STATES MED ON HOLD       vitamin E 400 UNIT capsule Take 400 Units by mouth daily       [DISCONTINUED] metoprolol succinate (TOPROL-XL) 100 MG 24 hr tablet Take 1 tablet (100 mg) by mouth 2 times daily 60 tablet 11       ALLERGIES     Allergies   Allergen Reactions     Amoxicillin Rash     Codeine GI Disturbance     Stomach pain     Melatonin Nausea     Eyesburned       PAST MEDICAL HISTORY:  Past Medical History:   Diagnosis Date     Aortic regurgitation     mod-severe by echo in 3/2018     Benign essential hypertension      Chronic diastolic heart failure (H)      Other and unspecified hyperlipidemia      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 Father      Family History Negative Mother      Genitourinary Problems Sister      Renal failure     HEART DISEASE Sister      Rhythm issues       SOCIAL HISTORY:  Social History     Social History     Marital status:      Spouse name: N/A     Number of children: 3     Years of education: N/A     Occupational History      Retired     Social History Main Topics     Smoking status: Former Smoker     Packs/day: 0.50     Types: Cigarettes     Quit date: 5/6/1973     Smokeless tobacco: Never Used     Alcohol use Yes      Comment: Socially     Drug use: No     Sexual activity: No     Other Topics Concern     Exercise Yes     Seat Belt Yes     Social History Narrative       Review of Systems:  Skin:  Positive for     Eyes:  Positive for glasses  ENT:  Positive for hearing loss  Respiratory:  Positive for dyspnea on exertion;cough;shortness of breath  Cardiovascular:  Negative for;lightheadedness;dizziness;edema;syncope or near-syncope Positive for;fatigue;exercise intolerance  Gastroenterology: Positive for poor appetite (felt nauseas w/dig)  Genitourinary:  Positive for urinary frequency  Musculoskeletal:  Positive for arthritis;joint swelling  Neurologic:  Positive for numbness or tingling of feet  Psychiatric:  Positive for sleep disturbances;anxiety  Heme/Lymph/Imm:  Positive for hay fever  Endocrine:  Negative      Physical Exam:  Vitals: /74  Pulse 71  Ht 1.524 m (5')  Wt 36.7 kg (81 lb)  BMI 15.82 kg/m2    Constitutional:    thin;frail uses a walker    Skin:  warm and dry to the touch   ppm incision healing    Head:  normocephalic        Eyes:  pupils equal and round        ENT:           Neck:  JVP normal        Chest:  clear to auscultation        Cardiac:           holosystolic murmur        Abdomen:  abdomen soft         Vascular:       right radial artery;1+             left radial artery;1+                  Extremities and Back:  no edema        Neurological:  no gross motor deficits          Recent Lab Results:  LIPID RESULTS:  Lab Results   Component Value Date    CHOL 107 10/07/2016    HDL 22 (L) 10/07/2016    LDL 65 10/07/2016    TRIG 100 10/07/2016    CHOLHDLRATIO 3.3 08/29/2013       LIVER ENZYME RESULTS:  Lab Results   Component Value Date    AST 28 06/21/2018    ALT 34 06/21/2018       CBC RESULTS:  Lab Results   Component Value Date    WBC 6.0 07/16/2018    RBC 4.36 07/16/2018    HGB 12.6 07/16/2018    HCT 38.8 07/16/2018    MCV 89 07/16/2018    MCH 28.9 07/16/2018    MCHC 32.5 07/16/2018    RDW 17.2 (H) 07/16/2018     07/16/2018       BMP RESULTS:  Lab Results   Component Value Date     07/16/2018    POTASSIUM 4.4 07/16/2018    CHLORIDE 103 07/16/2018    CO2 32 07/16/2018    ANIONGAP 4 07/16/2018    GLC 97 07/16/2018    BUN 28 07/16/2018    CR 0.82 07/16/2018    GFRESTIMATED 66 07/16/2018    GFRESTBLACK 79 07/16/2018    KARTHIK 9.6 07/16/2018              Thank you for allowing me to participate in the care of your patient.    Sincerely,     LUCINDA Sandoval [facility]     Bilateral Helical Rim Advancement Flap Text: The defect edges were debeveled with a #15 blade scalpel.  Given the location of the defect and the proximity to free margins (helical rim) a bilateral helical rim advancement flap was deemed most appropriate.  Using a sterile surgical marker, the appropriate advancement flaps were drawn incorporating the defect and placing the expected incisions between the helical rim and antihelix where possible.  The area thus outlined was incised through and through with a #15 scalpel blade.  With a skin hook and iris scissors, the flaps were gently and sharply undermined and freed up.

## 2021-03-09 NOTE — TELEPHONE ENCOUNTER
Bumex      Last Written Prescription Date: 3/6/17  Last Fill Quantity: 60, # refills: 1  Last Office Visit with [facility] or [facility] prescribing provider: 5/1/17    Per 5/1/17 dict-  Increase the bumex to 2 pills in the morning and 1 pill in the afternoon.           Next 5 appointments (look out 90 days)     May 30, 2017  9:15 AM CDT   Return Visit with Fabiano Bower MD   [facility] ([facility])    [address] [address] [address] [phone]                   Potassium   Date Value Ref Range Status   04/17/2017 4.1 3.4 - 5.3 mmol/L Final     Creatinine   Date Value Ref Range Status   04/17/2017 0.78 0.52 - 1.04 mg/dL Final     BP Readings from Last 3 Encounters:   05/02/17 152/84   05/01/17 178/82   04/07/17 118/60                      nasal endoscopy with debridement of nasal cavity, removal nasal bone graft, complex closure nasal dorsum

## 2021-12-30 NOTE — PLAN OF CARE
Problem: Patient Care Overview  Goal: Plan of Care/Patient Progress Review  Outcome: No Change  Stand by assist. 2 gram sodium diet. Left arm IV saline locked. Tele A Fib RVR, ST depression with inverted T waves. Potassium lab to be drawn in the morning. Tylenol given for pain in her legs. She complains of restless legs and wanted to ambulated in the hallway. Will continue to monitor.       Tissue Cultured Epidermal Autograft Text: The defect edges were debeveled with a #15 scalpel blade.  Given the location of the defect, shape of the defect and the proximity to free margins a tissue cultured epidermal autograft was deemed most appropriate.  The graft was then trimmed to fit the size of the defect.  The graft was then placed in the primary defect and oriented appropriately.

## 2023-04-21 NOTE — PATIENT INSTRUCTIONS
Increase the bumex to 2 pills in the morning and 1 pill in the afternoon.    Patient would like to know if notes from  were rec'd. Please call back.    's office is recommending an MRI and patient would like 's insight.

## 2024-04-10 NOTE — PROGRESS NOTES
SUBJECTIVE:                                                    Ana Cristina Dominguez is a 86 year old female who presents to clinic today for the following health issues:    Varicose veins  Patient complains of varicose veins in the lower extremities. She denies any pain. No edema is accompanied with the varicose veins.     Abdominal mass  Patient reports that about 6 months ago the right side of her abdomen started protruding and was hard. She was seen by Dr. Davison and had an abdominal US performed 3/1/2017 which did not reveal anything significant and was therefore told the mass is likely due to collapsed muscles. A couple of weeks ago the protruding area appeared to now be on the left side of her abdomen. She had a abdominal CT scan performed 10/5/2016 that revealed nothing of significance. Mass is not painful. Patient did mention that she experienced similar symptoms when she was pregnant long ago.     Restless leg syndrome  Inquired if she can increase her Requip dosage. She is currently taking  0.5 mg daily. She will take one before bed but will then wake up 3 hours later and not be able to sleep.     Past/recent records reviewed and discussed for:  -She is going to have some skin cancer removed and inquired how long she should hold xarelto. She was started on the med for a-fib. Recommended holding medication 7 days prior to procedure.     Problem list and histories reviewed & adjusted, as indicated.  Additional history: as documented    ROS:  REVIEW OF SYSTEMS: The following systems have been completely reviewed and are negative except as noted in the HPI:   Constitutional, respiratory, cardiovascular, gastrointestinal, genitourinary, musculoskeletal, dermatologic, hematologic, and neurologic systems.    SKIN: POSITIVE for varicose veins on lower legs  GI: POSITIVE for abdominal legs    This document serves as a record of the services and decisions personally performed and made by Dez Urban MD. It was  Shingrix vaccines recommended at age 50  -Pneumonia vaccine recommended at age 65    SCREENINGS:  -Screening PSA completed in 9/2022 and was normal, will plan to resume at age 50.  -Screening colonoscopy last completed 6/2021, reports scheduled for repeat in July 2024    LIFESTYLE MEASURES  -consider increasing protein intake provided no issues with kidneys to 1 gram per 1 pound of ideal body weight per not to exceed 150 gram per day. May have to supplement with a protein powder to achieve this goal.  -make sure you are avoiding refined carbs such as breads, pasta, cereal, candy, soda,  nutrition bars, granola, chips, and sugar sweetened beverages.      -eat 5- 7 servings daily of veggies,  healthy protein such as chicken, fish,  beans, and eggs, and include healthy fats in your diet such as seeds, nuts, olive oil, avocados, and salmon.   -exercise 4 - 6 days per week as you are able, 150 minutes total weekly divided up is recommended with 3-4 of those days including resistance/strength training.  -Vitamin D is recommended at 1000 - 5000 IU international units daily.   -Always wear sunscreen when you have sun exposure.  -64 oz of water is recommended daily.  -Dental visits recommended every 6 months.  -Eye exam recommended every 2 years, for those with vision problems every year.      INTERMITTENT FASTING  You may want to consider intermittent fasting.    There are several different approaches to this, but a straightforward and fairly easy one is to refrain from eating any calories for 12 hours every day. For example, from 8 PM until 8 AM, have nothing but water, or black coffee or tea in the morning. No cream or sugar should be used if you have coffee or tea. Make  created on his behalf by Tricia Pineda, a trained medical scribe. The creation of this document is based on the provider's statements to the medical scribe.  Tricia Pineda January 19, 2018 1:13 PM     OBJECTIVE:     /60  Pulse 92  Temp 97.7  F (36.5  C) (Oral)  Ht 1.524 m (5')  Wt 42.2 kg (93 lb)  SpO2 94%  BMI 18.16 kg/m2  Body mass index is 18.16 kg/(m^2).    GENERAL: healthy, alert and no distress  EYES: Eyes grossly normal to inspection, PERRL and conjunctivae and sclerae normal  RESP: lungs clear to auscultation - no rales, rhonchi or wheezes  CV: regular rate and rhythm, normal S1 S2, no S3 or S4, no murmur, click or rub, no peripheral edema and peripheral pulses strong  ABDOMEN: soft, nontender, no hepatosplenomegaly and bowel sounds normal. Abdominal hernia very apparent, especially with valsalva.  MS: no gross musculoskeletal defects noted, no edema  SKIN: no suspicious lesions or rashes. Non-tender, non-thrombosed Varicose veins on lower extremities. No edema or erythema present  NEURO: Normal strength and tone, mentation intact and speech normal  PSYCH: mentation appears normal, affect normal/bright    ASSESSMENT/PLAN:   (K46.9) Abdominal hernia without obstruction and without gangrene, recurrence not specified, unspecified hernia type  (primary encounter diagnosis)  Comment:Abdominal mass appears to be a worsening from previous known hernia. No signs of intra-abdominal mass. Reassurance provided. Will continue to monitor.      (I83.93) Varicose veins of both lower extremities  Comment:  No pain or edema present. Patient does not want to under go any surgery. Recommend stockings if desired.     (G25.81) RLS (restless legs syndrome)  Comment: Okay to raise Requip dosage to 2 tablets. May take 2 tablets before bed, or may take 1 tablet before bed and another tablet later at night.     (I48.2) Chronic atrial fibrillation (H)  Comment: Stable. No symptoms present today. Recommend holding Xarelto  circumference, and can contribute to weight loss.    If after one month of 12 hour intermittent fasting, you feel comfortable increasing the hours, you may go for 14 hours, and even up to 16 hours. For example, stopped eating at 8 PM and don't eat again until noon the next day, breaking the fast at lunchtime. Snacking during the period of fast will break the fast and will not result in the same outcomes.    It is not recommended that you go beyond 16 hours of fasting.    "for 7 days prior to procedure.     FUTURE APPOINTMENTS:       - Follow-up visit in     Patient Instructions   The \"lump\" that you are feeling in your abdomen seems to be from a worsening of the previously known abdominal hernia, rather than any new mass or worrisome finding.     The varicose veins may be left untreated if no pain or bad swelling.     You may try taking two of the Requip tablets at night, either both at bedtime, or one at bedtime and one later in the night.     Okay to stop taking Xarelto about 7 days prior to any procedures.     If you ever need to reach me, best to call the clinic and ask to speak with the \"Triage nurse\".     The information in this document, created by the medical scribe for me, accurately reflects the services I personally performed and the decisions made by me. I have reviewed and approved this document for accuracy prior to leaving the patient care area.  January 19, 2018 1:13 PM    Dez Urban MD  [facility]      "

## 2025-05-18 NOTE — MR AVS SNAPSHOT
After Visit Summary   5/9/2018    Ana Cristina Dominguez    MRN: [id number]           Patient Information     Date Of Birth          6/6/1931        Visit Information        Provider Department      5/9/2018 1:45 PM [facility]        Today's Diagnoses     Localized edema    -  1    Edema, unspecified type        Ischemic cardiomyopathy          Care Instructions    May finish old Bumex 4 tablets in AM, 2 in PM.          Follow-ups after your visit        Additional Services     Follow-Up with Cardiologist                 Follow-up notes from your care team     Return in about 4 weeks (around 6/6/2018).      Your next 10 appointments already scheduled     May 21, 2018  9:30 AM CDT   [facility]    [address] [place] [id number] [phone]            May 30, 2018  9:00 AM CDT   L/Vision Eval with Darlyn Santillan OT   [facility] ([facility])    [address] [place] [address]  [address] [place] [id number] [phone]              Future tests that were ordered for you today     Open Future Orders        Priority Expected Expires Ordered    NM Lexiscan stress test (nuc card) Routine 5/16/2018 5/9/2019 5/9/2018    Follow-Up with Cardiologist Routine 7/8/2018 5/9/2019 5/9/2018    US Lower Extremity Venous Duplex Bilateral Routine 5/16/2018 5/9/2019 5/9/2018            Who to contact     If you have questions or need follow up information about today's clinic visit or your schedule please contact [facility] directly at [phone].  Normal or non-critical lab and imaging results will be communicated to you by MyChart, letter or phone within 4 business days after the clinic has received the results. If you do not hear from us within 7 days, please contact the clinic  Instructed to call out patient scheduling first thing tomorrow morning to schedule his doppler.     through [employer] or phone. If you have a critical or abnormal lab result, we will notify you by phone as soon as possible.  Submit refill requests through [employer] or call your pharmacy and they will forward the refill request to us. Please allow 3 business days for your refill to be completed.          Additional Information About Your Visit        [employer] Information     [employer] gives you secure access to your electronic health record. If you see a primary care provider, you can also send messages to your care team and make appointments. If you have questions, please call your primary care clinic.  If you do not have a primary care provider, please call [phone] and they will assist you.        Care EveryWhere ID     This is your Care EveryWhere ID. This could be used by other organizations to access your [place] medical records  [id number]        Your Vitals Were     Pulse Pulse Oximetry BMI (Body Mass Index)             76 98% 17.6 kg/m2          Blood Pressure from Last 3 Encounters:   05/09/18 114/60   03/20/18 124/76   03/16/18 118/62    Weight from Last 3 Encounters:   05/09/18 90 lb 1.6 oz (40.9 kg)   03/20/18 96 lb 11.2 oz (43.9 kg)   03/16/18 99 lb (44.9 kg)                 Today's Medication Changes          These changes are accurate as of 5/9/18  2:27 PM.  If you have any questions, ask your nurse or doctor.               These medicines have changed or have updated prescriptions.        Dose/Directions    bumetanide 1 MG tablet   Commonly known as:  BUMEX   This may have changed:    - medication strength  - additional instructions   Used for:  Edema, unspecified type   Changed by:  Fabiano Bower MD        Take 2 tabs each AM and 1 tab each evening   Quantity:  90 tablet   Refills:  3       rOPINIRole 0.5 MG tablet   Commonly known as:  REQUIP   This may have changed:  See the new instructions.   Used for:  RLS (restless legs syndrome)        TAKE 1 TABLET BY MOUTH AT BEDTIME    Quantity:  30 tablet   Refills:  5            Where to get your medicines      These medications were sent to [facility]/pharmacy #[id number] - [place], [place] - [address]  [id number], [place] [address]     Phone:  [phone]     bumetanide 1 MG tablet                Primary Care Provider Office Phone # Fax #    Dez Urban [phone]       [address]  [facility] [address]        Equal Access to Services     KRYSTLE ESPINOSA : Hadii aad ku hadasho Soomaali, waaxda luqadaha, qaybta kaalmada adeegyada, waxay idiin hayaan adeeg kharash la'hank . So [facility] [phone].    ATENCIÓN: Si habla español, tiene a dominguez disposición servicios gratuitos de asistencia lingüística. [facility] [phone].    We comply with applicable federal civil rights laws and [place] laws. We do not discriminate on the basis of race, color, national origin, age, disability, sex, sexual orientation, or gender identity.            Thank you!     Thank you for choosing [facility]   [place]  for your care. Our goal is always to provide you with excellent care. Hearing back from our patients is one way we can continue to improve our services. Please take a few minutes to complete the written survey that you may receive in the mail after your visit with us. Thank you!             Your Updated Medication List - Protect others around you: Learn how to safely use, store and throw away your medicines at www.disposemymeds.org.          This list is accurate as of 5/9/18  2:27 PM.  Always use your most recent med list.                   Brand Name Dispense Instructions for use Diagnosis    ascorbic acid 500 MG tablet    VITAMIN C    100    1 TABLET DAILY        bumetanide 1 MG tablet    BUMEX    90 tablet    Take 2 tabs each AM and 1 tab each evening    Edema, unspecified type       CALCIUM 600 + D 600-200 MG-UNIT Tabs      2 qd        diltiazem 120 MG Cp12 12 hr SR capsule    CARDIZEM  SR    180 capsule    TAKE ONE CAPSULE BY MOUTH TWICE A DAY    Chronic atrial fibrillation (H)       metoprolol succinate 50 MG 24 hr tablet    TOPROL-XL    180 tablet    Take 2 tablets (100 mg) by mouth daily    Essential hypertension, Chronic atrial fibrillation (H)       MULTIVITAMIN TABS   OR      1 qd        olopatadine 0.1 % ophthalmic solution    PATANOL     Place 1 drop into both eyes 2 times daily as needed for allergies During Spring        predniSONE 1 MG tablet    DELTASONE    120 tablet    3mg PO daily    PMR (polymyalgia rheumatica) (H)       PRESERVISION AREDS PO      Take 1 capsule by mouth daily        rOPINIRole 0.5 MG tablet    REQUIP    30 tablet    TAKE 1 TABLET BY MOUTH AT BEDTIME    RLS (restless legs syndrome)       TYLENOL PO      Take by mouth every 4 hours as needed for mild pain or fever        vitamin E 400 UNIT capsule     100    ONE CAP PO QD        XARELTO 20 MG Tabs tablet   Generic drug:  rivaroxaban ANTICOAGULANT     30 tablet    TAKE 1 TABLET (20 MG) BY MOUTH DAILY (WITH DINNER)    Chronic atrial fibrillation (H)

## (undated) RX ORDER — CLINDAMYCIN PHOSPHATE 900 MG/50ML
INJECTION, SOLUTION INTRAVENOUS
Status: DISPENSED
Start: 2018-01-01

## (undated) RX ORDER — HEPARIN SODIUM 1000 [USP'U]/ML
INJECTION, SOLUTION INTRAVENOUS; SUBCUTANEOUS
Status: DISPENSED
Start: 2018-01-01

## (undated) RX ORDER — BUPIVACAINE HYDROCHLORIDE 2.5 MG/ML
INJECTION, SOLUTION EPIDURAL; INFILTRATION; INTRACAUDAL
Status: DISPENSED
Start: 2018-01-01

## (undated) RX ORDER — FENTANYL CITRATE 50 UG/ML
INJECTION, SOLUTION INTRAMUSCULAR; INTRAVENOUS
Status: DISPENSED
Start: 2018-01-01

## (undated) RX ORDER — LIDOCAINE HYDROCHLORIDE 10 MG/ML
INJECTION, SOLUTION EPIDURAL; INFILTRATION; INTRACAUDAL; PERINEURAL
Status: DISPENSED
Start: 2018-01-01